# Patient Record
Sex: MALE | Race: WHITE | NOT HISPANIC OR LATINO
[De-identification: names, ages, dates, MRNs, and addresses within clinical notes are randomized per-mention and may not be internally consistent; named-entity substitution may affect disease eponyms.]

---

## 2017-02-10 ENCOUNTER — NON-APPOINTMENT (OUTPATIENT)
Age: 77
End: 2017-02-10

## 2017-02-10 ENCOUNTER — APPOINTMENT (OUTPATIENT)
Dept: CARDIOLOGY | Facility: CLINIC | Age: 77
End: 2017-02-10

## 2017-02-10 VITALS
BODY MASS INDEX: 28.93 KG/M2 | HEART RATE: 56 BPM | DIASTOLIC BLOOD PRESSURE: 69 MMHG | SYSTOLIC BLOOD PRESSURE: 118 MMHG | HEIGHT: 66 IN | WEIGHT: 180 LBS | OXYGEN SATURATION: 96 %

## 2017-02-10 DIAGNOSIS — Z87.898 PERSONAL HISTORY OF OTHER SPECIFIED CONDITIONS: ICD-10-CM

## 2017-02-10 DIAGNOSIS — F15.90 OTHER STIMULANT USE, UNSPECIFIED, UNCOMPLICATED: ICD-10-CM

## 2017-02-10 DIAGNOSIS — Z87.09 PERSONAL HISTORY OF OTHER DISEASES OF THE RESPIRATORY SYSTEM: ICD-10-CM

## 2017-02-10 DIAGNOSIS — N40.0 BENIGN PROSTATIC HYPERPLASIA WITHOUT LOWER URINARY TRACT SYMPMS: ICD-10-CM

## 2017-02-10 DIAGNOSIS — Z87.39 PERSONAL HISTORY OF OTHER DISEASES OF THE MUSCULOSKELETAL SYSTEM AND CONNECTIVE TISSUE: ICD-10-CM

## 2017-02-10 RX ORDER — MEMANTINE HYDROCHLORIDE 10 MG/1
10 TABLET ORAL
Qty: 180 | Refills: 3 | Status: ACTIVE | COMMUNITY

## 2017-03-03 ENCOUNTER — APPOINTMENT (OUTPATIENT)
Dept: CARDIOLOGY | Facility: CLINIC | Age: 77
End: 2017-03-03

## 2017-03-20 ENCOUNTER — APPOINTMENT (OUTPATIENT)
Dept: CARDIOLOGY | Facility: CLINIC | Age: 77
End: 2017-03-20

## 2017-07-10 ENCOUNTER — RESULT REVIEW (OUTPATIENT)
Age: 77
End: 2017-07-10

## 2017-07-10 ENCOUNTER — OUTPATIENT (OUTPATIENT)
Dept: OUTPATIENT SERVICES | Facility: HOSPITAL | Age: 77
LOS: 1 days | Discharge: HOME | End: 2017-07-10
Payer: MEDICARE

## 2017-07-10 ENCOUNTER — TRANSCRIPTION ENCOUNTER (OUTPATIENT)
Age: 77
End: 2017-07-10

## 2017-07-10 DIAGNOSIS — R13.10 DYSPHAGIA, UNSPECIFIED: ICD-10-CM

## 2017-07-10 DIAGNOSIS — Z12.11 ENCOUNTER FOR SCREENING FOR MALIGNANT NEOPLASM OF COLON: ICD-10-CM

## 2017-07-10 PROCEDURE — 88313 SPECIAL STAINS GROUP 2: CPT | Mod: 26

## 2017-07-10 PROCEDURE — 88305 TISSUE EXAM BY PATHOLOGIST: CPT | Mod: 26

## 2017-07-10 PROCEDURE — 88305 TISSUE EXAM BY PATHOLOGIST: CPT

## 2017-07-10 PROCEDURE — 45385 COLONOSCOPY W/LESION REMOVAL: CPT | Mod: PT

## 2017-07-10 PROCEDURE — 88312 SPECIAL STAINS GROUP 1: CPT

## 2017-07-10 PROCEDURE — 43239 EGD BIOPSY SINGLE/MULTIPLE: CPT

## 2017-07-10 PROCEDURE — 88312 SPECIAL STAINS GROUP 1: CPT | Mod: 26

## 2017-07-10 PROCEDURE — 88313 SPECIAL STAINS GROUP 2: CPT

## 2017-07-14 DIAGNOSIS — D12.3 BENIGN NEOPLASM OF TRANSVERSE COLON: ICD-10-CM

## 2017-07-14 DIAGNOSIS — K64.4 RESIDUAL HEMORRHOIDAL SKIN TAGS: ICD-10-CM

## 2017-07-14 DIAGNOSIS — K26.9 DUODENAL ULCER, UNSPECIFIED AS ACUTE OR CHRONIC, WITHOUT HEMORRHAGE OR PERFORATION: ICD-10-CM

## 2017-07-14 DIAGNOSIS — I10 ESSENTIAL (PRIMARY) HYPERTENSION: ICD-10-CM

## 2017-07-14 DIAGNOSIS — Z12.11 ENCOUNTER FOR SCREENING FOR MALIGNANT NEOPLASM OF COLON: ICD-10-CM

## 2017-07-14 DIAGNOSIS — K29.50 UNSPECIFIED CHRONIC GASTRITIS WITHOUT BLEEDING: ICD-10-CM

## 2017-07-14 DIAGNOSIS — B96.81 HELICOBACTER PYLORI [H. PYLORI] AS THE CAUSE OF DISEASES CLASSIFIED ELSEWHERE: ICD-10-CM

## 2017-07-14 DIAGNOSIS — K21.9 GASTRO-ESOPHAGEAL REFLUX DISEASE WITHOUT ESOPHAGITIS: ICD-10-CM

## 2017-07-14 DIAGNOSIS — J44.9 CHRONIC OBSTRUCTIVE PULMONARY DISEASE, UNSPECIFIED: ICD-10-CM

## 2017-07-14 DIAGNOSIS — K64.8 OTHER HEMORRHOIDS: ICD-10-CM

## 2017-07-14 DIAGNOSIS — Z79.82 LONG TERM (CURRENT) USE OF ASPIRIN: ICD-10-CM

## 2017-07-14 DIAGNOSIS — K29.80 DUODENITIS WITHOUT BLEEDING: ICD-10-CM

## 2017-08-02 ENCOUNTER — APPOINTMENT (OUTPATIENT)
Dept: CARDIOLOGY | Facility: CLINIC | Age: 77
End: 2017-08-02

## 2017-09-07 ENCOUNTER — NON-APPOINTMENT (OUTPATIENT)
Age: 77
End: 2017-09-07

## 2017-09-07 ENCOUNTER — APPOINTMENT (OUTPATIENT)
Dept: CARDIOLOGY | Facility: CLINIC | Age: 77
End: 2017-09-07
Payer: MEDICARE

## 2017-09-07 VITALS
WEIGHT: 180 LBS | SYSTOLIC BLOOD PRESSURE: 147 MMHG | BODY MASS INDEX: 28.93 KG/M2 | OXYGEN SATURATION: 93 % | HEART RATE: 66 BPM | DIASTOLIC BLOOD PRESSURE: 76 MMHG | HEIGHT: 66 IN

## 2017-09-07 PROCEDURE — 93000 ELECTROCARDIOGRAM COMPLETE: CPT

## 2017-09-07 PROCEDURE — 99214 OFFICE O/P EST MOD 30 MIN: CPT

## 2018-10-25 ENCOUNTER — APPOINTMENT (OUTPATIENT)
Dept: INTERNAL MEDICINE | Facility: CLINIC | Age: 78
End: 2018-10-25
Payer: MEDICARE

## 2018-10-25 VITALS
WEIGHT: 162 LBS | SYSTOLIC BLOOD PRESSURE: 154 MMHG | TEMPERATURE: 98.7 F | OXYGEN SATURATION: 98 % | HEIGHT: 66 IN | BODY MASS INDEX: 26.03 KG/M2 | DIASTOLIC BLOOD PRESSURE: 68 MMHG | HEART RATE: 74 BPM

## 2018-10-25 DIAGNOSIS — Z87.891 PERSONAL HISTORY OF NICOTINE DEPENDENCE: ICD-10-CM

## 2018-10-25 DIAGNOSIS — R07.89 OTHER CHEST PAIN: ICD-10-CM

## 2018-10-25 DIAGNOSIS — R01.1 CARDIAC MURMUR, UNSPECIFIED: ICD-10-CM

## 2018-10-25 PROCEDURE — 99204 OFFICE O/P NEW MOD 45 MIN: CPT

## 2018-10-25 RX ORDER — AZITHROMYCIN 250 MG/1
250 TABLET, FILM COATED ORAL
Qty: 6 | Refills: 0 | Status: DISCONTINUED | COMMUNITY
Start: 2017-09-07 | End: 2018-10-25

## 2018-10-25 NOTE — PHYSICAL EXAM

## 2018-10-25 NOTE — HISTORY OF PRESENT ILLNESS
[FreeTextEntry1] : establish care [de-identified] : Presents to establish care. Sees cardiologist in-system. Feels well today. No acute complaints other than constipation. Received flu vaccine in [place] this season.

## 2018-10-25 NOTE — ASSESSMENT
[FreeTextEntry1] : \par 78 year old man with dementia brought in by daughter to establish care.\par \par 1. Dementia: cont f/u with neurologist\par \par 2. HCM: flu shot up to date; colonoscopy up to date with GI; check PSA today

## 2018-10-31 LAB
25(OH)D3 SERPL-MCNC: 29.2 NG/ML
ALBUMIN SERPL ELPH-MCNC: 4.7 G/DL
ALP BLD-CCNC: 64 U/L
ALT SERPL-CCNC: 17 U/L
ANION GAP SERPL CALC-SCNC: 16 MMOL/L
AST SERPL-CCNC: 23 U/L
BASOPHILS # BLD AUTO: 0.03 K/UL
BASOPHILS NFR BLD AUTO: 0.5 %
BILIRUB SERPL-MCNC: 0.5 MG/DL
BUN SERPL-MCNC: 14 MG/DL
CALCIUM SERPL-MCNC: 10 MG/DL
CHLORIDE SERPL-SCNC: 104 MMOL/L
CHOLEST SERPL-MCNC: 130 MG/DL
CHOLEST/HDLC SERPL: 3.1 RATIO
CO2 SERPL-SCNC: 22 MMOL/L
CREAT SERPL-MCNC: 1.08 MG/DL
EOSINOPHIL # BLD AUTO: 0.09 K/UL
EOSINOPHIL NFR BLD AUTO: 1.5 %
GLUCOSE SERPL-MCNC: 99 MG/DL
HBA1C MFR BLD HPLC: 5.6 %
HCT VFR BLD CALC: 43.3 %
HCV AB SER QL: NONREACTIVE
HCV S/CO RATIO: 0.14 S/CO
HDLC SERPL-MCNC: 42 MG/DL
HGB BLD-MCNC: 14 G/DL
HIV1+2 AB SPEC QL IA.RAPID: NONREACTIVE
IMM GRANULOCYTES NFR BLD AUTO: 0.2 %
LDLC SERPL CALC-MCNC: 68 MG/DL
LYMPHOCYTES # BLD AUTO: 1.27 K/UL
LYMPHOCYTES NFR BLD AUTO: 20.7 %
MAN DIFF?: NORMAL
MCHC RBC-ENTMCNC: 29 PG
MCHC RBC-ENTMCNC: 32.3 GM/DL
MCV RBC AUTO: 89.8 FL
MONOCYTES # BLD AUTO: 0.55 K/UL
MONOCYTES NFR BLD AUTO: 9 %
NEUTROPHILS # BLD AUTO: 4.18 K/UL
NEUTROPHILS NFR BLD AUTO: 68.1 %
PLATELET # BLD AUTO: 232 K/UL
POTASSIUM SERPL-SCNC: 4 MMOL/L
PROT SERPL-MCNC: 7.3 G/DL
PSA SERPL-MCNC: 1.57 NG/ML
RBC # BLD: 4.82 M/UL
RBC # FLD: 14.4 %
SODIUM SERPL-SCNC: 142 MMOL/L
TRIGL SERPL-MCNC: 102 MG/DL
TSH SERPL-ACNC: 1.11 UIU/ML
WBC # FLD AUTO: 6.13 K/UL

## 2018-11-02 ENCOUNTER — MEDICATION RENEWAL (OUTPATIENT)
Age: 78
End: 2018-11-02

## 2018-11-02 RX ORDER — ALBUTEROL SULFATE 90 UG/1
AEROSOL, METERED RESPIRATORY (INHALATION)
Refills: 0 | Status: DISCONTINUED | COMMUNITY
End: 2018-11-02

## 2018-12-12 ENCOUNTER — APPOINTMENT (OUTPATIENT)
Dept: CARDIOLOGY | Facility: CLINIC | Age: 78
End: 2018-12-12
Payer: MEDICARE

## 2018-12-12 ENCOUNTER — NON-APPOINTMENT (OUTPATIENT)
Age: 78
End: 2018-12-12

## 2018-12-12 VITALS
HEART RATE: 60 BPM | DIASTOLIC BLOOD PRESSURE: 68 MMHG | SYSTOLIC BLOOD PRESSURE: 119 MMHG | WEIGHT: 172 LBS | OXYGEN SATURATION: 95 % | HEIGHT: 66 IN | RESPIRATION RATE: 16 BRPM | BODY MASS INDEX: 27.64 KG/M2

## 2018-12-12 PROCEDURE — 93000 ELECTROCARDIOGRAM COMPLETE: CPT

## 2018-12-12 PROCEDURE — 99214 OFFICE O/P EST MOD 30 MIN: CPT

## 2018-12-12 NOTE — PHYSICAL EXAM
[General Appearance - Well Developed] : well developed [Normal Appearance] : normal appearance [Well Groomed] : well groomed [General Appearance - Well Nourished] : well nourished [No Deformities] : no deformities [General Appearance - In No Acute Distress] : no acute distress [Normal Conjunctiva] : the conjunctiva exhibited no abnormalities [Eyelids - No Xanthelasma] : the eyelids demonstrated no xanthelasmas [Normal Oral Mucosa] : normal oral mucosa [No Oral Pallor] : no oral pallor [No Oral Cyanosis] : no oral cyanosis [Normal Jugular Venous A Waves Present] : normal jugular venous A waves present [Normal Jugular Venous V Waves Present] : normal jugular venous V waves present [No Jugular Venous Ramírez A Waves] : no jugular venous ramírez A waves [Respiration, Rhythm And Depth] : normal respiratory rhythm and effort [Exaggerated Use Of Accessory Muscles For Inspiration] : no accessory muscle use [Auscultation Breath Sounds / Voice Sounds] : lungs were clear to auscultation bilaterally [Heart Rate And Rhythm] : heart rate and rhythm were normal [Heart Sounds] : normal S1 and S2 [Arterial Pulses Normal] : the arterial pulses were normal [Edema] : no peripheral edema present [Systolic grade ___/6] : A grade [unfilled]/6 systolic murmur was heard. [Abdomen Soft] : soft [Abdomen Tenderness] : non-tender [Abdomen Mass (___ Cm)] : no abdominal mass palpated [Abnormal Walk] : normal gait [Gait - Sufficient For Exercise Testing] : the gait was sufficient for exercise testing [Nail Clubbing] : no clubbing of the fingernails [Cyanosis, Localized] : no localized cyanosis [Petechial Hemorrhages (___cm)] : no petechial hemorrhages [Skin Color & Pigmentation] : normal skin color and pigmentation [] : no rash [No Venous Stasis] : no venous stasis [Skin Lesions] : no skin lesions [No Skin Ulcers] : no skin ulcer [No Xanthoma] : no  xanthoma was observed [Oriented To Time, Place, And Person] : oriented to person, place, and time [Affect] : the affect was normal [Mood] : the mood was normal [No Anxiety] : not feeling anxious

## 2018-12-12 NOTE — REASON FOR VISIT
[Follow-Up - Clinic] : a clinic follow-up of [Aortic Stenosis] : aortic stenosis [Hyperlipidemia] : hyperlipidemia [Medication Management] : Medication management

## 2018-12-26 ENCOUNTER — APPOINTMENT (OUTPATIENT)
Dept: CARDIOLOGY | Facility: CLINIC | Age: 78
End: 2018-12-26
Payer: MEDICARE

## 2018-12-26 PROCEDURE — 93306 TTE W/DOPPLER COMPLETE: CPT

## 2018-12-26 NOTE — REVIEW OF SYSTEMS
[Dyspnea on exertion] : dyspnea during exertion [Negative] : Heme/Lymph [Feeling Fatigued] : not feeling fatigued [Shortness Of Breath] : no shortness of breath [Lower Ext Edema] : no extremity edema

## 2018-12-26 NOTE — HISTORY OF PRESENT ILLNESS
[FreeTextEntry1] : Puma is here for f/u\par Continued Mild ACE, christiano up inclinces and stairs\par \par No chest pressure\par No syncope\par No palpitations\par No LE edema\par

## 2019-01-08 ENCOUNTER — LABORATORY RESULT (OUTPATIENT)
Age: 79
End: 2019-01-08

## 2019-01-08 ENCOUNTER — APPOINTMENT (OUTPATIENT)
Dept: CARDIOTHORACIC SURGERY | Facility: CLINIC | Age: 79
End: 2019-01-08
Payer: MEDICARE

## 2019-01-08 ENCOUNTER — NON-APPOINTMENT (OUTPATIENT)
Age: 79
End: 2019-01-08

## 2019-01-08 VITALS
SYSTOLIC BLOOD PRESSURE: 160 MMHG | OXYGEN SATURATION: 96 % | DIASTOLIC BLOOD PRESSURE: 68 MMHG | WEIGHT: 176 LBS | HEIGHT: 66 IN | TEMPERATURE: 98.3 F | HEART RATE: 62 BPM | BODY MASS INDEX: 28.28 KG/M2 | RESPIRATION RATE: 16 BRPM

## 2019-01-08 VITALS — DIASTOLIC BLOOD PRESSURE: 68 MMHG | SYSTOLIC BLOOD PRESSURE: 163 MMHG

## 2019-01-08 DIAGNOSIS — Z80.0 FAMILY HISTORY OF MALIGNANT NEOPLASM OF DIGESTIVE ORGANS: ICD-10-CM

## 2019-01-08 LAB
ALBUMIN SERPL ELPH-MCNC: 4.9 G/DL
ALP BLD-CCNC: 71 U/L
ALT SERPL-CCNC: 21 U/L
ANION GAP SERPL CALC-SCNC: 10 MMOL/L
AST SERPL-CCNC: 22 U/L
BASOPHILS # BLD AUTO: 0.07 K/UL
BASOPHILS NFR BLD AUTO: 1 %
BILIRUB SERPL-MCNC: 0.4 MG/DL
BUN SERPL-MCNC: 16 MG/DL
CALCIUM SERPL-MCNC: 10.1 MG/DL
CHLORIDE SERPL-SCNC: 105 MMOL/L
CO2 SERPL-SCNC: 29 MMOL/L
CREAT SERPL-MCNC: 1.07 MG/DL
EOSINOPHIL # BLD AUTO: 0.3 K/UL
EOSINOPHIL NFR BLD AUTO: 4.6 %
GLUCOSE SERPL-MCNC: 129 MG/DL
HCT VFR BLD CALC: 46.3 %
HGB BLD-MCNC: 15.4 G/DL
LYMPHOCYTES # BLD AUTO: 1.84 K/UL
LYMPHOCYTES NFR BLD AUTO: 27.8 %
MAN DIFF?: NO
MCHC RBC-ENTMCNC: 28.8 PG
MCHC RBC-ENTMCNC: 33.2 GM/DL
MCV RBC AUTO: 86.8 FL
MONOCYTES # BLD AUTO: 0.45 K/UL
MONOCYTES NFR BLD AUTO: 6.8 %
NEUTROPHILS # BLD AUTO: 3.94 K/UL
NEUTROPHILS NFR BLD AUTO: 59.8 %
NT-PROBNP SERPL-MCNC: 262 PG/ML
PLATELET # BLD AUTO: 206 K/UL
POTASSIUM SERPL-SCNC: 4.6 MMOL/L
PROT SERPL-MCNC: 7.5 G/DL
RBC # BLD: 5.33 M/UL
RBC # FLD: 12.3 %
SODIUM SERPL-SCNC: 144 MMOL/L
WBC # FLD AUTO: 6.59 K/UL

## 2019-01-08 PROCEDURE — 93000 ELECTROCARDIOGRAM COMPLETE: CPT

## 2019-01-08 PROCEDURE — 99205 OFFICE O/P NEW HI 60 MIN: CPT

## 2019-01-08 RX ORDER — ASPIRIN 81 MG/1
81 TABLET ORAL DAILY
Refills: 0 | Status: ACTIVE | COMMUNITY

## 2019-01-08 RX ORDER — DONEPEZIL HYDROCHLORIDE 5 MG/1
5 TABLET ORAL
Refills: 0 | Status: ACTIVE | COMMUNITY

## 2019-01-08 NOTE — PHYSICAL EXAM
[General Appearance - Well Developed] : well developed [Well Groomed] : well groomed [General Appearance - Well Nourished] : well nourished [General Appearance - In No Acute Distress] : no acute distress [Eyelids - No Xanthelasma] : the eyelids demonstrated no xanthelasmas [No Oral Pallor] : no oral pallor [No Oral Cyanosis] : no oral cyanosis [Normal Jugular Venous V Waves Present] : normal jugular venous V waves present [No Jugular Venous Ramírez A Waves] : no jugular venous ramírez A waves [Normal Rate] : normal [Rhythm Regular] : regular [II] : a grade 2 [No Pitting Edema] : no pitting edema present [Respiration, Rhythm And Depth] : normal respiratory rhythm and effort [Exaggerated Use Of Accessory Muscles For Inspiration] : no accessory muscle use [Auscultation Breath Sounds / Voice Sounds] : lungs were clear to auscultation bilaterally [Bowel Sounds] : normal bowel sounds [Abdomen Soft] : soft [Abdomen Tenderness] : non-tender [Abnormal Walk] : normal gait [Nail Clubbing] : no clubbing of the fingernails [Cyanosis, Localized] : no localized cyanosis [Petechial Hemorrhages (___cm)] : no petechial hemorrhages [Skin Color & Pigmentation] : normal skin color and pigmentation [] : no rash [No Venous Stasis] : no venous stasis [Skin Lesions] : no skin lesions [No Skin Ulcers] : no skin ulcer [No Xanthoma] : no  xanthoma was observed [Oriented To Time, Place, And Person] : oriented to person, place, and time [Affect] : the affect was normal [Mood] : the mood was normal [No Anxiety] : not feeling anxious

## 2019-01-08 NOTE — DISCUSSION/SUMMARY
[Aortic Stenosis] : aortic stenosis [Stable] : stable [None] : none [Patient] : the patient [Family] : the patient's family [FreeTextEntry1] : Mr Joseph has aortic stenosis with progression on his recent TTE as compared to 2017, which prompted his referral here today.  He reports no change in his functional capacity and is able to do all of his desired activities without limitation--his daughter corroborates that this is in fact the case.  He remains active, climbs stairs, and gardens without reported issue.  Dr Sterling and I have discussed with his daughter our mutual concern regarding the potential for his neurocognitive function to decline following valvular intervention, and we have agreed to a strategy of close follow up without intervention at this time.  He will continue to follow up closely with Dr Coreas--his next appointment is in March. Should there be any changes in his clinical status, I asked his daughter to notify either Dr Coreas or our team, at which time options will be reconsidered (i.e. a BAV would not be unreasonable).

## 2019-01-08 NOTE — REVIEW OF SYSTEMS
[see HPI] : see HPI [Shortness Of Breath] : no shortness of breath [Dyspnea on exertion] : not dyspnea during exertion [Lower Ext Edema] : no extremity edema [Palpitations] : no palpitations [Memory Lapses Or Loss] : memory lapses or loss [Depression] : depression [Negative] : Heme/Lymph

## 2019-01-08 NOTE — HISTORY OF PRESENT ILLNESS
[FreeTextEntry1] : Mr. Ruiz is an 78 year old gentleman, referred by Dr. Coreas for evaluation of aortic stenosis with recent progression on his TTE as compared to his prior exam in 2017.  His PMH includes dementia (on Namenda and Aricept), BPH, and asthma.  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  He just returned from [place] this past October after a year and notes feeling fatigued "with heavy stuff" but not with normal daily activities.  He walks extensively without issue.  He has no angina, dyspnea, or syncopal symptoms.  His daughter notes that he is difficult at times (agitated) due to his baseline cognitive impairment but they are "getting by".  He looks comfortable today in the office.

## 2019-01-10 ENCOUNTER — APPOINTMENT (OUTPATIENT)
Dept: CARDIOLOGY | Facility: CLINIC | Age: 79
End: 2019-01-10

## 2019-02-08 ENCOUNTER — APPOINTMENT (OUTPATIENT)
Dept: INTERNAL MEDICINE | Facility: CLINIC | Age: 79
End: 2019-02-08
Payer: MEDICARE

## 2019-02-08 VITALS
WEIGHT: 176 LBS | HEIGHT: 66 IN | SYSTOLIC BLOOD PRESSURE: 140 MMHG | HEART RATE: 63 BPM | BODY MASS INDEX: 28.28 KG/M2 | DIASTOLIC BLOOD PRESSURE: 80 MMHG | OXYGEN SATURATION: 97 %

## 2019-02-08 PROCEDURE — G0442 ANNUAL ALCOHOL SCREEN 15 MIN: CPT | Mod: 59

## 2019-02-08 PROCEDURE — G0444 DEPRESSION SCREEN ANNUAL: CPT | Mod: 59

## 2019-02-08 PROCEDURE — 99214 OFFICE O/P EST MOD 30 MIN: CPT

## 2019-02-08 NOTE — REVIEW OF SYSTEMS
[Fever] : no fever [Chills] : no chills [Fatigue] : no fatigue [Hot Flashes] : no hot flashes [Night Sweats] : no night sweats [Recent Change In Weight] : ~T no recent weight change [Discharge] : no discharge [Pain] : no pain [Redness] : no redness [Dryness] : no dryness [Vision Problems] : no vision problems [Itching] : no itching [Earache] : no earache [Hearing Loss] : no hearing loss [Nosebleeds] : no nosebleeds [Postnasal Drip] : no postnasal drip [Nasal Discharge] : no nasal discharge [Sore Throat] : no sore throat [Hoarseness] : no hoarseness [Chest Pain] : no chest pain [Palpitations] : no palpitations [Claudication] : no  leg claudication [Lower Ext Edema] : no lower extremity edema [Orthopena] : no orthopnea [Paroysmal Nocturnal Dyspnea] : no paroysmal nocturnal dyspnea [Shortness Of Breath] : no shortness of breath [Wheezing] : no wheezing [Cough] : no cough [Dyspnea on Exertion] : not dyspnea on exertion [Abdominal Pain] : no abdominal pain [Nausea] : no nausea [Constipation] : no constipation [Diarrhea] : no diarrhea [Vomiting] : no vomiting [Heartburn] : no heartburn [Melena] : no melena [Dysuria] : no dysuria [Incontinence] : no incontinence [Hesitancy] : no hesitancy [Nocturia] : nocturia [Hematuria] : no hematuria [Frequency] : frequency [Impotence] : no impotency [Poor Libido] : libido not poor [Joint Pain] : no joint pain [Joint Stiffness] : no joint stiffness [Muscle Pain] : no muscle pain [Muscle Weakness] : no muscle weakness [Back Pain] : no back pain [Joint Swelling] : no joint swelling [Itching] : no itching [Mole Changes] : no mole changes [Nail Changes] : no nail changes [Hair Changes] : no hair changes [Skin Rash] : no skin rash [Headache] : no headache [Dizziness] : no dizziness [Fainting] : no fainting [Confusion] : no confusion [Unsteady Walk] : no ataxia [Memory Loss] : no memory loss [Suicidal] : not suicidal [Insomnia] : no insomnia [Anxiety] : no anxiety [Depression] : no depression [Easy Bleeding] : no easy bleeding [Easy Bruising] : no easy bruising [Swollen Glands] : no swollen glands

## 2019-02-08 NOTE — ASSESSMENT
[FreeTextEntry1] : \par Medical Issue 1: Aortic Stenosis: coordinated follow-up and plan of care with cardiology team; continue to observe and consider surgical intervention if develops SOB, angina or syncope.\par \par Medical Issue 2: Dementia: prescribed Namenda and Donepezil medications; coordinated follow-up with neurologist for further evaluation; patient's family refusing nursing home placement at this time\par \par Medical Issue 3: High cholesterol: prescribed Simvastatin 40mg qhs and check lipid panel fasting for target LDL <70\par \par Medical Issue 4: BPH: not well controlled; pt has worsening frequency; prescribed Proscar and Flomax medications and coordinated follow-up care with Urologist\par \par Depression screen performed today, PHQ2=0\par \par Annual alcohol misuse screen, 15 mins, done; negative

## 2019-02-08 NOTE — HEALTH RISK ASSESSMENT
[] : No [No falls in past year] : Patient reported no falls in the past year [0] : 2) Feeling down, depressed, or hopeless: Not at all (0) [de-identified] : Urologist, Cardiologist [[id number]] : 0

## 2019-02-14 ENCOUNTER — APPOINTMENT (OUTPATIENT)
Dept: PULMONOLOGY | Facility: CLINIC | Age: 79
End: 2019-02-14
Payer: MEDICARE

## 2019-02-14 VITALS
RESPIRATION RATE: 16 BRPM | DIASTOLIC BLOOD PRESSURE: 68 MMHG | HEART RATE: 65 BPM | BODY MASS INDEX: 29.05 KG/M2 | WEIGHT: 180 LBS | OXYGEN SATURATION: 96 % | TEMPERATURE: 97.5 F | SYSTOLIC BLOOD PRESSURE: 155 MMHG

## 2019-02-14 PROCEDURE — 99204 OFFICE O/P NEW MOD 45 MIN: CPT

## 2019-02-14 RX ORDER — UMECLIDINIUM BROMIDE AND VILANTEROL TRIFENATATE 62.5; 25 UG/1; UG/1
62.5-25 POWDER RESPIRATORY (INHALATION)
Refills: 0 | Status: DISCONTINUED | COMMUNITY
End: 2019-02-14

## 2019-02-14 NOTE — HISTORY OF PRESENT ILLNESS
[FreeTextEntry1] : 78 year old male with dementia, severe AS( aortic valve area is 0.8, pressure gradient is 78) Also former smoker, 1  PPD for 50 years.  Respiratory symptoms started  20 years ago.  He was smoking up until 5 years ago.  Becomes dyspneic with walking and uses Ventolin quite frequently.  Denies episodes of bronchitis recently.  Lived in [facility] for two years and was reportedly well there, however dementia has become an issue.  Denied chest pain or lower extremity edema.  \par \par Did not perform PFTs today.  Daughter was concerned that he could have difficulty related to his AS.

## 2019-02-14 NOTE — PHYSICAL EXAM
[General Appearance - Well Developed] : well developed [Normal Appearance] : normal appearance [Well Groomed] : well groomed [General Appearance - Well Nourished] : well nourished [No Deformities] : no deformities [General Appearance - In No Acute Distress] : no acute distress [Normal Conjunctiva] : the conjunctiva exhibited no abnormalities [Eyelids - No Xanthelasma] : the eyelids demonstrated no xanthelasmas [III] : III [Neck Appearance] : the appearance of the neck was normal [Neck Cervical Mass (___cm)] : no neck mass was observed [Jugular Venous Distention Increased] : there was no jugular-venous distention [Thyroid Diffuse Enlargement] : the thyroid was not enlarged [Thyroid Nodule] : there were no palpable thyroid nodules [Heart Rate And Rhythm] : heart rate and rhythm were normal [Heart Sounds] : normal S1 and S2 [Edema] : no peripheral edema present [Systolic grade ___/6] : A grade [unfilled]/6 systolic murmur was heard. [Respiration, Rhythm And Depth] : normal respiratory rhythm and effort [Exaggerated Use Of Accessory Muscles For Inspiration] : no accessory muscle use [Auscultation Breath Sounds / Voice Sounds] : lungs were clear to auscultation bilaterally [Abnormal Walk] : normal gait [Gait - Sufficient For Exercise Testing] : the gait was sufficient for exercise testing [Nail Clubbing] : no clubbing of the fingernails [Cyanosis, Localized] : no localized cyanosis [Petechial Hemorrhages (___cm)] : no petechial hemorrhages [Skin Color & Pigmentation] : normal skin color and pigmentation [Skin Turgor] : normal skin turgor [] : no rash [No Focal Deficits] : no focal deficits [Oriented To Time, Place, And Person] : oriented to person, place, and time [Impaired Insight] : insight and judgment were intact [Affect] : the affect was normal

## 2019-02-14 NOTE — REVIEW OF SYSTEMS
[Ear Disturbance] : ear disturbance [As Noted in HPI] : as noted in HPI [Frequency] : urinary frequency [Arthralgias] : arthralgias [Memory Loss] : ~T memory lapses or loss [Confusion] : confusion [Negative] : Sleep Disorder

## 2019-02-14 NOTE — ASSESSMENT
[FreeTextEntry1] : Mr. Ruiz is an 78 year old male with dementia, aortic stenosis that is at least moderate, former 50 pack year smoker who quit less than 5 years ago.  He carries a diagnosis of "asthma" but it is likely COPD.  He is on ICS/LAMA/LABA and is using a ZENA quite frequently.  Did not perform PFTs today because family was concerned given his AS.  Difficult to obtain history secondary to dementia.\par \par On PE today, VSS.  Oxygen saturation at rest at room air is 96%.  HEENT is notable for acrowded oropharynx.  His lungs are clear without rales or wheezes.  Heart is RRR with soft systolic murmur.  There is no edema.  \par \par Imp- NOt clear if breathing difficulty is related to smoking (COPD) or cardiac condition.  \par \par Plan\par Continue INcruse and ADvair for now.  Advised patient to try not to use Ventolin so frequently.  ADvised to rest, if breathing difficulty does not improve he can use Ventolin then.  \par Will perform complete pulmonary function test if OK with Dr. Coreas, cardiology\par Will discuss ordering CT screening exam for lung cancer with daughter and patient at next visit.

## 2019-03-23 ENCOUNTER — LABORATORY RESULT (OUTPATIENT)
Age: 79
End: 2019-03-23

## 2019-03-27 ENCOUNTER — NON-APPOINTMENT (OUTPATIENT)
Age: 79
End: 2019-03-27

## 2019-03-27 ENCOUNTER — APPOINTMENT (OUTPATIENT)
Dept: CARDIOLOGY | Facility: CLINIC | Age: 79
End: 2019-03-27
Payer: MEDICARE

## 2019-03-27 VITALS
RESPIRATION RATE: 16 BRPM | SYSTOLIC BLOOD PRESSURE: 133 MMHG | BODY MASS INDEX: 29.57 KG/M2 | DIASTOLIC BLOOD PRESSURE: 71 MMHG | HEART RATE: 67 BPM | HEIGHT: 66 IN | WEIGHT: 184 LBS | OXYGEN SATURATION: 95 %

## 2019-03-27 PROCEDURE — 99214 OFFICE O/P EST MOD 30 MIN: CPT

## 2019-03-27 PROCEDURE — 93000 ELECTROCARDIOGRAM COMPLETE: CPT

## 2019-03-31 NOTE — HISTORY OF PRESENT ILLNESS
[FreeTextEntry1] : Puma is here for f/u\par Continued Mild ACE - unchanged\par \par No chest pressure\par No syncope\par No palpitations\par No LE edema\par

## 2019-04-01 ENCOUNTER — RX RENEWAL (OUTPATIENT)
Age: 79
End: 2019-04-01

## 2019-04-01 RX ORDER — FLUTICASONE PROPIONATE AND SALMETEROL 250; 50 UG/1; UG/1
250-50 POWDER RESPIRATORY (INHALATION) TWICE DAILY
Qty: 1 | Refills: 3 | Status: COMPLETED | COMMUNITY
Start: 2018-11-02 | End: 2019-07-30

## 2019-05-02 ENCOUNTER — APPOINTMENT (OUTPATIENT)
Dept: COLORECTAL SURGERY | Facility: CLINIC | Age: 79
End: 2019-05-02
Payer: MEDICARE

## 2019-05-02 VITALS
BODY MASS INDEX: 26.52 KG/M2 | WEIGHT: 175 LBS | HEIGHT: 68 IN | RESPIRATION RATE: 16 BRPM | HEART RATE: 63 BPM | OXYGEN SATURATION: 94 % | SYSTOLIC BLOOD PRESSURE: 151 MMHG | DIASTOLIC BLOOD PRESSURE: 72 MMHG

## 2019-05-02 PROCEDURE — 46600 DIAGNOSTIC ANOSCOPY SPX: CPT

## 2019-05-02 PROCEDURE — 99204 OFFICE O/P NEW MOD 45 MIN: CPT | Mod: 25

## 2019-05-02 RX ORDER — LATANOPROST 50 UG/ML
0.01 SOLUTION OPHTHALMIC
Refills: 0 | Status: ACTIVE | COMMUNITY

## 2019-05-02 NOTE — REASON FOR VISIT
[Spouse] : spouse [Consultation] : a consultation visit [Family Member] : family member [FreeTextEntry1] : Patient intake forms reviewed

## 2019-05-02 NOTE — PHYSICAL EXAM
[Normal Breath Sounds] : Normal breath sounds [Murmur] : murmur was appreciated [No Rash or Lesion] : No rash or lesion [Alert] : alert [Oriented to Person] : oriented to person [Oriented to Place] : oriented to place [Calm] : calm [Oriented to Time] : oriented to time [de-identified] : elderly male [de-identified] : obese abdomen, +BS [de-identified] : NC/AT [de-identified] : Intact [de-identified] : JAYASHREE/+ROM

## 2019-05-02 NOTE — REVIEW OF SYSTEMS
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

## 2019-05-02 NOTE — CONSULT LETTER
[Dear  ___] : Dear  [unfilled], [Consult Letter:] : I had the pleasure of evaluating your patient, [unfilled]. [Please see my note below.] : Please see my note below. [FreeTextEntry2] : Zay Martinez [Sincerely,] : Sincerely, [Consult Closing:] : Thank you very much for allowing me to participate in the care of this patient.  If you have any questions, please do not hesitate to contact me. [FreeTextEntry3] : Brian Irwin MD FACS\par Chief Colon and Rectal Surgery\par [facility]

## 2019-05-02 NOTE — HISTORY OF PRESENT ILLNESS
[FreeTextEntry1] : Patient is a 80 yo [place] male here to discuss a tear in his rectum.  Patient does complain of pain with bowel movements.  He was sent by his GI.  The patient does have dementia so the extent of his description is limited.  He was given creams and stool softner but states no improvement.Pain worse with bowel movements. Denies bleeding.

## 2019-05-03 RX ORDER — HYDROCORTISONE 25 MG/G
2.5 CREAM TOPICAL
Qty: 1 | Refills: 1 | Status: ACTIVE | COMMUNITY
Start: 2019-05-03 | End: 1900-01-01

## 2019-05-03 RX ORDER — HYDROCORTISONE 25 MG/G
2.5 CREAM TOPICAL
Qty: 2 | Refills: 5 | Status: ACTIVE | COMMUNITY
Start: 2019-05-03 | End: 1900-01-01

## 2019-05-16 ENCOUNTER — APPOINTMENT (OUTPATIENT)
Dept: INTERNAL MEDICINE | Facility: CLINIC | Age: 79
End: 2019-05-16
Payer: MEDICARE

## 2019-05-16 VITALS
SYSTOLIC BLOOD PRESSURE: 150 MMHG | BODY MASS INDEX: 27.58 KG/M2 | HEIGHT: 68 IN | HEART RATE: 73 BPM | DIASTOLIC BLOOD PRESSURE: 66 MMHG | OXYGEN SATURATION: 97 % | WEIGHT: 182 LBS

## 2019-05-16 PROCEDURE — 99214 OFFICE O/P EST MOD 30 MIN: CPT

## 2019-05-16 NOTE — ASSESSMENT
[FreeTextEntry1] : \par Medical Issue 1: Aortic Stenosis: coordinated follow-up and plan of care with cardiology team; continue to observe and consider surgical intervention if develops SOB, angina or syncope.\par \par Medical Issue 2: Dementia: prescribed Namenda and Donepezil medications; coordinated follow-up with neurologist for further evaluation; patient's family refusing nursing home placement at this time\par \par Medical Issue 3: High cholesterol: prescribed Simvastatin 40mg qhs and check lipid panel fasting for target LDL <70\par \par Medical Issue 4: BPH: not well controlled; pt has worsening frequency; prescribed Proscar and Flomax medications and coordinated follow-up care with Urologist

## 2019-05-16 NOTE — HEALTH RISK ASSESSMENT
[No falls in past year] : Patient reported no falls in the past year [0] : 2) Feeling down, depressed, or hopeless: Not at all (0) [] : No [de-identified] : Urologist, Cardiologist [[id number]] : 0

## 2019-05-16 NOTE — PHYSICAL EXAM
[No Acute Distress] : no acute distress [Well Nourished] : well nourished [Well-Appearing] : well-appearing [Well Developed] : well developed [Normal Sclera/Conjunctiva] : normal sclera/conjunctiva [PERRL] : pupils equal round and reactive to light [Normal Outer Ear/Nose] : the outer ears and nose were normal in appearance [EOMI] : extraocular movements intact [Supple] : supple [Normal Oropharynx] : the oropharynx was normal [No JVD] : no jugular venous distention [No Lymphadenopathy] : no lymphadenopathy [Thyroid Normal, No Nodules] : the thyroid was normal and there were no nodules present [Clear to Auscultation] : lungs were clear to auscultation bilaterally [No Respiratory Distress] : no respiratory distress  [No Accessory Muscle Use] : no accessory muscle use [Regular Rhythm] : with a regular rhythm [Normal Rate] : normal rate  [Normal S1, S2] : normal S1 and S2 [No Carotid Bruits] : no carotid bruits [No Murmur] : no murmur heard [No Abdominal Bruit] : a ~M bruit was not heard ~T in the abdomen [No Varicosities] : no varicosities [No Extremity Clubbing/Cyanosis] : no extremity clubbing/cyanosis [No Edema] : there was no peripheral edema [Pedal Pulses Present] : the pedal pulses are present [Soft] : abdomen soft [No Palpable Aorta] : no palpable aorta [Non Tender] : non-tender [No HSM] : no HSM [Non-distended] : non-distended [No Masses] : no abdominal mass palpated [Normal Bowel Sounds] : normal bowel sounds [Normal Posterior Cervical Nodes] : no posterior cervical lymphadenopathy [Normal Anterior Cervical Nodes] : no anterior cervical lymphadenopathy [No CVA Tenderness] : no CVA  tenderness [No Spinal Tenderness] : no spinal tenderness [No Joint Swelling] : no joint swelling [Grossly Normal Strength/Tone] : grossly normal strength/tone [No Rash] : no rash [Normal Gait] : normal gait [Coordination Grossly Intact] : coordination grossly intact [No Focal Deficits] : no focal deficits [Deep Tendon Reflexes (DTR)] : deep tendon reflexes were 2+ and symmetric [Normal Affect] : the affect was normal [Normal Insight/Judgement] : insight and judgment were intact

## 2019-05-16 NOTE — REVIEW OF SYSTEMS
[Nocturia] : nocturia [Frequency] : frequency [Fever] : no fever [Fatigue] : no fatigue [Chills] : no chills [Hot Flashes] : no hot flashes [Night Sweats] : no night sweats [Recent Change In Weight] : ~T no recent weight change [Discharge] : no discharge [Pain] : no pain [Redness] : no redness [Dryness] : no dryness [Vision Problems] : no vision problems [Itching] : no itching [Earache] : no earache [Hearing Loss] : no hearing loss [Nosebleeds] : no nosebleeds [Postnasal Drip] : no postnasal drip [Nasal Discharge] : no nasal discharge [Sore Throat] : no sore throat [Hoarseness] : no hoarseness [Chest Pain] : no chest pain [Palpitations] : no palpitations [Claudication] : no  leg claudication [Lower Ext Edema] : no lower extremity edema [Orthopena] : no orthopnea [Paroysmal Nocturnal Dyspnea] : no paroysmal nocturnal dyspnea [Shortness Of Breath] : no shortness of breath [Wheezing] : no wheezing [Cough] : no cough [Dyspnea on Exertion] : not dyspnea on exertion [Abdominal Pain] : no abdominal pain [Constipation] : no constipation [Nausea] : no nausea [Diarrhea] : no diarrhea [Vomiting] : no vomiting [Melena] : no melena [Heartburn] : no heartburn [Dysuria] : no dysuria [Incontinence] : no incontinence [Hesitancy] : no hesitancy [Hematuria] : no hematuria [Impotence] : no impotency [Poor Libido] : libido not poor [Joint Pain] : no joint pain [Joint Stiffness] : no joint stiffness [Muscle Pain] : no muscle pain [Muscle Weakness] : no muscle weakness [Back Pain] : no back pain [Joint Swelling] : no joint swelling [Itching] : no itching [Mole Changes] : no mole changes [Nail Changes] : no nail changes [Hair Changes] : no hair changes [Skin Rash] : no skin rash [Headache] : no headache [Dizziness] : no dizziness [Fainting] : no fainting [Unsteady Walk] : no ataxia [Confusion] : no confusion [Memory Loss] : no memory loss [Insomnia] : no insomnia [Suicidal] : not suicidal [Anxiety] : no anxiety [Depression] : no depression [Easy Bruising] : no easy bruising [Easy Bleeding] : no easy bleeding [Swollen Glands] : no swollen glands

## 2019-05-16 NOTE — HISTORY OF PRESENT ILLNESS
[Spouse] : spouse [Other: _____] : [unfilled] [FreeTextEntry1] : Presents for follow-up of aortic stenosis, dementia, high cholesterol and BPH. [de-identified] : \par Medical Issue 1: Aortic Stenosis: patient saw cardiologist since last visit with me and underwent ECHO showing moderate AS but he currently denies SOB, syncope and chest pain; he says he is agreeable to watch and observe plan as per cardiology\par \par Medical Issue 2: Dementia: patient's wife and daughter say this is getting worse; but patient says he does not notice any worsening; family says he forgets things easily and they do not want him going to [facility] by himself anymore; family is concerned about his safety but they do not want him to go to nursing home; they are with him at home 24/7\par \par Medical Issue 3: High cholesterol: patient admits to difficulty adhering to low-fat diet; he is struggling with this and needs assistance for healthy lifestyle; he is compliant with his statin medication\par \par Medical Issue 4: BPH: pt reports increase in urinary frequency; having to wake up several times per night to urinate; he is compliant with Proscar and Flomax; he denies blood in urine, flank pain, fevers and chills; he also denies dysuria

## 2019-06-28 ENCOUNTER — APPOINTMENT (OUTPATIENT)
Dept: INTERNAL MEDICINE | Facility: CLINIC | Age: 79
End: 2019-06-28
Payer: MEDICARE

## 2019-06-28 VITALS
BODY MASS INDEX: 27.28 KG/M2 | HEART RATE: 68 BPM | WEIGHT: 180 LBS | TEMPERATURE: 98.5 F | SYSTOLIC BLOOD PRESSURE: 134 MMHG | DIASTOLIC BLOOD PRESSURE: 64 MMHG | HEIGHT: 68 IN | OXYGEN SATURATION: 97 %

## 2019-06-28 PROCEDURE — 99215 OFFICE O/P EST HI 40 MIN: CPT

## 2019-06-28 NOTE — PHYSICAL EXAM
[Well Nourished] : well nourished [No Acute Distress] : no acute distress [Normal Sclera/Conjunctiva] : normal sclera/conjunctiva [Well-Appearing] : well-appearing [Well Developed] : well developed [PERRL] : pupils equal round and reactive to light [EOMI] : extraocular movements intact [Normal Outer Ear/Nose] : the outer ears and nose were normal in appearance [Normal Oropharynx] : the oropharynx was normal [No JVD] : no jugular venous distention [Supple] : supple [No Respiratory Distress] : no respiratory distress  [Thyroid Normal, No Nodules] : the thyroid was normal and there were no nodules present [No Lymphadenopathy] : no lymphadenopathy [No Accessory Muscle Use] : no accessory muscle use [Clear to Auscultation] : lungs were clear to auscultation bilaterally [Normal Rate] : normal rate  [Regular Rhythm] : with a regular rhythm [No Carotid Bruits] : no carotid bruits [Normal S1, S2] : normal S1 and S2 [No Murmur] : no murmur heard [No Abdominal Bruit] : a ~M bruit was not heard ~T in the abdomen [No Varicosities] : no varicosities [Pedal Pulses Present] : the pedal pulses are present [No Extremity Clubbing/Cyanosis] : no extremity clubbing/cyanosis [No Palpable Aorta] : no palpable aorta [No Edema] : there was no peripheral edema [Soft] : abdomen soft [Non Tender] : non-tender [No Masses] : no abdominal mass palpated [Non-distended] : non-distended [No HSM] : no HSM [Normal Bowel Sounds] : normal bowel sounds [Normal Anterior Cervical Nodes] : no anterior cervical lymphadenopathy [Normal Posterior Cervical Nodes] : no posterior cervical lymphadenopathy [No Joint Swelling] : no joint swelling [No CVA Tenderness] : no CVA  tenderness [No Spinal Tenderness] : no spinal tenderness [No Rash] : no rash [Grossly Normal Strength/Tone] : grossly normal strength/tone [Normal Gait] : normal gait [Deep Tendon Reflexes (DTR)] : deep tendon reflexes were 2+ and symmetric [Coordination Grossly Intact] : coordination grossly intact [No Focal Deficits] : no focal deficits [Normal Insight/Judgement] : insight and judgment were intact [Normal Affect] : the affect was normal

## 2019-06-28 NOTE — HEALTH RISK ASSESSMENT
[No falls in past year] : Patient reported no falls in the past year [] : No [de-identified] : Urologist, Cardiologist [[id number]] : 0 [0] : 2) Feeling down, depressed, or hopeless: Not at all (0)

## 2019-06-28 NOTE — REVIEW OF SYSTEMS
[Fever] : no fever [Chills] : no chills [Fatigue] : no fatigue [Night Sweats] : no night sweats [Hot Flashes] : no hot flashes [Recent Change In Weight] : ~T no recent weight change [Discharge] : no discharge [Pain] : no pain [Redness] : no redness [Vision Problems] : no vision problems [Dryness] : no dryness [Earache] : no earache [Hearing Loss] : no hearing loss [Itching] : no itching [Postnasal Drip] : no postnasal drip [Nasal Discharge] : no nasal discharge [Nosebleeds] : no nosebleeds [Hoarseness] : no hoarseness [Sore Throat] : no sore throat [Chest Pain] : no chest pain [Palpitations] : no palpitations [Claudication] : no  leg claudication [Paroysmal Nocturnal Dyspnea] : no paroysmal nocturnal dyspnea [Lower Ext Edema] : no lower extremity edema [Orthopena] : no orthopnea [Shortness Of Breath] : no shortness of breath [Wheezing] : no wheezing [Abdominal Pain] : no abdominal pain [Dyspnea on Exertion] : not dyspnea on exertion [Cough] : no cough [Diarrhea] : no diarrhea [Constipation] : no constipation [Nausea] : no nausea [Melena] : no melena [Vomiting] : no vomiting [Heartburn] : no heartburn [Hesitancy] : no hesitancy [Incontinence] : no incontinence [Dysuria] : no dysuria [Frequency] : frequency [Hematuria] : no hematuria [Nocturia] : nocturia [Poor Libido] : libido not poor [Impotence] : no impotency [Joint Pain] : no joint pain [Muscle Pain] : no muscle pain [Joint Stiffness] : no joint stiffness [Joint Swelling] : no joint swelling [Back Pain] : no back pain [Muscle Weakness] : no muscle weakness [Mole Changes] : no mole changes [Nail Changes] : no nail changes [Headache] : no headache [Skin Rash] : no skin rash [Hair Changes] : no hair changes [Dizziness] : no dizziness [Confusion] : no confusion [Fainting] : no fainting [Memory Loss] : no memory loss [Unsteady Walk] : no ataxia [Insomnia] : no insomnia [Suicidal] : not suicidal [Depression] : no depression [Easy Bleeding] : no easy bleeding [Easy Bruising] : no easy bruising [Anxiety] : no anxiety [Swollen Glands] : no swollen glands

## 2019-06-28 NOTE — HISTORY OF PRESENT ILLNESS
[Spouse] : spouse [Other: _____] : [unfilled] [FreeTextEntry1] : Presents for follow-up of aortic stenosis, dementia, high cholesterol and BPH. [de-identified] : \par Medical Issue 1: Aortic Stenosis: patient saw cardiologist since last visit with me and underwent ECHO showing moderate AS but he currently denies SOB, syncope and chest pain; he says he is agreeable to watch and observe plan as per cardiology\par \par Medical Issue 2: Dementia: patient's wife and daughter say this is getting worse; but patient says he does not notice any worsening; family says he forgets things easily and they do not want him going to [facility] by himself anymore; family is concerned about his safety but they do not want him to go to nursing home; they are with him at home 24/7\par \par Medical Issue 3: High cholesterol: patient admits to difficulty adhering to low-fat diet; he is struggling with this and needs assistance for healthy lifestyle; he is compliant with his statin medication\par \par Medical Issue 4: BPH: pt reports increase in urinary frequency; having to wake up several times per night to urinate; he is compliant with Proscar and Flomax; he denies blood in urine, flank pain, fevers and chills; he also denies dysuria

## 2019-07-11 ENCOUNTER — NON-APPOINTMENT (OUTPATIENT)
Age: 79
End: 2019-07-11

## 2019-07-11 ENCOUNTER — APPOINTMENT (OUTPATIENT)
Dept: CARDIOLOGY | Facility: CLINIC | Age: 79
End: 2019-07-11
Payer: MEDICARE

## 2019-07-11 VITALS — HEART RATE: 60 BPM | DIASTOLIC BLOOD PRESSURE: 57 MMHG | OXYGEN SATURATION: 95 % | SYSTOLIC BLOOD PRESSURE: 125 MMHG

## 2019-07-11 PROCEDURE — 93000 ELECTROCARDIOGRAM COMPLETE: CPT

## 2019-07-11 PROCEDURE — 99214 OFFICE O/P EST MOD 30 MIN: CPT

## 2019-07-11 NOTE — REASON FOR VISIT
[Follow-Up - Clinic] : a clinic follow-up of [Aortic Stenosis] : aortic stenosis [Medication Management] : Medication management [Hyperlipidemia] : hyperlipidemia

## 2019-07-11 NOTE — PHYSICAL EXAM
[Normal Appearance] : normal appearance [General Appearance - Well Developed] : well developed [Well Groomed] : well groomed [General Appearance - Well Nourished] : well nourished [No Deformities] : no deformities [General Appearance - In No Acute Distress] : no acute distress [Normal Conjunctiva] : the conjunctiva exhibited no abnormalities [Eyelids - No Xanthelasma] : the eyelids demonstrated no xanthelasmas [Normal Oral Mucosa] : normal oral mucosa [No Oral Pallor] : no oral pallor [No Oral Cyanosis] : no oral cyanosis [Normal Jugular Venous A Waves Present] : normal jugular venous A waves present [Normal Jugular Venous V Waves Present] : normal jugular venous V waves present [No Jugular Venous Ramírez A Waves] : no jugular venous ramírez A waves [Respiration, Rhythm And Depth] : normal respiratory rhythm and effort [Exaggerated Use Of Accessory Muscles For Inspiration] : no accessory muscle use [Auscultation Breath Sounds / Voice Sounds] : lungs were clear to auscultation bilaterally [Heart Rate And Rhythm] : heart rate and rhythm were normal [Heart Sounds] : normal S1 and S2 [Arterial Pulses Normal] : the arterial pulses were normal [Edema] : no peripheral edema present [Systolic grade ___/6] : A grade [unfilled]/6 systolic murmur was heard. [Abdomen Soft] : soft [Abdomen Tenderness] : non-tender [Abdomen Mass (___ Cm)] : no abdominal mass palpated [Abnormal Walk] : normal gait [Nail Clubbing] : no clubbing of the fingernails [Gait - Sufficient For Exercise Testing] : the gait was sufficient for exercise testing [Petechial Hemorrhages (___cm)] : no petechial hemorrhages [Cyanosis, Localized] : no localized cyanosis [] : no rash [Skin Color & Pigmentation] : normal skin color and pigmentation [No Venous Stasis] : no venous stasis [Skin Lesions] : no skin lesions [No Skin Ulcers] : no skin ulcer [No Xanthoma] : no  xanthoma was observed [Oriented To Time, Place, And Person] : oriented to person, place, and time [Affect] : the affect was normal [Mood] : the mood was normal [No Anxiety] : not feeling anxious

## 2019-07-14 LAB
ALBUMIN SERPL ELPH-MCNC: 4.2 G/DL
ALP BLD-CCNC: 52 U/L
ALT SERPL-CCNC: 21 U/L
ANION GAP SERPL CALC-SCNC: 14 MMOL/L
AST SERPL-CCNC: 21 U/L
BASOPHILS # BLD AUTO: 0.07 K/UL
BASOPHILS NFR BLD AUTO: 1.2 %
BILIRUB SERPL-MCNC: 0.4 MG/DL
BUN SERPL-MCNC: 16 MG/DL
CALCIUM SERPL-MCNC: 9.5 MG/DL
CHLORIDE SERPL-SCNC: 107 MMOL/L
CHOLEST SERPL-MCNC: 122 MG/DL
CHOLEST/HDLC SERPL: 2.9 RATIO
CO2 SERPL-SCNC: 23 MMOL/L
CREAT SERPL-MCNC: 0.95 MG/DL
EOSINOPHIL # BLD AUTO: 0.28 K/UL
EOSINOPHIL NFR BLD AUTO: 4.8 %
ESTIMATED AVERAGE GLUCOSE: 120 MG/DL
GLUCOSE SERPL-MCNC: 98 MG/DL
HBA1C MFR BLD HPLC: 5.8 %
HCT VFR BLD CALC: 42.1 %
HDLC SERPL-MCNC: 42 MG/DL
HGB BLD-MCNC: 13.9 G/DL
IMM GRANULOCYTES NFR BLD AUTO: 0.7 %
LDLC SERPL CALC-MCNC: 57 MG/DL
LYMPHOCYTES # BLD AUTO: 1.65 K/UL
LYMPHOCYTES NFR BLD AUTO: 28.3 %
MAN DIFF?: NORMAL
MCHC RBC-ENTMCNC: 28.6 PG
MCHC RBC-ENTMCNC: 33 GM/DL
MCV RBC AUTO: 86.6 FL
MONOCYTES # BLD AUTO: 0.48 K/UL
MONOCYTES NFR BLD AUTO: 8.2 %
NEUTROPHILS # BLD AUTO: 3.31 K/UL
NEUTROPHILS NFR BLD AUTO: 56.8 %
PLATELET # BLD AUTO: 220 K/UL
POTASSIUM SERPL-SCNC: 4.3 MMOL/L
PROT SERPL-MCNC: 6.3 G/DL
PSA SERPL-MCNC: 1.8 NG/ML
RBC # BLD: 4.86 M/UL
RBC # FLD: 13.3 %
SODIUM SERPL-SCNC: 144 MMOL/L
TRIGL SERPL-MCNC: 117 MG/DL
TSH SERPL-ACNC: 0.88 UIU/ML
WBC # FLD AUTO: 5.83 K/UL

## 2019-07-23 NOTE — DISCUSSION/SUMMARY
[FreeTextEntry1] : No change in meds\par Re-referred back to struct heart team for consideration of TAVR

## 2019-07-23 NOTE — HISTORY OF PRESENT ILLNESS
[FreeTextEntry1] : Puma is here for f/u\par Continued Mild ACE - slightly worse than prior\par \par No chest pressure\par No syncope\par No palpitations\par No LE edema\par \par Daughter notices him slowing down and wonders if something could be done.

## 2019-07-23 NOTE — REVIEW OF SYSTEMS
[Dyspnea on exertion] : dyspnea during exertion [Negative] : Heme/Lymph [Feeling Fatigued] : not feeling fatigued [Lower Ext Edema] : no extremity edema [Shortness Of Breath] : no shortness of breath

## 2019-10-03 ENCOUNTER — APPOINTMENT (OUTPATIENT)
Dept: CV DIAGNOSITCS | Facility: HOSPITAL | Age: 79
End: 2019-10-03

## 2019-10-08 ENCOUNTER — APPOINTMENT (OUTPATIENT)
Dept: CARDIOTHORACIC SURGERY | Facility: CLINIC | Age: 79
End: 2019-10-08
Payer: MEDICARE

## 2019-10-08 ENCOUNTER — NON-APPOINTMENT (OUTPATIENT)
Age: 79
End: 2019-10-08

## 2019-10-08 ENCOUNTER — OUTPATIENT (OUTPATIENT)
Dept: OUTPATIENT SERVICES | Facility: HOSPITAL | Age: 79
LOS: 1 days | End: 2019-10-08
Payer: MEDICARE

## 2019-10-08 VITALS
TEMPERATURE: 98 F | DIASTOLIC BLOOD PRESSURE: 66 MMHG | OXYGEN SATURATION: 96 % | RESPIRATION RATE: 12 BRPM | HEIGHT: 68 IN | HEART RATE: 60 BPM | WEIGHT: 180 LBS | BODY MASS INDEX: 27.28 KG/M2 | SYSTOLIC BLOOD PRESSURE: 133 MMHG

## 2019-10-08 DIAGNOSIS — I35.0 NONRHEUMATIC AORTIC (VALVE) STENOSIS: ICD-10-CM

## 2019-10-08 LAB
ALBUMIN SERPL ELPH-MCNC: 4.7 G/DL
ALP BLD-CCNC: 52 U/L
ALT SERPL-CCNC: 22 U/L
ANION GAP SERPL CALC-SCNC: 11 MMOL/L
AST SERPL-CCNC: 25 U/L
BASOPHILS # BLD AUTO: 0.04 K/UL
BASOPHILS NFR BLD AUTO: 0.7 %
BILIRUB SERPL-MCNC: 0.3 MG/DL
BUN SERPL-MCNC: 10 MG/DL
CALCIUM SERPL-MCNC: 9.8 MG/DL
CHLORIDE SERPL-SCNC: 104 MMOL/L
CO2 SERPL-SCNC: 27 MMOL/L
CREAT SERPL-MCNC: 0.98 MG/DL
EOSINOPHIL # BLD AUTO: 0.2 K/UL
EOSINOPHIL NFR BLD AUTO: 3.6 %
GLUCOSE SERPL-MCNC: 110 MG/DL
HCT VFR BLD CALC: 41.5 %
HGB BLD-MCNC: 13.7 G/DL
IMM GRANULOCYTES NFR BLD AUTO: 0.4 %
LYMPHOCYTES # BLD AUTO: 1.49 K/UL
LYMPHOCYTES NFR BLD AUTO: 27.1 %
MAN DIFF?: NORMAL
MCHC RBC-ENTMCNC: 28.4 PG
MCHC RBC-ENTMCNC: 33 GM/DL
MCV RBC AUTO: 85.9 FL
MONOCYTES # BLD AUTO: 0.51 K/UL
MONOCYTES NFR BLD AUTO: 9.3 %
NEUTROPHILS # BLD AUTO: 3.23 K/UL
NEUTROPHILS NFR BLD AUTO: 58.9 %
NT-PROBNP SERPL-MCNC: 627 PG/ML
PLATELET # BLD AUTO: 204 K/UL
POTASSIUM SERPL-SCNC: 4.4 MMOL/L
PROT SERPL-MCNC: 6.7 G/DL
RBC # BLD: 4.83 M/UL
RBC # FLD: 13.5 %
SODIUM SERPL-SCNC: 142 MMOL/L
WBC # FLD AUTO: 5.49 K/UL

## 2019-10-08 PROCEDURE — 99214 OFFICE O/P EST MOD 30 MIN: CPT

## 2019-10-08 PROCEDURE — 93306 TTE W/DOPPLER COMPLETE: CPT | Mod: 26

## 2019-10-08 PROCEDURE — 94010 BREATHING CAPACITY TEST: CPT

## 2019-10-08 PROCEDURE — 93000 ELECTROCARDIOGRAM COMPLETE: CPT

## 2019-10-08 PROCEDURE — 93306 TTE W/DOPPLER COMPLETE: CPT

## 2019-10-08 PROCEDURE — 99215 OFFICE O/P EST HI 40 MIN: CPT

## 2019-10-08 RX ORDER — CITALOPRAM HYDROBROMIDE 10 MG/1
10 TABLET, FILM COATED ORAL
Qty: 30 | Refills: 0 | Status: COMPLETED | COMMUNITY
Start: 2019-07-15 | End: 2019-10-08

## 2019-10-08 RX ORDER — DONEPEZIL HYDROCHLORIDE 10 MG/1
10 TABLET ORAL
Qty: 30 | Refills: 0 | Status: DISCONTINUED | COMMUNITY
Start: 2019-07-05

## 2019-10-08 NOTE — ASSESSMENT
[FreeTextEntry1] : 78 yo with mild dementia although completes all his own ADL's including going to the store with progressive aortic valve stenosis.  Daughter concerned that his dementia would preclude TAVR.  He was evaluated here in January and has been progresseively more symptomatic  since that time.  I am in favor of proceeding with the procedure as his best option now.

## 2019-10-08 NOTE — REASON FOR VISIT
[Initial Evaluation] : an initial evaluation [Aortic Stenosis] : aortic stenosis [Consultation] : a consultation regarding

## 2019-10-08 NOTE — HISTORY OF PRESENT ILLNESS
[FreeTextEntry1] : Mr. Ruiz returns today for follow up evaluation of aortic stenosis. We saw him in valve clinic in January, his symptoms at that time were limited to mild fatigue without impact to his quality of life. He has some degree of baseline dementia (on two medications), and on his prior visit he was not very limited with respect to function, and his family opted to continue medical management and close monitoring at that time (given concerns for the risk of cognitive decline post TAVR). \par \par His wife has observed a functional decline and more fatigue with exertion.  When asked about angina, he reports "sometimes feeling very tight" in the chest, though his family attributes this possibly to his asthma.  He has not syncopized.  His appetite is normal.

## 2019-10-08 NOTE — PHYSICAL EXAM
[Normal Rate] : normal [II] : a grade 2 [Rhythm Regular] : regular [No Pitting Edema] : no pitting edema present

## 2019-10-08 NOTE — CONSULT LETTER
[Dear  ___] : Dear ~SAMANTHA, [Courtesy Letter:] : I had the pleasure of seeing your patient, [unfilled], in my office today. [Please see my note below.] : Please see my note below. [Consult Closing:] : Thank you very much for allowing me to participate in the care of this patient.  If you have any questions, please do not hesitate to contact me. [Sincerely,] : Sincerely, [FreeTextEntry2] : Analy Coreas MD \par [address] [place] [address] \[place] [address] [FreeTextEntry3] : Micky Sterling MD\par \par Cardiovascular & Thoracic Surgery\par Professor\par [facility]\par \par

## 2019-10-08 NOTE — REVIEW OF SYSTEMS
[Feeling Fatigued] : feeling fatigued [Dyspnea on exertion] : dyspnea during exertion [Chest  Pressure] : chest pressure [Cough] : cough [Change in Appetite] : change in appetite [Urinary Frequency] : urinary frequency [Joint Pain] : joint pain [Memory Lapses Or Loss] : memory lapses or loss [Negative] : Heme/Lymph [Fever] : no fever [Chills] : no chills

## 2019-10-16 ENCOUNTER — FORM ENCOUNTER (OUTPATIENT)
Age: 79
End: 2019-10-16

## 2019-10-17 ENCOUNTER — OUTPATIENT (OUTPATIENT)
Dept: OUTPATIENT SERVICES | Facility: HOSPITAL | Age: 79
LOS: 1 days | End: 2019-10-17
Payer: MEDICARE

## 2019-10-17 ENCOUNTER — APPOINTMENT (OUTPATIENT)
Dept: CARDIOLOGY | Facility: CLINIC | Age: 79
End: 2019-10-17

## 2019-10-17 DIAGNOSIS — R07.9 CHEST PAIN, UNSPECIFIED: ICD-10-CM

## 2019-10-17 PROCEDURE — 75572 CT HRT W/3D IMAGE: CPT | Mod: 26

## 2019-10-21 ENCOUNTER — MEDICATION RENEWAL (OUTPATIENT)
Age: 79
End: 2019-10-21

## 2019-10-25 ENCOUNTER — TRANSCRIPTION ENCOUNTER (OUTPATIENT)
Age: 79
End: 2019-10-25

## 2019-10-25 ENCOUNTER — INPATIENT (INPATIENT)
Facility: HOSPITAL | Age: 79
LOS: 0 days | Discharge: HOME | DRG: 247 | End: 2019-10-25
Attending: INTERNAL MEDICINE | Admitting: INTERNAL MEDICINE
Payer: MEDICARE

## 2019-10-25 VITALS
TEMPERATURE: 99 F | RESPIRATION RATE: 18 BRPM | HEART RATE: 72 BPM | OXYGEN SATURATION: 96 % | DIASTOLIC BLOOD PRESSURE: 64 MMHG | SYSTOLIC BLOOD PRESSURE: 101 MMHG

## 2019-10-25 VITALS
DIASTOLIC BLOOD PRESSURE: 57 MMHG | HEART RATE: 66 BPM | OXYGEN SATURATION: 97 % | SYSTOLIC BLOOD PRESSURE: 117 MMHG | RESPIRATION RATE: 14 BRPM | HEIGHT: 67 IN | TEMPERATURE: 98 F | WEIGHT: 179.9 LBS

## 2019-10-25 DIAGNOSIS — I35.0 NONRHEUMATIC AORTIC (VALVE) STENOSIS: ICD-10-CM

## 2019-10-25 DIAGNOSIS — Z90.89 ACQUIRED ABSENCE OF OTHER ORGANS: Chronic | ICD-10-CM

## 2019-10-25 LAB
ALBUMIN SERPL ELPH-MCNC: 4.3 G/DL — SIGNIFICANT CHANGE UP (ref 3.3–5)
ALP SERPL-CCNC: 48 U/L — SIGNIFICANT CHANGE UP (ref 40–120)
ALT FLD-CCNC: 27 U/L — SIGNIFICANT CHANGE UP (ref 10–45)
ANION GAP SERPL CALC-SCNC: 18 MMOL/L — HIGH (ref 5–17)
AST SERPL-CCNC: 25 U/L — SIGNIFICANT CHANGE UP (ref 10–40)
BILIRUB SERPL-MCNC: 0.3 MG/DL — SIGNIFICANT CHANGE UP (ref 0.2–1.2)
BUN SERPL-MCNC: 13 MG/DL — SIGNIFICANT CHANGE UP (ref 7–23)
CALCIUM SERPL-MCNC: 9 MG/DL — SIGNIFICANT CHANGE UP (ref 8.4–10.5)
CHLORIDE SERPL-SCNC: 102 MMOL/L — SIGNIFICANT CHANGE UP (ref 96–108)
CO2 SERPL-SCNC: 25 MMOL/L — SIGNIFICANT CHANGE UP (ref 22–31)
CREAT SERPL-MCNC: 0.86 MG/DL — SIGNIFICANT CHANGE UP (ref 0.5–1.3)
GLUCOSE SERPL-MCNC: 112 MG/DL — HIGH (ref 70–99)
HCT VFR BLD CALC: 41.1 % — SIGNIFICANT CHANGE UP (ref 39–50)
HGB BLD-MCNC: 13.5 G/DL — SIGNIFICANT CHANGE UP (ref 13–17)
MCHC RBC-ENTMCNC: 28.1 PG — SIGNIFICANT CHANGE UP (ref 27–34)
MCHC RBC-ENTMCNC: 32.8 GM/DL — SIGNIFICANT CHANGE UP (ref 32–36)
MCV RBC AUTO: 85.4 FL — SIGNIFICANT CHANGE UP (ref 80–100)
NRBC # BLD: 0 /100 WBCS — SIGNIFICANT CHANGE UP (ref 0–0)
PLATELET # BLD AUTO: 204 K/UL — SIGNIFICANT CHANGE UP (ref 150–400)
POTASSIUM SERPL-MCNC: 4.3 MMOL/L — SIGNIFICANT CHANGE UP (ref 3.5–5.3)
POTASSIUM SERPL-SCNC: 4.3 MMOL/L — SIGNIFICANT CHANGE UP (ref 3.5–5.3)
PROT SERPL-MCNC: 6.8 G/DL — SIGNIFICANT CHANGE UP (ref 6–8.3)
RBC # BLD: 4.81 M/UL — SIGNIFICANT CHANGE UP (ref 4.2–5.8)
RBC # FLD: 13.5 % — SIGNIFICANT CHANGE UP (ref 10.3–14.5)
SODIUM SERPL-SCNC: 145 MMOL/L — SIGNIFICANT CHANGE UP (ref 135–145)
WBC # BLD: 5.48 K/UL — SIGNIFICANT CHANGE UP (ref 3.8–10.5)
WBC # FLD AUTO: 5.48 K/UL — SIGNIFICANT CHANGE UP (ref 3.8–10.5)

## 2019-10-25 PROCEDURE — 92933 PRQ TRLML C ATHRC ST ANGIOP1: CPT | Mod: LD,GC

## 2019-10-25 PROCEDURE — 99152 MOD SED SAME PHYS/QHP 5/>YRS: CPT | Mod: GC

## 2019-10-25 PROCEDURE — 93456 R HRT CORONARY ARTERY ANGIO: CPT | Mod: 26,59,GC

## 2019-10-25 PROCEDURE — 93010 ELECTROCARDIOGRAM REPORT: CPT

## 2019-10-25 PROCEDURE — 99223 1ST HOSP IP/OBS HIGH 75: CPT

## 2019-10-25 PROCEDURE — 93005 ELECTROCARDIOGRAM TRACING: CPT

## 2019-10-25 PROCEDURE — 75572 CT HRT W/3D IMAGE: CPT

## 2019-10-25 RX ORDER — DONEPEZIL HYDROCHLORIDE 10 MG/1
10 TABLET, FILM COATED ORAL AT BEDTIME
Refills: 0 | Status: DISCONTINUED | OUTPATIENT
Start: 2019-10-25 | End: 2019-10-25

## 2019-10-25 RX ORDER — FINASTERIDE 5 MG/1
5 TABLET, FILM COATED ORAL DAILY
Refills: 0 | Status: DISCONTINUED | OUTPATIENT
Start: 2019-10-25 | End: 2019-10-25

## 2019-10-25 RX ORDER — LATANOPROST 0.05 MG/ML
1 SOLUTION/ DROPS OPHTHALMIC; TOPICAL AT BEDTIME
Refills: 0 | Status: DISCONTINUED | OUTPATIENT
Start: 2019-10-25 | End: 2019-10-25

## 2019-10-25 RX ORDER — CLOPIDOGREL BISULFATE 75 MG/1
1 TABLET, FILM COATED ORAL
Qty: 90 | Refills: 3
Start: 2019-10-25 | End: 2020-10-18

## 2019-10-25 RX ORDER — CLOPIDOGREL BISULFATE 75 MG/1
75 TABLET, FILM COATED ORAL DAILY
Refills: 0 | Status: DISCONTINUED | OUTPATIENT
Start: 2019-10-26 | End: 2019-10-25

## 2019-10-25 RX ORDER — ASPIRIN/CALCIUM CARB/MAGNESIUM 324 MG
81 TABLET ORAL DAILY
Refills: 0 | Status: DISCONTINUED | OUTPATIENT
Start: 2019-10-25 | End: 2019-10-25

## 2019-10-25 RX ORDER — SODIUM CHLORIDE 9 MG/ML
3 INJECTION INTRAMUSCULAR; INTRAVENOUS; SUBCUTANEOUS EVERY 8 HOURS
Refills: 0 | Status: DISCONTINUED | OUTPATIENT
Start: 2019-10-25 | End: 2019-10-25

## 2019-10-25 RX ORDER — SIMVASTATIN 20 MG/1
40 TABLET, FILM COATED ORAL AT BEDTIME
Refills: 0 | Status: DISCONTINUED | OUTPATIENT
Start: 2019-10-25 | End: 2019-10-25

## 2019-10-25 RX ORDER — BUDESONIDE AND FORMOTEROL FUMARATE DIHYDRATE 160; 4.5 UG/1; UG/1
2 AEROSOL RESPIRATORY (INHALATION)
Refills: 0 | Status: DISCONTINUED | OUTPATIENT
Start: 2019-10-25 | End: 2019-10-25

## 2019-10-25 RX ORDER — OMEGA-3 ACID ETHYL ESTERS 1 G
4 CAPSULE ORAL DAILY
Refills: 0 | Status: DISCONTINUED | OUTPATIENT
Start: 2019-10-25 | End: 2019-10-25

## 2019-10-25 RX ORDER — MONTELUKAST 4 MG/1
10 TABLET, CHEWABLE ORAL DAILY
Refills: 0 | Status: DISCONTINUED | OUTPATIENT
Start: 2019-10-25 | End: 2019-10-25

## 2019-10-25 RX ORDER — TAMSULOSIN HYDROCHLORIDE 0.4 MG/1
0.4 CAPSULE ORAL AT BEDTIME
Refills: 0 | Status: DISCONTINUED | OUTPATIENT
Start: 2019-10-25 | End: 2019-10-25

## 2019-10-25 RX ORDER — ALBUTEROL 90 UG/1
2 AEROSOL, METERED ORAL EVERY 6 HOURS
Refills: 0 | Status: DISCONTINUED | OUTPATIENT
Start: 2019-10-25 | End: 2019-10-25

## 2019-10-25 RX ORDER — MEMANTINE HYDROCHLORIDE 10 MG/1
10 TABLET ORAL
Refills: 0 | Status: DISCONTINUED | OUTPATIENT
Start: 2019-10-25 | End: 2019-10-25

## 2019-10-25 RX ORDER — OMEGA-3 ACID ETHYL ESTERS 1 G
2 CAPSULE ORAL
Refills: 0 | Status: DISCONTINUED | OUTPATIENT
Start: 2019-10-25 | End: 2019-10-25

## 2019-10-25 RX ORDER — INFLUENZA VIRUS VACCINE 15; 15; 15; 15 UG/.5ML; UG/.5ML; UG/.5ML; UG/.5ML
0.5 SUSPENSION INTRAMUSCULAR ONCE
Refills: 0 | Status: COMPLETED | OUTPATIENT
Start: 2019-10-25 | End: 2019-10-25

## 2019-10-25 RX ADMIN — SODIUM CHLORIDE 3 MILLILITER(S): 9 INJECTION INTRAMUSCULAR; INTRAVENOUS; SUBCUTANEOUS at 13:54

## 2019-10-25 RX ADMIN — INFLUENZA VIRUS VACCINE 0.5 MILLILITER(S): 15; 15; 15; 15 SUSPENSION INTRAMUSCULAR at 17:31

## 2019-10-25 NOTE — CHART NOTE - NSCHARTNOTEFT_GEN_A_CORE
Removal of Femoral Sheath    Pulses in the (right/left) lower extremity are (palpable/audible by doppler/absent). The patient was placed in the supine position. The insertion site was identified and the sutures were removed per protocol.  The __6__ Nepali arterial femoral  and 7 Fr venous sheath was then removed. Direct pressure was applied for  ______ minutes.     Monitoring of the (right) groin and both lower extremities including neuro-vascular checks and vital signs every 15 minutes x 4, then every 30 minutes x 2, then every 1 hour x 2 and the every 4 hours was ordered.    Complications: No hematoma, no bleed    Comments:  Importance of DAPT therapy stressed with daughter (pts caretaker). Groin care and s/s reviewed. Understanding verified by teach back.    Marky Berg, SIRISHA   [phone]

## 2019-10-25 NOTE — DISCHARGE NOTE PROVIDER - NSDCCPTREATMENT_GEN_ALL_CORE_FT
PRINCIPAL PROCEDURE  Procedure: PCI, with stent insertion  Findings and Treatment: mLAD 80% CSI/atherectomy/LELAND x 2

## 2019-10-25 NOTE — DISCHARGE NOTE NURSING/CASE MANAGEMENT/SOCIAL WORK - NSDPLANGINTNAME_GEN_ALL_CORE
TEDDY  ( used for flu vaccine administration, daughter Georgia to be pt  for discharge instructions.)

## 2019-10-25 NOTE — H&P CARDIOLOGY - PMH
Aortic stenosis    Asthma    BPH (benign prostatic hyperplasia)    Dementia    ACE (dyspnea on exertion)    HLD (hyperlipidemia)

## 2019-10-25 NOTE — H&P CARDIOLOGY - HISTORY OF PRESENT ILLNESS
80 y/o Khmer speaking male(daughter at bedside to interpret) with PMHx of AS, Asthma, BPH, Dementia, HLD, ACE presents today for right and left heart cath in evaluation of  progressive aortic valve stenosis. Patient denies any chest pain/ SOB, palpitation dizziness/ syncope. Patient was evaluated by Dr. Sterling for possible TAVR evaluation and referred for cath. Patient had TTE this months which reveals severe AS.  CONCLUSIONS:  1. Calcified trileaflet aortic valve. Peak transaortic  valve gradient equals 70 mm Hg, mean transaortic valve  gradient equals 39 mm Hg, estimated aortic valve area  equals 0.8 sqcm (by continuity equation), aortic valve  velocity time integral equals 109 cm, the DVI= 0.18,  consistent with severe aortic stenosis. Mild aortic  regurgitation.  2. Normal left ventricular systolic function. No segmental  wall motion abnormalities.  The LVEF= 55-60%.  3. Diastology suggests increased left atrial pressure.  ------------------------------------------------------------------------  PROCEDURE DESCRIPTION: Transthoracic echocardiogram with  2-D, M-Mode and complete spectral and color flow Doppler.  ------------------------------------------------------------------------  ECHOCARDIOGRAPHIC EXAMINATION:  AORTIC ROOT:  Aortic Root (Leaflet): 3.3 cm  Aortic Root Index: 0.9  LEFT ATRIUM:  AP Dimensions: 3.9 cm  LA Volume Index: 37.00 cc/sqm  LA Volume: 71.9 cc  LEFT VENTRICLE:  LVIDd: 5.5 cm  LVIDs: 3.6 cm  IVS: 1.30  ILWT: 1.1 cm  RWT: 0.41  LV Mass: 286.0 gm  LV Mass Index: 148 gm/sqm  EF (Visual Estimate): 55-60%  HR and BP:  HR: 60 bpm  BP: 162/67  BSA: 1.93  ------------------------------------------------------------------------  HEMODYNAMICS:  IVRT: 81 ms  ------------------------------------------------------------------------  COLOR FLOW and SPECIAL DOPPLER:  AORTIC VALVE:  AV Peak Velocity: 4.1 M/sec  AV Peak Gradient: 69 mm Hg  AV Mean Gradient: 39 mm Hg  Valve Area: 0.8 sqcm  LVOT:  LVOT Velocity: .7 M/sec  LVOT Diameter: 2.3 cm  LVOT Peak Gradient Rest: 2 mm Hg  LVOT Mean Gradient Rest: 2 mm Hg  ------------------------------------------------------------------------  DIASTOLIC FUNCTION:  DT:356 ms  E/A: 1.12  e' Septal: 5.0 cm/s  E/e' Septal: 18.0  e' Lateral: 4.0 cm/s  E/e' Lateral: 22.5  MV E wave: 0.9 m/s  MV A wave: 0.8 m/s  Septal a': 7.0 cm/s  Lateral a': 9.0 cm/s 80 y/o English speaking male(daughter at bedside to interpret) with PMHx of AS, Asthma, BPH, Dementia, HLD, ACE presents today for right and left heart cath in evaluation of  progressive aortic valve stenosis. Patient denies any chest pain/ SOB, palpitation dizziness/ syncope. Patient was evaluated by Dr. Sterling for possible TAVR evaluation and referred for cath. Patient had TTE this months which reveals severe AS. Denies any implanted cardiac device  CONCLUSIONS:  1. Calcified trileaflet aortic valve. Peak transaortic  valve gradient equals 70 mm Hg, mean transaortic valve  gradient equals 39 mm Hg, estimated aortic valve area  equals 0.8 sqcm (by continuity equation), aortic valve  velocity time integral equals 109 cm, the DVI= 0.18,  consistent with severe aortic stenosis. Mild aortic  regurgitation.  2. Normal left ventricular systolic function. No segmental  wall motion abnormalities.  The LVEF= 55-60%.  3. Diastology suggests increased left atrial pressure.  ------------------------------------------------------------------------  PROCEDURE DESCRIPTION: Transthoracic echocardiogram with  2-D, M-Mode and complete spectral and color flow Doppler.  ------------------------------------------------------------------------  ECHOCARDIOGRAPHIC EXAMINATION:  AORTIC ROOT:  Aortic Root (Leaflet): 3.3 cm  Aortic Root Index: 0.9  LEFT ATRIUM:  AP Dimensions: 3.9 cm  LA Volume Index: 37.00 cc/sqm  LA Volume: 71.9 cc  LEFT VENTRICLE:  LVIDd: 5.5 cm  LVIDs: 3.6 cm  IVS: 1.30  ILWT: 1.1 cm  RWT: 0.41  LV Mass: 286.0 gm  LV Mass Index: 148 gm/sqm  EF (Visual Estimate): 55-60%  HR and BP:  HR: 60 bpm  BP: 162/67  BSA: 1.93  ------------------------------------------------------------------------  HEMODYNAMICS:  IVRT: 81 ms  ------------------------------------------------------------------------  COLOR FLOW and SPECIAL DOPPLER:  AORTIC VALVE:  AV Peak Velocity: 4.1 M/sec  AV Peak Gradient: 69 mm Hg  AV Mean Gradient: 39 mm Hg  Valve Area: 0.8 sqcm  LVOT:  LVOT Velocity: .7 M/sec  LVOT Diameter: 2.3 cm  LVOT Peak Gradient Rest: 2 mm Hg  LVOT Mean Gradient Rest: 2 mm Hg  ------------------------------------------------------------------------  DIASTOLIC FUNCTION:  DT:356 ms  E/A: 1.12  e' Septal: 5.0 cm/s  E/e' Septal: 18.0  e' Lateral: 4.0 cm/s  E/e' Lateral: 22.5  MV E wave: 0.9 m/s  MV A wave: 0.8 m/s  Septal a': 7.0 cm/s  Lateral a': 9.0 cm/s

## 2019-10-25 NOTE — DISCHARGE NOTE NURSING/CASE MANAGEMENT/SOCIAL WORK - PATIENT PORTAL LINK FT
You can access the FollowMyHealth Patient Portal offered by [facility] by registering at the following website: [email or web address]. By joining [employer]’s FollowMyHealth portal, you will also be able to view your health information using other applications (apps) compatible with our system.

## 2019-10-25 NOTE — PATIENT PROFILE ADULT - LAST BOWEL MOVEMENT DATE
Problem: At Risk for Falls  Goal: # Patient does not fall  Outcome: Outcome Met, Continue evaluating goal progress toward completion  Followed by therapy.  Up with one walker cindy gait belt from chair to bed.Shuffling slow gait.       24-Oct-2019

## 2019-10-25 NOTE — DISCHARGE NOTE PROVIDER - CARE PROVIDERS DIRECT ADDRESSES
[email or web address]

## 2019-10-25 NOTE — DISCHARGE NOTE PROVIDER - NSDCCPCAREPLAN_GEN_ALL_CORE_FT
PRINCIPAL DISCHARGE DIAGNOSIS  Diagnosis: Contraindication to percutaneous coronary intervention (PCI)  Assessment and Plan of Treatment: Continue your medications. Do not stop Aspirin or Plavix unless instructed by your cardiologist.  No heavy lifting, strenuous activity, bending, straining or unnecessary stair climbing for 2 weeks. No sex for 1 week.  No driving for 2 days. You may shower 24 hours following procedure but avoid baths and swimming for 1 week. Check groin site for bleeding and/or swelling daily following procedure. Call your doctor/cardiologist immediately for bleeding or swelling or if you have increased/persistent pain or drainage at the site. Follow up with your cardiologist in 1- 2 weeks. You may call [facility] at [phone] or [phone] after office hours and weekends with any questions or concerns following your procedure.      SECONDARY DISCHARGE DIAGNOSES  Diagnosis: HLD (hyperlipidemia)  Assessment and Plan of Treatment: Goal is to keep LDL<70. Continue with your cholesterol medications as prescribed. Eat a heart healthy diet that is low in saturated fats and salt, and includes whole grains, fruits, vegetables and lean protein; exercise regularly (consult with your physician or cardiologist first); maintain a heart healthy weight; if you smoke - quit (A resource to help you stop smoking is the [facility] – phone number [phone].). Continue to follow with your primary physician or cardiologist.

## 2019-10-25 NOTE — DISCHARGE NOTE PROVIDER - NSDCFUSCHEDAPPT_GEN_ALL_CORE_FT
MARLON KRUEGER ; 11/15/2019 ; NPP Internal Med [address] [facility]  MARLON KRUEGER ; 11/21/2019 ; NPP Cardio [address]

## 2019-10-25 NOTE — DISCHARGE NOTE PROVIDER - HOSPITAL COURSE
78 y/o Malay speaking male(daughter at bedside to interpret) with PMHx of AS, Asthma, BPH, Dementia, HLD, ACE presents today for right and left heart cath in evaluation of  progressive aortic valve stenosis. Patient denies any chest pain/ SOB, palpitation dizziness/ syncope. Patient was evaluated by Dr. Sterling for possible TAVR evaluation and referred for cath. Patient had TTE this months which reveals severe AS. Denies any implanted cardiac device    CONCLUSIONS:    1. Calcified trileaflet aortic valve. Peak transaortic    valve gradient equals 70 mm Hg, mean transaortic valve    gradient equals 39 mm Hg, estimated aortic valve area    equals 0.8 sqcm (by continuity equation), aortic valve    velocity time integral equals 109 cm, the DVI= 0.18,    consistent with severe aortic stenosis. Mild aortic    regurgitation.    2. Normal left ventricular systolic function. No segmental    wall motion abnormalities.  The LVEF= 55-60%.    3. Diastology suggests increased left atrial pressure.    ------------------------------------------------------------------------    PROCEDURE DESCRIPTION: Transthoracic echocardiogram with    2-D, M-Mode and complete spectral and color flow Doppler.    ------------------------------------------------------------------------    ECHOCARDIOGRAPHIC EXAMINATION:    AORTIC ROOT:    Aortic Root (Leaflet): 3.3 cm    Aortic Root Index: 0.9    LEFT ATRIUM:    AP Dimensions: 3.9 cm    LA Volume Index: 37.00 cc/sqm    LA Volume: 71.9 cc    LEFT VENTRICLE:    LVIDd: 5.5 cm    LVIDs: 3.6 cm    IVS: 1.30    ILWT: 1.1 cm    RWT: 0.41    LV Mass: 286.0 gm    LV Mass Index: 148 gm/sqm    EF (Visual Estimate): 55-60%    HR and BP:    HR: 60 bpm    BP: 162/67    BSA: 1.93    ------------------------------------------------------------------------    HEMODYNAMICS:    IVRT: 81 ms    ------------------------------------------------------------------------    COLOR FLOW and SPECIAL DOPPLER:    AORTIC VALVE:    AV Peak Velocity: 4.1 M/sec    AV Peak Gradient: 69 mm Hg    AV Mean Gradient: 39 mm Hg    Valve Area: 0.8 sqcm    LVOT:    LVOT Velocity: .7 M/sec    LVOT Diameter: 2.3 cm    LVOT Peak Gradient Rest: 2 mm Hg    LVOT Mean Gradient Rest: 2 mm Hg    ------------------------------------------------------------------------    DIASTOLIC FUNCTION:    DT:356 ms    E/A: 1.12    e' Septal: 5.0 cm/s    E/e' Septal: 18.0    e' Lateral: 4.0 cm/s    E/e' Lateral: 22.5    MV E wave: 0.9 m/s    MV A wave: 0.8 m/s    Septal a': 7.0 cm/s    Lateral a': 9.0 cm/s (25 Oct 2019 07:28)        Pt underwent left heart cath 10/25/19 that revealed mLAD 80% with  CSI/atherectomy and LELAND x 2, RCA 30% via right femoral artery access. Sheath (A/V) removed without hematoma or bleed. Pt tolerated well. Post bedrest pt ambulate without difficulty and groin remained stable. Pt is stable to discharge home. Medication and acitivyt and groin education done with pts daughter Georgia. Pt to follow up with Dr Coreas as outpatient.

## 2019-10-25 NOTE — DISCHARGE NOTE PROVIDER - CARE PROVIDER_API CALL
Analy Coreas (MD)  Cardiovascular Disease; Interventional Cardiology  [address] [place] [address]  Phone: [phone]  Fax: [phone]  Follow Up Time: 2 weeks

## 2019-10-25 NOTE — PATIENT PROFILE ADULT - PATIENT/CAREGIVER ACCEPTED INTERPRETER SERVICES
no/Family will translate at bedside, daughter Georgia.  If pt is not able to make needs known,  phone is available.

## 2019-10-25 NOTE — CHART NOTE - NSCHARTNOTEFT_GEN_A_CORE
Patient underwent a PCI procedure and is being admitted as they are at increased risk for major adverse cardiac and vascular events if discharged due to the following high risk characteristics:      bifurcations lesion, long lesion greater than or equal to 28 mm    Marky Berg, NP  x [phone]

## 2019-10-30 ENCOUNTER — MEDICATION RENEWAL (OUTPATIENT)
Age: 79
End: 2019-10-30

## 2019-10-30 NOTE — DISCUSSION/SUMMARY
[Home] : patient was discharged to home [FreeTextEntry1] : attempted to reach multiple times - has pending apt 11/15 - if does not show up will mail out post hospital f/u letter. pcp notified.

## 2019-11-01 ENCOUNTER — OUTPATIENT (OUTPATIENT)
Dept: OUTPATIENT SERVICES | Facility: HOSPITAL | Age: 79
LOS: 1 days | End: 2019-11-01
Payer: MEDICARE

## 2019-11-01 DIAGNOSIS — Z90.89 ACQUIRED ABSENCE OF OTHER ORGANS: Chronic | ICD-10-CM

## 2019-11-01 PROCEDURE — G9001: CPT

## 2019-11-02 PROBLEM — I35.0 NONRHEUMATIC AORTIC (VALVE) STENOSIS: Chronic | Status: ACTIVE | Noted: 2019-10-25

## 2019-11-02 PROBLEM — R06.09 OTHER FORMS OF DYSPNEA: Chronic | Status: ACTIVE | Noted: 2019-10-25

## 2019-11-02 PROBLEM — E78.5 HYPERLIPIDEMIA, UNSPECIFIED: Chronic | Status: ACTIVE | Noted: 2019-10-25

## 2019-11-02 PROBLEM — N40.0 BENIGN PROSTATIC HYPERPLASIA WITHOUT LOWER URINARY TRACT SYMPTOMS: Chronic | Status: ACTIVE | Noted: 2019-10-25

## 2019-11-08 DIAGNOSIS — Z71.89 OTHER SPECIFIED COUNSELING: ICD-10-CM

## 2019-11-12 PROCEDURE — 90686 IIV4 VACC NO PRSV 0.5 ML IM: CPT

## 2019-11-12 PROCEDURE — 93005 ELECTROCARDIOGRAM TRACING: CPT

## 2019-11-12 PROCEDURE — C1769: CPT

## 2019-11-12 PROCEDURE — 99153 MOD SED SAME PHYS/QHP EA: CPT

## 2019-11-12 PROCEDURE — C1725: CPT

## 2019-11-12 PROCEDURE — C1724: CPT

## 2019-11-12 PROCEDURE — 99152 MOD SED SAME PHYS/QHP 5/>YRS: CPT

## 2019-11-12 PROCEDURE — C1887: CPT

## 2019-11-12 PROCEDURE — 93456 R HRT CORONARY ARTERY ANGIO: CPT | Mod: 59

## 2019-11-12 PROCEDURE — 85027 COMPLETE CBC AUTOMATED: CPT

## 2019-11-12 PROCEDURE — C1894: CPT

## 2019-11-12 PROCEDURE — C1874: CPT

## 2019-11-12 PROCEDURE — 80053 COMPREHEN METABOLIC PANEL: CPT

## 2019-11-12 PROCEDURE — C9602: CPT | Mod: LD

## 2019-11-14 ENCOUNTER — APPOINTMENT (OUTPATIENT)
Dept: CARDIOLOGY | Facility: CLINIC | Age: 79
End: 2019-11-14
Payer: MEDICARE

## 2019-11-14 ENCOUNTER — APPOINTMENT (OUTPATIENT)
Dept: ULTRASOUND IMAGING | Facility: HOSPITAL | Age: 79
End: 2019-11-14

## 2019-11-14 ENCOUNTER — OUTPATIENT (OUTPATIENT)
Dept: OUTPATIENT SERVICES | Facility: HOSPITAL | Age: 79
LOS: 1 days | End: 2019-11-14
Payer: MEDICARE

## 2019-11-14 ENCOUNTER — NON-APPOINTMENT (OUTPATIENT)
Age: 79
End: 2019-11-14

## 2019-11-14 VITALS
SYSTOLIC BLOOD PRESSURE: 132 MMHG | TEMPERATURE: 98 F | RESPIRATION RATE: 15 BRPM | WEIGHT: 184.97 LBS | DIASTOLIC BLOOD PRESSURE: 72 MMHG | HEIGHT: 67 IN | HEART RATE: 77 BPM | OXYGEN SATURATION: 95 %

## 2019-11-14 VITALS — DIASTOLIC BLOOD PRESSURE: 71 MMHG | SYSTOLIC BLOOD PRESSURE: 150 MMHG | OXYGEN SATURATION: 95 % | HEART RATE: 67 BPM

## 2019-11-14 DIAGNOSIS — I35.0 NONRHEUMATIC AORTIC (VALVE) STENOSIS: ICD-10-CM

## 2019-11-14 DIAGNOSIS — J45.909 UNSPECIFIED ASTHMA, UNCOMPLICATED: ICD-10-CM

## 2019-11-14 DIAGNOSIS — Z29.9 ENCOUNTER FOR PROPHYLACTIC MEASURES, UNSPECIFIED: ICD-10-CM

## 2019-11-14 DIAGNOSIS — Z90.89 ACQUIRED ABSENCE OF OTHER ORGANS: Chronic | ICD-10-CM

## 2019-11-14 DIAGNOSIS — Z01.818 ENCOUNTER FOR OTHER PREPROCEDURAL EXAMINATION: ICD-10-CM

## 2019-11-14 DIAGNOSIS — Z98.890 OTHER SPECIFIED POSTPROCEDURAL STATES: Chronic | ICD-10-CM

## 2019-11-14 DIAGNOSIS — I25.10 ATHEROSCLEROTIC HEART DISEASE OF NATIVE CORONARY ARTERY WITHOUT ANGINA PECTORIS: ICD-10-CM

## 2019-11-14 LAB
ANION GAP SERPL CALC-SCNC: 13 MMOL/L — SIGNIFICANT CHANGE UP (ref 5–17)
BLD GP AB SCN SERPL QL: NEGATIVE — SIGNIFICANT CHANGE UP
BUN SERPL-MCNC: 12 MG/DL — SIGNIFICANT CHANGE UP (ref 7–23)
CALCIUM SERPL-MCNC: 9.8 MG/DL — SIGNIFICANT CHANGE UP (ref 8.4–10.5)
CHLORIDE SERPL-SCNC: 101 MMOL/L — SIGNIFICANT CHANGE UP (ref 96–108)
CO2 SERPL-SCNC: 25 MMOL/L — SIGNIFICANT CHANGE UP (ref 22–31)
CREAT SERPL-MCNC: 0.89 MG/DL — SIGNIFICANT CHANGE UP (ref 0.5–1.3)
GLUCOSE SERPL-MCNC: 103 MG/DL — HIGH (ref 70–99)
HBA1C BLD-MCNC: 5.8 % — HIGH (ref 4–5.6)
HCT VFR BLD CALC: 42.7 % — SIGNIFICANT CHANGE UP (ref 39–50)
HGB BLD-MCNC: 14 G/DL — SIGNIFICANT CHANGE UP (ref 13–17)
MCHC RBC-ENTMCNC: 28.2 PG — SIGNIFICANT CHANGE UP (ref 27–34)
MCHC RBC-ENTMCNC: 32.8 GM/DL — SIGNIFICANT CHANGE UP (ref 32–36)
MCV RBC AUTO: 86.1 FL — SIGNIFICANT CHANGE UP (ref 80–100)
PLATELET # BLD AUTO: 233 K/UL — SIGNIFICANT CHANGE UP (ref 150–400)
POTASSIUM SERPL-MCNC: 4.1 MMOL/L — SIGNIFICANT CHANGE UP (ref 3.5–5.3)
POTASSIUM SERPL-SCNC: 4.1 MMOL/L — SIGNIFICANT CHANGE UP (ref 3.5–5.3)
RBC # BLD: 4.96 M/UL — SIGNIFICANT CHANGE UP (ref 4.2–5.8)
RBC # FLD: 13.2 % — SIGNIFICANT CHANGE UP (ref 10.3–14.5)
RH IG SCN BLD-IMP: POSITIVE — SIGNIFICANT CHANGE UP
SODIUM SERPL-SCNC: 139 MMOL/L — SIGNIFICANT CHANGE UP (ref 135–145)
WBC # BLD: 6.3 K/UL — SIGNIFICANT CHANGE UP (ref 3.8–10.5)
WBC # FLD AUTO: 6.3 K/UL — SIGNIFICANT CHANGE UP (ref 3.8–10.5)

## 2019-11-14 PROCEDURE — 93880 EXTRACRANIAL BILAT STUDY: CPT | Mod: 26

## 2019-11-14 PROCEDURE — 86901 BLOOD TYPING SEROLOGIC RH(D): CPT

## 2019-11-14 PROCEDURE — 86900 BLOOD TYPING SEROLOGIC ABO: CPT

## 2019-11-14 PROCEDURE — 86850 RBC ANTIBODY SCREEN: CPT

## 2019-11-14 PROCEDURE — 83036 HEMOGLOBIN GLYCOSYLATED A1C: CPT

## 2019-11-14 PROCEDURE — 85027 COMPLETE CBC AUTOMATED: CPT

## 2019-11-14 PROCEDURE — 93000 ELECTROCARDIOGRAM COMPLETE: CPT

## 2019-11-14 PROCEDURE — 71046 X-RAY EXAM CHEST 2 VIEWS: CPT | Mod: 26

## 2019-11-14 PROCEDURE — 80048 BASIC METABOLIC PNL TOTAL CA: CPT

## 2019-11-14 PROCEDURE — 99214 OFFICE O/P EST MOD 30 MIN: CPT

## 2019-11-14 PROCEDURE — G0463: CPT

## 2019-11-14 PROCEDURE — 87640 STAPH A DNA AMP PROBE: CPT

## 2019-11-14 PROCEDURE — 87641 MR-STAPH DNA AMP PROBE: CPT

## 2019-11-14 PROCEDURE — 93880 EXTRACRANIAL BILAT STUDY: CPT

## 2019-11-14 PROCEDURE — 71046 X-RAY EXAM CHEST 2 VIEWS: CPT

## 2019-11-14 RX ORDER — SODIUM CHLORIDE 9 MG/ML
3 INJECTION INTRAMUSCULAR; INTRAVENOUS; SUBCUTANEOUS EVERY 8 HOURS
Refills: 0 | Status: DISCONTINUED | OUTPATIENT
Start: 2019-11-21 | End: 2019-11-21

## 2019-11-14 RX ORDER — LIDOCAINE HCL 20 MG/ML
0.2 VIAL (ML) INJECTION ONCE
Refills: 0 | Status: DISCONTINUED | OUTPATIENT
Start: 2019-11-21 | End: 2019-11-21

## 2019-11-14 NOTE — H&P PST ADULT - NSICDXPASTSURGICALHX_GEN_ALL_CORE_FT
PAST SURGICAL HISTORY:  H/O coronary angiogram 10/25/19, s/p PCI    History of tonsillectomy childhood PAST SURGICAL HISTORY:  H/O coronary angiogram 10/25/19, s/p PCI to LAD    History of tonsillectomy childhood

## 2019-11-14 NOTE — PHYSICAL EXAM
[General Appearance - Well Developed] : well developed [Normal Appearance] : normal appearance [Well Groomed] : well groomed [General Appearance - Well Nourished] : well nourished [No Deformities] : no deformities [General Appearance - In No Acute Distress] : no acute distress [Normal Conjunctiva] : the conjunctiva exhibited no abnormalities [Eyelids - No Xanthelasma] : the eyelids demonstrated no xanthelasmas [Normal Oral Mucosa] : normal oral mucosa [No Oral Pallor] : no oral pallor [No Oral Cyanosis] : no oral cyanosis [Normal Jugular Venous A Waves Present] : normal jugular venous A waves present [Normal Jugular Venous V Waves Present] : normal jugular venous V waves present [No Jugular Venous Ramírez A Waves] : no jugular venous ramírez A waves [Respiration, Rhythm And Depth] : normal respiratory rhythm and effort [Exaggerated Use Of Accessory Muscles For Inspiration] : no accessory muscle use [Auscultation Breath Sounds / Voice Sounds] : lungs were clear to auscultation bilaterally [Heart Rate And Rhythm] : heart rate and rhythm were normal [Heart Sounds] : normal S1 and S2 [Arterial Pulses Normal] : the arterial pulses were normal [Edema] : no peripheral edema present [Systolic grade ___/6] : A grade [unfilled]/6 systolic murmur was heard. [Abdomen Soft] : soft [Abdomen Tenderness] : non-tender [Abdomen Mass (___ Cm)] : no abdominal mass palpated [Abnormal Walk] : normal gait [Gait - Sufficient For Exercise Testing] : the gait was sufficient for exercise testing [Nail Clubbing] : no clubbing of the fingernails [Cyanosis, Localized] : no localized cyanosis [Petechial Hemorrhages (___cm)] : no petechial hemorrhages [Skin Color & Pigmentation] : normal skin color and pigmentation [] : no rash [No Venous Stasis] : no venous stasis [Skin Lesions] : no skin lesions [No Skin Ulcers] : no skin ulcer [Oriented To Time, Place, And Person] : oriented to person, place, and time [No Xanthoma] : no  xanthoma was observed [Affect] : the affect was normal [Mood] : the mood was normal [No Anxiety] : not feeling anxious

## 2019-11-14 NOTE — H&P PST ADULT - NSICDXPASTMEDICALHX_GEN_ALL_CORE_FT
PAST MEDICAL HISTORY:  Aortic stenosis     Asthma     BPH (benign prostatic hyperplasia)     CAD (coronary artery disease) with stent    Dementia     ACE (dyspnea on exertion)     HLD (hyperlipidemia) PAST MEDICAL HISTORY:  Anxiety     Aortic stenosis     Asthma controlled on inhalers    BPH (benign prostatic hyperplasia)     CAD (coronary artery disease) s/p stent 10/2019    Dementia mild- on Memantine    ACE (dyspnea on exertion)     HLD (hyperlipidemia)     OH (ocular hypertension) on eye drops    Overactive bladder

## 2019-11-14 NOTE — H&P PST ADULT - ASSESSMENT
CAPRINI SCORE [CLOT updated 18]    AGE RELATED RISK FACTORS                                                       MOBILITY RELATED FACTORS  [ ] Age 41-60 years                                            (1 Point)                    [ ] Bed rest                                                        (1 Point)  [ ] Age: 61-74 years                                           (2 Points)                  [ ] Plaster cast                                                   (2 Points)  [x] Age= 75 years                                              (3 Points)                    [ ] Bed bound for more than 72 hours                 (2 Points)    DISEASE RELATED RISK FACTORS                                               GENDER SPECIFIC FACTORS  [ ] Edema in the lower extremities                       (1 Point)              [ ] Pregnancy                                                     (1 Point)  [ ] Varicose veins                                               (1 Point)                     [ ] Post-partum < 6 weeks                                   (1 Point)             [x] BMI > 25 Kg/m2                                            (1 Point)                     [ ] Hormonal therapy  or oral contraception          (1 Point)                 [ ] Sepsis (in the previous month)                        (1 Point)               [ ] History of pregnancy complications                 (1 point)  [ ] Pneumonia or serious lung disease                                               [ ] Unexplained or recurrent                     (1 Point)           (in the previous month)                               (1 Point)  [ ] Abnormal pulmonary function test                     (1 Point)                 SURGERY RELATED RISK FACTORS  [ ] Acute myocardial infarction                              (1 Point)               [ ]  Section                                             (1 Point)  [ ] Congestive heart failure (in the previous month)  (1 Point)      [ ] Minor surgery                                                  (1 Point)   [ ] Inflammatory bowel disease                             (1 Point)               [ ] Arthroscopic surgery                                        (2 Points)  [ ] Central venous access                                      (2 Points)                [x] General surgery lasting more than 45 minutes (2 points)  [ ] Present or previous malignancy                     (2 Points)                [ ] Elective arthroplasty                                         (5 points)    [ ] Stroke (in the previous month)                          (5 Points)                                                                                                                                                           HEMATOLOGY RELATED FACTORS                                                 TRAUMA RELATED RISK FACTORS  [ ] Prior episodes of VTE                                     (3 Points)                [ ] Fracture of the hip, pelvis, or leg                       (5 Points)  [ ] Positive family history for VTE                         (3 Points)             [ ] Acute spinal cord injury (in the previous month)  (5 Points)  [ ] Prothrombin 52152 A                                     (3 Points)               [ ] Paralysis  (less than 1 month)                             (5 Points)  [ ] Factor V Leiden                                             (3 Points)                  [ ] Multiple Trauma within 1 month                        (5 Points)  [ ] Lupus anticoagulants                                     (3 Points)                                                           [ ] Anticardiolipin antibodies                               (3 Points)                                                       [ ] High homocysteine in the blood                      (3 Points)                                             [ ] Other congenital or acquired thrombophilia      (3 Points)                                                [ ] Heparin induced thrombocytopenia                  (3 Points)                                     Total Score [    6     ]

## 2019-11-14 NOTE — H&P PST ADULT - NSICDXPROBLEM_GEN_ALL_CORE_FT
no
PROBLEM DIAGNOSES  Problem: Aortic stenosis  Assessment and Plan: Scheduled TAVR on 11/21/19  Pre-op insructions given to pt and daughter, including chlorhexidine soap  Carotid dopplers and CXR ordered at [facility]  Lab work sent at [facility]    Problem: CAD (coronary artery disease)  Assessment and Plan: Pt instructed to continue aspirin and plavix pre-op per CTSx (Tigre).     Problem: Asthma  Assessment and Plan: Pt to continue inhalers    Problem: Need for prophylactic measure  Assessment and Plan: The Caprini score indicates that this patient is at high risk for a VTE event (score 6 or greater). Surgical patients in this group will benefit from both pharmacologic prophylaxis and intermittent compression devices.  The surgical team will determine the balance between VTE risk and bleeding risk, and other clinical considerations

## 2019-11-15 ENCOUNTER — APPOINTMENT (OUTPATIENT)
Dept: INTERNAL MEDICINE | Facility: CLINIC | Age: 79
End: 2019-11-15
Payer: MEDICARE

## 2019-11-15 VITALS
DIASTOLIC BLOOD PRESSURE: 70 MMHG | HEART RATE: 74 BPM | BODY MASS INDEX: 28.34 KG/M2 | HEIGHT: 68 IN | WEIGHT: 187 LBS | OXYGEN SATURATION: 97 % | SYSTOLIC BLOOD PRESSURE: 123 MMHG | TEMPERATURE: 99 F

## 2019-11-15 PROBLEM — H40.059 OCULAR HYPERTENSION, UNSPECIFIED EYE: Chronic | Status: ACTIVE | Noted: 2019-11-14

## 2019-11-15 PROBLEM — F03.90 UNSPECIFIED DEMENTIA WITHOUT BEHAVIORAL DISTURBANCE: Chronic | Status: ACTIVE | Noted: 2019-10-25

## 2019-11-15 PROBLEM — F41.9 ANXIETY DISORDER, UNSPECIFIED: Chronic | Status: ACTIVE | Noted: 2019-11-14

## 2019-11-15 PROBLEM — N32.81 OVERACTIVE BLADDER: Chronic | Status: ACTIVE | Noted: 2019-11-14

## 2019-11-15 PROBLEM — I25.10 ATHEROSCLEROTIC HEART DISEASE OF NATIVE CORONARY ARTERY WITHOUT ANGINA PECTORIS: Chronic | Status: ACTIVE | Noted: 2019-11-14

## 2019-11-15 PROBLEM — J45.909 UNSPECIFIED ASTHMA, UNCOMPLICATED: Chronic | Status: ACTIVE | Noted: 2019-10-25

## 2019-11-15 LAB
MRSA PCR RESULT.: SIGNIFICANT CHANGE UP
S AUREUS DNA NOSE QL NAA+PROBE: SIGNIFICANT CHANGE UP

## 2019-11-15 PROCEDURE — 99215 OFFICE O/P EST HI 40 MIN: CPT

## 2019-11-15 NOTE — REVIEW OF SYSTEMS
[Nocturia] : nocturia [Frequency] : frequency [Fever] : no fever [Chills] : no chills [Fatigue] : no fatigue [Hot Flashes] : no hot flashes [Night Sweats] : no night sweats [Recent Change In Weight] : ~T no recent weight change [Discharge] : no discharge [Pain] : no pain [Redness] : no redness [Dryness] : no dryness [Vision Problems] : no vision problems [Itching] : no itching [Earache] : no earache [Hearing Loss] : no hearing loss [Nosebleeds] : no nosebleeds [Postnasal Drip] : no postnasal drip [Nasal Discharge] : no nasal discharge [Sore Throat] : no sore throat [Hoarseness] : no hoarseness [Chest Pain] : no chest pain [Palpitations] : no palpitations [Claudication] : no  leg claudication [Lower Ext Edema] : no lower extremity edema [Orthopena] : no orthopnea [Shortness Of Breath] : no shortness of breath [Cough] : no cough [Wheezing] : no wheezing [Nausea] : no nausea [Dyspnea on Exertion] : not dyspnea on exertion [Abdominal Pain] : no abdominal pain [Constipation] : no constipation [Diarrhea] : no diarrhea [Vomiting] : no vomiting [Heartburn] : no heartburn [Melena] : no melena [Dysuria] : no dysuria [Incontinence] : no incontinence [Hesitancy] : no hesitancy [Impotence] : no impotency [Hematuria] : no hematuria [Poor Libido] : libido not poor [Joint Pain] : no joint pain [Muscle Pain] : no muscle pain [Joint Stiffness] : no joint stiffness [Muscle Weakness] : no muscle weakness [Back Pain] : no back pain [Itching] : no itching [Joint Swelling] : no joint swelling [Mole Changes] : no mole changes [Nail Changes] : no nail changes [Hair Changes] : no hair changes [Headache] : no headache [Skin Rash] : no skin rash [Confusion] : no confusion [Fainting] : no fainting [Dizziness] : no dizziness [Unsteady Walk] : no ataxia [Suicidal] : not suicidal [Memory Loss] : no memory loss [Insomnia] : no insomnia [Anxiety] : no anxiety [Easy Bruising] : no easy bruising [Depression] : no depression [Easy Bleeding] : no easy bleeding [Swollen Glands] : no swollen glands

## 2019-11-15 NOTE — PHYSICAL EXAM
[No Acute Distress] : no acute distress [Well Nourished] : well nourished [Normal Sclera/Conjunctiva] : normal sclera/conjunctiva [Well-Appearing] : well-appearing [Well Developed] : well developed [Normal Outer Ear/Nose] : the outer ears and nose were normal in appearance [PERRL] : pupils equal round and reactive to light [EOMI] : extraocular movements intact [Supple] : supple [No JVD] : no jugular venous distention [Normal Oropharynx] : the oropharynx was normal [Thyroid Normal, No Nodules] : the thyroid was normal and there were no nodules present [No Lymphadenopathy] : no lymphadenopathy [No Respiratory Distress] : no respiratory distress  [Clear to Auscultation] : lungs were clear to auscultation bilaterally [No Accessory Muscle Use] : no accessory muscle use [Normal Rate] : normal rate  [Regular Rhythm] : with a regular rhythm [Normal S1, S2] : normal S1 and S2 [No Murmur] : no murmur heard [Pedal Pulses Present] : the pedal pulses are present [No Carotid Bruits] : no carotid bruits [No Varicosities] : no varicosities [No Abdominal Bruit] : a ~M bruit was not heard ~T in the abdomen [No Extremity Clubbing/Cyanosis] : no extremity clubbing/cyanosis [No Edema] : there was no peripheral edema [No Palpable Aorta] : no palpable aorta [Soft] : abdomen soft [Non-distended] : non-distended [Non Tender] : non-tender [No Masses] : no abdominal mass palpated [Normal Bowel Sounds] : normal bowel sounds [Normal Posterior Cervical Nodes] : no posterior cervical lymphadenopathy [No HSM] : no HSM [Normal Anterior Cervical Nodes] : no anterior cervical lymphadenopathy [No Spinal Tenderness] : no spinal tenderness [No CVA Tenderness] : no CVA  tenderness [No Joint Swelling] : no joint swelling [Grossly Normal Strength/Tone] : grossly normal strength/tone [No Rash] : no rash [Coordination Grossly Intact] : coordination grossly intact [Normal Gait] : normal gait [No Focal Deficits] : no focal deficits [Deep Tendon Reflexes (DTR)] : deep tendon reflexes were 2+ and symmetric [Normal Insight/Judgement] : insight and judgment were intact [Normal Affect] : the affect was normal

## 2019-11-15 NOTE — ASSESSMENT
[FreeTextEntry1] : \par Medical Issue 1: Aortic Stenosis: coordinated follow-up and plan of care with cardiology team; planning to undergo TAVR\par \par Medical Issue 2: Dementia: prescribed Namenda and Donepezil medications; coordinated follow-up with neurologist for further evaluation; patient's family refusing nursing home placement at this time\par \par Medical Issue 3: High cholesterol: prescribed Simvastatin 40mg qhs and check lipid panel fasting for target LDL <70\par \par Medical Issue 4: BPH: not well controlled; pt has worsening frequency; prescribed Proscar and Flomax medications and coordinated follow-up care with Urologist

## 2019-11-15 NOTE — HEALTH RISK ASSESSMENT
[No falls in past year] : Patient reported no falls in the past year [0] : 2) Feeling down, depressed, or hopeless: Not at all (0) [de-identified] : Urologist, Cardiologist [] : No [[id number]] : 0

## 2019-11-15 NOTE — HISTORY OF PRESENT ILLNESS
[Spouse] : spouse [Other: _____] : [unfilled] [FreeTextEntry1] : Presents for follow-up of aortic stenosis, dementia, high cholesterol and BPH. [de-identified] : \par Medical Issue 1: Aortic Stenosis: patient saw cardiologist since last visit with me and underwent ECHO showing moderate AS but he currently denies SOB, syncope and chest pain; he says he is agreeable to pursuing TAVR\par \par Medical Issue 2: Dementia: patient's wife and daughter say this is getting worse; but patient says he does not notice any worsening; family says he forgets things easily and they do not want him going to [facility] by himself anymore; family is concerned about his safety but they do not want him to go to nursing home; they are with him at home 24/7\par \par Medical Issue 3: High cholesterol: patient admits to difficulty adhering to low-fat diet; he is struggling with this and needs assistance for healthy lifestyle; he is compliant with his statin medication\par \par Medical Issue 4: BPH: pt reports increase in urinary frequency; having to wake up several times per night to urinate; he is compliant with Proscar and Flomax; he denies blood in urine, flank pain, fevers and chills; he also denies dysuria

## 2019-11-18 NOTE — HISTORY OF PRESENT ILLNESS
[FreeTextEntry1] : [facility] is here for f/u\par s/p recent LAD CSI/PCI\par RTO prior to TAVR next week\par No chest pressure\par No syncope\par No palpitations\par No LE edema\par

## 2019-11-18 NOTE — DISCUSSION/SUMMARY
[FreeTextEntry1] : No change in meds \par Encouraged DAPT compliance\par Ok to proceed with TAVR next week\par

## 2019-11-18 NOTE — REVIEW OF SYSTEMS
[Dyspnea on exertion] : dyspnea during exertion [Negative] : Heme/Lymph [Shortness Of Breath] : no shortness of breath [Feeling Fatigued] : not feeling fatigued [Lower Ext Edema] : no extremity edema

## 2019-11-21 ENCOUNTER — INPATIENT (INPATIENT)
Facility: HOSPITAL | Age: 79
LOS: 1 days | Discharge: HOME | DRG: 267 | End: 2019-11-23
Attending: THORACIC SURGERY (CARDIOTHORACIC VASCULAR SURGERY) | Admitting: THORACIC SURGERY (CARDIOTHORACIC VASCULAR SURGERY)
Payer: MEDICARE

## 2019-11-21 ENCOUNTER — APPOINTMENT (OUTPATIENT)
Dept: CARDIOTHORACIC SURGERY | Facility: HOSPITAL | Age: 79
End: 2019-11-21

## 2019-11-21 VITALS
TEMPERATURE: 98 F | HEART RATE: 68 BPM | WEIGHT: 188.27 LBS | SYSTOLIC BLOOD PRESSURE: 145 MMHG | RESPIRATION RATE: 18 BRPM | HEIGHT: 67 IN | DIASTOLIC BLOOD PRESSURE: 71 MMHG | OXYGEN SATURATION: 97 %

## 2019-11-21 DIAGNOSIS — Z98.890 OTHER SPECIFIED POSTPROCEDURAL STATES: Chronic | ICD-10-CM

## 2019-11-21 DIAGNOSIS — I35.0 NONRHEUMATIC AORTIC (VALVE) STENOSIS: ICD-10-CM

## 2019-11-21 DIAGNOSIS — Z90.89 ACQUIRED ABSENCE OF OTHER ORGANS: Chronic | ICD-10-CM

## 2019-11-21 LAB
ALBUMIN SERPL ELPH-MCNC: 4 G/DL — SIGNIFICANT CHANGE UP (ref 3.3–5)
ALP SERPL-CCNC: 44 U/L — SIGNIFICANT CHANGE UP (ref 40–120)
ALT FLD-CCNC: 15 U/L — SIGNIFICANT CHANGE UP (ref 10–45)
ANION GAP SERPL CALC-SCNC: 13 MMOL/L — SIGNIFICANT CHANGE UP (ref 5–17)
APTT BLD: 31 SEC — SIGNIFICANT CHANGE UP (ref 27.5–36.3)
AST SERPL-CCNC: 20 U/L — SIGNIFICANT CHANGE UP (ref 10–40)
BASOPHILS # BLD AUTO: 0.03 K/UL — SIGNIFICANT CHANGE UP (ref 0–0.2)
BASOPHILS NFR BLD AUTO: 0.4 % — SIGNIFICANT CHANGE UP (ref 0–2)
BILIRUB SERPL-MCNC: 0.3 MG/DL — SIGNIFICANT CHANGE UP (ref 0.2–1.2)
BUN SERPL-MCNC: 10 MG/DL — SIGNIFICANT CHANGE UP (ref 7–23)
CALCIUM SERPL-MCNC: 9.3 MG/DL — SIGNIFICANT CHANGE UP (ref 8.4–10.5)
CHLORIDE SERPL-SCNC: 105 MMOL/L — SIGNIFICANT CHANGE UP (ref 96–108)
CK MB BLD-MCNC: 7.4 % — HIGH (ref 0–3.5)
CK MB CFR SERPL CALC: 10.8 NG/ML — HIGH (ref 0–6.7)
CK SERPL-CCNC: 145 U/L — SIGNIFICANT CHANGE UP (ref 30–200)
CO2 SERPL-SCNC: 24 MMOL/L — SIGNIFICANT CHANGE UP (ref 22–31)
CREAT SERPL-MCNC: 0.82 MG/DL — SIGNIFICANT CHANGE UP (ref 0.5–1.3)
EOSINOPHIL # BLD AUTO: 0.17 K/UL — SIGNIFICANT CHANGE UP (ref 0–0.5)
EOSINOPHIL NFR BLD AUTO: 2.3 % — SIGNIFICANT CHANGE UP (ref 0–6)
FIBRINOGEN PPP-MCNC: 456 MG/DL — SIGNIFICANT CHANGE UP (ref 350–510)
GLUCOSE SERPL-MCNC: 93 MG/DL — SIGNIFICANT CHANGE UP (ref 70–99)
HCT VFR BLD CALC: 38.6 % — LOW (ref 39–50)
HGB BLD-MCNC: 12.8 G/DL — LOW (ref 13–17)
IMM GRANULOCYTES NFR BLD AUTO: 0.7 % — SIGNIFICANT CHANGE UP (ref 0–1.5)
INR BLD: 1.26 RATIO — HIGH (ref 0.88–1.16)
LYMPHOCYTES # BLD AUTO: 1.05 K/UL — SIGNIFICANT CHANGE UP (ref 1–3.3)
LYMPHOCYTES # BLD AUTO: 14.2 % — SIGNIFICANT CHANGE UP (ref 13–44)
MAGNESIUM SERPL-MCNC: 2.1 MG/DL — SIGNIFICANT CHANGE UP (ref 1.6–2.6)
MCHC RBC-ENTMCNC: 27.9 PG — SIGNIFICANT CHANGE UP (ref 27–34)
MCHC RBC-ENTMCNC: 33.2 GM/DL — SIGNIFICANT CHANGE UP (ref 32–36)
MCV RBC AUTO: 84.3 FL — SIGNIFICANT CHANGE UP (ref 80–100)
MONOCYTES # BLD AUTO: 0.67 K/UL — SIGNIFICANT CHANGE UP (ref 0–0.9)
MONOCYTES NFR BLD AUTO: 9.1 % — SIGNIFICANT CHANGE UP (ref 2–14)
NEUTROPHILS # BLD AUTO: 5.43 K/UL — SIGNIFICANT CHANGE UP (ref 1.8–7.4)
NEUTROPHILS NFR BLD AUTO: 73.3 % — SIGNIFICANT CHANGE UP (ref 43–77)
NRBC # BLD: 0 /100 WBCS — SIGNIFICANT CHANGE UP (ref 0–0)
PHOSPHATE SERPL-MCNC: 3.2 MG/DL — SIGNIFICANT CHANGE UP (ref 2.5–4.5)
PLATELET # BLD AUTO: 172 K/UL — SIGNIFICANT CHANGE UP (ref 150–400)
POTASSIUM SERPL-MCNC: 4.4 MMOL/L — SIGNIFICANT CHANGE UP (ref 3.5–5.3)
POTASSIUM SERPL-SCNC: 4.4 MMOL/L — SIGNIFICANT CHANGE UP (ref 3.5–5.3)
PROT SERPL-MCNC: 6.3 G/DL — SIGNIFICANT CHANGE UP (ref 6–8.3)
PROTHROM AB SERPL-ACNC: 14.5 SEC — HIGH (ref 10–12.9)
RBC # BLD: 4.58 M/UL — SIGNIFICANT CHANGE UP (ref 4.2–5.8)
RBC # FLD: 13.3 % — SIGNIFICANT CHANGE UP (ref 10.3–14.5)
RH IG SCN BLD-IMP: POSITIVE — SIGNIFICANT CHANGE UP
SODIUM SERPL-SCNC: 142 MMOL/L — SIGNIFICANT CHANGE UP (ref 135–145)
TROPONIN T, HIGH SENSITIVITY RESULT: 89 NG/L — HIGH (ref 0–51)
WBC # BLD: 7.4 K/UL — SIGNIFICANT CHANGE UP (ref 3.8–10.5)
WBC # FLD AUTO: 7.4 K/UL — SIGNIFICANT CHANGE UP (ref 3.8–10.5)

## 2019-11-21 PROCEDURE — 33361 REPLACE AORTIC VALVE PERQ: CPT | Mod: 62,Q0

## 2019-11-21 PROCEDURE — 33361 REPLACE AORTIC VALVE PERQ: CPT | Mod: Q0,62

## 2019-11-21 PROCEDURE — 93355 ECHO TRANSESOPHAGEAL (TEE): CPT

## 2019-11-21 PROCEDURE — 93010 ELECTROCARDIOGRAM REPORT: CPT | Mod: 76

## 2019-11-21 RX ORDER — FINASTERIDE 5 MG/1
5 TABLET, FILM COATED ORAL DAILY
Refills: 0 | Status: DISCONTINUED | OUTPATIENT
Start: 2019-11-21 | End: 2019-11-23

## 2019-11-21 RX ORDER — DONEPEZIL HYDROCHLORIDE 10 MG/1
10 TABLET, FILM COATED ORAL AT BEDTIME
Refills: 0 | Status: DISCONTINUED | OUTPATIENT
Start: 2019-11-21 | End: 2019-11-23

## 2019-11-21 RX ORDER — SIMVASTATIN 20 MG/1
40 TABLET, FILM COATED ORAL AT BEDTIME
Refills: 0 | Status: DISCONTINUED | OUTPATIENT
Start: 2019-11-21 | End: 2019-11-23

## 2019-11-21 RX ORDER — SODIUM CHLORIDE 9 MG/ML
1000 INJECTION INTRAMUSCULAR; INTRAVENOUS; SUBCUTANEOUS
Refills: 0 | Status: DISCONTINUED | OUTPATIENT
Start: 2019-11-21 | End: 2019-11-23

## 2019-11-21 RX ORDER — MONTELUKAST 4 MG/1
10 TABLET, CHEWABLE ORAL DAILY
Refills: 0 | Status: DISCONTINUED | OUTPATIENT
Start: 2019-11-21 | End: 2019-11-23

## 2019-11-21 RX ORDER — TAMSULOSIN HYDROCHLORIDE 0.4 MG/1
1 CAPSULE ORAL
Qty: 0 | Refills: 0 | DISCHARGE

## 2019-11-21 RX ORDER — CHLORHEXIDINE GLUCONATE 213 G/1000ML
1 SOLUTION TOPICAL ONCE
Refills: 0 | Status: COMPLETED | OUTPATIENT
Start: 2019-11-21 | End: 2019-11-21

## 2019-11-21 RX ORDER — CEFUROXIME AXETIL 250 MG
1500 TABLET ORAL ONCE
Refills: 0 | Status: COMPLETED | OUTPATIENT
Start: 2019-11-21 | End: 2019-11-21

## 2019-11-21 RX ORDER — CITALOPRAM 10 MG/1
20 TABLET, FILM COATED ORAL DAILY
Refills: 0 | Status: DISCONTINUED | OUTPATIENT
Start: 2019-11-21 | End: 2019-11-23

## 2019-11-21 RX ORDER — TAMSULOSIN HYDROCHLORIDE 0.4 MG/1
2 CAPSULE ORAL
Qty: 0 | Refills: 0 | DISCHARGE

## 2019-11-21 RX ORDER — PANTOPRAZOLE SODIUM 20 MG/1
40 TABLET, DELAYED RELEASE ORAL
Refills: 0 | Status: DISCONTINUED | OUTPATIENT
Start: 2019-11-21 | End: 2019-11-23

## 2019-11-21 RX ORDER — LATANOPROST 0.05 MG/ML
1 SOLUTION/ DROPS OPHTHALMIC; TOPICAL AT BEDTIME
Refills: 0 | Status: DISCONTINUED | OUTPATIENT
Start: 2019-11-21 | End: 2019-11-23

## 2019-11-21 RX ORDER — TAMSULOSIN HYDROCHLORIDE 0.4 MG/1
0.4 CAPSULE ORAL AT BEDTIME
Refills: 0 | Status: DISCONTINUED | OUTPATIENT
Start: 2019-11-21 | End: 2019-11-23

## 2019-11-21 RX ORDER — CLOPIDOGREL BISULFATE 75 MG/1
75 TABLET, FILM COATED ORAL DAILY
Refills: 0 | Status: DISCONTINUED | OUTPATIENT
Start: 2019-11-21 | End: 2019-11-23

## 2019-11-21 RX ORDER — ACETAMINOPHEN 500 MG
650 TABLET ORAL EVERY 6 HOURS
Refills: 0 | Status: DISCONTINUED | OUTPATIENT
Start: 2019-11-21 | End: 2019-11-23

## 2019-11-21 RX ORDER — MEMANTINE HYDROCHLORIDE 10 MG/1
10 TABLET ORAL
Refills: 0 | Status: DISCONTINUED | OUTPATIENT
Start: 2019-11-21 | End: 2019-11-23

## 2019-11-21 RX ORDER — ASPIRIN/CALCIUM CARB/MAGNESIUM 324 MG
81 TABLET ORAL DAILY
Refills: 0 | Status: DISCONTINUED | OUTPATIENT
Start: 2019-11-21 | End: 2019-11-23

## 2019-11-21 RX ORDER — CEFUROXIME AXETIL 250 MG
1500 TABLET ORAL EVERY 8 HOURS
Refills: 0 | Status: COMPLETED | OUTPATIENT
Start: 2019-11-21 | End: 2019-11-22

## 2019-11-21 RX ADMIN — CHLORHEXIDINE GLUCONATE 1 APPLICATION(S): 213 SOLUTION TOPICAL at 11:29

## 2019-11-21 RX ADMIN — Medication 81 MILLIGRAM(S): at 22:01

## 2019-11-21 RX ADMIN — SODIUM CHLORIDE 3 MILLILITER(S): 9 INJECTION INTRAMUSCULAR; INTRAVENOUS; SUBCUTANEOUS at 11:28

## 2019-11-21 RX ADMIN — FINASTERIDE 5 MILLIGRAM(S): 5 TABLET, FILM COATED ORAL at 22:01

## 2019-11-21 RX ADMIN — LATANOPROST 1 DROP(S): 0.05 SOLUTION/ DROPS OPHTHALMIC; TOPICAL at 22:02

## 2019-11-21 RX ADMIN — SIMVASTATIN 40 MILLIGRAM(S): 20 TABLET, FILM COATED ORAL at 22:01

## 2019-11-21 RX ADMIN — CLOPIDOGREL BISULFATE 75 MILLIGRAM(S): 75 TABLET, FILM COATED ORAL at 22:01

## 2019-11-21 RX ADMIN — TAMSULOSIN HYDROCHLORIDE 0.4 MILLIGRAM(S): 0.4 CAPSULE ORAL at 22:01

## 2019-11-21 NOTE — CHART NOTE - NSCHARTNOTEFT_GEN_A_CORE
Patient is s/p TAVR via Right groin ( Manta closure device in place) and Left groin ( per- close in place)   pt tolerated procedure well as per verbal report received by Dr Coreas and Dr Dc  vitals remained stable while in CRS ( see paper flow sheet), calm and cooperative ( PMH of Dementia, family at bedside)   bl groin benign w/o bleeding or hematoma   pt is to stay BR as per protocol   RRA band being removed by primary nurse Roxie   patient to be transferred to [facility] after recovery is fully done, hand off telephone report given to Richa ( SIRISHA)

## 2019-11-21 NOTE — BRIEF OPERATIVE NOTE - NSICDXBRIEFPROCEDURE_GEN_ALL_CORE_FT
PROCEDURES:  TAVR, percutaneous 21-Nov-2019 17:08:50 #29 core valve via right femoral artery [place]

## 2019-11-22 DIAGNOSIS — F03.90 UNSPECIFIED DEMENTIA WITHOUT BEHAVIORAL DISTURBANCE: ICD-10-CM

## 2019-11-22 DIAGNOSIS — Z95.3 PRESENCE OF XENOGENIC HEART VALVE: ICD-10-CM

## 2019-11-22 LAB
ANION GAP SERPL CALC-SCNC: 16 MMOL/L — SIGNIFICANT CHANGE UP (ref 5–17)
BUN SERPL-MCNC: 14 MG/DL — SIGNIFICANT CHANGE UP (ref 7–23)
CALCIUM SERPL-MCNC: 9.3 MG/DL — SIGNIFICANT CHANGE UP (ref 8.4–10.5)
CHLORIDE SERPL-SCNC: 105 MMOL/L — SIGNIFICANT CHANGE UP (ref 96–108)
CO2 SERPL-SCNC: 22 MMOL/L — SIGNIFICANT CHANGE UP (ref 22–31)
CREAT SERPL-MCNC: 0.95 MG/DL — SIGNIFICANT CHANGE UP (ref 0.5–1.3)
GLUCOSE BLDC GLUCOMTR-MCNC: 101 MG/DL — HIGH (ref 70–99)
GLUCOSE SERPL-MCNC: 94 MG/DL — SIGNIFICANT CHANGE UP (ref 70–99)
HCT VFR BLD CALC: 38.2 % — LOW (ref 39–50)
HGB BLD-MCNC: 13 G/DL — SIGNIFICANT CHANGE UP (ref 13–17)
MCHC RBC-ENTMCNC: 28.8 PG — SIGNIFICANT CHANGE UP (ref 27–34)
MCHC RBC-ENTMCNC: 34 GM/DL — SIGNIFICANT CHANGE UP (ref 32–36)
MCV RBC AUTO: 84.7 FL — SIGNIFICANT CHANGE UP (ref 80–100)
NRBC # BLD: 0 /100 WBCS — SIGNIFICANT CHANGE UP (ref 0–0)
PLATELET # BLD AUTO: 161 K/UL — SIGNIFICANT CHANGE UP (ref 150–400)
POTASSIUM SERPL-MCNC: 4.2 MMOL/L — SIGNIFICANT CHANGE UP (ref 3.5–5.3)
POTASSIUM SERPL-SCNC: 4.2 MMOL/L — SIGNIFICANT CHANGE UP (ref 3.5–5.3)
RBC # BLD: 4.51 M/UL — SIGNIFICANT CHANGE UP (ref 4.2–5.8)
RBC # FLD: 13.6 % — SIGNIFICANT CHANGE UP (ref 10.3–14.5)
SODIUM SERPL-SCNC: 143 MMOL/L — SIGNIFICANT CHANGE UP (ref 135–145)
WBC # BLD: 10.76 K/UL — HIGH (ref 3.8–10.5)
WBC # FLD AUTO: 10.76 K/UL — HIGH (ref 3.8–10.5)

## 2019-11-22 PROCEDURE — 99232 SBSQ HOSP IP/OBS MODERATE 35: CPT

## 2019-11-22 PROCEDURE — 93306 TTE W/DOPPLER COMPLETE: CPT | Mod: 26

## 2019-11-22 PROCEDURE — 71045 X-RAY EXAM CHEST 1 VIEW: CPT | Mod: 26

## 2019-11-22 PROCEDURE — 93010 ELECTROCARDIOGRAM REPORT: CPT

## 2019-11-22 RX ORDER — SODIUM CHLORIDE 9 MG/ML
3 INJECTION INTRAMUSCULAR; INTRAVENOUS; SUBCUTANEOUS EVERY 8 HOURS
Refills: 0 | Status: DISCONTINUED | OUTPATIENT
Start: 2019-11-22 | End: 2019-11-23

## 2019-11-22 RX ORDER — ALBUTEROL 90 UG/1
2 AEROSOL, METERED ORAL EVERY 4 HOURS
Refills: 0 | Status: DISCONTINUED | OUTPATIENT
Start: 2019-11-22 | End: 2019-11-23

## 2019-11-22 RX ORDER — BUDESONIDE AND FORMOTEROL FUMARATE DIHYDRATE 160; 4.5 UG/1; UG/1
2 AEROSOL RESPIRATORY (INHALATION)
Refills: 0 | Status: DISCONTINUED | OUTPATIENT
Start: 2019-11-22 | End: 2019-11-23

## 2019-11-22 RX ORDER — INSULIN LISPRO 100/ML
VIAL (ML) SUBCUTANEOUS
Refills: 0 | Status: DISCONTINUED | OUTPATIENT
Start: 2019-11-22 | End: 2019-11-23

## 2019-11-22 RX ADMIN — MONTELUKAST 10 MILLIGRAM(S): 4 TABLET, CHEWABLE ORAL at 11:31

## 2019-11-22 RX ADMIN — SODIUM CHLORIDE 3 MILLILITER(S): 9 INJECTION INTRAMUSCULAR; INTRAVENOUS; SUBCUTANEOUS at 23:03

## 2019-11-22 RX ADMIN — CITALOPRAM 20 MILLIGRAM(S): 10 TABLET, FILM COATED ORAL at 11:31

## 2019-11-22 RX ADMIN — ALBUTEROL 2 PUFF(S): 90 AEROSOL, METERED ORAL at 14:21

## 2019-11-22 RX ADMIN — TAMSULOSIN HYDROCHLORIDE 0.4 MILLIGRAM(S): 0.4 CAPSULE ORAL at 22:42

## 2019-11-22 RX ADMIN — Medication 650 MILLIGRAM(S): at 08:14

## 2019-11-22 RX ADMIN — DONEPEZIL HYDROCHLORIDE 10 MILLIGRAM(S): 10 TABLET, FILM COATED ORAL at 22:42

## 2019-11-22 RX ADMIN — MEMANTINE HYDROCHLORIDE 10 MILLIGRAM(S): 10 TABLET ORAL at 17:00

## 2019-11-22 RX ADMIN — FINASTERIDE 5 MILLIGRAM(S): 5 TABLET, FILM COATED ORAL at 11:32

## 2019-11-22 RX ADMIN — PANTOPRAZOLE SODIUM 40 MILLIGRAM(S): 20 TABLET, DELAYED RELEASE ORAL at 08:14

## 2019-11-22 RX ADMIN — Medication 1 TABLET(S): at 11:31

## 2019-11-22 RX ADMIN — MEMANTINE HYDROCHLORIDE 10 MILLIGRAM(S): 10 TABLET ORAL at 05:22

## 2019-11-22 RX ADMIN — CLOPIDOGREL BISULFATE 75 MILLIGRAM(S): 75 TABLET, FILM COATED ORAL at 11:31

## 2019-11-22 RX ADMIN — Medication 81 MILLIGRAM(S): at 11:31

## 2019-11-22 RX ADMIN — Medication 650 MILLIGRAM(S): at 08:30

## 2019-11-22 RX ADMIN — BUDESONIDE AND FORMOTEROL FUMARATE DIHYDRATE 2 PUFF(S): 160; 4.5 AEROSOL RESPIRATORY (INHALATION) at 22:42

## 2019-11-22 RX ADMIN — LATANOPROST 1 DROP(S): 0.05 SOLUTION/ DROPS OPHTHALMIC; TOPICAL at 22:43

## 2019-11-22 RX ADMIN — Medication 100 MILLIGRAM(S): at 08:20

## 2019-11-22 RX ADMIN — DONEPEZIL HYDROCHLORIDE 10 MILLIGRAM(S): 10 TABLET, FILM COATED ORAL at 00:27

## 2019-11-22 RX ADMIN — SIMVASTATIN 40 MILLIGRAM(S): 20 TABLET, FILM COATED ORAL at 22:42

## 2019-11-22 RX ADMIN — Medication 100 MILLIGRAM(S): at 00:27

## 2019-11-22 NOTE — PROGRESS NOTE ADULT - PROBLEM SELECTOR PLAN 3
(+) confusion --> 1:1; A&O x 2 only --> constant observation for safety.  Continue donepezil 10 mg PO mg daily  Continue with memantine 10 mg PO two times a day

## 2019-11-22 NOTE — PROGRESS NOTE ADULT - SUBJECTIVE AND OBJECTIVE BOX
*****Structural Heart Team*****    Subjective:    Patient resting comfortably in bed, offering no complaints. He currently denies any SOB or CP, and denies pain to his groins. He has yet to ambulate.      PAST MEDICAL & SURGICAL HISTORY:  Anxiety  Overactive bladder  OH (ocular hypertension): on eye drops  CAD (coronary artery disease): s/p stent 10/2019  HLD (hyperlipidemia)  Asthma: controlled on inhalers  ACE (dyspnea on exertion)  Dementia: mild- on Memantine  BPH (benign prostatic hyperplasia)  Aortic stenosis  H/O coronary angiogram: 10/25/19, s/p PCI to LAD  History of tonsillectomy: childhood  chronic diastolic Heart failure      T(C): 37.1 (11-22-19 @ 11:00), Max: 37.3 (11-22-19 @ 07:00)  HR: 76 (11-22-19 @ 11:00) (57 - 88)  BP: 139/63 (11-22-19 @ 11:00) (123/57 - 187/82)  RR: 32 (11-22-19 @ 11:00) (16 - 43)  SpO2: 96% (11-22-19 @ 11:00) (92% - 100%)  Wt(kg): --  11-21 @ 07:01  -  11-22 @ 07:00  --------------------------------------------------------  IN: 250 mL / OUT: 1300 mL / NET: -1050 mL    11-22 @ 07:01  -  11-22 @ 11:53  --------------------------------------------------------  IN: 300 mL / OUT: 325 mL / NET: -25 mL      MEDICATIONS  (STANDING):  aspirin enteric coated 81 milliGRAM(s) Oral daily  budesonide 160 MICROgram(s)/formoterol 4.5 MICROgram(s) Inhaler 2 Puff(s) Inhalation two times a day  citalopram 20 milliGRAM(s) Oral daily  clopidogrel Tablet 75 milliGRAM(s) Oral daily  donepezil 10 milliGRAM(s) Oral at bedtime  finasteride 5 milliGRAM(s) Oral daily  latanoprost 0.005% Ophthalmic Solution 1 Drop(s) Both EYES at bedtime  memantine 10 milliGRAM(s) Oral two times a day  montelukast 10 milliGRAM(s) Oral daily  multivitamin 1 Tablet(s) Oral daily  pantoprazole    Tablet 40 milliGRAM(s) Oral before breakfast  simvastatin 40 milliGRAM(s) Oral at bedtime  sodium chloride 0.9%. 1000 milliLiter(s) (10 mL/Hr) IV Continuous <Continuous>  tamsulosin 0.4 milliGRAM(s) Oral at bedtime    MEDICATIONS  (PRN):  acetaminophen   Tablet .. 650 milliGRAM(s) Oral every 6 hours PRN Mild Pain (1 - 3)  ALBUTerol    90 MICROgram(s) HFA Inhaler 2 Puff(s) Inhalation every 4 hours PRN Shortness of Breath      Review of Symptoms:  Constitutional: Awake, Alert, Follows commands  Respiratory: Denies SOB, Denies ACE  Cardiac: Denies CP, Denies Palpitations  Gastrointestinal: Denies Pain, Denies N/V, tolerating po intake  Vascular: Negative  Extremities: No Edema, No joint pain or swelling  Neurological: Negative  Endocrine: No heat or cold intolerance, No excessive thirst  Heme/Onc: Negative    Exam:  General: A/O x3, NAD, [id number]  HEENT: Supple, No JVD, Trachea midline, no masses  Pulmonary: CTAB, = Chest Excursion, no accessory muscle use  Cor: S1S2, RRR, No murmur/Rub  ECG: SR  Groin/Wound: Soft B/L , NT, No hematoma  Gastrointestinal: Soft, NT/ND, + Bowel Sounds  Neuro: = motor and sensory B/L, No focal deficits  Vascular: 1+ Pulses B/L, No Bruits, + PMS x4  Extremities: No joint pain or edema, PMS x4  Skin: Warm/Dry/Normal color, Normal turgor, no rashes                          13.0   10.76 )-----------( 161      ( 22 Nov 2019 04:42 )             38.2   11-22    143  |  105  |  14  ----------------------------<  94  4.2   |  22  |  0.95    Ca    9.3      22 Nov 2019 04:42  Phos  3.2     11-21  Mg     2.1     11-21    TPro  6.3  /  Alb  4.0  /  TBili  0.3  /  DBili  x   /  AST  20  /  ALT  15  /  AlkPhos  44  11-21  PT/INR - ( 21 Nov 2019 20:02 )   PT: 14.5 sec;   INR: 1.26 ratio         PTT - ( 21 Nov 2019 20:02 )  PTT:31.0 sec    Imaging Reviewed:    Post Op TTE: Pending    Assesment/Plan:  80 y/o male, S/P TAVR for Severe Symptomatic Aortic Stenosis, Chronic Diastolic Heart FAilure, Asthma  1.) S/P TAVR: ASA 81 mg po daily, Plavix 75 mg po daily, Continue to Monitor Groins. Ambulate as tolerated. Follow up Post op TTE  2.) Asthma: Restarted patient on his Bronchodilators. Meds were  changed for therapeutic interchange here at the hospital. Please instruct patient to return to his usual meds on discharge (Advair HFA and Ventolin HFA)  3.) Diastolic Heart Failure: no indication for diuresis, daily weight  4.) Transfer patient to [address] [place] [address]  5.) Discharge Plan: Patient is to follow up with Dr. Sterling  in 1 week post discharge. HE should then follow up with the [facility]  in 30 days, with a repeat Transthoracic Echo to be done at that visit.

## 2019-11-22 NOTE — PROGRESS NOTE ADULT - ASSESSMENT
78 y/o Romanian speaking male, accompanied by daughter to translate, with PMHx: of AS, Asthma (controlled on inhalers), BPH, Dementia, HLD, c/o progressively worsening ACE, S/P Echo confirmed severe aortic stenosis. S/P cath completed 10/25/19 with stent placed to LAD. Patient currently denies any chest pain/ SOB, palpitations, or dizziness/ syncope. Patient was evaluated by Dr. Sterling. Presents to [facility] for a scheduled TAVR on 11/21/2019.  On 11/21/19 s/p TAVR #29 core valve via right femoral artery  Post op Course:   11/22 Post op TTE revealed: Mitral annular calcification, otherwise normal mitral valve. Minimal mitral regurgitation. Transcatheter aortic valve replacement. The valve is well seated.  Peak transaortic valve gradient equals 7 mm Hg, mean transaortic valve gradient equals 4 mm Hg, which is probably normal in the presence of a transcatheter aortic valve replacement. No aortic valvular or paravalvular regurgitation seen. Transferred to [facility] Telemetry floor.  (+) confusion à 1:1; A&O x 2 only --> constant observation for safety.

## 2019-11-22 NOTE — PROGRESS NOTE ADULT - PROBLEM SELECTOR PLAN 1
Continue with ASA 81 mg PO Daily.   Continue on Plavix 75 mg PO Daily   Daily EKG   Continue with Statin   Increase activity as tolerated.   Encourage Chest PT / Pulmonary toileting and Incentive spirometry every 1 hour x 10 while awake.   Continue with PUD prophylaxis.   Daily Shower start in AM   D/C plan home once medically   Plan of care discussed with attending Continue with ASA 81 mg PO Daily.   Continue on Plavix 75 mg PO Daily   Daily EKG   Continue with Statin   Increase activity as tolerated.   Encourage Chest PT / Pulmonary toileting and Incentive spirometry every 1 hour x 10 while awake.   Continue with PUD prophylaxis.   Daily Shower start in AM   D/C plan home once medically cleared   Plan of care discussed with attending

## 2019-11-22 NOTE — PROGRESS NOTE ADULT - SUBJECTIVE AND OBJECTIVE BOX
VITAL SIGNS    Subjective: "I'm ok." Denies CP, palpitation, SOB, AEC, HA, dizziness, N/V/D, fever or chills.  No acute event noted overnight.     Telemetry:  NSR 80's     Vital Signs Last 24 Hrs  T(C): 37.4 (19 @ 18:26), Max: 38.1 (19 @ 15:00)  T(F): 99.3 (19 @ 18:26), Max: 100.5 (19 @ 15:00)  HR: 75 (19 @ 18:26) (59 - 88)  BP: 149/73 (19 @ 18:26) (115/58 - 187/82)  RR: 20 (19 @ 18:26) (17 - 46)  SpO2: 95% (19 @ 18:26) (92% - 96%)           @ 07:01  -   @ 07:00  --------------------------------------------------------  IN: 250 mL / OUT: 1300 mL / NET: -1050 mL     @ 07:01  -   @ 19:41  --------------------------------------------------------  IN: 420 mL / OUT: 625 mL / NET: -205 mL    Daily     Daily Weight in k.7 (2019 00:00)      PHYSICAL EXAM    Neurology: alert and oriented x 2, nonfocal, no gross deficits    CV: (+) S1 and S2, No murmurs, rubs, gallops or clicks     Lungs: CTA B/L     Abdomen: soft, nontender, nondistended, positive bowel sounds, (+) Flatus; (+) BM     :  Voiding               Extremities:  B/L LE (+) edema; negative calf tenderness; (+) 2 DP palpable; B/L groin site C/D/I;  soft         acetaminophen Tablet .. 650 milliGRAM(s) Oral every 6 hours PRN  ALBUTerol 90 MICROgram(s) HFA Inhaler 2 Puff(s) Inhalation every 4 hours PRN  aspirin enteric coated 81 milliGRAM(s) Oral daily  budesonide 160 MICROgram(s)/formoterol 4.5 MICROgram(s) Inhaler 2 Puff(s) Inhalation two times a day  citalopram 20 milliGRAM(s) Oral daily  clopidogrel Tablet 75 milliGRAM(s) Oral daily  donepezil 10 milliGRAM(s) Oral at bedtime  finasteride 5 milliGRAM(s) Oral daily  insulin lispro (HumaLOG) corrective regimen sliding scale   SubCutaneous Before meals and at bedtime  latanoprost 0.005% Ophthalmic Solution 1 Drop(s) Both EYES at bedtime  memantine 10 milliGRAM(s) Oral two times a day  montelukast 10 milliGRAM(s) Oral daily  multivitamin 1 Tablet(s) Oral daily  pantoprazole Tablet 40 milliGRAM(s) Oral before breakfast  simvastatin 40 milliGRAM(s) Oral at bedtime  tamsulosin 0.4 milliGRAM(s) Oral at bedtime    Physical Therapy Rec:   Home  [  ]   Home w/ PT  [ X ]  Rehab  [  ]    Discussed with Cardiothoracic Team at AM rounds.

## 2019-11-22 NOTE — PROGRESS NOTE ADULT - PROBLEM SELECTOR PLAN 2
Continue with Albuterol 90 HAF inhaler 2 puffs every 6 hours as needed   Continue with budesonide 160  mcg / formoterol 4.5 mcg  Inhaler 2 Puff(s) Inhalation two times a day

## 2019-11-23 ENCOUNTER — TRANSCRIPTION ENCOUNTER (OUTPATIENT)
Age: 79
End: 2019-11-23

## 2019-11-23 VITALS
RESPIRATION RATE: 18 BRPM | HEART RATE: 68 BPM | TEMPERATURE: 99 F | DIASTOLIC BLOOD PRESSURE: 69 MMHG | OXYGEN SATURATION: 95 % | SYSTOLIC BLOOD PRESSURE: 125 MMHG

## 2019-11-23 LAB
ANION GAP SERPL CALC-SCNC: 14 MMOL/L — SIGNIFICANT CHANGE UP (ref 5–17)
BUN SERPL-MCNC: 15 MG/DL — SIGNIFICANT CHANGE UP (ref 7–23)
CALCIUM SERPL-MCNC: 9.2 MG/DL — SIGNIFICANT CHANGE UP (ref 8.4–10.5)
CHLORIDE SERPL-SCNC: 105 MMOL/L — SIGNIFICANT CHANGE UP (ref 96–108)
CO2 SERPL-SCNC: 24 MMOL/L — SIGNIFICANT CHANGE UP (ref 22–31)
CREAT SERPL-MCNC: 0.86 MG/DL — SIGNIFICANT CHANGE UP (ref 0.5–1.3)
GLUCOSE BLDC GLUCOMTR-MCNC: 111 MG/DL — HIGH (ref 70–99)
GLUCOSE BLDC GLUCOMTR-MCNC: 122 MG/DL — HIGH (ref 70–99)
GLUCOSE SERPL-MCNC: 159 MG/DL — HIGH (ref 70–99)
HCT VFR BLD CALC: 36.7 % — LOW (ref 39–50)
HGB BLD-MCNC: 12.6 G/DL — LOW (ref 13–17)
MCHC RBC-ENTMCNC: 28.8 PG — SIGNIFICANT CHANGE UP (ref 27–34)
MCHC RBC-ENTMCNC: 34.3 GM/DL — SIGNIFICANT CHANGE UP (ref 32–36)
MCV RBC AUTO: 84 FL — SIGNIFICANT CHANGE UP (ref 80–100)
NRBC # BLD: 0 /100 WBCS — SIGNIFICANT CHANGE UP (ref 0–0)
PLATELET # BLD AUTO: 147 K/UL — LOW (ref 150–400)
POTASSIUM SERPL-MCNC: 3.7 MMOL/L — SIGNIFICANT CHANGE UP (ref 3.5–5.3)
POTASSIUM SERPL-SCNC: 3.7 MMOL/L — SIGNIFICANT CHANGE UP (ref 3.5–5.3)
RBC # BLD: 4.37 M/UL — SIGNIFICANT CHANGE UP (ref 4.2–5.8)
RBC # FLD: 13.2 % — SIGNIFICANT CHANGE UP (ref 10.3–14.5)
SODIUM SERPL-SCNC: 143 MMOL/L — SIGNIFICANT CHANGE UP (ref 135–145)
WBC # BLD: 8.54 K/UL — SIGNIFICANT CHANGE UP (ref 3.8–10.5)
WBC # FLD AUTO: 8.54 K/UL — SIGNIFICANT CHANGE UP (ref 3.8–10.5)

## 2019-11-23 PROCEDURE — P9047: CPT

## 2019-11-23 PROCEDURE — 71045 X-RAY EXAM CHEST 1 VIEW: CPT

## 2019-11-23 PROCEDURE — 93010 ELECTROCARDIOGRAM REPORT: CPT

## 2019-11-23 PROCEDURE — 99239 HOSP IP/OBS DSCHRG MGMT >30: CPT

## 2019-11-23 PROCEDURE — P9016: CPT

## 2019-11-23 PROCEDURE — 93355 ECHO TRANSESOPHAGEAL (TEE): CPT

## 2019-11-23 PROCEDURE — 85610 PROTHROMBIN TIME: CPT

## 2019-11-23 PROCEDURE — 83735 ASSAY OF MAGNESIUM: CPT

## 2019-11-23 PROCEDURE — 85384 FIBRINOGEN ACTIVITY: CPT

## 2019-11-23 PROCEDURE — C1889: CPT

## 2019-11-23 PROCEDURE — C1769: CPT

## 2019-11-23 PROCEDURE — 85027 COMPLETE CBC AUTOMATED: CPT

## 2019-11-23 PROCEDURE — 84100 ASSAY OF PHOSPHORUS: CPT

## 2019-11-23 PROCEDURE — C1894: CPT

## 2019-11-23 PROCEDURE — 86900 BLOOD TYPING SEROLOGIC ABO: CPT

## 2019-11-23 PROCEDURE — 80053 COMPREHEN METABOLIC PANEL: CPT

## 2019-11-23 PROCEDURE — C1726: CPT

## 2019-11-23 PROCEDURE — 93306 TTE W/DOPPLER COMPLETE: CPT

## 2019-11-23 PROCEDURE — 85730 THROMBOPLASTIN TIME PARTIAL: CPT

## 2019-11-23 PROCEDURE — 84484 ASSAY OF TROPONIN QUANT: CPT

## 2019-11-23 PROCEDURE — C1887: CPT

## 2019-11-23 PROCEDURE — 80048 BASIC METABOLIC PNL TOTAL CA: CPT

## 2019-11-23 PROCEDURE — C1884: CPT

## 2019-11-23 PROCEDURE — 93005 ELECTROCARDIOGRAM TRACING: CPT

## 2019-11-23 PROCEDURE — 76000 FLUOROSCOPY <1 HR PHYS/QHP: CPT

## 2019-11-23 PROCEDURE — 82962 GLUCOSE BLOOD TEST: CPT

## 2019-11-23 PROCEDURE — 86901 BLOOD TYPING SEROLOGIC RH(D): CPT

## 2019-11-23 PROCEDURE — 82553 CREATINE MB FRACTION: CPT

## 2019-11-23 PROCEDURE — 82550 ASSAY OF CK (CPK): CPT

## 2019-11-23 PROCEDURE — 86923 COMPATIBILITY TEST ELECTRIC: CPT

## 2019-11-23 PROCEDURE — 71045 X-RAY EXAM CHEST 1 VIEW: CPT | Mod: 26

## 2019-11-23 PROCEDURE — C1760: CPT

## 2019-11-23 PROCEDURE — L8699: CPT

## 2019-11-23 PROCEDURE — 94640 AIRWAY INHALATION TREATMENT: CPT

## 2019-11-23 RX ORDER — ACETAMINOPHEN 500 MG
2 TABLET ORAL
Qty: 0 | Refills: 0 | DISCHARGE
Start: 2019-11-23

## 2019-11-23 RX ORDER — OMEGA-3 ACID ETHYL ESTERS 1 G
0 CAPSULE ORAL
Qty: 0 | Refills: 0 | DISCHARGE

## 2019-11-23 RX ADMIN — Medication 1 TABLET(S): at 13:40

## 2019-11-23 RX ADMIN — CITALOPRAM 20 MILLIGRAM(S): 10 TABLET, FILM COATED ORAL at 13:42

## 2019-11-23 RX ADMIN — Medication 81 MILLIGRAM(S): at 13:41

## 2019-11-23 RX ADMIN — SODIUM CHLORIDE 3 MILLILITER(S): 9 INJECTION INTRAMUSCULAR; INTRAVENOUS; SUBCUTANEOUS at 06:30

## 2019-11-23 RX ADMIN — FINASTERIDE 5 MILLIGRAM(S): 5 TABLET, FILM COATED ORAL at 13:40

## 2019-11-23 RX ADMIN — CLOPIDOGREL BISULFATE 75 MILLIGRAM(S): 75 TABLET, FILM COATED ORAL at 13:40

## 2019-11-23 RX ADMIN — BUDESONIDE AND FORMOTEROL FUMARATE DIHYDRATE 2 PUFF(S): 160; 4.5 AEROSOL RESPIRATORY (INHALATION) at 06:44

## 2019-11-23 RX ADMIN — PANTOPRAZOLE SODIUM 40 MILLIGRAM(S): 20 TABLET, DELAYED RELEASE ORAL at 06:43

## 2019-11-23 RX ADMIN — MEMANTINE HYDROCHLORIDE 10 MILLIGRAM(S): 10 TABLET ORAL at 06:43

## 2019-11-23 NOTE — DISCHARGE NOTE NURSING/CASE MANAGEMENT/SOCIAL WORK - NSDCPNINST_GEN_ALL_CORE
pt refused  services, daughter sheree used for translation, pt and family understands post op plan of care teach back rec'v

## 2019-11-23 NOTE — DISCHARGE NOTE PROVIDER - HOSPITAL COURSE
78 y/o Amharic speaking male, accompanied by daughter to translate, with PMHx: of AS, Asthma (controlled on inhalers), BPH, Dementia, HLD, c/o progressively worsening ACE, S/P Echo confirmed severe aortic stenosis. S/P cath completed 10/25/19 with stent placed to LAD. Patient currently denies any chest pain/ SOB, palpitations, or dizziness/ syncope. Patient was evaluated by Dr. Sterling. Presents to [facility] for a scheduled TAVR on 11/21/2019.    On 11/21/19 s/p TAVR #29 core valve via right femoral artery    Post op Course:     11/22 Post op TTE revealed: Mitral annular calcification, otherwise normal mitral valve. Minimal mitral regurgitation. Transcatheter aortic valve replacement. The valve is well seated.  Peak transaortic valve gradient equals 7 mm Hg, mean transaortic valve gradient equals 4 mm Hg, which is probably normal in the presence of a transcatheter aortic valve replacement. No aortic valvular or paravalvular regurgitation seen. Transferred to 2 [facility] Telemetry floor.  (+) confusion à 1:1; A&O x 2 only --> constant observation for safety.     11/23 VSS  seen and examined in company of Dr Sterling stable and eager for discharge on home

## 2019-11-23 NOTE — DISCHARGE NOTE NURSING/CASE MANAGEMENT/SOCIAL WORK - PATIENT PORTAL LINK FT
You can access the FollowMyHealth Patient Portal offered by [facility] by registering at the following website: [email or web address]. By joining [employer]’s FollowMyHealth portal, you will also be able to view your health information using other applications (apps) compatible with our system.

## 2019-11-23 NOTE — DISCHARGE NOTE PROVIDER - CARE PROVIDER_API CALL
Micky Sterling)  Thoracic and Cardiac Surgery  [address] [place] [address]  Phone: [phone]  Fax: [phone]  Follow Up Time:

## 2019-11-23 NOTE — DISCHARGE NOTE PROVIDER - NSDCFUSCHEDAPPT_GEN_ALL_CORE_FT
MARLON KRUEGER ; 11/26/2019 ; NPP CT Surg [address]  MARLON KRUEGER ; 11/26/2019 ; NPP CT Surg [address] MARLON Jacome ; 02/21/2020 ; NPP Internal Med [address]

## 2019-11-23 NOTE — DISCHARGE NOTE PROVIDER - NSDCPNSUBOBJ_GEN_ALL_CORE
axox3     NSR70    S1S2 rrr    Lungs CTA ab soft +, BS+ BM    Voids     left groin eccymotic nontender    right groin  ecchymotic nontender    b/lle warm well erfused + DP     125/69 68 18 95 36.7                            12.6     8.54  )-----------( 147      ( 23 Nov 2019 10:39 )               36.7     11-23        143  |  105  |  15    ----------------------------<  159<H>    3.7   |  24  |  0.86        Ca    9.2      23 Nov 2019 10:39    Phos  3.2     11-21    Mg     2.1     11-21        TPro  6.3  /  Alb  4.0  /  TBili  0.3  /  DBili  x   /  AST  20  /  ALT  15  /  AlkPhos  44  11-21    Greater then 40 minutes spent on discharge

## 2019-11-26 ENCOUNTER — NON-APPOINTMENT (OUTPATIENT)
Age: 79
End: 2019-11-26

## 2019-11-26 ENCOUNTER — APPOINTMENT (OUTPATIENT)
Dept: CARDIOTHORACIC SURGERY | Facility: CLINIC | Age: 79
End: 2019-11-26
Payer: MEDICARE

## 2019-11-26 VITALS
HEIGHT: 68 IN | DIASTOLIC BLOOD PRESSURE: 76 MMHG | BODY MASS INDEX: 28.64 KG/M2 | RESPIRATION RATE: 12 BRPM | HEART RATE: 74 BPM | TEMPERATURE: 98.8 F | OXYGEN SATURATION: 97 % | WEIGHT: 189 LBS | SYSTOLIC BLOOD PRESSURE: 130 MMHG

## 2019-11-26 PROCEDURE — 99214 OFFICE O/P EST MOD 30 MIN: CPT

## 2019-11-27 NOTE — DISCUSSION/SUMMARY
[Home] : patient was discharged to home [FreeTextEntry1] : attempted to reach pt multiple times - will mail out post hospital f/u letter, pcp notified

## 2019-12-05 ENCOUNTER — APPOINTMENT (OUTPATIENT)
Dept: PULMONOLOGY | Facility: CLINIC | Age: 79
End: 2019-12-05
Payer: MEDICARE

## 2019-12-05 VITALS
OXYGEN SATURATION: 96 % | HEART RATE: 67 BPM | HEIGHT: 64 IN | TEMPERATURE: 98.7 F | BODY MASS INDEX: 30.73 KG/M2 | WEIGHT: 180 LBS

## 2019-12-05 PROCEDURE — 94727 GAS DIL/WSHOT DETER LNG VOL: CPT

## 2019-12-05 PROCEDURE — 94060 EVALUATION OF WHEEZING: CPT

## 2019-12-05 PROCEDURE — 94729 DIFFUSING CAPACITY: CPT

## 2019-12-05 PROCEDURE — 99203 OFFICE O/P NEW LOW 30 MIN: CPT | Mod: 25

## 2019-12-09 NOTE — PHYSICAL EXAM
[Normal Appearance] : normal appearance [General Appearance - Well Developed] : well developed [Well Groomed] : well groomed [No Deformities] : no deformities [General Appearance - Well Nourished] : well nourished [General Appearance - In No Acute Distress] : no acute distress [Normal Conjunctiva] : the conjunctiva exhibited no abnormalities [Eyelids - No Xanthelasma] : the eyelids demonstrated no xanthelasmas [Normal Oropharynx] : normal oropharynx [Neck Appearance] : the appearance of the neck was normal [Neck Cervical Mass (___cm)] : no neck mass was observed [Thyroid Nodule] : there were no palpable thyroid nodules [Thyroid Diffuse Enlargement] : the thyroid was not enlarged [Jugular Venous Distention Increased] : there was no jugular-venous distention [Heart Sounds] : normal S1 and S2 [Heart Rate And Rhythm] : heart rate and rhythm were normal [Murmurs] : no murmurs present [Respiration, Rhythm And Depth] : normal respiratory rhythm and effort [Exaggerated Use Of Accessory Muscles For Inspiration] : no accessory muscle use [Abdomen Soft] : soft [Auscultation Breath Sounds / Voice Sounds] : lungs were clear to auscultation bilaterally [Abdomen Tenderness] : non-tender [Cyanosis, Localized] : no localized cyanosis [Abdomen Mass (___ Cm)] : no abdominal mass palpated [Nail Clubbing] : no clubbing of the fingernails [Petechial Hemorrhages (___cm)] : no petechial hemorrhages [Skin Color & Pigmentation] : normal skin color and pigmentation [] : no rash [Skin Turgor] : normal skin turgor [No Focal Deficits] : no focal deficits [Oriented To Time, Place, And Person] : oriented to person, place, and time [Impaired Insight] : insight and judgment were intact [Affect] : the affect was normal

## 2019-12-09 NOTE — HISTORY OF PRESENT ILLNESS
[FreeTextEntry1] : 80 yo male with hx of "asthma", presents for evaluation. The patient has never been admitted for respiratory problems. He uses advair BID with daily incruse with PRN albuterol MDI. Presently he denies fever, cough or chest pain. He is 2 weeks post TAVR.

## 2019-12-09 NOTE — DISCUSSION/SUMMARY
[FreeTextEntry1] : 78 yo male with OAD by history. PFT are normal today. Treatment adjustment discussed with the patient's daughter who was present. He is to stop LAMA and use ICS/LABA , montelukast with albuterol MDI PRN . Further treatment adjustment will depend on symptomatic needs. He is to follow up with his PMD as before.

## 2019-12-19 ENCOUNTER — APPOINTMENT (OUTPATIENT)
Dept: CARDIOLOGY | Facility: CLINIC | Age: 79
End: 2019-12-19
Payer: MEDICARE

## 2019-12-19 ENCOUNTER — NON-APPOINTMENT (OUTPATIENT)
Age: 79
End: 2019-12-19

## 2019-12-19 ENCOUNTER — APPOINTMENT (OUTPATIENT)
Dept: CARDIOTHORACIC SURGERY | Facility: CLINIC | Age: 79
End: 2019-12-19
Payer: MEDICARE

## 2019-12-19 ENCOUNTER — OUTPATIENT (OUTPATIENT)
Dept: OUTPATIENT SERVICES | Facility: HOSPITAL | Age: 79
LOS: 1 days | End: 2019-12-19
Payer: MEDICARE

## 2019-12-19 ENCOUNTER — APPOINTMENT (OUTPATIENT)
Dept: CV DIAGNOSITCS | Facility: HOSPITAL | Age: 79
End: 2019-12-19

## 2019-12-19 VITALS — OXYGEN SATURATION: 93 % | HEART RATE: 63 BPM | DIASTOLIC BLOOD PRESSURE: 77 MMHG | SYSTOLIC BLOOD PRESSURE: 149 MMHG

## 2019-12-19 VITALS
BODY MASS INDEX: 29.88 KG/M2 | WEIGHT: 175 LBS | DIASTOLIC BLOOD PRESSURE: 65 MMHG | SYSTOLIC BLOOD PRESSURE: 131 MMHG | OXYGEN SATURATION: 99 % | RESPIRATION RATE: 20 BRPM | HEART RATE: 59 BPM | HEIGHT: 64 IN

## 2019-12-19 DIAGNOSIS — Z90.89 ACQUIRED ABSENCE OF OTHER ORGANS: Chronic | ICD-10-CM

## 2019-12-19 DIAGNOSIS — Z98.890 OTHER SPECIFIED POSTPROCEDURAL STATES: Chronic | ICD-10-CM

## 2019-12-19 DIAGNOSIS — I35.0 NONRHEUMATIC AORTIC (VALVE) STENOSIS: ICD-10-CM

## 2019-12-19 PROCEDURE — 99213 OFFICE O/P EST LOW 20 MIN: CPT

## 2019-12-19 PROCEDURE — 99214 OFFICE O/P EST MOD 30 MIN: CPT

## 2019-12-19 PROCEDURE — 93306 TTE W/DOPPLER COMPLETE: CPT

## 2019-12-19 PROCEDURE — 93000 ELECTROCARDIOGRAM COMPLETE: CPT

## 2019-12-19 PROCEDURE — 93306 TTE W/DOPPLER COMPLETE: CPT | Mod: 26

## 2019-12-19 NOTE — ASSESSMENT
[FreeTextEntry1] : Mr. Ruiz is continuing to recover from his TAVR. I encouraged him and the family to get him to go out and increase his activities. I also recommended they see a geriatrician with regards to his worsening memory issues, but they said they will see his neurologist. He will continue to follow up with Dr. Coreas. He will see us again in one year.

## 2019-12-19 NOTE — PHYSICAL EXAM
[Sclera] : the sclera and conjunctiva were normal [PERRL With Normal Accommodation] : pupils were equal in size, round, and reactive to light [Neck Appearance] : the appearance of the neck was normal [Jugular Venous Distention Increased] : there was no jugular-venous distention [Respiration, Rhythm And Depth] : normal respiratory rhythm and effort [Exaggerated Use Of Accessory Muscles For Inspiration] : no accessory muscle use [Auscultation Breath Sounds / Voice Sounds] : lungs were clear to auscultation bilaterally [Apical Impulse] : the apical impulse was normal [Heart Rate And Rhythm] : heart rate was normal and rhythm regular [Heart Sounds] : normal S1 and S2 [Murmurs] : no murmurs [Heart Sounds Gallop] : no gallops [Arterial Pulses Carotid] : carotid pulses were normal with no bruits [Abdominal Aorta] : the abdominal aorta was normal [Arterial Pulses Femoral] : femoral pulses were normal without bruits [Full Pulse] : the pedal pulses are present [Edema] : there was no peripheral edema [Bowel Sounds] : normal bowel sounds [Abdomen Soft] : soft [Abdomen Tenderness] : non-tender [Abnormal Walk] : normal gait [Nail Clubbing] : no clubbing  or cyanosis of the fingernails [Musculoskeletal - Swelling] : no joint swelling seen [Skin Color & Pigmentation] : normal skin color and pigmentation [Skin Turgor] : normal skin turgor [Sensation] : the sensory exam was normal to light touch and pinprick [] : no rash [Oriented To Time, Place, And Person] : oriented to person, place, and time [Motor Exam] : the motor exam was normal [FreeTextEntry1] : Worsening short term memory

## 2019-12-19 NOTE — REVIEW OF SYSTEMS
[Feeling Tired] : feeling tired [Lower Ext Edema] : no extremity edema [As Noted in HPI] : as noted in HPI [Negative] : Integumentary

## 2019-12-19 NOTE — HISTORY OF PRESENT ILLNESS
[Dyslipidemia] : Dyslipidemia [Hypertension] : Hypertension [Prior Heart Failure] : Prior Heart Failure [FreeTextEntry1] : MARLON KRUEGER 79 year year old M, presenting today for his  30-day follow up after his TAVR procedure on 11/21/2019 .his  past medical history includes:Dementia, BPH, Asthma and HLD. His post operative course was unremarkable, and he was sent home on POD #2.\par \par Today he presents stating he feels well, but he doesn't do much as per his daughter. His breathing has been better, and has some CP with coughing. He denies any pedal edema, but he does get get occasional headaches, which is not new. His family states that his dementia has been getting worse, where he is more forgetful, and he has to repeat things more. He usually A/Ox3, but his short term memory is getting worse. He also having occasional mood swings. \par \par 5 meter Gait:  7.5/ 7.7/ 6.8  Seconds\par Current NYHA Class: 1\par

## 2019-12-19 NOTE — PHYSICAL EXAM
[General Appearance - Well Developed] : well developed [Normal Appearance] : normal appearance [General Appearance - Well Nourished] : well nourished [Well Groomed] : well groomed [Normal Conjunctiva] : the conjunctiva exhibited no abnormalities [No Deformities] : no deformities [General Appearance - In No Acute Distress] : no acute distress [Eyelids - No Xanthelasma] : the eyelids demonstrated no xanthelasmas [Normal Oral Mucosa] : normal oral mucosa [No Oral Cyanosis] : no oral cyanosis [No Oral Pallor] : no oral pallor [Normal Jugular Venous V Waves Present] : normal jugular venous V waves present [Normal Jugular Venous A Waves Present] : normal jugular venous A waves present [No Jugular Venous Ramírez A Waves] : no jugular venous ramírez A waves [Respiration, Rhythm And Depth] : normal respiratory rhythm and effort [Auscultation Breath Sounds / Voice Sounds] : lungs were clear to auscultation bilaterally [Exaggerated Use Of Accessory Muscles For Inspiration] : no accessory muscle use [Arterial Pulses Normal] : the arterial pulses were normal [Edema] : no peripheral edema present [Heart Rate And Rhythm] : heart rate and rhythm were normal [Heart Sounds] : normal S1 and S2 [Abdomen Soft] : soft [Systolic grade ___/6] : A grade [unfilled]/6 systolic murmur was heard. [Abdomen Mass (___ Cm)] : no abdominal mass palpated [Abdomen Tenderness] : non-tender [Abnormal Walk] : normal gait [Gait - Sufficient For Exercise Testing] : the gait was sufficient for exercise testing [Petechial Hemorrhages (___cm)] : no petechial hemorrhages [Cyanosis, Localized] : no localized cyanosis [Nail Clubbing] : no clubbing of the fingernails [] : no ischemic changes [Skin Color & Pigmentation] : normal skin color and pigmentation [No Venous Stasis] : no venous stasis [Skin Lesions] : no skin lesions [No Skin Ulcers] : no skin ulcer [Oriented To Time, Place, And Person] : oriented to person, place, and time [No Xanthoma] : no  xanthoma was observed [Mood] : the mood was normal [No Anxiety] : not feeling anxious [Affect] : the affect was normal

## 2020-01-03 NOTE — REVIEW OF SYSTEMS
[Negative] : Heme/Lymph [Feeling Fatigued] : not feeling fatigued [Shortness Of Breath] : no shortness of breath [Dyspnea on exertion] : not dyspnea during exertion [Lower Ext Edema] : no extremity edema

## 2020-01-03 NOTE — HISTORY OF PRESENT ILLNESS
[FreeTextEntry1] : Marlys is here for f/u\par \par s/p TAVR\par Doing well\par No chest pressure\par No syncope\par No palpitations\par No LE edema\par

## 2020-01-15 ENCOUNTER — APPOINTMENT (OUTPATIENT)
Dept: CV DIAGNOSITCS | Facility: HOSPITAL | Age: 80
End: 2020-01-15

## 2020-01-17 ENCOUNTER — APPOINTMENT (OUTPATIENT)
Dept: INTERNAL MEDICINE | Facility: CLINIC | Age: 80
End: 2020-01-17
Payer: MEDICARE

## 2020-01-17 VITALS
OXYGEN SATURATION: 96 % | SYSTOLIC BLOOD PRESSURE: 148 MMHG | DIASTOLIC BLOOD PRESSURE: 74 MMHG | HEART RATE: 73 BPM | WEIGHT: 176 LBS | TEMPERATURE: 98.6 F | HEIGHT: 64 IN | BODY MASS INDEX: 30.05 KG/M2

## 2020-01-17 PROCEDURE — 99215 OFFICE O/P EST HI 40 MIN: CPT

## 2020-01-17 PROCEDURE — G0444 DEPRESSION SCREEN ANNUAL: CPT | Mod: 59

## 2020-01-19 NOTE — REVIEW OF SYSTEMS
[Chills] : no chills [Fever] : no fever [Fatigue] : no fatigue [Hot Flashes] : no hot flashes [Recent Change In Weight] : ~T no recent weight change [Night Sweats] : no night sweats [Discharge] : no discharge [Pain] : no pain [Dryness] : no dryness [Redness] : no redness [Itching] : no itching [Vision Problems] : no vision problems [Hearing Loss] : no hearing loss [Earache] : no earache [Nosebleeds] : no nosebleeds [Postnasal Drip] : no postnasal drip [Nasal Discharge] : no nasal discharge [Hoarseness] : no hoarseness [Sore Throat] : no sore throat [Chest Pain] : no chest pain [Palpitations] : no palpitations [Claudication] : no  leg claudication [Lower Ext Edema] : no lower extremity edema [Orthopena] : no orthopnea [Shortness Of Breath] : no shortness of breath [Cough] : no cough [Wheezing] : no wheezing [Dyspnea on Exertion] : not dyspnea on exertion [Nausea] : no nausea [Constipation] : no constipation [Abdominal Pain] : no abdominal pain [Vomiting] : no vomiting [Diarrhea] : no diarrhea [Heartburn] : no heartburn [Melena] : no melena [Dysuria] : no dysuria [Hesitancy] : no hesitancy [Incontinence] : no incontinence [Hematuria] : no hematuria [Nocturia] : nocturia [Frequency] : frequency [Poor Libido] : libido not poor [Impotence] : no impotency [Joint Pain] : no joint pain [Joint Stiffness] : no joint stiffness [Muscle Pain] : no muscle pain [Muscle Weakness] : no muscle weakness [Back Pain] : no back pain [Joint Swelling] : no joint swelling [Nail Changes] : no nail changes [Mole Changes] : no mole changes [Skin Rash] : no skin rash [Hair Changes] : no hair changes [Dizziness] : no dizziness [Headache] : no headache [Confusion] : no confusion [Fainting] : no fainting [Memory Loss] : no memory loss [Suicidal] : not suicidal [Unsteady Walk] : no ataxia [Insomnia] : no insomnia [Anxiety] : no anxiety [Easy Bleeding] : no easy bleeding [Depression] : no depression [Easy Bruising] : no easy bruising [Swollen Glands] : no swollen glands

## 2020-01-19 NOTE — HEALTH RISK ASSESSMENT
[] : No [No falls in past year] : Patient reported no falls in the past year [de-identified] : Urologist, Cardiologist [0] : 2) Feeling down, depressed, or hopeless: Not at all (0) [[id number]] : 0

## 2020-01-19 NOTE — PHYSICAL EXAM
[No Acute Distress] : no acute distress [Well Nourished] : well nourished [Well Developed] : well developed [Well-Appearing] : well-appearing [PERRL] : pupils equal round and reactive to light [Normal Sclera/Conjunctiva] : normal sclera/conjunctiva [EOMI] : extraocular movements intact [Normal Outer Ear/Nose] : the outer ears and nose were normal in appearance [Normal Oropharynx] : the oropharynx was normal [Supple] : supple [No JVD] : no jugular venous distention [No Lymphadenopathy] : no lymphadenopathy [No Respiratory Distress] : no respiratory distress  [Thyroid Normal, No Nodules] : the thyroid was normal and there were no nodules present [No Accessory Muscle Use] : no accessory muscle use [Clear to Auscultation] : lungs were clear to auscultation bilaterally [Normal Rate] : normal rate  [Regular Rhythm] : with a regular rhythm [Normal S1, S2] : normal S1 and S2 [No Murmur] : no murmur heard [No Carotid Bruits] : no carotid bruits [No Abdominal Bruit] : a ~M bruit was not heard ~T in the abdomen [No Varicosities] : no varicosities [Pedal Pulses Present] : the pedal pulses are present [No Edema] : there was no peripheral edema [No Extremity Clubbing/Cyanosis] : no extremity clubbing/cyanosis [No Palpable Aorta] : no palpable aorta [Non Tender] : non-tender [Soft] : abdomen soft [Non-distended] : non-distended [No Masses] : no abdominal mass palpated [Normal Bowel Sounds] : normal bowel sounds [Normal Posterior Cervical Nodes] : no posterior cervical lymphadenopathy [No HSM] : no HSM [No CVA Tenderness] : no CVA  tenderness [Normal Anterior Cervical Nodes] : no anterior cervical lymphadenopathy [No Joint Swelling] : no joint swelling [No Spinal Tenderness] : no spinal tenderness [Grossly Normal Strength/Tone] : grossly normal strength/tone [No Rash] : no rash [Normal Gait] : normal gait [Coordination Grossly Intact] : coordination grossly intact [Deep Tendon Reflexes (DTR)] : deep tendon reflexes were 2+ and symmetric [Normal Affect] : the affect was normal [No Focal Deficits] : no focal deficits [Normal Insight/Judgement] : insight and judgment were intact

## 2020-01-19 NOTE — HISTORY OF PRESENT ILLNESS
[Spouse] : spouse [Other: _____] : [unfilled] [FreeTextEntry1] : Presents for follow-up of aortic stenosis, dementia, high cholesterol and BPH. [de-identified] : \par Medical Issue 1: Aortic Stenosis: patient saw cardiologist since last visit with me and underwent ECHO showing moderate AS but he currently denies SOB, syncope and chest pain; he says he is agreeable to pursuing TAVR\par \par Medical Issue 2: Dementia: patient's wife and daughter say this is getting worse; but patient says he does not notice any worsening; family says he forgets things easily and they do not want him going to [facility] by himself anymore; family is concerned about his safety but they do not want him to go to nursing home; they are with him at home 24/7\par \par Medical Issue 3: High cholesterol: patient admits to difficulty adhering to low-fat diet; he is struggling with this and needs assistance for healthy lifestyle; he is compliant with his statin medication\par \par Medical Issue 4: BPH: pt reports increase in urinary frequency; having to wake up several times per night to urinate; he is compliant with Proscar and Flomax; he denies blood in urine, flank pain, fevers and chills; he also denies dysuria

## 2020-01-27 LAB
ANION GAP SERPL CALC-SCNC: 14 MMOL/L
BASOPHILS # BLD AUTO: 0.05 K/UL
BASOPHILS NFR BLD AUTO: 0.7 %
BUN SERPL-MCNC: 11 MG/DL
CALCIUM SERPL-MCNC: 9.8 MG/DL
CHLORIDE SERPL-SCNC: 104 MMOL/L
CO2 SERPL-SCNC: 25 MMOL/L
CREAT SERPL-MCNC: 0.91 MG/DL
EOSINOPHIL # BLD AUTO: 0.27 K/UL
EOSINOPHIL NFR BLD AUTO: 3.7 %
GLUCOSE SERPL-MCNC: 98 MG/DL
HCT VFR BLD CALC: 43.3 %
HGB BLD-MCNC: 14.1 G/DL
IMM GRANULOCYTES NFR BLD AUTO: 0.5 %
LYMPHOCYTES # BLD AUTO: 1.8 K/UL
LYMPHOCYTES NFR BLD AUTO: 24.4 %
MAN DIFF?: NORMAL
MCHC RBC-ENTMCNC: 27.8 PG
MCHC RBC-ENTMCNC: 32.6 GM/DL
MCV RBC AUTO: 85.4 FL
MONOCYTES # BLD AUTO: 0.74 K/UL
MONOCYTES NFR BLD AUTO: 10 %
NEUTROPHILS # BLD AUTO: 4.47 K/UL
NEUTROPHILS NFR BLD AUTO: 60.7 %
PLATELET # BLD AUTO: 168 K/UL
POTASSIUM SERPL-SCNC: 4.5 MMOL/L
RBC # BLD: 5.07 M/UL
RBC # FLD: 13.3 %
SODIUM SERPL-SCNC: 143 MMOL/L
WBC # FLD AUTO: 7.37 K/UL

## 2020-02-28 ENCOUNTER — APPOINTMENT (OUTPATIENT)
Dept: INTERNAL MEDICINE | Facility: CLINIC | Age: 80
End: 2020-02-28
Payer: MEDICARE

## 2020-02-28 VITALS
SYSTOLIC BLOOD PRESSURE: 129 MMHG | BODY MASS INDEX: 31.76 KG/M2 | HEIGHT: 64 IN | TEMPERATURE: 98.2 F | DIASTOLIC BLOOD PRESSURE: 76 MMHG | WEIGHT: 186 LBS | HEART RATE: 72 BPM

## 2020-02-28 PROCEDURE — G0442 ANNUAL ALCOHOL SCREEN 15 MIN: CPT | Mod: 59

## 2020-02-28 PROCEDURE — 99215 OFFICE O/P EST HI 40 MIN: CPT

## 2020-02-28 RX ORDER — ALBUTEROL SULFATE 90 UG/1
108 (90 BASE) AEROSOL, METERED RESPIRATORY (INHALATION) EVERY 4 HOURS
Qty: 1 | Refills: 3 | Status: ACTIVE | COMMUNITY
Start: 2018-11-02 | End: 1900-01-01

## 2020-02-28 NOTE — PHYSICAL EXAM
[No Acute Distress] : no acute distress [Well Nourished] : well nourished [Well-Appearing] : well-appearing [Well Developed] : well developed [Normal Sclera/Conjunctiva] : normal sclera/conjunctiva [PERRL] : pupils equal round and reactive to light [EOMI] : extraocular movements intact [Normal Outer Ear/Nose] : the outer ears and nose were normal in appearance [Normal Oropharynx] : the oropharynx was normal [No JVD] : no jugular venous distention [Supple] : supple [No Lymphadenopathy] : no lymphadenopathy [No Respiratory Distress] : no respiratory distress  [Clear to Auscultation] : lungs were clear to auscultation bilaterally [Thyroid Normal, No Nodules] : the thyroid was normal and there were no nodules present [Normal Rate] : normal rate  [Regular Rhythm] : with a regular rhythm [No Accessory Muscle Use] : no accessory muscle use [Normal S1, S2] : normal S1 and S2 [No Murmur] : no murmur heard [No Carotid Bruits] : no carotid bruits [Pedal Pulses Present] : the pedal pulses are present [No Varicosities] : no varicosities [No Abdominal Bruit] : a ~M bruit was not heard ~T in the abdomen [No Palpable Aorta] : no palpable aorta [No Edema] : there was no peripheral edema [No Extremity Clubbing/Cyanosis] : no extremity clubbing/cyanosis [Soft] : abdomen soft [Non-distended] : non-distended [Non Tender] : non-tender [No HSM] : no HSM [Normal Bowel Sounds] : normal bowel sounds [No Masses] : no abdominal mass palpated [No CVA Tenderness] : no CVA  tenderness [Normal Posterior Cervical Nodes] : no posterior cervical lymphadenopathy [Normal Anterior Cervical Nodes] : no anterior cervical lymphadenopathy [No Spinal Tenderness] : no spinal tenderness [Grossly Normal Strength/Tone] : grossly normal strength/tone [No Joint Swelling] : no joint swelling [Coordination Grossly Intact] : coordination grossly intact [No Rash] : no rash [Normal Gait] : normal gait [Deep Tendon Reflexes (DTR)] : deep tendon reflexes were 2+ and symmetric [No Focal Deficits] : no focal deficits [Normal Insight/Judgement] : insight and judgment were intact [Normal Affect] : the affect was normal

## 2020-02-28 NOTE — HEALTH RISK ASSESSMENT
[] : No [No falls in past year] : Patient reported no falls in the past year [0] : 1) Little interest or pleasure doing things: Not at all (0) [[id number]] : 0 [de-identified] : Urologist, Cardiologist

## 2020-02-28 NOTE — ASSESSMENT
[FreeTextEntry1] : \par Medical Issue 1: Aortic Stenosis: coordinated follow-up and plan of care with cardiology team; planning to undergo TAVR\par \par Medical Issue 2: Dementia: prescribed Namenda and Donepezil medications; coordinated follow-up with neurologist for further evaluation; patient's family refusing nursing home placement at this time\par \par Medical Issue 3: High cholesterol: prescribed Simvastatin 40mg qhs and check lipid panel fasting for target LDL <70\par \par Medical Issue 4: BPH: not well controlled; pt has worsening frequency; prescribed Proscar and Flomax medications and coordinated follow-up care with Urologist\par \par Annual alcohol misuse screen, 15 mins, done; negative

## 2020-02-28 NOTE — HISTORY OF PRESENT ILLNESS
[Spouse] : spouse [Other: _____] : [unfilled] [FreeTextEntry1] : Presents for follow-up of aortic stenosis, dementia, high cholesterol and BPH. [de-identified] : \par Medical Issue 1: Aortic Stenosis: patient saw cardiologist since last visit with me and underwent ECHO showing moderate AS but he currently denies SOB, syncope and chest pain; he says he is agreeable to pursuing TAVR\par \par Medical Issue 2: Dementia: patient's wife and daughter say this is getting worse; but patient says he does not notice any worsening; family says he forgets things easily and they do not want him going to [facility] by himself anymore; family is concerned about his safety but they do not want him to go to nursing home; they are with him at home 24/7\par \par Medical Issue 3: High cholesterol: patient admits to difficulty adhering to low-fat diet; he is struggling with this and needs assistance for healthy lifestyle; he is compliant with his statin medication\par \par Medical Issue 4: BPH: pt reports increase in urinary frequency; having to wake up several times per night to urinate; he is compliant with Proscar and Flomax; he denies blood in urine, flank pain, fevers and chills; he also denies dysuria

## 2020-02-28 NOTE — REVIEW OF SYSTEMS
[Fever] : no fever [Fatigue] : no fatigue [Night Sweats] : no night sweats [Hot Flashes] : no hot flashes [Chills] : no chills [Discharge] : no discharge [Recent Change In Weight] : ~T no recent weight change [Pain] : no pain [Redness] : no redness [Dryness] : no dryness [Vision Problems] : no vision problems [Itching] : no itching [Earache] : no earache [Nosebleeds] : no nosebleeds [Hearing Loss] : no hearing loss [Sore Throat] : no sore throat [Postnasal Drip] : no postnasal drip [Nasal Discharge] : no nasal discharge [Hoarseness] : no hoarseness [Chest Pain] : no chest pain [Palpitations] : no palpitations [Lower Ext Edema] : no lower extremity edema [Orthopena] : no orthopnea [Claudication] : no  leg claudication [Wheezing] : no wheezing [Shortness Of Breath] : no shortness of breath [Cough] : no cough [Dyspnea on Exertion] : not dyspnea on exertion [Abdominal Pain] : no abdominal pain [Constipation] : no constipation [Nausea] : no nausea [Diarrhea] : no diarrhea [Vomiting] : no vomiting [Heartburn] : no heartburn [Melena] : no melena [Dysuria] : no dysuria [Incontinence] : no incontinence [Hesitancy] : no hesitancy [Nocturia] : nocturia [Frequency] : frequency [Hematuria] : no hematuria [Poor Libido] : libido not poor [Impotence] : no impotency [Joint Pain] : no joint pain [Muscle Pain] : no muscle pain [Joint Stiffness] : no joint stiffness [Muscle Weakness] : no muscle weakness [Itching] : no itching [Mole Changes] : no mole changes [Back Pain] : no back pain [Joint Swelling] : no joint swelling [Nail Changes] : no nail changes [Hair Changes] : no hair changes [Headache] : no headache [Skin Rash] : no skin rash [Fainting] : no fainting [Dizziness] : no dizziness [Memory Loss] : no memory loss [Confusion] : no confusion [Unsteady Walk] : no ataxia [Suicidal] : not suicidal [Insomnia] : no insomnia [Depression] : no depression [Anxiety] : no anxiety [Easy Bruising] : no easy bruising [Swollen Glands] : no swollen glands [Easy Bleeding] : no easy bleeding

## 2020-03-19 ENCOUNTER — APPOINTMENT (OUTPATIENT)
Dept: PULMONOLOGY | Facility: CLINIC | Age: 80
End: 2020-03-19
Payer: MEDICARE

## 2020-03-19 VITALS
OXYGEN SATURATION: 96 % | WEIGHT: 186 LBS | DIASTOLIC BLOOD PRESSURE: 88 MMHG | BODY MASS INDEX: 31.93 KG/M2 | TEMPERATURE: 98.2 F | HEART RATE: 75 BPM | SYSTOLIC BLOOD PRESSURE: 158 MMHG

## 2020-03-19 PROCEDURE — 99214 OFFICE O/P EST MOD 30 MIN: CPT

## 2020-03-19 RX ORDER — UMECLIDINIUM 62.5 UG/1
62.5 AEROSOL, POWDER ORAL
Refills: 0 | Status: DISCONTINUED | COMMUNITY
End: 2020-03-19

## 2020-03-22 NOTE — HISTORY OF PRESENT ILLNESS
[TextBox_4] : 78 yo male with hx of OAD presents for follow up. The patient complains of PRN productive cough with wheezing. He denies fever, chest pain or hemoptysis. He is on daily montelukast, BID advair 250/50 with PRN albuterol MDI.

## 2020-03-22 NOTE — DISCUSSION/SUMMARY
[FreeTextEntry1] : 78 yo male with OAD at baseline. He is to decrease advair to 250/50 BID with continued use of montelukast daily and PRN albuterol MDI. Treatment adjustment will depend on symptomatic needs. He is to follow up with his PMD as before. His wife and daughter were present.

## 2020-04-03 ENCOUNTER — APPOINTMENT (OUTPATIENT)
Dept: INTERNAL MEDICINE | Facility: CLINIC | Age: 80
End: 2020-04-03

## 2020-04-28 RX ORDER — OMEGA-3-ACID ETHYL ESTERS CAPSULES 1 G/1
1 CAPSULE, LIQUID FILLED ORAL
Qty: 180 | Refills: 3 | Status: ACTIVE | COMMUNITY
Start: 1900-01-01 | End: 1900-01-01

## 2020-05-05 ENCOUNTER — RX RENEWAL (OUTPATIENT)
Age: 80
End: 2020-05-05

## 2020-05-05 RX ORDER — FLUTICASONE PROPIONATE AND SALMETEROL 50; 250 UG/1; UG/1
250-50 POWDER RESPIRATORY (INHALATION)
Qty: 180 | Refills: 3 | Status: ACTIVE | COMMUNITY
Start: 2020-05-05 | End: 1900-01-01

## 2020-05-08 ENCOUNTER — APPOINTMENT (OUTPATIENT)
Dept: INTERNAL MEDICINE | Facility: CLINIC | Age: 80
End: 2020-05-08

## 2020-06-05 ENCOUNTER — APPOINTMENT (OUTPATIENT)
Dept: INTERNAL MEDICINE | Facility: CLINIC | Age: 80
End: 2020-06-05
Payer: MEDICARE

## 2020-06-05 PROCEDURE — 99213 OFFICE O/P EST LOW 20 MIN: CPT | Mod: 95

## 2020-06-05 NOTE — HISTORY OF PRESENT ILLNESS
[Home] : at home, [unfilled] , at the time of the visit. [Medical Office: ([facility])___] : at the medical office located in  [Spouse] : spouse [Other:____] : [unfilled] [Verbal consent obtained from patient] : the patient, [unfilled] [FreeTextEntry8] : Patient feeling more angry, stressed in setting of COVID pandemic. Living at home with family. Denies fevers, chills, SOB and cough. Taking all meds as prescribed.

## 2020-06-05 NOTE — ASSESSMENT
[FreeTextEntry1] : \par Dementia: worsened by stress from COVID pandemic; advised to remain home under family supervision as much as possible

## 2020-06-11 ENCOUNTER — APPOINTMENT (OUTPATIENT)
Dept: CARDIOLOGY | Facility: CLINIC | Age: 80
End: 2020-06-11
Payer: MEDICARE

## 2020-06-11 VITALS
DIASTOLIC BLOOD PRESSURE: 84 MMHG | HEIGHT: 64 IN | HEART RATE: 70 BPM | OXYGEN SATURATION: 96 % | SYSTOLIC BLOOD PRESSURE: 138 MMHG

## 2020-06-11 PROCEDURE — 99213 OFFICE O/P EST LOW 20 MIN: CPT

## 2020-06-11 PROCEDURE — 93000 ELECTROCARDIOGRAM COMPLETE: CPT

## 2020-06-15 NOTE — REVIEW OF SYSTEMS
[Negative] : Heme/Lymph [Feeling Fatigued] : not feeling fatigued [Dyspnea on exertion] : not dyspnea during exertion [Shortness Of Breath] : no shortness of breath [Lower Ext Edema] : no extremity edema

## 2020-06-15 NOTE — HISTORY OF PRESENT ILLNESS
[FreeTextEntry1] : Puma is here for f/u\par \par s/p TAVR\par No chest pressure\par No syncope\par No palpitations\par No LE edema\par Daughter notes an increase in agitation and short temper

## 2020-08-03 ENCOUNTER — APPOINTMENT (OUTPATIENT)
Dept: INTERNAL MEDICINE | Facility: CLINIC | Age: 80
End: 2020-08-03
Payer: MEDICARE

## 2020-08-03 VITALS
HEART RATE: 93 BPM | OXYGEN SATURATION: 95 % | DIASTOLIC BLOOD PRESSURE: 70 MMHG | HEIGHT: 64 IN | TEMPERATURE: 97.1 F | SYSTOLIC BLOOD PRESSURE: 137 MMHG | BODY MASS INDEX: 31.76 KG/M2 | WEIGHT: 186 LBS

## 2020-08-03 PROCEDURE — G0447 BEHAVIOR COUNSEL OBESITY 15M: CPT | Mod: 59

## 2020-08-03 PROCEDURE — G0439: CPT

## 2020-08-03 PROCEDURE — 99214 OFFICE O/P EST MOD 30 MIN: CPT | Mod: 25

## 2020-08-03 NOTE — PHYSICAL EXAM
[No Acute Distress] : no acute distress [Well Nourished] : well nourished [Well Developed] : well developed [Normal Sclera/Conjunctiva] : normal sclera/conjunctiva [Well-Appearing] : well-appearing [PERRL] : pupils equal round and reactive to light [EOMI] : extraocular movements intact [Normal Oropharynx] : the oropharynx was normal [Normal Outer Ear/Nose] : the outer ears and nose were normal in appearance [No JVD] : no jugular venous distention [Supple] : supple [No Lymphadenopathy] : no lymphadenopathy [Thyroid Normal, No Nodules] : the thyroid was normal and there were no nodules present [No Respiratory Distress] : no respiratory distress  [Clear to Auscultation] : lungs were clear to auscultation bilaterally [Normal Rate] : normal rate  [No Accessory Muscle Use] : no accessory muscle use [Normal S1, S2] : normal S1 and S2 [Regular Rhythm] : with a regular rhythm [No Murmur] : no murmur heard [No Carotid Bruits] : no carotid bruits [No Abdominal Bruit] : a ~M bruit was not heard ~T in the abdomen [No Varicosities] : no varicosities [Pedal Pulses Present] : the pedal pulses are present [No Edema] : there was no peripheral edema [No Extremity Clubbing/Cyanosis] : no extremity clubbing/cyanosis [No Palpable Aorta] : no palpable aorta [Non Tender] : non-tender [Soft] : abdomen soft [Non-distended] : non-distended [No Masses] : no abdominal mass palpated [No HSM] : no HSM [Normal Bowel Sounds] : normal bowel sounds [Normal Anterior Cervical Nodes] : no anterior cervical lymphadenopathy [Normal Posterior Cervical Nodes] : no posterior cervical lymphadenopathy [No CVA Tenderness] : no CVA  tenderness [No Spinal Tenderness] : no spinal tenderness [No Joint Swelling] : no joint swelling [Grossly Normal Strength/Tone] : grossly normal strength/tone [Normal Gait] : normal gait [No Rash] : no rash [Deep Tendon Reflexes (DTR)] : deep tendon reflexes were 2+ and symmetric [Coordination Grossly Intact] : coordination grossly intact [No Focal Deficits] : no focal deficits [Normal Affect] : the affect was normal [Normal Insight/Judgement] : insight and judgment were intact

## 2020-08-03 NOTE — ASSESSMENT
[FreeTextEntry1] : \par Preventive visit: pt is up to date with vaccines; he is up to date with colonoscopy screening and prostate cancer screening; he does not smoke cigarettes and does not misuse alcohol; he feels safe at home and has smoke/CO detectors in the house; he wears seatbelts when in vehicles\par \par Medical Issue 1: Aortic Stenosis: coordinated follow-up and plan of care with cardiology team; planning to undergo TAVR\par \par Medical Issue 2: Dementia: prescribed Namenda and Donepezil medications; coordinated follow-up with neurologist for further evaluation; patient's family refusing nursing home placement at this time\par \par Medical Issue 3: High cholesterol: prescribed Simvastatin 40mg qhs and check lipid panel fasting for target LDL <70\par \par Medical Issue 4: BPH: not well controlled; pt has worsening frequency; prescribed Proscar and Flomax medications and coordinated follow-up care with Urologist\par \par Obesity counselling: 15 mins, assessed BMI at 30 and above now in obese range; advised weight loss, exercise routine and diet; pt agreed to start exercise program for target weight loss 20 lbs; assisted patient with resources including nutrition referral as needed and arranged for follow-up to monitor weight loss progress over next several months

## 2020-08-03 NOTE — HISTORY OF PRESENT ILLNESS
[Spouse] : spouse [FreeTextEntry1] : Presents for preventive visit as well as follow-up of aortic stenosis, dementia, high cholesterol and BPH. [de-identified] : \par Preventive visit: pt is up to date with vaccines; he is up to date with colonoscopy screening and prostate cancer screening; he does not smoke cigarettes and does not misuse alcohol; he feels safe at home and has smoke/CO detectors in the house; he wears seatbelts when in vehicles\par \par Medical Issue 1: Aortic Stenosis: patient saw cardiologist since last visit with me and underwent ECHO showing moderate AS but he currently denies SOB, syncope and chest pain; he says he is agreeable to pursuing TAVR\par \par Medical Issue 2: Dementia: patient's wife and daughter say this is getting worse; but patient says he does not notice any worsening; family says he forgets things easily and they do not want him going to [facility] by himself anymore; family is concerned about his safety but they do not want him to go to nursing home; they are with him at home 24/7\par \par Medical Issue 3: High cholesterol: patient admits to difficulty adhering to low-fat diet; he is struggling with this and needs assistance for healthy lifestyle; he is compliant with his statin medication\par \par Medical Issue 4: BPH: pt reports increase in urinary frequency; having to wake up several times per night to urinate; he is compliant with Proscar and Flomax; he denies blood in urine, flank pain, fevers and chills; he also denies dysuria [Other: _____] : [unfilled]

## 2020-08-03 NOTE — REVIEW OF SYSTEMS
[Fever] : no fever [Chills] : no chills [Hot Flashes] : no hot flashes [Fatigue] : no fatigue [Discharge] : no discharge [Recent Change In Weight] : ~T no recent weight change [Night Sweats] : no night sweats [Pain] : no pain [Redness] : no redness [Dryness] : no dryness [Earache] : no earache [Itching] : no itching [Vision Problems] : no vision problems [Nosebleeds] : no nosebleeds [Hearing Loss] : no hearing loss [Nasal Discharge] : no nasal discharge [Postnasal Drip] : no postnasal drip [Sore Throat] : no sore throat [Hoarseness] : no hoarseness [Lower Ext Edema] : no lower extremity edema [Chest Pain] : no chest pain [Palpitations] : no palpitations [Claudication] : no  leg claudication [Orthopena] : no orthopnea [Cough] : no cough [Shortness Of Breath] : no shortness of breath [Dyspnea on Exertion] : not dyspnea on exertion [Wheezing] : no wheezing [Nausea] : no nausea [Abdominal Pain] : no abdominal pain [Constipation] : no constipation [Diarrhea] : no diarrhea [Vomiting] : no vomiting [Heartburn] : no heartburn [Melena] : no melena [Dysuria] : no dysuria [Incontinence] : no incontinence [Hesitancy] : no hesitancy [Nocturia] : nocturia [Frequency] : frequency [Hematuria] : no hematuria [Impotence] : no impotency [Joint Pain] : no joint pain [Poor Libido] : libido not poor [Joint Stiffness] : no joint stiffness [Muscle Pain] : no muscle pain [Muscle Weakness] : no muscle weakness [Joint Swelling] : no joint swelling [Back Pain] : no back pain [Mole Changes] : no mole changes [Nail Changes] : no nail changes [Hair Changes] : no hair changes [Headache] : no headache [Skin Rash] : no skin rash [Fainting] : no fainting [Confusion] : no confusion [Dizziness] : no dizziness [Unsteady Walk] : no ataxia [Suicidal] : not suicidal [Memory Loss] : no memory loss [Insomnia] : no insomnia [Depression] : no depression [Anxiety] : no anxiety [Easy Bruising] : no easy bruising [Easy Bleeding] : no easy bleeding [Swollen Glands] : no swollen glands

## 2020-08-03 NOTE — HEALTH RISK ASSESSMENT
[] : No [Good] : ~his/her~  mood as  good [No] : No [No falls in past year] : Patient reported no falls in the past year [1 or 2 (0 pts)] : 1 or 2 (0 points) [Never (0 pts)] : Never (0 points) [0] : 1) Little interest or pleasure doing things: Not at all (0) [de-identified] : Urologist, Cardiologist [Change in mental status noted] : No change in mental status noted [Audit-CScore] : 0 [[id number]] : 0 [Behavior] : denies difficulty with behavior [Language] : denies difficulty with language [Learning/Retaining New Information] : denies difficulty learning/retaining new information [Handling Complex Tasks] : denies difficulty handling complex tasks [Reasoning] : denies difficulty with reasoning [Spatial Ability and Orientation] : denies difficulty with spatial ability and orientation [Retired] : retired [With Family] : lives with family [None] : None [High Risk Behavior] : no high risk behavior [] :  [Feels Safe at Home] : Feels safe at home [Some assistance needed] : using telephone [Full assistance needed] : managing medications [Reports changes in vision] : Reports no changes in vision [Reports changes in hearing] : Reports no changes in hearing [Reports normal functional visual acuity (ie: able to read med bottle)] : Reports normal functional visual acuity [Carbon Monoxide Detector] : carbon monoxide detector [Smoke Detector] : smoke detector [Reports changes in dental health] : Reports no changes in dental health [Guns at Home] : no guns at home [Seat Belt] :  uses seat belt [Safety elements used in home] : safety elements used in home [Sunscreen] : uses sunscreen [TB Exposure] : is not being exposed to tuberculosis [Travel to Developing Areas] : does not  travel to developing areas [Caregiver Concerns] : does not have caregiver concerns [Reviewed no changes] : Reviewed no changes [AdvancecareDate] : 08/20

## 2020-08-07 LAB
25(OH)D3 SERPL-MCNC: 33.7 NG/ML
ALBUMIN SERPL ELPH-MCNC: 4.7 G/DL
ALP BLD-CCNC: 56 U/L
ALT SERPL-CCNC: 15 U/L
ANION GAP SERPL CALC-SCNC: 12 MMOL/L
AST SERPL-CCNC: 21 U/L
BASOPHILS # BLD AUTO: 0.05 K/UL
BASOPHILS NFR BLD AUTO: 0.8 %
BILIRUB SERPL-MCNC: 0.2 MG/DL
BUN SERPL-MCNC: 11 MG/DL
CALCIUM SERPL-MCNC: 9.8 MG/DL
CHLORIDE SERPL-SCNC: 103 MMOL/L
CHOLEST SERPL-MCNC: 142 MG/DL
CHOLEST/HDLC SERPL: 3.4 RATIO
CO2 SERPL-SCNC: 25 MMOL/L
CREAT SERPL-MCNC: 0.95 MG/DL
EOSINOPHIL # BLD AUTO: 0.21 K/UL
EOSINOPHIL NFR BLD AUTO: 3.2 %
ESTIMATED AVERAGE GLUCOSE: 123 MG/DL
GLUCOSE SERPL-MCNC: 114 MG/DL
HBA1C MFR BLD HPLC: 5.9 %
HCT VFR BLD CALC: 44.3 %
HDLC SERPL-MCNC: 41 MG/DL
HGB BLD-MCNC: 14.4 G/DL
IMM GRANULOCYTES NFR BLD AUTO: 0.3 %
LDLC SERPL CALC-MCNC: 56 MG/DL
LYMPHOCYTES # BLD AUTO: 1.39 K/UL
LYMPHOCYTES NFR BLD AUTO: 21.5 %
MAN DIFF?: NORMAL
MCHC RBC-ENTMCNC: 28.3 PG
MCHC RBC-ENTMCNC: 32.5 GM/DL
MCV RBC AUTO: 87.2 FL
MONOCYTES # BLD AUTO: 0.67 K/UL
MONOCYTES NFR BLD AUTO: 10.4 %
NEUTROPHILS # BLD AUTO: 4.13 K/UL
NEUTROPHILS NFR BLD AUTO: 63.8 %
PLATELET # BLD AUTO: 200 K/UL
POTASSIUM SERPL-SCNC: 4.4 MMOL/L
PROT SERPL-MCNC: 6.5 G/DL
PSA SERPL-MCNC: 1.09 NG/ML
RBC # BLD: 5.08 M/UL
RBC # FLD: 13.3 %
SARS-COV-2 IGG SERPL IA-ACNC: <0.1 INDEX
SARS-COV-2 IGG SERPL QL IA: NEGATIVE
SODIUM SERPL-SCNC: 141 MMOL/L
TRIGL SERPL-MCNC: 220 MG/DL
TSH SERPL-ACNC: 1 UIU/ML
WBC # FLD AUTO: 6.47 K/UL

## 2020-08-13 LAB — SARS-COV-2 N GENE NPH QL NAA+PROBE: NOT DETECTED

## 2020-09-25 ENCOUNTER — APPOINTMENT (OUTPATIENT)
Dept: INTERNAL MEDICINE | Facility: CLINIC | Age: 80
End: 2020-09-25
Payer: MEDICARE

## 2020-09-25 VITALS
BODY MASS INDEX: 32.06 KG/M2 | DIASTOLIC BLOOD PRESSURE: 71 MMHG | TEMPERATURE: 96.9 F | SYSTOLIC BLOOD PRESSURE: 117 MMHG | WEIGHT: 186.75 LBS | OXYGEN SATURATION: 95 % | HEART RATE: 75 BPM

## 2020-09-25 PROCEDURE — 99214 OFFICE O/P EST MOD 30 MIN: CPT | Mod: 25

## 2020-09-25 PROCEDURE — G0447 BEHAVIOR COUNSEL OBESITY 15M: CPT | Mod: 59

## 2020-09-28 NOTE — REVIEW OF SYSTEMS
[Fever] : no fever [Chills] : no chills [Fatigue] : no fatigue [Hot Flashes] : no hot flashes [Night Sweats] : no night sweats [Recent Change In Weight] : ~T no recent weight change [Discharge] : no discharge [Pain] : no pain [Redness] : no redness [Dryness] : no dryness [Vision Problems] : no vision problems [Itching] : no itching [Earache] : no earache [Hearing Loss] : no hearing loss [Nosebleeds] : no nosebleeds [Postnasal Drip] : no postnasal drip [Nasal Discharge] : no nasal discharge [Sore Throat] : no sore throat [Hoarseness] : no hoarseness [Chest Pain] : no chest pain [Palpitations] : no palpitations [Claudication] : no  leg claudication [Lower Ext Edema] : no lower extremity edema [Orthopena] : no orthopnea [Shortness Of Breath] : no shortness of breath [Wheezing] : no wheezing [Cough] : no cough [Dyspnea on Exertion] : not dyspnea on exertion [Abdominal Pain] : no abdominal pain [Nausea] : no nausea [Constipation] : no constipation [Diarrhea] : no diarrhea [Vomiting] : no vomiting [Heartburn] : no heartburn [Melena] : no melena [Dysuria] : no dysuria [Incontinence] : no incontinence [Hesitancy] : no hesitancy [Nocturia] : nocturia [Hematuria] : no hematuria [Frequency] : frequency [Impotence] : no impotency [Poor Libido] : libido not poor [Joint Pain] : no joint pain [Joint Stiffness] : no joint stiffness [Muscle Pain] : no muscle pain [Muscle Weakness] : no muscle weakness [Back Pain] : no back pain [Joint Swelling] : no joint swelling [Mole Changes] : no mole changes [Nail Changes] : no nail changes [Hair Changes] : no hair changes [Skin Rash] : no skin rash [Headache] : no headache [Dizziness] : no dizziness [Fainting] : no fainting [Confusion] : no confusion [Unsteady Walk] : no ataxia [Memory Loss] : no memory loss [Suicidal] : not suicidal [Insomnia] : no insomnia [Anxiety] : no anxiety [Depression] : no depression [Easy Bleeding] : no easy bleeding [Easy Bruising] : no easy bruising [Swollen Glands] : no swollen glands

## 2020-09-28 NOTE — HISTORY OF PRESENT ILLNESS
[Spouse] : spouse [Other: _____] : [unfilled] [FreeTextEntry1] : Presents for follow-up of aortic stenosis, dementia, high cholesterol and BPH. [de-identified] : \par Medical Issue 1: Aortic Stenosis: patient saw cardiologist since last visit with me and underwent ECHO showing moderate AS but he currently denies SOB, syncope and chest pain; he says he is agreeable to pursuing TAVR\par \par Medical Issue 2: Dementia: patient's wife and daughter say this is getting worse; but patient says he does not notice any worsening; family says he forgets things easily and they do not want him going to [facility] by himself anymore; family is concerned about his safety but they do not want him to go to nursing home; they are with him at home 24/7\par \par Medical Issue 3: High cholesterol: patient admits to difficulty adhering to low-fat diet; he is struggling with this and needs assistance for healthy lifestyle; he is compliant with his statin medication\par \par Medical Issue 4: BPH: pt reports increase in urinary frequency; having to wake up several times per night to urinate; he is compliant with Proscar and Flomax; he denies blood in urine, flank pain, fevers and chills; he also denies dysuria

## 2020-09-28 NOTE — ASSESSMENT
[FreeTextEntry1] : \par Medical Issue 1: Aortic Stenosis: coordinated follow-up and plan of care with cardiology team; planning to undergo TAVR\par \par Medical Issue 2: Dementia: prescribed Namenda and Donepezil medications; coordinated follow-up with neurologist for further evaluation; patient's family refusing nursing home placement at this time\par \par Medical Issue 3: High cholesterol: prescribed Simvastatin 40mg qhs and check lipid panel fasting for target LDL <70\par \par Medical Issue 4: BPH: not well controlled; pt has worsening frequency; prescribed Proscar and Flomax medications and coordinated follow-up care with Urologist\par \par Obesity counselling: 15 mins, assessed BMI at 30 and above now in obese range; advised weight loss, exercise routine and diet; pt agreed to start exercise program for target weight loss 20 lbs; assisted patient with resources including nutrition referral as needed and arranged for follow-up to monitor weight loss progress over next several months

## 2020-09-28 NOTE — HEALTH RISK ASSESSMENT
[] : No [No falls in past year] : Patient reported no falls in the past year [0] : 2) Feeling down, depressed, or hopeless: Not at all (0) [de-identified] : Urologist, Cardiologist [[id number]] : 0

## 2020-11-20 ENCOUNTER — APPOINTMENT (OUTPATIENT)
Dept: INTERNAL MEDICINE | Facility: CLINIC | Age: 80
End: 2020-11-20
Payer: MEDICARE

## 2020-11-20 VITALS
HEIGHT: 64 IN | OXYGEN SATURATION: 96 % | SYSTOLIC BLOOD PRESSURE: 110 MMHG | BODY MASS INDEX: 31.76 KG/M2 | HEART RATE: 78 BPM | DIASTOLIC BLOOD PRESSURE: 73 MMHG | WEIGHT: 186 LBS | TEMPERATURE: 94 F

## 2020-11-20 DIAGNOSIS — I35.0 NONRHEUMATIC AORTIC (VALVE) STENOSIS: ICD-10-CM

## 2020-11-20 PROCEDURE — 99213 OFFICE O/P EST LOW 20 MIN: CPT

## 2020-11-20 NOTE — ASSESSMENT
[FreeTextEntry1] : Pt as outlined, stable.\par Forms filled out for longterm care.\par f/u w Cardiol and Neuro.\par

## 2020-11-20 NOTE — HISTORY OF PRESENT ILLNESS
[FreeTextEntry1] : f/u [de-identified] : 79 yo man brought by dgtr for med renewals and forms to fill out for longterm care.\par Pt had TAVR last yr, will see Dr. FEDERICO Coreas next mo\par Sees Neuro Dr. James, stable at this time.\par Vax and labs uptodate

## 2020-12-09 ENCOUNTER — APPOINTMENT (OUTPATIENT)
Dept: CARDIOLOGY | Facility: CLINIC | Age: 80
End: 2020-12-09

## 2021-01-07 ENCOUNTER — NON-APPOINTMENT (OUTPATIENT)
Age: 81
End: 2021-01-07

## 2021-01-07 ENCOUNTER — APPOINTMENT (OUTPATIENT)
Dept: CARDIOLOGY | Facility: CLINIC | Age: 81
End: 2021-01-07
Payer: MEDICARE

## 2021-01-07 VITALS
SYSTOLIC BLOOD PRESSURE: 145 MMHG | HEART RATE: 79 BPM | DIASTOLIC BLOOD PRESSURE: 72 MMHG | HEIGHT: 64 IN | OXYGEN SATURATION: 99 %

## 2021-01-07 PROCEDURE — 99213 OFFICE O/P EST LOW 20 MIN: CPT

## 2021-01-07 PROCEDURE — 93000 ELECTROCARDIOGRAM COMPLETE: CPT

## 2021-01-07 NOTE — PHYSICAL EXAM
[General Appearance - Well Developed] : well developed [Normal Appearance] : normal appearance [Well Groomed] : well groomed [General Appearance - Well Nourished] : well nourished [No Deformities] : no deformities [General Appearance - In No Acute Distress] : no acute distress [Normal Conjunctiva] : the conjunctiva exhibited no abnormalities [Eyelids - No Xanthelasma] : the eyelids demonstrated no xanthelasmas [Normal Oral Mucosa] : normal oral mucosa [No Oral Pallor] : no oral pallor [No Oral Cyanosis] : no oral cyanosis [Normal Jugular Venous A Waves Present] : normal jugular venous A waves present [Normal Jugular Venous V Waves Present] : normal jugular venous V waves present [No Jugular Venous Ramírez A Waves] : no jugular venous ramírez A waves [Respiration, Rhythm And Depth] : normal respiratory rhythm and effort [Exaggerated Use Of Accessory Muscles For Inspiration] : no accessory muscle use [Auscultation Breath Sounds / Voice Sounds] : lungs were clear to auscultation bilaterally [Heart Rate And Rhythm] : heart rate and rhythm were normal [Heart Sounds] : normal S1 and S2 [Arterial Pulses Normal] : the arterial pulses were normal [Edema] : no peripheral edema present [Systolic grade ___/6] : A grade [unfilled]/6 systolic murmur was heard. [Abdomen Soft] : soft [Abdomen Tenderness] : non-tender [Abdomen Mass (___ Cm)] : no abdominal mass palpated [Abnormal Walk] : normal gait [Gait - Sufficient For Exercise Testing] : the gait was sufficient for exercise testing [Nail Clubbing] : no clubbing of the fingernails [Cyanosis, Localized] : no localized cyanosis [Petechial Hemorrhages (___cm)] : no petechial hemorrhages [Skin Color & Pigmentation] : normal skin color and pigmentation [] : no rash [No Venous Stasis] : no venous stasis [Skin Lesions] : no skin lesions [No Skin Ulcers] : no skin ulcer [No Xanthoma] : no  xanthoma was observed [Affect] : the affect was normal [Mood] : the mood was normal [No Anxiety] : not feeling anxious

## 2021-01-12 NOTE — REVIEW OF SYSTEMS
[Negative] : Heme/Lymph [Confusion] : confusion was observed [Memory Lapses Or Loss] : memory lapses or loss [Feeling Fatigued] : not feeling fatigued [Shortness Of Breath] : no shortness of breath [Dyspnea on exertion] : not dyspnea during exertion [Lower Ext Edema] : no extremity edema

## 2021-01-12 NOTE — DISCUSSION/SUMMARY
[FreeTextEntry1] : No change in meds \par Encouraged  med compliance \par F/u with pcp - consider Neuro intervention for agitation/memory lapse

## 2021-01-29 ENCOUNTER — APPOINTMENT (OUTPATIENT)
Dept: INTERNAL MEDICINE | Facility: CLINIC | Age: 81
End: 2021-01-29
Payer: MEDICARE

## 2021-01-29 VITALS
DIASTOLIC BLOOD PRESSURE: 67 MMHG | TEMPERATURE: 97.7 F | BODY MASS INDEX: 32.27 KG/M2 | HEART RATE: 75 BPM | SYSTOLIC BLOOD PRESSURE: 120 MMHG | WEIGHT: 188 LBS | OXYGEN SATURATION: 94 %

## 2021-01-29 PROCEDURE — G0444 DEPRESSION SCREEN ANNUAL: CPT | Mod: 59

## 2021-01-29 PROCEDURE — 99215 OFFICE O/P EST HI 40 MIN: CPT

## 2021-01-29 PROCEDURE — G0442 ANNUAL ALCOHOL SCREEN 15 MIN: CPT | Mod: 59

## 2021-01-29 NOTE — HISTORY OF PRESENT ILLNESS
[Spouse] : spouse [Other: _____] : [unfilled] [FreeTextEntry1] : Presents for follow-up of aortic stenosis, dementia, high cholesterol and BPH. [de-identified] : \par Medical Issue 1: Aortic Stenosis: patient saw cardiologist since last visit with me and underwent ECHO showing moderate AS but he currently denies SOB, syncope and chest pain; he says he is agreeable to pursuing TAVR\par \par Medical Issue 2: Dementia: patient's wife and daughter say this is getting worse; but patient says he does not notice any worsening; family says he forgets things easily and they do not want him going to [facility] by himself anymore; family is concerned about his safety but they do not want him to go to nursing home; they are with him at home 24/7\par \par Medical Issue 3: High cholesterol: patient admits to difficulty adhering to low-fat diet; he is struggling with this and needs assistance for healthy lifestyle; he is compliant with his statin medication\par \par Medical Issue 4: BPH: pt reports increase in urinary frequency; having to wake up several times per night to urinate; he is compliant with Proscar and Flomax; he denies blood in urine, flank pain, fevers and chills; he also denies dysuria

## 2021-01-29 NOTE — ASSESSMENT
[FreeTextEntry1] : \par Medical Issue 1: Aortic Stenosis: coordinated follow-up and plan of care with cardiology team; planning to undergo TAVR\par \par Medical Issue 2: Dementia: prescribed Namenda and Donepezil medications; coordinated follow-up with neurologist for further evaluation; patient's family refusing nursing home placement at this time\par \par Medical Issue 3: High cholesterol: prescribed Simvastatin 40mg qhs and check lipid panel fasting for target LDL <70\par \par Medical Issue 4: BPH: not well controlled; pt has worsening frequency; prescribed Proscar and Flomax medications and coordinated follow-up care with Urologist\par \par Obesity counselling: 15 mins, assessed BMI at 30 and above now in obese range; advised weight loss, exercise routine and diet; pt agreed to start exercise program for target weight loss 20 lbs; assisted patient with resources including nutrition referral as needed and arranged for follow-up to monitor weight loss progress over next several months\par \par Depression screen performed today, PHQ2=0, negative.\par \par Annual alcohol misuse screen, 15 mins, done; negative.\par

## 2021-01-29 NOTE — HEALTH RISK ASSESSMENT
[No falls in past year] : Patient reported no falls in the past year [0] : 2) Feeling down, depressed, or hopeless: Not at all (0) [] : No [de-identified] : Urologist, Cardiologist [[id number]] : 0

## 2021-01-29 NOTE — REVIEW OF SYSTEMS
[Nocturia] : nocturia [Frequency] : frequency [Fever] : no fever [Chills] : no chills [Fatigue] : no fatigue [Hot Flashes] : no hot flashes [Night Sweats] : no night sweats [Recent Change In Weight] : ~T no recent weight change [Discharge] : no discharge [Pain] : no pain [Redness] : no redness [Dryness] : no dryness [Vision Problems] : no vision problems [Itching] : no itching [Earache] : no earache [Hearing Loss] : no hearing loss [Nosebleeds] : no nosebleeds [Postnasal Drip] : no postnasal drip [Nasal Discharge] : no nasal discharge [Sore Throat] : no sore throat [Hoarseness] : no hoarseness [Chest Pain] : no chest pain [Palpitations] : no palpitations [Claudication] : no  leg claudication [Lower Ext Edema] : no lower extremity edema [Orthopena] : no orthopnea [Shortness Of Breath] : no shortness of breath [Wheezing] : no wheezing [Cough] : no cough [Dyspnea on Exertion] : not dyspnea on exertion [Abdominal Pain] : no abdominal pain [Nausea] : no nausea [Constipation] : no constipation [Diarrhea] : no diarrhea [Vomiting] : no vomiting [Heartburn] : no heartburn [Melena] : no melena [Dysuria] : no dysuria [Incontinence] : no incontinence [Hesitancy] : no hesitancy [Hematuria] : no hematuria [Impotence] : no impotency [Poor Libido] : libido not poor [Joint Pain] : no joint pain [Joint Stiffness] : no joint stiffness [Muscle Pain] : no muscle pain [Muscle Weakness] : no muscle weakness [Back Pain] : no back pain [Joint Swelling] : no joint swelling [Itching] : no itching [Mole Changes] : no mole changes [Nail Changes] : no nail changes [Hair Changes] : no hair changes [Skin Rash] : no skin rash [Headache] : no headache [Dizziness] : no dizziness [Fainting] : no fainting [Confusion] : no confusion [Unsteady Walk] : no ataxia [Memory Loss] : no memory loss [Suicidal] : not suicidal [Insomnia] : no insomnia [Anxiety] : no anxiety [Depression] : no depression [Easy Bleeding] : no easy bleeding [Easy Bruising] : no easy bruising [Swollen Glands] : no swollen glands

## 2021-02-05 LAB
25(OH)D3 SERPL-MCNC: 30.8 NG/ML
ALBUMIN SERPL ELPH-MCNC: 4.4 G/DL
ALP BLD-CCNC: 62 U/L
ALT SERPL-CCNC: 16 U/L
ANION GAP SERPL CALC-SCNC: 15 MMOL/L
AST SERPL-CCNC: 20 U/L
BASOPHILS # BLD AUTO: 0.07 K/UL
BASOPHILS NFR BLD AUTO: 1.3 %
BILIRUB SERPL-MCNC: 0.3 MG/DL
BUN SERPL-MCNC: 13 MG/DL
CALCIUM SERPL-MCNC: 9.7 MG/DL
CHLORIDE SERPL-SCNC: 104 MMOL/L
CHOLEST SERPL-MCNC: 143 MG/DL
CO2 SERPL-SCNC: 23 MMOL/L
CREAT SERPL-MCNC: 0.97 MG/DL
EOSINOPHIL # BLD AUTO: 0.41 K/UL
EOSINOPHIL NFR BLD AUTO: 7.3 %
ESTIMATED AVERAGE GLUCOSE: 128 MG/DL
GLUCOSE SERPL-MCNC: 166 MG/DL
HBA1C MFR BLD HPLC: 6.1 %
HCT VFR BLD CALC: 44.5 %
HDLC SERPL-MCNC: 36 MG/DL
HGB BLD-MCNC: 14.2 G/DL
IMM GRANULOCYTES NFR BLD AUTO: 0.4 %
LDLC SERPL CALC-MCNC: 53 MG/DL
LYMPHOCYTES # BLD AUTO: 1.54 K/UL
LYMPHOCYTES NFR BLD AUTO: 27.6 %
MAN DIFF?: NORMAL
MCHC RBC-ENTMCNC: 28.3 PG
MCHC RBC-ENTMCNC: 31.9 GM/DL
MCV RBC AUTO: 88.6 FL
MONOCYTES # BLD AUTO: 0.51 K/UL
MONOCYTES NFR BLD AUTO: 9.1 %
NEUTROPHILS # BLD AUTO: 3.03 K/UL
NEUTROPHILS NFR BLD AUTO: 54.3 %
NONHDLC SERPL-MCNC: 107 MG/DL
PLATELET # BLD AUTO: 205 K/UL
POTASSIUM SERPL-SCNC: 4.4 MMOL/L
PROT SERPL-MCNC: 6.4 G/DL
RBC # BLD: 5.02 M/UL
RBC # FLD: 13.9 %
SODIUM SERPL-SCNC: 142 MMOL/L
TRIGL SERPL-MCNC: 270 MG/DL
TSH SERPL-ACNC: 0.88 UIU/ML
WBC # FLD AUTO: 5.58 K/UL

## 2021-03-22 ENCOUNTER — RX RENEWAL (OUTPATIENT)
Age: 81
End: 2021-03-22

## 2021-04-08 ENCOUNTER — APPOINTMENT (OUTPATIENT)
Dept: INTERNAL MEDICINE | Facility: CLINIC | Age: 81
End: 2021-04-08
Payer: MEDICARE

## 2021-04-08 VITALS
WEIGHT: 182 LBS | HEART RATE: 76 BPM | BODY MASS INDEX: 31.07 KG/M2 | HEIGHT: 64 IN | DIASTOLIC BLOOD PRESSURE: 67 MMHG | TEMPERATURE: 97.3 F | SYSTOLIC BLOOD PRESSURE: 153 MMHG | OXYGEN SATURATION: 96 %

## 2021-04-08 PROCEDURE — 99215 OFFICE O/P EST HI 40 MIN: CPT

## 2021-04-08 PROCEDURE — G0447 BEHAVIOR COUNSEL OBESITY 15M: CPT | Mod: 59

## 2021-04-08 NOTE — HISTORY OF PRESENT ILLNESS
[Spouse] : spouse [Other: _____] : [unfilled] [FreeTextEntry1] : Presents for follow-up of aortic stenosis, dementia, high cholesterol and BPH. [de-identified] : \par Medical Issue 1: Aortic Stenosis: patient saw cardiologist since last visit with me and underwent ECHO showing moderate AS but he currently denies SOB, syncope and chest pain; he says he is agreeable to pursuing TAVR\par \par Medical Issue 2: Dementia: patient's wife and daughter say this is getting worse; but patient says he does not notice any worsening; family says he forgets things easily and they do not want him going to [facility] by himself anymore; family is concerned about his safety but they do not want him to go to nursing home; they are with him at home 24/7\par \par Medical Issue 3: High cholesterol: patient admits to difficulty adhering to low-fat diet; he is struggling with this and needs assistance for healthy lifestyle; he is compliant with his statin medication\par \par Medical Issue 4: BPH: pt reports increase in urinary frequency; having to wake up several times per night to urinate; he is compliant with Proscar and Flomax; he denies blood in urine, flank pain, fevers and chills; he also denies dysuria

## 2021-04-08 NOTE — HEALTH RISK ASSESSMENT
[No falls in past year] : Patient reported no falls in the past year [0] : 2) Feeling down, depressed, or hopeless: Not at all (0) [] : No [de-identified] : Urologist, Cardiologist [[id number]] : 0

## 2021-04-08 NOTE — ASSESSMENT
[FreeTextEntry1] : \par Medical Issue 1: Aortic Stenosis: coordinated follow-up and plan of care with cardiology team; planning to undergo TAVR\par \par Medical Issue 2: Dementia: prescribed Namenda and Donepezil medications; coordinated follow-up with neurologist for further evaluation; patient's family refusing nursing home placement at this time\par \par Medical Issue 3: High cholesterol: prescribed Simvastatin 40mg qhs and check lipid panel fasting for target LDL <70\par \par Medical Issue 4: BPH: not well controlled; pt has worsening frequency; prescribed Proscar and Flomax medications and coordinated follow-up care with Urologist\par \par Obesity counselling: 15 mins, assessed BMI at 30 and above now in obese range; advised weight loss, exercise routine and diet; pt agreed to start exercise program for target weight loss 20 lbs; assisted patient with resources including nutrition referral as needed and arranged for follow-up to monitor weight loss progress over next several months\par \par \par

## 2021-05-20 ENCOUNTER — APPOINTMENT (OUTPATIENT)
Dept: INTERNAL MEDICINE | Facility: CLINIC | Age: 81
End: 2021-05-20
Payer: MEDICARE

## 2021-05-20 VITALS
TEMPERATURE: 97.1 F | HEART RATE: 75 BPM | OXYGEN SATURATION: 95 % | WEIGHT: 180 LBS | HEIGHT: 64 IN | DIASTOLIC BLOOD PRESSURE: 79 MMHG | BODY MASS INDEX: 30.73 KG/M2 | SYSTOLIC BLOOD PRESSURE: 162 MMHG

## 2021-05-20 PROCEDURE — 99215 OFFICE O/P EST HI 40 MIN: CPT

## 2021-05-20 PROCEDURE — G0447 BEHAVIOR COUNSEL OBESITY 15M: CPT | Mod: 59

## 2021-05-20 NOTE — HEALTH RISK ASSESSMENT
[No falls in past year] : Patient reported no falls in the past year [0] : 2) Feeling down, depressed, or hopeless: Not at all (0) [] : No [de-identified] : Urologist, Cardiologist [[id number]] : 0

## 2021-05-20 NOTE — HISTORY OF PRESENT ILLNESS
[Spouse] : spouse [Other: _____] : [unfilled] [FreeTextEntry1] : Presents for follow-up of aortic stenosis, dementia, high cholesterol and BPH. [de-identified] : \par Medical Issue 1: Aortic Stenosis: patient saw cardiologist since last visit with me and underwent ECHO showing moderate AS but he currently denies SOB, syncope and chest pain; he says he is agreeable to pursuing TAVR\par \par Medical Issue 2: Dementia: patient's wife and daughter say this is getting worse; but patient says he does not notice any worsening; family says he forgets things easily and they do not want him going to [facility] by himself anymore; family is concerned about his safety but they do not want him to go to nursing home; they are with him at home 24/7\par \par Medical Issue 3: High cholesterol: patient admits to difficulty adhering to low-fat diet; he is struggling with this and needs assistance for healthy lifestyle; he is compliant with his statin medication\par \par Medical Issue 4: BPH: pt reports increase in urinary frequency; having to wake up several times per night to urinate; he is compliant with Proscar and Flomax; he denies blood in urine, flank pain, fevers and chills; he also denies dysuria

## 2021-05-27 ENCOUNTER — NON-APPOINTMENT (OUTPATIENT)
Age: 81
End: 2021-05-27

## 2021-07-08 ENCOUNTER — APPOINTMENT (OUTPATIENT)
Dept: CARDIOLOGY | Facility: CLINIC | Age: 81
End: 2021-07-08
Payer: MEDICARE

## 2021-07-08 VITALS
DIASTOLIC BLOOD PRESSURE: 70 MMHG | HEIGHT: 64 IN | BODY MASS INDEX: 30.73 KG/M2 | HEART RATE: 71 BPM | OXYGEN SATURATION: 95 % | WEIGHT: 180 LBS | SYSTOLIC BLOOD PRESSURE: 120 MMHG

## 2021-07-08 PROCEDURE — 99213 OFFICE O/P EST LOW 20 MIN: CPT

## 2021-07-08 PROCEDURE — 93000 ELECTROCARDIOGRAM COMPLETE: CPT

## 2021-07-21 ENCOUNTER — NON-APPOINTMENT (OUTPATIENT)
Age: 81
End: 2021-07-21

## 2021-07-21 NOTE — HISTORY OF PRESENT ILLNESS
[FreeTextEntry1] : Puma is here for f/u\par \par he is s/p TAVR\par His mental sharpness continues to decline\par \par No chest pressure\par No syncope\par No palpitations\par No LE edema

## 2021-07-21 NOTE — DISCUSSION/SUMMARY
[FreeTextEntry1] : CAD- No angina\par s/p TAVR - No heart failure or edema. No dyspnea\par HTN - well controlled\par \par No change in meds \par Encouraged  med compliance

## 2021-07-21 NOTE — REVIEW OF SYSTEMS
[Feeling Fatigued] : feeling fatigued [Dyspnea on exertion] : dyspnea during exertion [Memory Lapses Or Loss] : memory lapses or loss [Negative] : Heme/Lymph

## 2021-07-21 NOTE — CARDIOLOGY SUMMARY
[de-identified] : 7/8/21\par Sinus  Rhythm  - occasional PAC   \par # PACs = 1.\par -Intraventricular conduction defect -consider left ventricular hypertrophy. \par  -Nonspecific ST depression  -Nondiagnostic. \par \par ABNORMAL

## 2021-07-23 ENCOUNTER — APPOINTMENT (OUTPATIENT)
Dept: INTERNAL MEDICINE | Facility: CLINIC | Age: 81
End: 2021-07-23
Payer: MEDICARE

## 2021-07-23 VITALS
WEIGHT: 182 LBS | HEART RATE: 69 BPM | OXYGEN SATURATION: 94 % | DIASTOLIC BLOOD PRESSURE: 70 MMHG | TEMPERATURE: 97.2 F | HEIGHT: 64 IN | SYSTOLIC BLOOD PRESSURE: 100 MMHG | BODY MASS INDEX: 31.07 KG/M2

## 2021-07-23 PROCEDURE — 99215 OFFICE O/P EST HI 40 MIN: CPT

## 2021-07-23 NOTE — HEALTH RISK ASSESSMENT
[No falls in past year] : Patient reported no falls in the past year [0] : 2) Feeling down, depressed, or hopeless: Not at all (0) [] : No [de-identified] : Urologist, Cardiologist [[id number]] : 0

## 2021-07-23 NOTE — HISTORY OF PRESENT ILLNESS
[Spouse] : spouse [Other: _____] : [unfilled] [FreeTextEntry1] : Presents for follow-up of aortic stenosis, dementia, high cholesterol and BPH. [de-identified] : \par Medical Issue 1: Aortic Stenosis: patient saw cardiologist since last visit with me and underwent ECHO showing moderate AS but he currently denies SOB, syncope and chest pain; he says he is agreeable to pursuing TAVR\par \par Medical Issue 2: Dementia: patient's wife and daughter say this is getting worse; but patient says he does not notice any worsening; family says he forgets things easily and they do not want him going to [facility] by himself anymore; family is concerned about his safety but they do not want him to go to nursing home; they are with him at home 24/7\par \par Medical Issue 3: High cholesterol: patient admits to difficulty adhering to low-fat diet; he is struggling with this and needs assistance for healthy lifestyle; he is compliant with his statin medication\par \par Medical Issue 4: BPH: pt reports increase in urinary frequency; having to wake up several times per night to urinate; he is compliant with Proscar and Flomax; he denies blood in urine, flank pain, fevers and chills; he also denies dysuria

## 2021-07-26 LAB
25(OH)D3 SERPL-MCNC: 29.9 NG/ML
ALBUMIN SERPL ELPH-MCNC: 4.6 G/DL
ALP BLD-CCNC: 65 U/L
ALT SERPL-CCNC: 17 U/L
ANION GAP SERPL CALC-SCNC: 14 MMOL/L
AST SERPL-CCNC: 21 U/L
BASOPHILS # BLD AUTO: 0.08 K/UL
BASOPHILS NFR BLD AUTO: 1.2 %
BILIRUB SERPL-MCNC: 0.2 MG/DL
BUN SERPL-MCNC: 13 MG/DL
CALCIUM SERPL-MCNC: 9.7 MG/DL
CHLORIDE SERPL-SCNC: 105 MMOL/L
CHOLEST SERPL-MCNC: 123 MG/DL
CO2 SERPL-SCNC: 22 MMOL/L
CREAT SERPL-MCNC: 0.93 MG/DL
EOSINOPHIL # BLD AUTO: 0.42 K/UL
EOSINOPHIL NFR BLD AUTO: 6.3 %
ESTIMATED AVERAGE GLUCOSE: 126 MG/DL
GLUCOSE SERPL-MCNC: 106 MG/DL
HBA1C MFR BLD HPLC: 6 %
HCT VFR BLD CALC: 46.3 %
HDLC SERPL-MCNC: 40 MG/DL
HGB BLD-MCNC: 15.1 G/DL
IMM GRANULOCYTES NFR BLD AUTO: 0.3 %
LDLC SERPL CALC-MCNC: 48 MG/DL
LYMPHOCYTES # BLD AUTO: 1.7 K/UL
LYMPHOCYTES NFR BLD AUTO: 25.6 %
MAN DIFF?: NORMAL
MCHC RBC-ENTMCNC: 29.2 PG
MCHC RBC-ENTMCNC: 32.6 GM/DL
MCV RBC AUTO: 89.4 FL
MONOCYTES # BLD AUTO: 0.67 K/UL
MONOCYTES NFR BLD AUTO: 10.1 %
NEUTROPHILS # BLD AUTO: 3.76 K/UL
NEUTROPHILS NFR BLD AUTO: 56.5 %
NONHDLC SERPL-MCNC: 84 MG/DL
PLATELET # BLD AUTO: 215 K/UL
POTASSIUM SERPL-SCNC: 4.6 MMOL/L
PROT SERPL-MCNC: 6.6 G/DL
PSA SERPL-MCNC: 0.98 NG/ML
RBC # BLD: 5.18 M/UL
RBC # FLD: 13.4 %
SODIUM SERPL-SCNC: 141 MMOL/L
TRIGL SERPL-MCNC: 181 MG/DL
TSH SERPL-ACNC: 0.84 UIU/ML
WBC # FLD AUTO: 6.65 K/UL

## 2021-08-04 ENCOUNTER — RX RENEWAL (OUTPATIENT)
Age: 81
End: 2021-08-04

## 2021-09-15 ENCOUNTER — RX RENEWAL (OUTPATIENT)
Age: 81
End: 2021-09-15

## 2021-09-15 RX ORDER — FINASTERIDE 5 MG/1
5 TABLET, FILM COATED ORAL DAILY
Qty: 90 | Refills: 3 | Status: ACTIVE | COMMUNITY
Start: 2018-11-02 | End: 1900-01-01

## 2021-09-16 ENCOUNTER — APPOINTMENT (OUTPATIENT)
Dept: INTERNAL MEDICINE | Facility: CLINIC | Age: 81
End: 2021-09-16
Payer: MEDICARE

## 2021-09-16 VITALS
TEMPERATURE: 97.9 F | WEIGHT: 180 LBS | SYSTOLIC BLOOD PRESSURE: 145 MMHG | HEART RATE: 68 BPM | OXYGEN SATURATION: 93 % | BODY MASS INDEX: 30.73 KG/M2 | DIASTOLIC BLOOD PRESSURE: 79 MMHG | HEIGHT: 64 IN

## 2021-09-16 DIAGNOSIS — Z23 ENCOUNTER FOR IMMUNIZATION: ICD-10-CM

## 2021-09-16 PROCEDURE — 99215 OFFICE O/P EST HI 40 MIN: CPT | Mod: 25

## 2021-09-16 PROCEDURE — G0008: CPT

## 2021-09-16 PROCEDURE — G0447 BEHAVIOR COUNSEL OBESITY 15M: CPT | Mod: 59

## 2021-09-16 PROCEDURE — 90662 IIV NO PRSV INCREASED AG IM: CPT

## 2021-09-16 RX ORDER — MIRABEGRON 25 MG/1
25 TABLET, FILM COATED, EXTENDED RELEASE ORAL
Qty: 90 | Refills: 3 | Status: ACTIVE | COMMUNITY
Start: 2019-07-12 | End: 1900-01-01

## 2021-09-16 NOTE — HISTORY OF PRESENT ILLNESS
[Spouse] : spouse [Other: _____] : [unfilled] [FreeTextEntry1] : Presents for follow-up of aortic stenosis, dementia, high cholesterol and BPH. [de-identified] : \par Medical Issue 1: Aortic Stenosis: patient saw cardiologist since last visit with me and underwent ECHO showing moderate AS but he currently denies SOB, syncope and chest pain; he says he is agreeable to pursuing TAVR\par \par Medical Issue 2: Dementia: patient's wife and daughter say this is getting worse; but patient says he does not notice any worsening; family says he forgets things easily and they do not want him going to [facility] by himself anymore; family is concerned about his safety but they do not want him to go to nursing home; they are with him at home 24/7\par \par Medical Issue 3: High cholesterol: patient admits to difficulty adhering to low-fat diet; he is struggling with this and needs assistance for healthy lifestyle; he is compliant with his statin medication\par \par Medical Issue 4: BPH: pt reports increase in urinary frequency; having to wake up several times per night to urinate; he is compliant with Proscar and Flomax; he denies blood in urine, flank pain, fevers and chills; he also denies dysuria

## 2021-09-16 NOTE — HEALTH RISK ASSESSMENT
[] : No [No falls in past year] : Patient reported no falls in the past year [0] : 2) Feeling down, depressed, or hopeless: Not at all (0) [de-identified] : Urologist, Cardiologist [[id number]] : 0

## 2021-09-16 NOTE — ASSESSMENT
[FreeTextEntry1] : \par Medical Issue 1: Aortic Stenosis: coordinated follow-up and plan of care with cardiology team; planning to undergo TAVR; add Amlodipine 5\par \par Medical Issue 2: Dementia: prescribed Namenda and Donepezil medications; coordinated follow-up with neurologist for further evaluation; patient's family refusing nursing home placement at this time\par \par Medical Issue 3: High cholesterol: prescribed Simvastatin 40mg qhs and check lipid panel fasting for target LDL <70\par \par Medical Issue 4: BPH: not well controlled; pt has worsening frequency; prescribed Proscar and Flomax medications and coordinated follow-up care with Urologist\par \par Obesity counselling: 15 mins, assessed BMI at 30 and above now in obese range; advised weight loss, exercise routine and diet; pt agreed to start exercise program for target weight loss 20 lbs; assisted patient with resources including nutrition referral as needed and arranged for follow-up to monitor weight loss progress over next several months\par \par \par

## 2021-10-20 ENCOUNTER — RX RENEWAL (OUTPATIENT)
Age: 81
End: 2021-10-20

## 2021-10-20 RX ORDER — TAMSULOSIN HYDROCHLORIDE 0.4 MG/1
0.4 CAPSULE ORAL
Qty: 90 | Refills: 3 | Status: ACTIVE | COMMUNITY
Start: 2021-10-20 | End: 1900-01-01

## 2021-11-19 ENCOUNTER — APPOINTMENT (OUTPATIENT)
Dept: INTERNAL MEDICINE | Facility: CLINIC | Age: 81
End: 2021-11-19
Payer: MEDICARE

## 2021-11-19 VITALS
DIASTOLIC BLOOD PRESSURE: 69 MMHG | HEART RATE: 65 BPM | OXYGEN SATURATION: 95 % | WEIGHT: 183 LBS | BODY MASS INDEX: 31.24 KG/M2 | HEIGHT: 64 IN | SYSTOLIC BLOOD PRESSURE: 113 MMHG | TEMPERATURE: 97.6 F

## 2021-11-19 PROCEDURE — G0447 BEHAVIOR COUNSEL OBESITY 15M: CPT | Mod: 59

## 2021-11-19 PROCEDURE — 99215 OFFICE O/P EST HI 40 MIN: CPT

## 2021-11-19 NOTE — HISTORY OF PRESENT ILLNESS
[Spouse] : spouse [Other: _____] : [unfilled] [FreeTextEntry1] : Presents for follow-up of aortic stenosis, dementia, high cholesterol and BPH. [de-identified] : \par Medical Issue 1: Aortic Stenosis: patient saw cardiologist since last visit with me and underwent ECHO showing moderate AS but he currently denies SOB, syncope and chest pain; he says he is agreeable to pursuing TAVR\par \par Medical Issue 2: Dementia: patient's wife and daughter say this is getting worse; but patient says he does not notice any worsening; family says he forgets things easily and they do not want him going to [facility] by himself anymore; family is concerned about his safety but they do not want him to go to nursing home; they are with him at home 24/7\par \par Medical Issue 3: High cholesterol: patient admits to difficulty adhering to low-fat diet; he is struggling with this and needs assistance for healthy lifestyle; he is compliant with his statin medication\par \par Medical Issue 4: BPH: pt reports increase in urinary frequency; having to wake up several times per night to urinate; he is compliant with Proscar and Flomax; he denies blood in urine, flank pain, fevers and chills; he also denies dysuria

## 2021-11-19 NOTE — REVIEW OF SYSTEMS
[Nocturia] : nocturia [Frequency] : frequency [Fever] : no fever [Chills] : no chills [Fatigue] : no fatigue [Hot Flashes] : no hot flashes [Night Sweats] : no night sweats [Recent Change In Weight] : ~T no recent weight change [Discharge] : no discharge [Pain] : no pain [Redness] : no redness [Dryness] : no dryness [Vision Problems] : no vision problems [Itching] : no itching [Earache] : no earache [Hearing Loss] : no hearing loss [Nosebleeds] : no nosebleeds [Postnasal Drip] : no postnasal drip [Nasal Discharge] : no nasal discharge [Sore Throat] : no sore throat [Hoarseness] : no hoarseness [Chest Pain] : no chest pain [Palpitations] : no palpitations [Claudication] : no  leg claudication [Lower Ext Edema] : no lower extremity edema [Orthopena] : no orthopnea [Shortness Of Breath] : no shortness of breath [Wheezing] : no wheezing [Cough] : no cough [Dyspnea on Exertion] : not dyspnea on exertion [Abdominal Pain] : no abdominal pain [Nausea] : no nausea [Constipation] : no constipation [Diarrhea] : no diarrhea [Vomiting] : no vomiting [Heartburn] : no heartburn [Melena] : no melena [Dysuria] : no dysuria [Incontinence] : no incontinence [Hesitancy] : no hesitancy [Hematuria] : no hematuria [Impotence] : no impotency [Poor Libido] : libido not poor [Joint Pain] : no joint pain [Joint Stiffness] : no joint stiffness [Muscle Pain] : no muscle pain [Back Pain] : no back pain [Muscle Weakness] : no muscle weakness [Joint Swelling] : no joint swelling [Itching] : no itching [Mole Changes] : no mole changes [Nail Changes] : no nail changes [Hair Changes] : no hair changes [Skin Rash] : no skin rash [Headache] : no headache [Dizziness] : no dizziness [Fainting] : no fainting [Unsteady Walk] : no ataxia [Confusion] : no confusion [Memory Loss] : no memory loss [Suicidal] : not suicidal [Insomnia] : no insomnia [Anxiety] : no anxiety [Depression] : no depression [Easy Bleeding] : no easy bleeding [Easy Bruising] : no easy bruising [Swollen Glands] : no swollen glands

## 2021-11-19 NOTE — HEALTH RISK ASSESSMENT
[No falls in past year] : Patient reported no falls in the past year [0] : 2) Feeling down, depressed, or hopeless: Not at all (0) [] : No [de-identified] : Urologist, Cardiologist [[id number]] : 0

## 2021-11-23 LAB
25(OH)D3 SERPL-MCNC: 32.8 NG/ML
ALBUMIN SERPL ELPH-MCNC: 4.6 G/DL
ALP BLD-CCNC: 63 U/L
ALT SERPL-CCNC: 21 U/L
ANION GAP SERPL CALC-SCNC: 11 MMOL/L
AST SERPL-CCNC: 19 U/L
BASOPHILS # BLD AUTO: 0.08 K/UL
BASOPHILS NFR BLD AUTO: 1.1 %
BILIRUB SERPL-MCNC: 0.3 MG/DL
BUN SERPL-MCNC: 14 MG/DL
CALCIUM SERPL-MCNC: 10 MG/DL
CHLORIDE SERPL-SCNC: 104 MMOL/L
CHOLEST SERPL-MCNC: 135 MG/DL
CO2 SERPL-SCNC: 26 MMOL/L
CREAT SERPL-MCNC: 0.96 MG/DL
EOSINOPHIL # BLD AUTO: 0.33 K/UL
EOSINOPHIL NFR BLD AUTO: 4.7 %
ESTIMATED AVERAGE GLUCOSE: 120 MG/DL
GLUCOSE SERPL-MCNC: 124 MG/DL
HBA1C MFR BLD HPLC: 5.8 %
HCT VFR BLD CALC: 44.9 %
HDLC SERPL-MCNC: 41 MG/DL
HGB BLD-MCNC: 14.3 G/DL
IMM GRANULOCYTES NFR BLD AUTO: 0.3 %
LDLC SERPL CALC-MCNC: 57 MG/DL
LYMPHOCYTES # BLD AUTO: 1.71 K/UL
LYMPHOCYTES NFR BLD AUTO: 24.2 %
MAN DIFF?: NORMAL
MCHC RBC-ENTMCNC: 28.3 PG
MCHC RBC-ENTMCNC: 31.8 GM/DL
MCV RBC AUTO: 88.9 FL
MONOCYTES # BLD AUTO: 0.68 K/UL
MONOCYTES NFR BLD AUTO: 9.6 %
NEUTROPHILS # BLD AUTO: 4.24 K/UL
NEUTROPHILS NFR BLD AUTO: 60.1 %
NONHDLC SERPL-MCNC: 94 MG/DL
PLATELET # BLD AUTO: 212 K/UL
POTASSIUM SERPL-SCNC: 4.5 MMOL/L
PROT SERPL-MCNC: 6.7 G/DL
PSA SERPL-MCNC: 0.9 NG/ML
RBC # BLD: 5.05 M/UL
RBC # FLD: 13.8 %
SODIUM SERPL-SCNC: 141 MMOL/L
TRIGL SERPL-MCNC: 186 MG/DL
TSH SERPL-ACNC: 0.79 UIU/ML
WBC # FLD AUTO: 7.06 K/UL

## 2021-12-09 ENCOUNTER — APPOINTMENT (OUTPATIENT)
Dept: CARDIOLOGY | Facility: CLINIC | Age: 81
End: 2021-12-09
Payer: MEDICARE

## 2021-12-09 VITALS
DIASTOLIC BLOOD PRESSURE: 69 MMHG | HEIGHT: 64 IN | HEART RATE: 68 BPM | SYSTOLIC BLOOD PRESSURE: 133 MMHG | OXYGEN SATURATION: 95 %

## 2021-12-09 PROCEDURE — 93000 ELECTROCARDIOGRAM COMPLETE: CPT

## 2021-12-09 PROCEDURE — 99214 OFFICE O/P EST MOD 30 MIN: CPT

## 2021-12-12 ENCOUNTER — NON-APPOINTMENT (OUTPATIENT)
Age: 81
End: 2021-12-12

## 2021-12-12 NOTE — REVIEW OF SYSTEMS
[Feeling Fatigued] : feeling fatigued [Confusion] : confusion was observed [Memory Lapses Or Loss] : memory lapses or loss [Negative] : Heme/Lymph

## 2021-12-12 NOTE — DISCUSSION/SUMMARY
[FreeTextEntry1] : CAD- No angina\par s/p TAVR - No heart failure or significant edema. No dyspnea\par HTN - well controlled\par \par No change in meds \par Encouraged  med compliance

## 2021-12-12 NOTE — CARDIOLOGY SUMMARY
[de-identified] : 12/9/21\par Sinus  Rhythm \par -Intraventricular conduction defect -consider left ventricular hypertrophy. \par \par ABNORMAL

## 2021-12-12 NOTE — HISTORY OF PRESENT ILLNESS
[FreeTextEntry1] : Puma is here for f/u\par \par he is s/p TAVR\par \par No chest pressure\par No syncope\par No palpitations\par Minor LE edema\par \par Overall he appears clinically unchanged

## 2022-01-20 ENCOUNTER — APPOINTMENT (OUTPATIENT)
Dept: INTERNAL MEDICINE | Facility: CLINIC | Age: 82
End: 2022-01-20
Payer: MEDICARE

## 2022-01-20 VITALS
OXYGEN SATURATION: 95 % | WEIGHT: 183 LBS | HEART RATE: 72 BPM | SYSTOLIC BLOOD PRESSURE: 115 MMHG | HEIGHT: 64 IN | BODY MASS INDEX: 31.24 KG/M2 | TEMPERATURE: 98.4 F | DIASTOLIC BLOOD PRESSURE: 73 MMHG

## 2022-01-20 PROCEDURE — 99215 OFFICE O/P EST HI 40 MIN: CPT

## 2022-01-20 PROCEDURE — G0447 BEHAVIOR COUNSEL OBESITY 15M: CPT | Mod: 59

## 2022-01-20 NOTE — HEALTH RISK ASSESSMENT
[No falls in past year] : Patient reported no falls in the past year [0] : 2) Feeling down, depressed, or hopeless: Not at all (0) [de-identified] : Urologist, Cardiologist [[id number]] : 0

## 2022-01-20 NOTE — HISTORY OF PRESENT ILLNESS
Vital signs: Stable ; B/P: 142/77, Temp: 98.7, HR: 92, RR: 16  Pain Control: Tylenol, Ibuprofen and Oxycodone PRN.   Diet: Tolerating regular diet.   Output: Voiding adequately and having loose stool; stool softeners held.   Activity: Up in room and halls independently. Resting comfortably between cares.   Updates: Abdomen tender in right mid part of abdomen. Miralax and senna given with Oxycodone per patient request.    Plan: Monitor and assess VS/pain. Continue IV antibiotics and plan for US tomorrow.      [Spouse] : spouse [Other: _____] : [unfilled] [FreeTextEntry1] : Presents for follow-up of aortic stenosis, dementia, high cholesterol and BPH. [de-identified] : \par Medical Issue 1: Aortic Stenosis: patient saw cardiologist since last visit with me and underwent ECHO showing moderate AS but he currently denies SOB, syncope and chest pain; he says he is agreeable to pursuing TAVR\par \par Medical Issue 2: Dementia: patient's wife and daughter say this is getting worse; but patient says he does not notice any worsening; family says he forgets things easily and they do not want him going to [facility] by himself anymore; family is concerned about his safety but they do not want him to go to nursing home; they are with him at home 24/7\par \par Medical Issue 3: High cholesterol: patient admits to difficulty adhering to low-fat diet; he is struggling with this and needs assistance for healthy lifestyle; he is compliant with his statin medication\par \par Medical Issue 4: BPH: pt reports increase in urinary frequency; having to wake up several times per night to urinate; he is compliant with Proscar and Flomax; he denies blood in urine, flank pain, fevers and chills; he also denies dysuria

## 2022-03-16 NOTE — DISCHARGE NOTE NURSING/CASE MANAGEMENT/SOCIAL WORK - HAS THE PATIENT RECEIVED THE INFLUENZA VACCINE THIS SEASON?
Pt keeps yo yoing her weight  No weight loss meds tried  Refer to bariatric surgeon  Pt lost 70lbs and then gained it back   yes...

## 2022-03-17 ENCOUNTER — NON-APPOINTMENT (OUTPATIENT)
Age: 82
End: 2022-03-17

## 2022-03-17 ENCOUNTER — APPOINTMENT (OUTPATIENT)
Dept: CARDIOLOGY | Facility: CLINIC | Age: 82
End: 2022-03-17
Payer: MEDICARE

## 2022-03-17 VITALS
DIASTOLIC BLOOD PRESSURE: 78 MMHG | WEIGHT: 180 LBS | HEIGHT: 64 IN | SYSTOLIC BLOOD PRESSURE: 149 MMHG | HEART RATE: 72 BPM | OXYGEN SATURATION: 96 % | BODY MASS INDEX: 30.73 KG/M2

## 2022-03-17 PROCEDURE — 93000 ELECTROCARDIOGRAM COMPLETE: CPT

## 2022-03-17 PROCEDURE — 99214 OFFICE O/P EST MOD 30 MIN: CPT

## 2022-03-19 NOTE — DISCUSSION/SUMMARY
[FreeTextEntry1] : CAD- No angina\par s/p TAVR - No heart failure or significant edema. No dyspnea\par HTN - Adequate control\par \par No change in meds \par Encouraged  med compliance

## 2022-03-19 NOTE — PHYSICAL EXAM
[Well Developed] : well developed [Well Nourished] : well nourished [Normal Conjunctiva] : normal conjunctiva [No Acute Distress] : no acute distress [Normal Venous Pressure] : normal venous pressure [No Carotid Bruit] : no carotid bruit [Normal S1, S2] : normal S1, S2 [No Murmur] : no murmur [No Rub] : no rub [No Gallop] : no gallop [Clear Lung Fields] : clear lung fields [Good Air Entry] : good air entry [Soft] : abdomen soft [No Respiratory Distress] : no respiratory distress  [Non Tender] : non-tender [No Masses/organomegaly] : no masses/organomegaly [Normal Bowel Sounds] : normal bowel sounds [Normal Gait] : normal gait [No Edema] : no edema [No Cyanosis] : no cyanosis [No Clubbing] : no clubbing [No Varicosities] : no varicosities [No Rash] : no rash [No Skin Lesions] : no skin lesions [Moves all extremities] : moves all extremities [Normal Speech] : normal speech [No Focal Deficits] : no focal deficits [Alert and Oriented] : alert and oriented [Normal memory] : normal memory

## 2022-03-22 ENCOUNTER — APPOINTMENT (OUTPATIENT)
Dept: INTERNAL MEDICINE | Facility: CLINIC | Age: 82
End: 2022-03-22
Payer: MEDICARE

## 2022-03-22 VITALS
DIASTOLIC BLOOD PRESSURE: 72 MMHG | HEIGHT: 64 IN | BODY MASS INDEX: 30.73 KG/M2 | SYSTOLIC BLOOD PRESSURE: 128 MMHG | WEIGHT: 180 LBS

## 2022-03-22 PROCEDURE — G0444 DEPRESSION SCREEN ANNUAL: CPT | Mod: 59

## 2022-03-22 PROCEDURE — G0442 ANNUAL ALCOHOL SCREEN 15 MIN: CPT | Mod: 59

## 2022-03-22 PROCEDURE — G0447 BEHAVIOR COUNSEL OBESITY 15M: CPT | Mod: 59

## 2022-03-22 PROCEDURE — 99215 OFFICE O/P EST HI 40 MIN: CPT

## 2022-03-22 NOTE — HISTORY OF PRESENT ILLNESS
[Spouse] : spouse [Other: _____] : [unfilled] [FreeTextEntry1] : Presents for follow-up of aortic stenosis, dementia, high cholesterol and BPH. [de-identified] : \par Medical Issue 1: Aortic Stenosis: patient saw cardiologist since last visit with me and underwent ECHO showing moderate AS but he currently denies SOB, syncope and chest pain; he says he is agreeable to pursuing TAVR\par \par Medical Issue 2: Dementia: patient's wife and daughter say this is getting worse; but patient says he does not notice any worsening; family says he forgets things easily and they do not want him going to [facility] by himself anymore; family is concerned about his safety but they do not want him to go to nursing home; they are with him at home 24/7\par \par Medical Issue 3: High cholesterol: patient admits to difficulty adhering to low-fat diet; he is struggling with this and needs assistance for healthy lifestyle; he is compliant with his statin medication\par \par Medical Issue 4: BPH: pt reports increase in urinary frequency; having to wake up several times per night to urinate; he is compliant with Proscar and Flomax; he denies blood in urine, flank pain, fevers and chills; he also denies dysuria

## 2022-03-22 NOTE — HEALTH RISK ASSESSMENT
[No falls in past year] : Patient reported no falls in the past year [0] : 2) Feeling down, depressed, or hopeless: Not at all (0) [de-identified] : Urologist, Cardiologist [[id number]] : 0

## 2022-03-22 NOTE — ASSESSMENT
[FreeTextEntry1] : \par Medical Issue 1: Aortic Stenosis: coordinated follow-up and plan of care with cardiology team; planning to undergo TAVR; add Amlodipine 5\par \par Medical Issue 2: Dementia: prescribed Namenda and Donepezil medications; coordinated follow-up with neurologist for further evaluation; patient's family refusing nursing home placement at this time\par \par Medical Issue 3: High cholesterol: prescribed Simvastatin 40mg qhs and check lipid panel fasting for target LDL <70\par \par Medical Issue 4: BPH: not well controlled; pt has worsening frequency; prescribed Proscar and Flomax medications and coordinated follow-up care with Urologist\par \par Obesity counselling: 15 mins, assessed BMI at 30 and above now in obese range; advised weight loss, exercise routine and diet; pt agreed to start exercise program for target weight loss 20 lbs; assisted patient with resources including nutrition referral as needed and arranged for follow-up to monitor weight loss progress over next several months\par \par Depression screen performed today, PHQ2=0, negative.\par \par Annual alcohol misuse screen, 15 mins, done; negative.\par \par \par \par

## 2022-03-23 LAB
25(OH)D3 SERPL-MCNC: 32.3 NG/ML
ALBUMIN SERPL ELPH-MCNC: 4.7 G/DL
ALP BLD-CCNC: 67 U/L
ALT SERPL-CCNC: 23 U/L
ANION GAP SERPL CALC-SCNC: 13 MMOL/L
AST SERPL-CCNC: 26 U/L
BASOPHILS # BLD AUTO: 0.07 K/UL
BASOPHILS NFR BLD AUTO: 1 %
BILIRUB SERPL-MCNC: 0.2 MG/DL
BUN SERPL-MCNC: 15 MG/DL
CALCIUM SERPL-MCNC: 9.6 MG/DL
CHLORIDE SERPL-SCNC: 104 MMOL/L
CHOLEST SERPL-MCNC: 127 MG/DL
CO2 SERPL-SCNC: 27 MMOL/L
CREAT SERPL-MCNC: 0.94 MG/DL
EGFR: 80 ML/MIN/1.73M2
EOSINOPHIL # BLD AUTO: 0.31 K/UL
EOSINOPHIL NFR BLD AUTO: 4.4 %
ESTIMATED AVERAGE GLUCOSE: 123 MG/DL
GLUCOSE SERPL-MCNC: 113 MG/DL
HBA1C MFR BLD HPLC: 5.9 %
HCT VFR BLD CALC: 42.6 %
HDLC SERPL-MCNC: 41 MG/DL
HGB BLD-MCNC: 13.9 G/DL
IMM GRANULOCYTES NFR BLD AUTO: 0.4 %
LDLC SERPL CALC-MCNC: 51 MG/DL
LYMPHOCYTES # BLD AUTO: 1.71 K/UL
LYMPHOCYTES NFR BLD AUTO: 24.3 %
MAN DIFF?: NORMAL
MCHC RBC-ENTMCNC: 28.2 PG
MCHC RBC-ENTMCNC: 32.6 GM/DL
MCV RBC AUTO: 86.4 FL
MONOCYTES # BLD AUTO: 0.8 K/UL
MONOCYTES NFR BLD AUTO: 11.4 %
NEUTROPHILS # BLD AUTO: 4.11 K/UL
NEUTROPHILS NFR BLD AUTO: 58.5 %
NONHDLC SERPL-MCNC: 86 MG/DL
PLATELET # BLD AUTO: 218 K/UL
POTASSIUM SERPL-SCNC: 4.5 MMOL/L
PROT SERPL-MCNC: 6.6 G/DL
PSA SERPL-MCNC: 1.35 NG/ML
RBC # BLD: 4.93 M/UL
RBC # FLD: 13.5 %
SODIUM SERPL-SCNC: 143 MMOL/L
TRIGL SERPL-MCNC: 174 MG/DL
TSH SERPL-ACNC: 0.98 UIU/ML
WBC # FLD AUTO: 7.03 K/UL

## 2022-04-14 ENCOUNTER — RX RENEWAL (OUTPATIENT)
Age: 82
End: 2022-04-14

## 2022-05-20 ENCOUNTER — APPOINTMENT (OUTPATIENT)
Dept: INTERNAL MEDICINE | Facility: CLINIC | Age: 82
End: 2022-05-20
Payer: MEDICARE

## 2022-05-20 VITALS
DIASTOLIC BLOOD PRESSURE: 68 MMHG | HEIGHT: 64 IN | SYSTOLIC BLOOD PRESSURE: 135 MMHG | BODY MASS INDEX: 30.05 KG/M2 | WEIGHT: 176 LBS

## 2022-05-20 VITALS
DIASTOLIC BLOOD PRESSURE: 68 MMHG | BODY MASS INDEX: 30.05 KG/M2 | WEIGHT: 176 LBS | TEMPERATURE: 97.6 F | OXYGEN SATURATION: 93 % | SYSTOLIC BLOOD PRESSURE: 135 MMHG | HEART RATE: 70 BPM | HEIGHT: 64 IN

## 2022-05-20 PROCEDURE — 99215 OFFICE O/P EST HI 40 MIN: CPT | Mod: 25

## 2022-05-20 PROCEDURE — G0447 BEHAVIOR COUNSEL OBESITY 15M: CPT | Mod: 59

## 2022-05-20 RX ORDER — CITALOPRAM HYDROBROMIDE 20 MG/1
20 TABLET, FILM COATED ORAL DAILY
Qty: 90 | Refills: 3 | Status: DISCONTINUED | COMMUNITY
Start: 2019-09-20 | End: 2022-05-20

## 2022-05-20 RX ORDER — CITALOPRAM HYDROBROMIDE 10 MG/1
10 TABLET, FILM COATED ORAL DAILY
Qty: 90 | Refills: 3 | Status: DISCONTINUED | COMMUNITY
Start: 2021-09-16 | End: 2022-05-20

## 2022-05-20 NOTE — HISTORY OF PRESENT ILLNESS
[Spouse] : spouse [Other: _____] : [unfilled] [FreeTextEntry1] : Presents for follow-up of aortic stenosis, dementia, high cholesterol and BPH. [de-identified] : \par Medical Issue 1: Aortic Stenosis: patient saw cardiologist since last visit with me and underwent ECHO showing moderate AS but he currently denies SOB, syncope and chest pain; he says he is agreeable to pursuing TAVR\par \par Medical Issue 2: Dementia: patient's wife and daughter say this is getting worse; but patient says he does not notice any worsening; family says he forgets things easily and they do not want him going to [facility] by himself anymore; family is concerned about his safety but they do not want him to go to nursing home; they are with him at home 24/7\par \par Medical Issue 3: High cholesterol: patient admits to difficulty adhering to low-fat diet; he is struggling with this and needs assistance for healthy lifestyle; he is compliant with his statin medication\par \par Medical Issue 4: BPH: pt reports increase in urinary frequency; having to wake up several times per night to urinate; he is compliant with Proscar and Flomax; he denies blood in urine, flank pain, fevers and chills; he also denies dysuria

## 2022-05-20 NOTE — ASSESSMENT
[FreeTextEntry1] : \par Medical Issue 1: Aortic Stenosis: coordinated follow-up and plan of care with cardiology team; planning to undergo TAVR; add Amlodipine 5\par \par Medical Issue 2: Dementia: prescribed Namenda and Donepezil medications; coordinated follow-up with neurologist for further evaluation; patient's family refusing nursing home placement at this time\par \par Medical Issue 3: High cholesterol: prescribed Simvastatin 40mg qhs and check lipid panel fasting for target LDL <70\par \par Medical Issue 4: BPH: not well controlled; pt has worsening frequency; prescribed Proscar and Flomax medications and coordinated follow-up care with Urologist\par \par Obesity counselling: 15 mins, assessed BMI at 30 and above now in obese range; advised weight loss, exercise routine and diet; pt agreed to start exercise program for target weight loss 20 lbs; assisted patient with resources including nutrition referral as needed and arranged for follow-up to monitor weight loss progress over next several months\par

## 2022-05-20 NOTE — REVIEW OF SYSTEMS
[Fever] : no fever [Chills] : no chills [Fatigue] : no fatigue [Hot Flashes] : no hot flashes [Night Sweats] : no night sweats [Recent Change In Weight] : ~T no recent weight change [Discharge] : no discharge [Pain] : no pain [Redness] : no redness [Dryness] : no dryness [Vision Problems] : no vision problems [Itching] : no itching [Earache] : no earache [Hearing Loss] : no hearing loss [Nosebleeds] : no nosebleeds [Postnasal Drip] : no postnasal drip [Nasal Discharge] : no nasal discharge [Sore Throat] : no sore throat [Hoarseness] : no hoarseness [Chest Pain] : no chest pain [Palpitations] : no palpitations [Claudication] : no  leg claudication [Lower Ext Edema] : no lower extremity edema [Orthopena] : no orthopnea [Shortness Of Breath] : no shortness of breath [Wheezing] : no wheezing [Cough] : no cough [Dyspnea on Exertion] : not dyspnea on exertion [Abdominal Pain] : no abdominal pain [Nausea] : no nausea [Constipation] : no constipation [Diarrhea] : no diarrhea [Vomiting] : no vomiting [Heartburn] : no heartburn [Melena] : no melena [Dysuria] : no dysuria [Incontinence] : no incontinence [Hesitancy] : no hesitancy [Nocturia] : nocturia [Hematuria] : no hematuria [Frequency] : frequency [Impotence] : no impotency [Poor Libido] : libido not poor [Joint Pain] : no joint pain [Joint Stiffness] : no joint stiffness [Muscle Pain] : no muscle pain [Muscle Weakness] : no muscle weakness [Back Pain] : no back pain [Joint Swelling] : no joint swelling [Itching] : no itching [Mole Changes] : no mole changes [Nail Changes] : no nail changes [Hair Changes] : no hair changes [Skin Rash] : no skin rash [Headache] : no headache [Dizziness] : no dizziness [Fainting] : no fainting [Confusion] : no confusion [Unsteady Walk] : no ataxia [Memory Loss] : no memory loss [Suicidal] : not suicidal [Insomnia] : no insomnia [Anxiety] : no anxiety [Depression] : no depression [Easy Bleeding] : no easy bleeding [Easy Bruising] : no easy bruising [Swollen Glands] : no swollen glands

## 2022-05-20 NOTE — HEALTH RISK ASSESSMENT
[No falls in past year] : Patient reported no falls in the past year [0] : 2) Feeling down, depressed, or hopeless: Not at all (0) [de-identified] : Urologist, Cardiologist [[id number]] : 0

## 2022-06-13 ENCOUNTER — RX RENEWAL (OUTPATIENT)
Age: 82
End: 2022-06-13

## 2022-06-21 NOTE — CARDIOLOGY SUMMARY
[de-identified] : 3/17/22\par Sinus  Rhythm \par -Nonspecific QRS widening. \par  -Poor R-wave progression -nonspecific -consider old anterior infarct. \par  -  Nonspecific T-abnormality. \par \par ABNORMAL \par 
[[id number]]

## 2022-06-22 ENCOUNTER — APPOINTMENT (OUTPATIENT)
Dept: ORTHOPEDIC SURGERY | Facility: CLINIC | Age: 82
End: 2022-06-22

## 2022-06-23 ENCOUNTER — OUTPATIENT (OUTPATIENT)
Dept: OUTPATIENT SERVICES | Facility: HOSPITAL | Age: 82
LOS: 1 days | End: 2022-06-23
Payer: MEDICARE

## 2022-06-23 ENCOUNTER — APPOINTMENT (OUTPATIENT)
Dept: CV DIAGNOSITCS | Facility: HOSPITAL | Age: 82
End: 2022-06-23

## 2022-06-23 DIAGNOSIS — Z90.89 ACQUIRED ABSENCE OF OTHER ORGANS: Chronic | ICD-10-CM

## 2022-06-23 DIAGNOSIS — E78.00 PURE HYPERCHOLESTEROLEMIA, UNSPECIFIED: ICD-10-CM

## 2022-06-23 DIAGNOSIS — I25.10 ATHEROSCLEROTIC HEART DISEASE OF NATIVE CORONARY ARTERY WITHOUT ANGINA PECTORIS: ICD-10-CM

## 2022-06-23 DIAGNOSIS — Z98.890 OTHER SPECIFIED POSTPROCEDURAL STATES: Chronic | ICD-10-CM

## 2022-06-23 PROCEDURE — 93306 TTE W/DOPPLER COMPLETE: CPT | Mod: 26

## 2022-06-23 PROCEDURE — 93306 TTE W/DOPPLER COMPLETE: CPT

## 2022-07-03 NOTE — DISCHARGE NOTE PROVIDER - NSDCMRMEDTOKEN_GEN_ALL_CORE_FT
5 Advair  mcg-21 mcg/inh inhalation aerosol: 2 puff(s) inhaled 2 times a day  cetirizine 5 mg oral tablet: 1 tab(s) orally once a day  citalopram 20 mg oral tablet: 1 tab(s) orally once a day  clopidogrel 75 mg oral tablet: 1 tab(s) orally once a day  donepezil 10 mg oral tablet: 1 tab(s) orally once a day (at bedtime)  Ecotrin Adult Low Strength 81 mg oral delayed release tablet: 1 tab(s) orally once a day  finasteride 5 mg oral tablet: 1 tab(s) orally once a day  Incruse Ellipta 62.5 mcg/inh inhalation powder: inhaled once a day  Melatonin 5 mg oral tablet: 1 tab(s) orally once a day (at bedtime)  memantine 10 mg oral tablet: 1 tab(s) orally 2 times a day  Multiple Vitamins oral tablet: 1 tab(s) orally once a day  Myrbetriq 25 mg oral tablet, extended release: 1 tab(s) orally once a day (at bedtime)  Omega-3 oral capsule: orally once a day  simvastatin 40 mg oral tablet: 1 tab(s) orally once a day (at bedtime)  Singulair 10 mg oral tablet: 1 tab(s) orally once a day  tamsulosin 0.4 mg oral capsule: 1 cap(s) orally once a day  Ventolin HFA 90 mcg/inh inhalation aerosol: 2 puff(s) inhaled 4 times a day, As Needed  Xalatan 0.005% ophthalmic solution: 1 drop(s) to each affected eye once a day (in the evening) acetaminophen 325 mg oral tablet: 2 tab(s) orally every 6 hours, As needed, Mild Pain (1 - 3)  Advair  mcg-21 mcg/inh inhalation aerosol: 2 puff(s) inhaled 2 times a day  cetirizine 5 mg oral tablet: 1 tab(s) orally once a day  citalopram 20 mg oral tablet: 1 tab(s) orally once a day  clopidogrel 75 mg oral tablet: 1 tab(s) orally once a day  donepezil 10 mg oral tablet: 1 tab(s) orally once a day (at bedtime)  Ecotrin Adult Low Strength 81 mg oral delayed release tablet: 1 tab(s) orally once a day  finasteride 5 mg oral tablet: 1 tab(s) orally once a day  Incruse Ellipta 62.5 mcg/inh inhalation powder: inhaled once a day  Melatonin 5 mg oral tablet: 1 tab(s) orally once a day (at bedtime)  memantine 10 mg oral tablet: 1 tab(s) orally 2 times a day  Multiple Vitamins oral tablet: 1 tab(s) orally once a day  Myrbetriq 25 mg oral tablet, extended release: 1 tab(s) orally once a day (at bedtime)  simvastatin 40 mg oral tablet: 1 tab(s) orally once a day (at bedtime)  Singulair 10 mg oral tablet: 1 tab(s) orally once a day  tamsulosin 0.4 mg oral capsule: 1 cap(s) orally once a day  Ventolin HFA 90 mcg/inh inhalation aerosol: 2 puff(s) inhaled 4 times a day, As Needed  Xalatan 0.005% ophthalmic solution: 1 drop(s) to each affected eye once a day (in the evening)

## 2022-07-14 ENCOUNTER — NON-APPOINTMENT (OUTPATIENT)
Age: 82
End: 2022-07-14

## 2022-07-14 ENCOUNTER — APPOINTMENT (OUTPATIENT)
Dept: CARDIOLOGY | Facility: CLINIC | Age: 82
End: 2022-07-14

## 2022-07-14 VITALS
HEIGHT: 64 IN | BODY MASS INDEX: 29.02 KG/M2 | SYSTOLIC BLOOD PRESSURE: 129 MMHG | WEIGHT: 170 LBS | OXYGEN SATURATION: 97 % | DIASTOLIC BLOOD PRESSURE: 66 MMHG | HEART RATE: 73 BPM

## 2022-07-14 PROCEDURE — 99214 OFFICE O/P EST MOD 30 MIN: CPT

## 2022-07-14 PROCEDURE — 93000 ELECTROCARDIOGRAM COMPLETE: CPT

## 2022-07-17 NOTE — PHYSICAL EXAM
[Well Nourished] : well nourished [Well Developed] : well developed [No Acute Distress] : no acute distress [Normal Conjunctiva] : normal conjunctiva [Normal Venous Pressure] : normal venous pressure [No Carotid Bruit] : no carotid bruit [Normal S1, S2] : normal S1, S2 [No Murmur] : no murmur [No Rub] : no rub [No Gallop] : no gallop [Clear Lung Fields] : clear lung fields [Good Air Entry] : good air entry [No Respiratory Distress] : no respiratory distress  [Soft] : abdomen soft [No Masses/organomegaly] : no masses/organomegaly [Non Tender] : non-tender [Normal Bowel Sounds] : normal bowel sounds [Normal Gait] : normal gait [No Edema] : no edema [No Cyanosis] : no cyanosis [No Clubbing] : no clubbing [No Varicosities] : no varicosities [No Rash] : no rash [No Skin Lesions] : no skin lesions [No Focal Deficits] : no focal deficits [Moves all extremities] : moves all extremities [Normal Speech] : normal speech [Alert and Oriented] : alert and oriented [Normal memory] : normal memory

## 2022-07-17 NOTE — CARDIOLOGY SUMMARY
[de-identified] : 7/14/22\par Sinus  Rhythm \par -Intraventricular conduction defect -consider left ventricular hypertrophy. \par \par ABNORMAL \par

## 2022-07-17 NOTE — HISTORY OF PRESENT ILLNESS
[FreeTextEntry1] : Puma is here for f/u\par \par \par No chest pressure\par No syncope\par No palpitations\par Minor LE edema\par His memory and short temper issues continue to worsen

## 2022-07-17 NOTE — DISCUSSION/SUMMARY
[EKG obtained to assist in diagnosis and management of assessed problem(s)] : EKG obtained to assist in diagnosis and management of assessed problem(s) [FreeTextEntry1] : CAD- No angina\par s/p TAVR - No heart failure or significant edema. No dyspnea\par HTN - Adequate control\par \par No change in meds

## 2022-07-20 ENCOUNTER — APPOINTMENT (OUTPATIENT)
Dept: INTERNAL MEDICINE | Facility: CLINIC | Age: 82
End: 2022-07-20

## 2022-07-20 VITALS
WEIGHT: 182 LBS | TEMPERATURE: 97.7 F | OXYGEN SATURATION: 95 % | BODY MASS INDEX: 31.07 KG/M2 | SYSTOLIC BLOOD PRESSURE: 101 MMHG | HEART RATE: 78 BPM | DIASTOLIC BLOOD PRESSURE: 60 MMHG | HEIGHT: 64 IN

## 2022-07-20 PROCEDURE — 99215 OFFICE O/P EST HI 40 MIN: CPT

## 2022-07-20 PROCEDURE — G0447 BEHAVIOR COUNSEL OBESITY 15M: CPT | Mod: 59

## 2022-07-20 NOTE — HISTORY OF PRESENT ILLNESS
[Spouse] : spouse [Other: _____] : [unfilled] [FreeTextEntry1] : Presents for follow-up of aortic stenosis, dementia, high cholesterol and BPH. [de-identified] : \par Medical Issue 1: Aortic Stenosis: patient saw cardiologist since last visit with me and underwent ECHO showing moderate AS but he currently denies SOB, syncope and chest pain; he says he is agreeable to pursuing TAVR\par \par Medical Issue 2: Dementia: patient's wife and daughter say this is getting worse; but patient says he does not notice any worsening; family says he forgets things easily and they do not want him going to [facility] by himself anymore; family is concerned about his safety but they do not want him to go to nursing home; they are with him at home 24/7\par \par Medical Issue 3: High cholesterol: patient admits to difficulty adhering to low-fat diet; he is struggling with this and needs assistance for healthy lifestyle; he is compliant with his statin medication\par \par Medical Issue 4: BPH: pt reports increase in urinary frequency; having to wake up several times per night to urinate; he is compliant with Proscar and Flomax; he denies blood in urine, flank pain, fevers and chills; he also denies dysuria

## 2022-07-20 NOTE — HEALTH RISK ASSESSMENT
[No falls in past year] : Patient reported no falls in the past year [0] : 2) Feeling down, depressed, or hopeless: Not at all (0) [de-identified] : Urologist, Cardiologist [[id number]] : 0

## 2022-07-27 LAB
ALBUMIN SERPL ELPH-MCNC: 4.6 G/DL
ALP BLD-CCNC: 69 U/L
ALT SERPL-CCNC: 14 U/L
ANION GAP SERPL CALC-SCNC: 14 MMOL/L
AST SERPL-CCNC: 21 U/L
BASOPHILS # BLD AUTO: 0.06 K/UL
BASOPHILS NFR BLD AUTO: 0.9 %
BILIRUB SERPL-MCNC: 0.2 MG/DL
BUN SERPL-MCNC: 12 MG/DL
CALCIUM SERPL-MCNC: 9.5 MG/DL
CHLORIDE SERPL-SCNC: 107 MMOL/L
CHOLEST SERPL-MCNC: 132 MG/DL
CO2 SERPL-SCNC: 23 MMOL/L
COVID-19 NUCLEOCAPSID  GAM ANTIBODY INTERPRETATION: NEGATIVE
COVID-19 SPIKE DOMAIN ANTIBODY INTERPRETATION: POSITIVE
CREAT SERPL-MCNC: 0.95 MG/DL
EGFR: 79 ML/MIN/1.73M2
EOSINOPHIL # BLD AUTO: 0.38 K/UL
EOSINOPHIL NFR BLD AUTO: 5.7 %
ESTIMATED AVERAGE GLUCOSE: 128 MG/DL
GLUCOSE SERPL-MCNC: 98 MG/DL
HBA1C MFR BLD HPLC: 6.1 %
HCT VFR BLD CALC: 42.9 %
HDLC SERPL-MCNC: 41 MG/DL
HGB BLD-MCNC: 14.4 G/DL
IMM GRANULOCYTES NFR BLD AUTO: 0.4 %
LDLC SERPL CALC-MCNC: 49 MG/DL
LYMPHOCYTES # BLD AUTO: 1.6 K/UL
LYMPHOCYTES NFR BLD AUTO: 23.8 %
MAN DIFF?: NORMAL
MCHC RBC-ENTMCNC: 29 PG
MCHC RBC-ENTMCNC: 33.6 GM/DL
MCV RBC AUTO: 86.5 FL
MONOCYTES # BLD AUTO: 0.75 K/UL
MONOCYTES NFR BLD AUTO: 11.2 %
NEUTROPHILS # BLD AUTO: 3.89 K/UL
NEUTROPHILS NFR BLD AUTO: 58 %
NONHDLC SERPL-MCNC: 91 MG/DL
PLATELET # BLD AUTO: 212 K/UL
POTASSIUM SERPL-SCNC: 4.5 MMOL/L
PROT SERPL-MCNC: 6.7 G/DL
RBC # BLD: 4.96 M/UL
RBC # FLD: 13.8 %
SARS-COV-2 AB SERPL IA-ACNC: >250 U/ML
SARS-COV-2 AB SERPL QL IA: 0.08 INDEX
SODIUM SERPL-SCNC: 144 MMOL/L
TRIGL SERPL-MCNC: 209 MG/DL
TSH SERPL-ACNC: 0.87 UIU/ML
WBC # FLD AUTO: 6.71 K/UL

## 2022-07-29 ENCOUNTER — RX RENEWAL (OUTPATIENT)
Age: 82
End: 2022-07-29

## 2022-08-16 NOTE — HISTORY OF PRESENT ILLNESS
[Spouse] : spouse [Other: _____] : [unfilled] [FreeTextEntry1] : Presents for follow-up of aortic stenosis, dementia, high cholesterol and BPH. [de-identified] : \par Medical Issue 1: Aortic Stenosis: patient saw cardiologist since last visit with me and underwent ECHO showing moderate AS but he currently denies SOB, syncope and chest pain; he says he is agreeable to watch and observe plan as per cardiology\par \par Medical Issue 2: Dementia: patient's wife and daughter say this is getting worse; but patient says he does not notice any worsening; family says he forgets things easily and they do not want him going to [facility] by himself anymore; family is concerned about his safety but they do not want him to go to nursing home; they are with him at home 24/7\par \par Medical Issue 3: High cholesterol: patient admits to difficulty adhering to low-fat diet; he is struggling with this and needs assistance for healthy lifestyle; he is compliant with his statin medication\par \par Medical Issue 4: BPH: pt reports increase in urinary frequency; having to wake up several times per night to urinate; he is compliant with Proscar and Flomax; he denies blood in urine, flank pain, fevers and chills; he also denies dysuria Stable

## 2022-08-31 ENCOUNTER — RX RENEWAL (OUTPATIENT)
Age: 82
End: 2022-08-31

## 2022-08-31 RX ORDER — CLOPIDOGREL BISULFATE 75 MG/1
75 TABLET, FILM COATED ORAL DAILY
Qty: 1 | Refills: 1 | Status: ACTIVE | COMMUNITY
Start: 2020-10-26 | End: 1900-01-01

## 2022-09-14 ENCOUNTER — APPOINTMENT (OUTPATIENT)
Dept: INTERNAL MEDICINE | Facility: CLINIC | Age: 82
End: 2022-09-14

## 2022-09-14 VITALS
SYSTOLIC BLOOD PRESSURE: 109 MMHG | OXYGEN SATURATION: 96 % | TEMPERATURE: 97.8 F | WEIGHT: 173 LBS | HEART RATE: 81 BPM | DIASTOLIC BLOOD PRESSURE: 63 MMHG | BODY MASS INDEX: 29.53 KG/M2 | HEIGHT: 64 IN

## 2022-09-14 PROCEDURE — G0008: CPT

## 2022-09-14 PROCEDURE — G0447 BEHAVIOR COUNSEL OBESITY 15M: CPT | Mod: 59

## 2022-09-14 PROCEDURE — 99215 OFFICE O/P EST HI 40 MIN: CPT | Mod: 25

## 2022-09-14 PROCEDURE — 90662 IIV NO PRSV INCREASED AG IM: CPT

## 2022-09-14 NOTE — HISTORY OF PRESENT ILLNESS
[Spouse] : spouse [Other: _____] : [unfilled] [FreeTextEntry1] : Presents for follow-up of aortic stenosis, dementia, high cholesterol and BPH. [de-identified] : \par Medical Issue 1: Aortic Stenosis: patient saw cardiologist since last visit with me and underwent ECHO showing moderate AS but he currently denies SOB, syncope and chest pain; he says he is agreeable to pursuing TAVR\par \par Medical Issue 2: Dementia: patient's wife and daughter say this is getting worse; but patient says he does not notice any worsening; family says he forgets things easily and they do not want him going to [facility] by himself anymore; family is concerned about his safety but they do not want him to go to nursing home; they are with him at home 24/7\par \par Medical Issue 3: High cholesterol: patient admits to difficulty adhering to low-fat diet; he is struggling with this and needs assistance for healthy lifestyle; he is compliant with his statin medication\par \par Medical Issue 4: BPH: pt reports increase in urinary frequency; having to wake up several times per night to urinate; he is compliant with Proscar and Flomax; he denies blood in urine, flank pain, fevers and chills; he also denies dysuria

## 2022-09-14 NOTE — HEALTH RISK ASSESSMENT
[No falls in past year] : Patient reported no falls in the past year [0] : 2) Feeling down, depressed, or hopeless: Not at all (0) [de-identified] : Urologist, Cardiologist [[id number]] : 0

## 2022-10-27 DIAGNOSIS — U07.1 COVID-19: ICD-10-CM

## 2022-10-27 RX ORDER — NIRMATRELVIR AND RITONAVIR 300-100 MG
20 X 150 MG & KIT ORAL
Qty: 1 | Refills: 0 | Status: ACTIVE | COMMUNITY
Start: 2022-10-27 | End: 1900-01-01

## 2022-10-28 ENCOUNTER — NON-APPOINTMENT (OUTPATIENT)
Age: 82
End: 2022-10-28

## 2022-11-07 ENCOUNTER — RX RENEWAL (OUTPATIENT)
Age: 82
End: 2022-11-07

## 2022-11-18 ENCOUNTER — LABORATORY RESULT (OUTPATIENT)
Age: 82
End: 2022-11-18

## 2022-11-18 ENCOUNTER — RX RENEWAL (OUTPATIENT)
Age: 82
End: 2022-11-18

## 2022-11-18 RX ORDER — MONTELUKAST 10 MG/1
10 TABLET, FILM COATED ORAL DAILY
Qty: 90 | Refills: 3 | Status: ACTIVE | COMMUNITY
Start: 2021-09-15 | End: 1900-01-01

## 2022-11-23 ENCOUNTER — APPOINTMENT (OUTPATIENT)
Dept: INTERNAL MEDICINE | Facility: CLINIC | Age: 82
End: 2022-11-23

## 2022-11-28 NOTE — BRIEF OPERATIVE NOTE - ESTIMATED BLOOD LOSS
15 [Negative] : Heme/Lymph [Feeling Fatigued] : not feeling fatigued [FreeTextEntry8] : As noted above

## 2022-12-07 ENCOUNTER — APPOINTMENT (OUTPATIENT)
Dept: INTERNAL MEDICINE | Facility: CLINIC | Age: 82
End: 2022-12-07

## 2022-12-07 VITALS
BODY MASS INDEX: 26.63 KG/M2 | SYSTOLIC BLOOD PRESSURE: 121 MMHG | HEIGHT: 64 IN | HEART RATE: 93 BPM | OXYGEN SATURATION: 95 % | WEIGHT: 156 LBS | DIASTOLIC BLOOD PRESSURE: 71 MMHG | TEMPERATURE: 97.8 F

## 2022-12-07 DIAGNOSIS — Z00.00 ENCOUNTER FOR GENERAL ADULT MEDICAL EXAMINATION W/OUT ABNORMAL FINDINGS: ICD-10-CM

## 2022-12-07 PROCEDURE — 99215 OFFICE O/P EST HI 40 MIN: CPT | Mod: 25

## 2022-12-07 PROCEDURE — G0439: CPT

## 2022-12-07 NOTE — HEALTH RISK ASSESSMENT
[Good] : ~his/her~  mood as  good [No] : No [1 or 2 (0 pts)] : 1 or 2 (0 points) [Never (0 pts)] : Never (0 points) [No falls in past year] : Patient reported no falls in the past year [0] : 2) Feeling down, depressed, or hopeless: Not at all (0) [PHQ-2 Negative - No further assessment needed] : PHQ-2 Negative - No further assessment needed [de-identified] : Urologist, Cardiologist [Audit-CScore] : 0 [[id number]] : 0 [Change in mental status noted] : No change in mental status noted [Language] : denies difficulty with language [Behavior] : denies difficulty with behavior [Learning/Retaining New Information] : denies difficulty learning/retaining new information [Handling Complex Tasks] : denies difficulty handling complex tasks [Reasoning] : denies difficulty with reasoning [Spatial Ability and Orientation] : denies difficulty with spatial ability and orientation [None] : None [With Family] : lives with family [Retired] : retired [] :  [High Risk Behavior] : no high risk behavior [Feels Safe at Home] : Feels safe at home [Some assistance needed] : using telephone [Full assistance needed] : managing finances [Reports changes in hearing] : Reports no changes in hearing [Reports changes in vision] : Reports no changes in vision [Reports normal functional visual acuity (ie: able to read med bottle)] : Reports normal functional visual acuity [Reports changes in dental health] : Reports no changes in dental health [Smoke Detector] : smoke detector [Carbon Monoxide Detector] : carbon monoxide detector [Guns at Home] : no guns at home [Safety elements used in home] : safety elements used in home [Seat Belt] :  uses seat belt [Sunscreen] : uses sunscreen [Travel to Developing Areas] : does not  travel to developing areas [TB Exposure] : is not being exposed to tuberculosis [Caregiver Concerns] : does not have caregiver concerns

## 2022-12-07 NOTE — HISTORY OF PRESENT ILLNESS
[FreeTextEntry1] : Presents for preventive visit as well as follow-up of aortic stenosis, dementia, high cholesterol and BPH. [de-identified] : \par Preventive visit: pt is up to date with vaccines; he is up to date with colonoscopy screening and prostate cancer screening; he does not smoke cigarettes and does not misuse alcohol; he feels safe at home and has smoke/CO detectors in the house; he wears seatbelts when in vehicles\par \par Medical Issue 1: Aortic Stenosis: patient saw cardiologist since last visit with me and underwent ECHO showing moderate AS but he currently denies SOB, syncope and chest pain; he says he is agreeable to pursuing TAVR\par \par Medical Issue 2: Dementia: patient's wife and daughter say this is getting worse; but patient says he does not notice any worsening; family says he forgets things easily and they do not want him going to [facility] by himself anymore; family is concerned about his safety but they do not want him to go to nursing home; they are with him at home 24/7\par \par Medical Issue 3: High cholesterol: patient admits to difficulty adhering to low-fat diet; he is struggling with this and needs assistance for healthy lifestyle; he is compliant with his statin medication\par \par Medical Issue 4: BPH: pt reports increase in urinary frequency; having to wake up several times per night to urinate; he is compliant with Proscar and Flomax; he denies blood in urine, flank pain, fevers and chills; he also denies dysuria [Spouse] : spouse [Other: _____] : [unfilled]

## 2022-12-07 NOTE — ASSESSMENT
[FreeTextEntry1] : \par Preventive visit: pt is up to date with vaccines; he is up to date with colonoscopy screening and prostate cancer screening; he does not smoke cigarettes and does not misuse alcohol; he feels safe at home and has smoke/CO detectors in the house; he wears seatbelts when in vehicles\par \par Medical Issue 1: Aortic Stenosis: coordinated follow-up and plan of care with cardiology team; planning to undergo TAVR\par \par Medical Issue 2: Dementia: prescribed Namenda and Donepezil medications; coordinated follow-up with neurologist for further evaluation; patient's family refusing nursing home placement at this time\par \par Medical Issue 3: High cholesterol: prescribed Simvastatin 40mg qhs and check lipid panel fasting for target LDL <70\par \par Medical Issue 4: BPH: not well controlled; pt has worsening frequency; prescribed Proscar and Flomax medications and coordinated follow-up care with Urologist

## 2022-12-13 ENCOUNTER — APPOINTMENT (OUTPATIENT)
Dept: CARDIOLOGY | Facility: CLINIC | Age: 82
End: 2022-12-13

## 2022-12-13 VITALS
HEART RATE: 82 BPM | HEIGHT: 64 IN | OXYGEN SATURATION: 97 % | SYSTOLIC BLOOD PRESSURE: 123 MMHG | DIASTOLIC BLOOD PRESSURE: 78 MMHG

## 2022-12-13 PROCEDURE — 99214 OFFICE O/P EST MOD 30 MIN: CPT

## 2022-12-13 PROCEDURE — 93000 ELECTROCARDIOGRAM COMPLETE: CPT

## 2022-12-15 ENCOUNTER — NON-APPOINTMENT (OUTPATIENT)
Age: 82
End: 2022-12-15

## 2022-12-15 NOTE — DISCUSSION/SUMMARY
[FreeTextEntry1] : CAD- No angina\par s/p TAVR - No heart failure or significant edema. No dyspnea\par HTN - Adequate control\par \par No change in meds   [EKG obtained to assist in diagnosis and management of assessed problem(s)] : EKG obtained to assist in diagnosis and management of assessed problem(s)

## 2022-12-15 NOTE — CARDIOLOGY SUMMARY
[de-identified] : 12/13/22\par Sinus  Rhythm \par -Nonspecific QRS widening. \par  -Poor R-wave progression -nonspecific -consider old anterior infarct. \par  -Nonspecific ST depression  -Nondiagnostic. \par \par ABNORMAL

## 2022-12-15 NOTE — HISTORY OF PRESENT ILLNESS
[FreeTextEntry1] : Puma is here for f/u\par \par \par No chest pressure\par No syncope\par No palpitations\par Minor LE edema

## 2023-01-03 ENCOUNTER — RX RENEWAL (OUTPATIENT)
Age: 83
End: 2023-01-03

## 2023-01-04 ENCOUNTER — APPOINTMENT (OUTPATIENT)
Dept: INTERNAL MEDICINE | Facility: CLINIC | Age: 83
End: 2023-01-04
Payer: MEDICARE

## 2023-01-04 VITALS
HEART RATE: 81 BPM | HEIGHT: 64 IN | DIASTOLIC BLOOD PRESSURE: 64 MMHG | TEMPERATURE: 97 F | BODY MASS INDEX: 26.8 KG/M2 | OXYGEN SATURATION: 98 % | SYSTOLIC BLOOD PRESSURE: 115 MMHG | WEIGHT: 157 LBS

## 2023-01-04 DIAGNOSIS — J44.9 CHRONIC OBSTRUCTIVE PULMONARY DISEASE, UNSPECIFIED: Chronic | ICD-10-CM

## 2023-01-04 DIAGNOSIS — Z13.31 ENCOUNTER FOR SCREENING FOR DEPRESSION: ICD-10-CM

## 2023-01-04 DIAGNOSIS — E66.9 OBESITY, UNSPECIFIED: ICD-10-CM

## 2023-01-04 DIAGNOSIS — Z13.39 ENCOUNTER FOR SCREENING EXAM FOR OTHER MENTAL HEALTH AND BEHAVIORAL DISORDERS: ICD-10-CM

## 2023-01-04 PROCEDURE — G0444 DEPRESSION SCREEN ANNUAL: CPT | Mod: 59

## 2023-01-04 PROCEDURE — G0442 ANNUAL ALCOHOL SCREEN 15 MIN: CPT | Mod: 59

## 2023-01-04 PROCEDURE — 99215 OFFICE O/P EST HI 40 MIN: CPT

## 2023-01-04 NOTE — HEALTH RISK ASSESSMENT
[No] : No [1 or 2 (0 pts)] : 1 or 2 (0 points) [Never (0 pts)] : Never (0 points) [No falls in past year] : Patient reported no falls in the past year [0] : 2) Feeling down, depressed, or hopeless: Not at all (0) [PHQ-2 Negative - No further assessment needed] : PHQ-2 Negative - No further assessment needed [de-identified] : Urologist, Cardiologist [Audit-CScore] : 0 [[id number]] : 0

## 2023-01-04 NOTE — HISTORY OF PRESENT ILLNESS
[Spouse] : spouse [Other: _____] : [unfilled] [FreeTextEntry1] : Presents for follow-up of aortic stenosis, dementia, high cholesterol and BPH. [de-identified] : \par Medical Issue 1: Aortic Stenosis: patient saw cardiologist since last visit with me and underwent ECHO showing moderate AS but he currently denies SOB, syncope and chest pain; he says he is agreeable to pursuing TAVR\par \par Medical Issue 2: Dementia: patient's wife and daughter say this is getting worse; but patient says he does not notice any worsening; family says he forgets things easily and they do not want him going to [facility] by himself anymore; family is concerned about his safety but they do not want him to go to nursing home; they are with him at home 24/7\par \par Medical Issue 3: High cholesterol: patient admits to difficulty adhering to low-fat diet; he is struggling with this and needs assistance for healthy lifestyle; he is compliant with his statin medication\par \par Medical Issue 4: BPH: pt reports increase in urinary frequency; having to wake up several times per night to urinate; he is compliant with Proscar and Flomax; he denies blood in urine, flank pain, fevers and chills; he also denies dysuria

## 2023-01-04 NOTE — ASSESSMENT
[FreeTextEntry1] : \par Medical Issue 1: Aortic Stenosis: coordinated follow-up and plan of care with cardiology team; planning to undergo TAVR; add Amlodipine 5\par \par Medical Issue 2: Dementia: prescribed Namenda and Donepezil medications; coordinated follow-up with neurologist for further evaluation; patient's family refusing nursing home placement at this time\par \par Medical Issue 3: High cholesterol: prescribed Simvastatin 40mg qhs and check lipid panel fasting for target LDL <70\par \par Medical Issue 4: BPH: not well controlled; pt has worsening frequency; prescribed Proscar and Flomax medications and coordinated follow-up care with Urologist\par \par Depression screen performed today, PHQ2=0, negative.\par \par Annual alcohol misuse screen, 15 mins, done; negative.\par \par

## 2023-01-05 LAB
ALBUMIN SERPL ELPH-MCNC: 4.6 G/DL
ALP BLD-CCNC: 76 U/L
ALT SERPL-CCNC: 17 U/L
ANION GAP SERPL CALC-SCNC: 13 MMOL/L
AST SERPL-CCNC: 21 U/L
BASOPHILS # BLD AUTO: 0.08 K/UL
BASOPHILS NFR BLD AUTO: 1.2 %
BILIRUB SERPL-MCNC: 0.3 MG/DL
BUN SERPL-MCNC: 13 MG/DL
CALCIUM SERPL-MCNC: 10 MG/DL
CHLORIDE SERPL-SCNC: 103 MMOL/L
CHOLEST SERPL-MCNC: 138 MG/DL
CO2 SERPL-SCNC: 24 MMOL/L
CREAT SERPL-MCNC: 0.99 MG/DL
EGFR: 75 ML/MIN/1.73M2
EOSINOPHIL # BLD AUTO: 0.41 K/UL
EOSINOPHIL NFR BLD AUTO: 6.1 %
ESTIMATED AVERAGE GLUCOSE: 120 MG/DL
GLUCOSE SERPL-MCNC: 123 MG/DL
HBA1C MFR BLD HPLC: 5.8 %
HCT VFR BLD CALC: 48.1 %
HDLC SERPL-MCNC: 46 MG/DL
HGB BLD-MCNC: 15.3 G/DL
IMM GRANULOCYTES NFR BLD AUTO: 0.4 %
LDLC SERPL CALC-MCNC: 59 MG/DL
LYMPHOCYTES # BLD AUTO: 1.69 K/UL
LYMPHOCYTES NFR BLD AUTO: 25.3 %
MAN DIFF?: NORMAL
MCHC RBC-ENTMCNC: 28.2 PG
MCHC RBC-ENTMCNC: 31.8 GM/DL
MCV RBC AUTO: 88.6 FL
MONOCYTES # BLD AUTO: 0.6 K/UL
MONOCYTES NFR BLD AUTO: 9 %
NEUTROPHILS # BLD AUTO: 3.86 K/UL
NEUTROPHILS NFR BLD AUTO: 58 %
NONHDLC SERPL-MCNC: 91 MG/DL
PLATELET # BLD AUTO: 227 K/UL
POTASSIUM SERPL-SCNC: 4.3 MMOL/L
PROT SERPL-MCNC: 7 G/DL
RBC # BLD: 5.43 M/UL
RBC # FLD: 14.4 %
SODIUM SERPL-SCNC: 140 MMOL/L
TRIGL SERPL-MCNC: 161 MG/DL
TSH SERPL-ACNC: 0.84 UIU/ML
WBC # FLD AUTO: 6.67 K/UL

## 2023-02-21 ENCOUNTER — RX RENEWAL (OUTPATIENT)
Age: 83
End: 2023-02-21

## 2023-02-27 ENCOUNTER — RX RENEWAL (OUTPATIENT)
Age: 83
End: 2023-02-27

## 2023-03-02 ENCOUNTER — APPOINTMENT (OUTPATIENT)
Dept: COLORECTAL SURGERY | Facility: CLINIC | Age: 83
End: 2023-03-02
Payer: MEDICARE

## 2023-03-02 VITALS
RESPIRATION RATE: 15 BRPM | WEIGHT: 150 LBS | OXYGEN SATURATION: 94 % | DIASTOLIC BLOOD PRESSURE: 71 MMHG | HEIGHT: 67 IN | SYSTOLIC BLOOD PRESSURE: 130 MMHG | BODY MASS INDEX: 23.54 KG/M2 | TEMPERATURE: 97.5 F | HEART RATE: 85 BPM

## 2023-03-02 DIAGNOSIS — K62.89 OTHER SPECIFIED DISEASES OF ANUS AND RECTUM: ICD-10-CM

## 2023-03-02 DIAGNOSIS — K60.2 ANAL FISSURE, UNSPECIFIED: ICD-10-CM

## 2023-03-02 PROCEDURE — 46600 DIAGNOSTIC ANOSCOPY SPX: CPT

## 2023-03-02 PROCEDURE — 99203 OFFICE O/P NEW LOW 30 MIN: CPT | Mod: 25

## 2023-03-02 RX ORDER — HYDROCORTISONE 25 MG/G
2.5 CREAM TOPICAL
Qty: 1 | Refills: 5 | Status: ACTIVE | COMMUNITY
Start: 2023-03-02 | End: 1900-01-01

## 2023-03-02 RX ORDER — MEMANTINE HYDROCHLORIDE 5 MG-10 MG
KIT ORAL
Refills: 0 | Status: ACTIVE | COMMUNITY

## 2023-03-02 RX ORDER — FAMOTIDINE 40 MG/1
TABLET, FILM COATED ORAL
Refills: 0 | Status: ACTIVE | COMMUNITY

## 2023-03-02 RX ORDER — UMECLIDINIUM 62.5 UG/1
AEROSOL, POWDER ORAL
Refills: 0 | Status: ACTIVE | COMMUNITY

## 2023-03-02 RX ORDER — ALBUTEROL 90 MCG
AEROSOL (GRAM) INHALATION
Refills: 0 | Status: ACTIVE | COMMUNITY

## 2023-03-02 RX ORDER — LATANOPROST/PF 0.005 %
DROPS OPHTHALMIC (EYE)
Refills: 0 | Status: ACTIVE | COMMUNITY

## 2023-03-02 NOTE — REVIEW OF SYSTEMS
[Recent Weight Loss (___ Lbs)] : recent [unfilled] ~Ulb weight loss [Wheezing] : wheezing [As Noted in HPI] : as noted in HPI [Confused] : confusion [Depression] : depression [Negative] : Heme/Lymph [FreeTextEntry5] : elevated cholesterol, HTN, valve placement [FreeTextEntry6] : hx asthma, emphysema [FreeTextEntry8] : urinary frequency [FreeTextEntry9] : shoulder pain [de-identified] : hx dementia

## 2023-03-02 NOTE — CONSULT LETTER
[Dear  ___] : Dear  [unfilled], [Consult Letter:] : I had the pleasure of evaluating your patient, [unfilled]. [Please see my note below.] : Please see my note below. [Consult Closing:] : Thank you very much for allowing me to participate in the care of this patient.  If you have any questions, please do not hesitate to contact me. [Sincerely,] : Sincerely, [FreeTextEntry2] : Steve Coreas [FreeTextEntry3] : Brian Irwin MD FACS\par Chief Colon and Rectal Surgery\par [facility]

## 2023-03-02 NOTE — PHYSICAL EXAM
[Wheezing] : wheezing was heard [Normal Heart Sounds] : normal heart sounds [Normal Rate and Rhythm] : normal rate and rhythm [No Rash or Lesion] : No rash or lesion [Alert] : alert [Oriented to Person] : oriented to person [Calm] : calm [Oriented to Place] : disoriented to place [Oriented to Time] : disoriented to time [de-identified] : soft, NT/ND, +BS [de-identified] : elderly male [de-identified] : NC/AT [de-identified] : JAYASHREE/+ROM [de-identified] : intact

## 2023-03-02 NOTE — HISTORY OF PRESENT ILLNESS
[FreeTextEntry1] : 82 year old male presents with rectal pain. Patient has a history of dementia and is primarily [place] speaking. Patient's daughter presents with appointment with patient. Patient has been complaining of rectal pain for about a month and a half but the patient is a poor historian. Patient's daughter states that the patient is constantly in the bathroom but does not produce any urine or stool. Patient is always straining. Patient recently saw a GI last month who did an exam which was normal. Patient's last colonoscopy was in October of 2022 and was reportedly normal.

## 2023-03-02 NOTE — REASON FOR VISIT
[Consultation] : a consultation visit [Family Member] : family member [FreeTextEntry1] : patient intake forms reviewed

## 2023-03-02 NOTE — ASSESSMENT
[FreeTextEntry1] : Small anal fissure. Fissure unlikely cause of patient's discomfort. Persistent feeling of need to defecate likely related to chronic dementia\par -Patient to continue fiber supplementation daily\par -Hydrocortisone cream as needed\par -We'll continue supportive care\par -Follow gastroenterology as needed\par -All questions answered

## 2023-03-07 ENCOUNTER — APPOINTMENT (OUTPATIENT)
Dept: INTERNAL MEDICINE | Facility: CLINIC | Age: 83
End: 2023-03-07
Payer: MEDICARE

## 2023-03-07 VITALS
TEMPERATURE: 97.6 F | HEART RATE: 79 BPM | OXYGEN SATURATION: 96 % | WEIGHT: 157 LBS | SYSTOLIC BLOOD PRESSURE: 106 MMHG | DIASTOLIC BLOOD PRESSURE: 64 MMHG | BODY MASS INDEX: 24.59 KG/M2

## 2023-03-07 PROCEDURE — 99214 OFFICE O/P EST MOD 30 MIN: CPT

## 2023-03-09 NOTE — ASSESSMENT
[FreeTextEntry1] : \par Medical Issue 1: Aortic Stenosis: coordinated follow-up and plan of care with cardiology team; planning to undergo TAVR; add Amlodipine 5\par \par Medical Issue 2: Dementia: prescribed Namenda and Donepezil medications; coordinated follow-up with neurologist for further evaluation; patient's family refusing nursing home placement at this time\par \par Medical Issue 3: High cholesterol: prescribed Simvastatin 40mg qhs and check lipid panel fasting for target LDL <70\par \par Medical Issue 4: BPH: not well controlled; pt has worsening frequency; prescribed Proscar and Flomax medications and coordinated follow-up care with Urologist\par \par

## 2023-03-09 NOTE — HISTORY OF PRESENT ILLNESS
[Spouse] : spouse [Other: _____] : [unfilled] [FreeTextEntry1] : Presents for follow-up of aortic stenosis, dementia, high cholesterol and BPH. [de-identified] : \par Medical Issue 1: Aortic Stenosis: patient saw cardiologist since last visit with me and underwent ECHO showing moderate AS but he currently denies SOB, syncope and chest pain; he says he is agreeable to pursuing TAVR\par \par Medical Issue 2: Dementia: patient's wife and daughter say this is getting worse; but patient says he does not notice any worsening; family says he forgets things easily and they do not want him going to [facility] by himself anymore; family is concerned about his safety but they do not want him to go to nursing home; they are with him at home 24/7\par \par Medical Issue 3: High cholesterol: patient admits to difficulty adhering to low-fat diet; he is struggling with this and needs assistance for healthy lifestyle; he is compliant with his statin medication\par \par Medical Issue 4: BPH: pt reports increase in urinary frequency; having to wake up several times per night to urinate; he is compliant with Proscar and Flomax; he denies blood in urine, flank pain, fevers and chills; he also denies dysuria

## 2023-03-09 NOTE — HEALTH RISK ASSESSMENT
[No] : No [1 or 2 (0 pts)] : 1 or 2 (0 points) [Never (0 pts)] : Never (0 points) [No falls in past year] : Patient reported no falls in the past year [0] : 2) Feeling down, depressed, or hopeless: Not at all (0) [PHQ-2 Negative - No further assessment needed] : PHQ-2 Negative - No further assessment needed [de-identified] : Urologist, Cardiologist [Audit-CScore] : 0 [[id number]] : 0

## 2023-03-13 LAB
ALBUMIN SERPL ELPH-MCNC: 4.6 G/DL
ALP BLD-CCNC: 68 U/L
ALT SERPL-CCNC: 21 U/L
ANION GAP SERPL CALC-SCNC: 12 MMOL/L
AST SERPL-CCNC: 22 U/L
BASOPHILS # BLD AUTO: 0.07 K/UL
BASOPHILS NFR BLD AUTO: 1 %
BILIRUB SERPL-MCNC: 0.3 MG/DL
BUN SERPL-MCNC: 14 MG/DL
CALCIUM SERPL-MCNC: 9.6 MG/DL
CHLORIDE SERPL-SCNC: 104 MMOL/L
CHOLEST SERPL-MCNC: 116 MG/DL
CO2 SERPL-SCNC: 25 MMOL/L
CREAT SERPL-MCNC: 0.89 MG/DL
EGFR: 84 ML/MIN/1.73M2
EOSINOPHIL # BLD AUTO: 0.31 K/UL
EOSINOPHIL NFR BLD AUTO: 4.5 %
ESTIMATED AVERAGE GLUCOSE: 120 MG/DL
GLUCOSE SERPL-MCNC: 112 MG/DL
HBA1C MFR BLD HPLC: 5.8 %
HCT VFR BLD CALC: 45.3 %
HDLC SERPL-MCNC: 44 MG/DL
HGB BLD-MCNC: 15.2 G/DL
IMM GRANULOCYTES NFR BLD AUTO: 0.1 %
LDLC SERPL CALC-MCNC: 45 MG/DL
LYMPHOCYTES # BLD AUTO: 1.47 K/UL
LYMPHOCYTES NFR BLD AUTO: 21.5 %
MAN DIFF?: NORMAL
MCHC RBC-ENTMCNC: 28.7 PG
MCHC RBC-ENTMCNC: 33.6 GM/DL
MCV RBC AUTO: 85.5 FL
MONOCYTES # BLD AUTO: 0.64 K/UL
MONOCYTES NFR BLD AUTO: 9.3 %
NEUTROPHILS # BLD AUTO: 4.35 K/UL
NEUTROPHILS NFR BLD AUTO: 63.6 %
NONHDLC SERPL-MCNC: 73 MG/DL
PLATELET # BLD AUTO: 195 K/UL
POTASSIUM SERPL-SCNC: 4.4 MMOL/L
PROT SERPL-MCNC: 7 G/DL
RBC # BLD: 5.3 M/UL
RBC # FLD: 13.8 %
SODIUM SERPL-SCNC: 141 MMOL/L
TRIGL SERPL-MCNC: 137 MG/DL
TSH SERPL-ACNC: 0.79 UIU/ML
WBC # FLD AUTO: 6.85 K/UL

## 2023-04-01 NOTE — DISCHARGE NOTE PROVIDER - NSDCCPCAREPLAN_GEN_ALL_CORE_FT
PRINCIPAL DISCHARGE DIAGNOSIS  Diagnosis: S/P TAVR (transcatheter aortic valve replacement)  Assessment and Plan of Treatment: Followup with Dr Fiore one week call for appointment [phone]   Tstructural heart team in one month have ECHO prior to visit   1. Daily Shower  2. Weight yourself daily and notify any weight gain greater than 2-3 pounds in 24 hours.  3. Regular diet - low fat, low cholesterol, no added salt.  4. Cleanse Midsternal incision and leg incision daily while showering with warm water and mild soap, pat dry and maintain open to air.   5. Follow Cardiac Surgery Do's and Don'ts discharge instructions.   6. No driving until cleared by MD.   7. No heavy lifting nothing greater than 5 pounds until cleared by MD.   8. Call / Notify MD any fever greater than 101.0  9. Increase Activity as tolerated.ith .

## 2023-04-07 ENCOUNTER — APPOINTMENT (OUTPATIENT)
Dept: INTERNAL MEDICINE | Facility: CLINIC | Age: 83
End: 2023-04-07
Payer: MEDICARE

## 2023-04-07 VITALS
BODY MASS INDEX: 24.17 KG/M2 | HEIGHT: 67 IN | WEIGHT: 154 LBS | HEART RATE: 97 BPM | SYSTOLIC BLOOD PRESSURE: 118 MMHG | OXYGEN SATURATION: 97 % | DIASTOLIC BLOOD PRESSURE: 69 MMHG | TEMPERATURE: 97.2 F

## 2023-04-07 VITALS — DIASTOLIC BLOOD PRESSURE: 69 MMHG | SYSTOLIC BLOOD PRESSURE: 118 MMHG

## 2023-04-07 DIAGNOSIS — M79.10 MYALGIA, UNSPECIFIED SITE: ICD-10-CM

## 2023-04-07 PROCEDURE — 99213 OFFICE O/P EST LOW 20 MIN: CPT

## 2023-04-07 RX ORDER — DICLOFENAC SODIUM 1% 10 MG/G
1 GEL TOPICAL DAILY
Qty: 1 | Refills: 2 | Status: ACTIVE | COMMUNITY
Start: 2023-04-07 | End: 1900-01-01

## 2023-04-07 NOTE — HISTORY OF PRESENT ILLNESS
[Spouse] : spouse [Other: _____] : [unfilled] [FreeTextEntry1] : Presents for follow-up of aortic stenosis, dementia, high cholesterol and BPH. [de-identified] : \par Medical Issue 1: Aortic Stenosis: patient saw cardiologist since last visit with me and underwent ECHO showing moderate AS but he currently denies SOB, syncope and chest pain; he says he is agreeable to pursuing TAVR\par \par Medical Issue 2: Dementia: patient's wife and daughter say this is getting worse; but patient says he does not notice any worsening; family says he forgets things easily and they do not want him going to [facility] by himself anymore; family is concerned about his safety but they do not want him to go to nursing home; they are with him at home 24/7\par \par Medical Issue 3: High cholesterol: patient admits to difficulty adhering to low-fat diet; he is struggling with this and needs assistance for healthy lifestyle; he is compliant with his statin medication\par \par Medical Issue 4: BPH: pt reports increase in urinary frequency; having to wake up several times per night to urinate; he is compliant with Proscar and Flomax; he denies blood in urine, flank pain, fevers and chills; he also denies dysuria

## 2023-04-07 NOTE — HEALTH RISK ASSESSMENT
[No] : No [1 or 2 (0 pts)] : 1 or 2 (0 points) [Never (0 pts)] : Never (0 points) [No falls in past year] : Patient reported no falls in the past year [0] : 2) Feeling down, depressed, or hopeless: Not at all (0) [PHQ-2 Negative - No further assessment needed] : PHQ-2 Negative - No further assessment needed [de-identified] : Urologist, Cardiologist [Audit-CScore] : 0 [[id number]] : 0

## 2023-04-10 RX ORDER — QUETIAPINE FUMARATE 50 MG/1
50 TABLET ORAL
Qty: 135 | Refills: 0 | Status: ACTIVE | COMMUNITY
Start: 2022-05-20 | End: 1900-01-01

## 2023-04-10 RX ORDER — QUETIAPINE FUMARATE 25 MG/1
25 TABLET ORAL
Qty: 30 | Refills: 2 | Status: ACTIVE | COMMUNITY
Start: 2023-04-10 | End: 1900-01-01

## 2023-04-12 ENCOUNTER — RX RENEWAL (OUTPATIENT)
Age: 83
End: 2023-04-12

## 2023-04-12 RX ORDER — BENZONATATE 100 MG/1
100 CAPSULE ORAL 3 TIMES DAILY
Qty: 30 | Refills: 3 | Status: ACTIVE | COMMUNITY
Start: 2022-05-20 | End: 1900-01-01

## 2023-05-03 ENCOUNTER — APPOINTMENT (OUTPATIENT)
Dept: ULTRASOUND IMAGING | Facility: CLINIC | Age: 83
End: 2023-05-03
Payer: MEDICARE

## 2023-05-03 ENCOUNTER — RX RENEWAL (OUTPATIENT)
Age: 83
End: 2023-05-03

## 2023-05-03 PROCEDURE — 76700 US EXAM ABDOM COMPLETE: CPT

## 2023-05-03 RX ORDER — SIMVASTATIN 40 MG/1
40 TABLET, FILM COATED ORAL
Qty: 90 | Refills: 3 | Status: ACTIVE | COMMUNITY
Start: 2021-03-22 | End: 1900-01-01

## 2023-05-10 ENCOUNTER — NON-APPOINTMENT (OUTPATIENT)
Age: 83
End: 2023-05-10

## 2023-05-10 ENCOUNTER — APPOINTMENT (OUTPATIENT)
Dept: CARDIOLOGY | Facility: CLINIC | Age: 83
End: 2023-05-10
Payer: MEDICARE

## 2023-05-10 VITALS
OXYGEN SATURATION: 97 % | WEIGHT: 154 LBS | HEART RATE: 91 BPM | DIASTOLIC BLOOD PRESSURE: 78 MMHG | BODY MASS INDEX: 24.17 KG/M2 | HEIGHT: 67 IN | SYSTOLIC BLOOD PRESSURE: 120 MMHG

## 2023-05-10 PROCEDURE — 93000 ELECTROCARDIOGRAM COMPLETE: CPT

## 2023-05-10 PROCEDURE — 99214 OFFICE O/P EST MOD 30 MIN: CPT

## 2023-05-14 NOTE — HISTORY OF PRESENT ILLNESS
[FreeTextEntry1] : Puma is here for f/u\par \par Dealing with memory issues, agitation\par No chest pressure\par No syncope\par No palpitations\par Minor LE edema

## 2023-05-14 NOTE — REVIEW OF SYSTEMS
[Feeling Fatigued] : feeling fatigued [Confusion] : confusion was observed [Memory Lapses Or Loss] : memory lapses or loss [Depression] : depression [Anxiety] : anxiety [Negative] : Heme/Lymph

## 2023-05-14 NOTE — CARDIOLOGY SUMMARY
[de-identified] : 5/10/23\par Sinus  Rhythm \par -Intraventricular conduction defect -consider left ventricular hypertrophy. \par  -  Nonspecific T-abnormality. \par \par ABNORMAL \par

## 2023-05-14 NOTE — REASON FOR VISIT
[Structural Heart and Valve Disease] : structural heart and valve disease [Arrhythmia/ECG Abnorrmalities] : arrhythmia/ECG abnormalities [Hyperlipidemia] : hyperlipidemia [Hypertension] : hypertension [Coronary Artery Disease] : coronary artery disease

## 2023-05-18 ENCOUNTER — APPOINTMENT (OUTPATIENT)
Dept: INTERNAL MEDICINE | Facility: CLINIC | Age: 83
End: 2023-05-18
Payer: MEDICARE

## 2023-05-18 VITALS
WEIGHT: 156 LBS | SYSTOLIC BLOOD PRESSURE: 108 MMHG | HEIGHT: 67 IN | OXYGEN SATURATION: 95 % | HEART RATE: 98 BPM | TEMPERATURE: 97.5 F | BODY MASS INDEX: 24.48 KG/M2 | DIASTOLIC BLOOD PRESSURE: 59 MMHG

## 2023-05-18 DIAGNOSIS — I35.0 NONRHEUMATIC AORTIC (VALVE) STENOSIS: ICD-10-CM

## 2023-05-18 DIAGNOSIS — N40.0 BENIGN PROSTATIC HYPERPLASIA WITHOUT LOWER URINARY TRACT SYMPMS: ICD-10-CM

## 2023-05-18 DIAGNOSIS — F03.90 UNSPECIFIED DEMENTIA W/OUT BEHAVIORAL DISTURBANCE: ICD-10-CM

## 2023-05-18 PROCEDURE — 99214 OFFICE O/P EST MOD 30 MIN: CPT

## 2023-05-18 NOTE — HISTORY OF PRESENT ILLNESS
[Spouse] : spouse [Other: _____] : [unfilled] [FreeTextEntry1] : Presents for follow-up of aortic stenosis, dementia, high cholesterol and BPH. [de-identified] : \par Medical Issue 1: Aortic Stenosis: patient saw cardiologist since last visit with me and underwent ECHO showing moderate AS but he currently denies SOB, syncope and chest pain; he says he is agreeable to pursuing TAVR\par \par Medical Issue 2: Dementia: patient's wife and daughter say this is getting worse; but patient says he does not notice any worsening; family says he forgets things easily and they do not want him going to [facility] by himself anymore; family is concerned about his safety but they do not want him to go to nursing home; they are with him at home 24/7\par \par Medical Issue 3: High cholesterol: patient admits to difficulty adhering to low-fat diet; he is struggling with this and needs assistance for healthy lifestyle; he is compliant with his statin medication\par \par Medical Issue 4: BPH: pt reports increase in urinary frequency; having to wake up several times per night to urinate; he is compliant with Proscar and Flomax; he denies blood in urine, flank pain, fevers and chills; he also denies dysuria

## 2023-05-18 NOTE — HEALTH RISK ASSESSMENT
[No] : No [1 or 2 (0 pts)] : 1 or 2 (0 points) [Never (0 pts)] : Never (0 points) [No falls in past year] : Patient reported no falls in the past year [0] : 2) Feeling down, depressed, or hopeless: Not at all (0) [PHQ-2 Negative - No further assessment needed] : PHQ-2 Negative - No further assessment needed [de-identified] : Urologist, Cardiologist [Audit-CScore] : 0 [[id number]] : 0

## 2023-05-19 LAB
ALBUMIN SERPL ELPH-MCNC: 4.7 G/DL
ALP BLD-CCNC: 72 U/L
ALT SERPL-CCNC: 21 U/L
ANION GAP SERPL CALC-SCNC: 12 MMOL/L
AST SERPL-CCNC: 24 U/L
BILIRUB SERPL-MCNC: 0.3 MG/DL
BUN SERPL-MCNC: 14 MG/DL
CALCIUM SERPL-MCNC: 10.3 MG/DL
CHLORIDE SERPL-SCNC: 104 MMOL/L
CHOLEST SERPL-MCNC: 132 MG/DL
CO2 SERPL-SCNC: 26 MMOL/L
CREAT SERPL-MCNC: 0.86 MG/DL
EGFR: 85 ML/MIN/1.73M2
ESTIMATED AVERAGE GLUCOSE: 123 MG/DL
GLUCOSE SERPL-MCNC: 139 MG/DL
HBA1C MFR BLD HPLC: 5.9 %
HDLC SERPL-MCNC: 51 MG/DL
LDLC SERPL CALC-MCNC: 60 MG/DL
NONHDLC SERPL-MCNC: 81 MG/DL
POTASSIUM SERPL-SCNC: 4.4 MMOL/L
PROT SERPL-MCNC: 6.9 G/DL
SODIUM SERPL-SCNC: 142 MMOL/L
TRIGL SERPL-MCNC: 104 MG/DL
TSH SERPL-ACNC: 1 UIU/ML

## 2023-06-14 ENCOUNTER — RX RENEWAL (OUTPATIENT)
Age: 83
End: 2023-06-14

## 2023-06-16 ENCOUNTER — RX RENEWAL (OUTPATIENT)
Age: 83
End: 2023-06-16

## 2023-06-16 RX ORDER — LEVOCETIRIZINE DIHYDROCHLORIDE 5 MG/1
5 TABLET ORAL DAILY
Qty: 90 | Refills: 1 | Status: ACTIVE | COMMUNITY
Start: 2019-05-16 | End: 1900-01-01

## 2023-07-18 ENCOUNTER — APPOINTMENT (OUTPATIENT)
Dept: CARDIOLOGY | Facility: CLINIC | Age: 83
End: 2023-07-18
Payer: MEDICARE

## 2023-07-18 VITALS
OXYGEN SATURATION: 96 % | SYSTOLIC BLOOD PRESSURE: 126 MMHG | WEIGHT: 151 LBS | BODY MASS INDEX: 23.7 KG/M2 | HEART RATE: 89 BPM | HEIGHT: 67 IN | DIASTOLIC BLOOD PRESSURE: 71 MMHG

## 2023-07-18 DIAGNOSIS — R06.09 OTHER FORMS OF DYSPNEA: ICD-10-CM

## 2023-07-18 PROCEDURE — 99214 OFFICE O/P EST MOD 30 MIN: CPT

## 2023-07-18 PROCEDURE — 93000 ELECTROCARDIOGRAM COMPLETE: CPT

## 2023-08-03 PROBLEM — R06.09 DYSPNEA ON EXERTION: Status: ACTIVE | Noted: 2017-02-11

## 2023-08-03 NOTE — HISTORY OF PRESENT ILLNESS
[FreeTextEntry1] : Puma is here for f/u  Dealing with memory issues, agitation No chest pressure No syncope No palpitations Minor LE edema  Voices no complaints

## 2023-08-03 NOTE — CARDIOLOGY SUMMARY
[de-identified] : 7/18/23 Sinus  Rhythm  -Intraventricular conduction defect -consider left ventricular hypertrophy.   -Nonspecific ST depression   +   Nonspecific T-abnormality  -Nondiagnostic.   ABNORMAL

## 2023-09-05 ENCOUNTER — RX RENEWAL (OUTPATIENT)
Age: 83
End: 2023-09-05

## 2023-11-21 ENCOUNTER — RX RENEWAL (OUTPATIENT)
Age: 83
End: 2023-11-21

## 2023-11-22 RX ORDER — AMLODIPINE BESYLATE 5 MG/1
5 TABLET ORAL
Qty: 90 | Refills: 0 | Status: ACTIVE | COMMUNITY
Start: 2021-09-16 | End: 1900-01-01

## 2024-01-25 ENCOUNTER — APPOINTMENT (OUTPATIENT)
Dept: CARDIOLOGY | Facility: CLINIC | Age: 84
End: 2024-01-25
Payer: MEDICARE

## 2024-01-25 ENCOUNTER — NON-APPOINTMENT (OUTPATIENT)
Age: 84
End: 2024-01-25

## 2024-01-25 VITALS
HEART RATE: 93 BPM | TEMPERATURE: 97.6 F | OXYGEN SATURATION: 96 % | DIASTOLIC BLOOD PRESSURE: 74 MMHG | SYSTOLIC BLOOD PRESSURE: 113 MMHG

## 2024-01-25 DIAGNOSIS — E78.00 PURE HYPERCHOLESTEROLEMIA, UNSPECIFIED: ICD-10-CM

## 2024-01-25 DIAGNOSIS — Z95.3 PRESENCE OF XENOGENIC HEART VALVE: ICD-10-CM

## 2024-01-25 DIAGNOSIS — I25.10 ATHEROSCLEROTIC HEART DISEASE OF NATIVE CORONARY ARTERY W/OUT ANGINA PECTORIS: ICD-10-CM

## 2024-01-25 PROCEDURE — 99214 OFFICE O/P EST MOD 30 MIN: CPT

## 2024-01-25 PROCEDURE — 93000 ELECTROCARDIOGRAM COMPLETE: CPT

## 2024-01-28 PROBLEM — Z95.3 S/P TAVR (TRANSCATHETER AORTIC VALVE REPLACEMENT), BIOPROSTHETIC: Status: ACTIVE | Noted: 2019-11-25

## 2024-01-28 PROBLEM — E78.00 HIGH CHOLESTEROL: Status: ACTIVE | Noted: 2017-02-10

## 2024-01-28 PROBLEM — I25.10 ARTERIOSCLEROTIC CORONARY ARTERY DISEASE: Status: ACTIVE | Noted: 2019-11-18

## 2024-01-28 NOTE — CARDIOLOGY SUMMARY
[de-identified] : 1/25/24 Sinus Rhythm  -Intraventricular conduction defect -consider left ventricular hypertrophy. -Nonspecific ST depression  +  Nonspecific T-abnormality -Nondiagnostic.  ABNORMAL

## 2024-01-28 NOTE — REVIEW OF SYSTEMS
[Confusion] : confusion was observed [Memory Lapses Or Loss] : memory lapses or loss [Negative] : Heme/Lymph [FreeTextEntry2] : limited due to his underlying confusion

## 2024-01-28 NOTE — HISTORY OF PRESENT ILLNESS
[FreeTextEntry1] : Puma is here for f/u  Dealing with memory issues, agitation - getting worse than previously No chest pressure No syncope No palpitations Minor LE edema

## 2024-01-28 NOTE — REASON FOR VISIT
[Structural Heart and Valve Disease] : structural heart and valve disease [Hyperlipidemia] : hyperlipidemia [Coronary Artery Disease] : coronary artery disease [Hypertension] : hypertension

## 2024-03-18 RX ORDER — CLOPIDOGREL BISULFATE 75 MG/1
75 TABLET, FILM COATED ORAL DAILY
Qty: 90 | Refills: 2 | Status: ACTIVE | COMMUNITY
Start: 1900-01-01 | End: 1900-01-01

## 2024-03-28 ENCOUNTER — INPATIENT (INPATIENT)
Facility: HOSPITAL | Age: 84
LOS: 12 days | Discharge: HOME | DRG: 871 | End: 2024-04-10
Attending: INTERNAL MEDICINE | Admitting: FAMILY MEDICINE
Payer: MEDICARE

## 2024-03-28 VITALS
HEIGHT: 68 IN | OXYGEN SATURATION: 94 % | SYSTOLIC BLOOD PRESSURE: 129 MMHG | HEART RATE: 96 BPM | WEIGHT: 184.97 LBS | DIASTOLIC BLOOD PRESSURE: 73 MMHG | TEMPERATURE: 98 F | RESPIRATION RATE: 19 BRPM

## 2024-03-28 DIAGNOSIS — Z87.09 PERSONAL HISTORY OF OTHER DISEASES OF THE RESPIRATORY SYSTEM: ICD-10-CM

## 2024-03-28 DIAGNOSIS — Z90.89 ACQUIRED ABSENCE OF OTHER ORGANS: Chronic | ICD-10-CM

## 2024-03-28 DIAGNOSIS — J10.1 INFLUENZA DUE TO OTHER IDENTIFIED INFLUENZA VIRUS WITH OTHER RESPIRATORY MANIFESTATIONS: ICD-10-CM

## 2024-03-28 DIAGNOSIS — I25.10 ATHEROSCLEROTIC HEART DISEASE OF NATIVE CORONARY ARTERY WITHOUT ANGINA PECTORIS: ICD-10-CM

## 2024-03-28 DIAGNOSIS — J11.1 INFLUENZA DUE TO UNIDENTIFIED INFLUENZA VIRUS WITH OTHER RESPIRATORY MANIFESTATIONS: ICD-10-CM

## 2024-03-28 DIAGNOSIS — I35.0 NONRHEUMATIC AORTIC (VALVE) STENOSIS: ICD-10-CM

## 2024-03-28 DIAGNOSIS — R63.0 ANOREXIA: ICD-10-CM

## 2024-03-28 DIAGNOSIS — E78.5 HYPERLIPIDEMIA, UNSPECIFIED: ICD-10-CM

## 2024-03-28 DIAGNOSIS — F03.90 UNSPECIFIED DEMENTIA, UNSPECIFIED SEVERITY, WITHOUT BEHAVIORAL DISTURBANCE, PSYCHOTIC DISTURBANCE, MOOD DISTURBANCE, AND ANXIETY: ICD-10-CM

## 2024-03-28 DIAGNOSIS — R93.89 ABNORMAL FINDINGS ON DIAGNOSTIC IMAGING OF OTHER SPECIFIED BODY STRUCTURES: ICD-10-CM

## 2024-03-28 DIAGNOSIS — A04.8 OTHER SPECIFIED BACTERIAL INTESTINAL INFECTIONS: ICD-10-CM

## 2024-03-28 DIAGNOSIS — Z98.890 OTHER SPECIFIED POSTPROCEDURAL STATES: Chronic | ICD-10-CM

## 2024-03-28 DIAGNOSIS — Z29.9 ENCOUNTER FOR PROPHYLACTIC MEASURES, UNSPECIFIED: ICD-10-CM
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
## 2024-03-28 DIAGNOSIS — N40.0 BENIGN PROSTATIC HYPERPLASIA WITHOUT LOWER URINARY TRACT SYMPTOMS: ICD-10-CM

## 2024-03-28 LAB
ALBUMIN SERPL ELPH-MCNC: 3.9 G/DL — SIGNIFICANT CHANGE UP (ref 3.3–5)
ALP SERPL-CCNC: 77 U/L — SIGNIFICANT CHANGE UP (ref 40–120)
ALT FLD-CCNC: 38 U/L — SIGNIFICANT CHANGE UP (ref 12–78)
ANION GAP SERPL CALC-SCNC: 8 MMOL/L — SIGNIFICANT CHANGE UP (ref 5–17)
APTT BLD: 29 SEC — SIGNIFICANT CHANGE UP (ref 24.5–35.6)
AST SERPL-CCNC: 51 U/L — HIGH (ref 15–37)
BASE EXCESS BLDA CALC-SCNC: -2.7 MMOL/L — LOW (ref -2–3)
BASOPHILS # BLD AUTO: 0.05 K/UL — SIGNIFICANT CHANGE UP (ref 0–0.2)
BASOPHILS NFR BLD AUTO: 0.5 % — SIGNIFICANT CHANGE UP (ref 0–2)
BILIRUB SERPL-MCNC: 0.6 MG/DL — SIGNIFICANT CHANGE UP (ref 0.2–1.2)
BLOOD GAS COMMENTS ARTERIAL: SIGNIFICANT CHANGE UP
BUN SERPL-MCNC: 19 MG/DL — SIGNIFICANT CHANGE UP (ref 7–23)
CALCIUM SERPL-MCNC: 9 MG/DL — SIGNIFICANT CHANGE UP (ref 8.5–10.1)
CHLORIDE SERPL-SCNC: 109 MMOL/L — HIGH (ref 96–108)
CK MB BLD-MCNC: 0.6 % — SIGNIFICANT CHANGE UP (ref 0–3.5)
CK MB CFR SERPL CALC: 17.4 NG/ML — HIGH (ref 0–3.6)
CK SERPL-CCNC: 3109 U/L — HIGH (ref 26–308)
CO2 SERPL-SCNC: 27 MMOL/L — SIGNIFICANT CHANGE UP (ref 22–31)
CREAT SERPL-MCNC: 0.98 MG/DL — SIGNIFICANT CHANGE UP (ref 0.5–1.3)
EGFR: 75 ML/MIN/1.73M2 — SIGNIFICANT CHANGE UP
EOSINOPHIL # BLD AUTO: 0.01 K/UL — SIGNIFICANT CHANGE UP (ref 0–0.5)
EOSINOPHIL NFR BLD AUTO: 0.1 % — SIGNIFICANT CHANGE UP (ref 0–6)
FLUAV AG NPH QL: DETECTED
FLUBV AG NPH QL: SIGNIFICANT CHANGE UP
GAS PNL BLDA: SIGNIFICANT CHANGE UP
GLUCOSE SERPL-MCNC: 116 MG/DL — HIGH (ref 70–99)
HCO3 BLDA-SCNC: 25 MMOL/L — SIGNIFICANT CHANGE UP (ref 21–28)
HCT VFR BLD CALC: 43.9 % — SIGNIFICANT CHANGE UP (ref 39–50)
HGB BLD-MCNC: 14.9 G/DL — SIGNIFICANT CHANGE UP (ref 13–17)
HOROWITZ INDEX BLDA+IHG-RTO: 100 — SIGNIFICANT CHANGE UP
IMM GRANULOCYTES NFR BLD AUTO: 0.5 % — SIGNIFICANT CHANGE UP (ref 0–0.9)
INR BLD: 1.24 RATIO — HIGH (ref 0.85–1.18)
LACTATE SERPL-SCNC: 1.4 MMOL/L — SIGNIFICANT CHANGE UP (ref 0.7–2)
LYMPHOCYTES # BLD AUTO: 0.66 K/UL — LOW (ref 1–3.3)
LYMPHOCYTES # BLD AUTO: 6.2 % — LOW (ref 13–44)
MCHC RBC-ENTMCNC: 28.7 PG — SIGNIFICANT CHANGE UP (ref 27–34)
MCHC RBC-ENTMCNC: 33.9 GM/DL — SIGNIFICANT CHANGE UP (ref 32–36)
MCV RBC AUTO: 84.4 FL — SIGNIFICANT CHANGE UP (ref 80–100)
MONOCYTES # BLD AUTO: 1.15 K/UL — HIGH (ref 0–0.9)
MONOCYTES NFR BLD AUTO: 10.9 % — SIGNIFICANT CHANGE UP (ref 2–14)
NEUTROPHILS # BLD AUTO: 8.66 K/UL — HIGH (ref 1.8–7.4)
NEUTROPHILS NFR BLD AUTO: 81.8 % — HIGH (ref 43–77)
NRBC # BLD: 0 /100 WBCS — SIGNIFICANT CHANGE UP (ref 0–0)
PCO2 BLDA: 61 MMHG — HIGH (ref 35–48)
PH BLDA: 7.22 — LOW (ref 7.35–7.45)
PLATELET # BLD AUTO: 172 K/UL — SIGNIFICANT CHANGE UP (ref 150–400)
PO2 BLDA: 67 MMHG — LOW (ref 83–108)
POTASSIUM SERPL-MCNC: 4.1 MMOL/L — SIGNIFICANT CHANGE UP (ref 3.5–5.3)
POTASSIUM SERPL-SCNC: 4.1 MMOL/L — SIGNIFICANT CHANGE UP (ref 3.5–5.3)
PROT SERPL-MCNC: 7.3 G/DL — SIGNIFICANT CHANGE UP (ref 6–8.3)
PROTHROM AB SERPL-ACNC: 14.4 SEC — HIGH (ref 9.5–13)
RBC # BLD: 5.2 M/UL — SIGNIFICANT CHANGE UP (ref 4.2–5.8)
RBC # FLD: 14.1 % — SIGNIFICANT CHANGE UP (ref 10.3–14.5)
RSV RNA NPH QL NAA+NON-PROBE: SIGNIFICANT CHANGE UP
SAO2 % BLDA: 92.3 % — LOW (ref 94–98)
SARS-COV-2 RNA SPEC QL NAA+PROBE: SIGNIFICANT CHANGE UP
SODIUM SERPL-SCNC: 144 MMOL/L — SIGNIFICANT CHANGE UP (ref 135–145)
TROPONIN I, HIGH SENSITIVITY RESULT: 194.6 NG/L — HIGH
WBC # BLD: 10.58 K/UL — HIGH (ref 3.8–10.5)
WBC # FLD AUTO: 10.58 K/UL — HIGH (ref 3.8–10.5)

## 2024-03-28 PROCEDURE — 71250 CT THORAX DX C-: CPT | Mod: 26,MC

## 2024-03-28 PROCEDURE — 72125 CT NECK SPINE W/O DYE: CPT | Mod: 26,MC

## 2024-03-28 PROCEDURE — 71045 X-RAY EXAM CHEST 1 VIEW: CPT | Mod: 26

## 2024-03-28 PROCEDURE — 99223 1ST HOSP IP/OBS HIGH 75: CPT | Mod: GC

## 2024-03-28 PROCEDURE — 93010 ELECTROCARDIOGRAM REPORT: CPT

## 2024-03-28 PROCEDURE — 99285 EMERGENCY DEPT VISIT HI MDM: CPT

## 2024-03-28 PROCEDURE — 70450 CT HEAD/BRAIN W/O DYE: CPT | Mod: 26,MC

## 2024-03-28 RX ORDER — FLUTICASONE FUROATE, UMECLIDINIUM BROMIDE AND VILANTEROL TRIFENATATE 200; 62.5; 25 UG/1; UG/1; UG/1
1 POWDER RESPIRATORY (INHALATION)
Refills: 0 | DISCHARGE

## 2024-03-28 RX ORDER — ENOXAPARIN SODIUM 100 MG/ML
40 INJECTION SUBCUTANEOUS EVERY 24 HOURS
Refills: 0 | Status: DISCONTINUED | OUTPATIENT
Start: 2024-03-28 | End: 2024-03-29

## 2024-03-28 RX ORDER — TIOTROPIUM BROMIDE 18 UG/1
2 CAPSULE ORAL; RESPIRATORY (INHALATION) DAILY
Refills: 0 | Status: DISCONTINUED | OUTPATIENT
Start: 2024-03-28 | End: 2024-04-05

## 2024-03-28 RX ORDER — MONTELUKAST 4 MG/1
10 TABLET, CHEWABLE ORAL DAILY
Refills: 0 | Status: DISCONTINUED | OUTPATIENT
Start: 2024-03-28 | End: 2024-04-10

## 2024-03-28 RX ORDER — BUDESONIDE AND FORMOTEROL FUMARATE DIHYDRATE 160; 4.5 UG/1; UG/1
2 AEROSOL RESPIRATORY (INHALATION)
Refills: 0 | Status: DISCONTINUED | OUTPATIENT
Start: 2024-03-28 | End: 2024-04-05

## 2024-03-28 RX ORDER — AZITHROMYCIN 500 MG/1
500 TABLET, FILM COATED ORAL EVERY 24 HOURS
Refills: 0 | Status: DISCONTINUED | OUTPATIENT
Start: 2024-03-29 | End: 2024-03-29

## 2024-03-28 RX ORDER — FAMOTIDINE 10 MG/ML
40 INJECTION INTRAVENOUS DAILY
Refills: 0 | Status: DISCONTINUED | OUTPATIENT
Start: 2024-03-28 | End: 2024-03-29

## 2024-03-28 RX ORDER — OLANZAPINE 15 MG/1
5 TABLET, FILM COATED ORAL ONCE
Refills: 0 | Status: COMPLETED | OUTPATIENT
Start: 2024-03-28 | End: 2024-03-28

## 2024-03-28 RX ORDER — SIMVASTATIN 20 MG/1
1 TABLET, FILM COATED ORAL
Qty: 0 | Refills: 0 | DISCHARGE

## 2024-03-28 RX ORDER — SIMVASTATIN 20 MG/1
40 TABLET, FILM COATED ORAL AT BEDTIME
Refills: 0 | Status: DISCONTINUED | OUTPATIENT
Start: 2024-03-28 | End: 2024-03-29

## 2024-03-28 RX ORDER — LANOLIN ALCOHOL/MO/W.PET/CERES
3 CREAM (GRAM) TOPICAL AT BEDTIME
Refills: 0 | Status: DISCONTINUED | OUTPATIENT
Start: 2024-03-28 | End: 2024-03-29

## 2024-03-28 RX ORDER — LANOLIN ALCOHOL/MO/W.PET/CERES
1 CREAM (GRAM) TOPICAL
Qty: 0 | Refills: 0 | DISCHARGE

## 2024-03-28 RX ORDER — SODIUM CHLORIDE 9 MG/ML
1000 INJECTION INTRAMUSCULAR; INTRAVENOUS; SUBCUTANEOUS
Refills: 0 | Status: DISCONTINUED | OUTPATIENT
Start: 2024-03-28 | End: 2024-03-28

## 2024-03-28 RX ORDER — FUROSEMIDE 40 MG
40 TABLET ORAL ONCE
Refills: 0 | Status: COMPLETED | OUTPATIENT
Start: 2024-03-28 | End: 2024-03-28

## 2024-03-28 RX ORDER — ACETAMINOPHEN 500 MG
1000 TABLET ORAL ONCE
Refills: 0 | Status: COMPLETED | OUTPATIENT
Start: 2024-03-28 | End: 2024-03-28

## 2024-03-28 RX ORDER — DONEPEZIL HYDROCHLORIDE 10 MG/1
10 TABLET, FILM COATED ORAL
Refills: 0 | Status: DISCONTINUED | OUTPATIENT
Start: 2024-03-28 | End: 2024-03-29

## 2024-03-28 RX ORDER — MEMANTINE HYDROCHLORIDE 10 MG/1
10 TABLET ORAL
Refills: 0 | Status: DISCONTINUED | OUTPATIENT
Start: 2024-03-28 | End: 2024-03-29

## 2024-03-28 RX ORDER — ONDANSETRON 8 MG/1
4 TABLET, FILM COATED ORAL EVERY 8 HOURS
Refills: 0 | Status: DISCONTINUED | OUTPATIENT
Start: 2024-03-28 | End: 2024-04-10

## 2024-03-28 RX ORDER — FINASTERIDE 5 MG/1
5 TABLET, FILM COATED ORAL DAILY
Refills: 0 | Status: DISCONTINUED | OUTPATIENT
Start: 2024-03-28 | End: 2024-04-10

## 2024-03-28 RX ORDER — CLOPIDOGREL BISULFATE 75 MG/1
75 TABLET, FILM COATED ORAL DAILY
Refills: 0 | Status: DISCONTINUED | OUTPATIENT
Start: 2024-03-28 | End: 2024-04-10

## 2024-03-28 RX ORDER — QUETIAPINE FUMARATE 200 MG/1
25 TABLET, FILM COATED ORAL AT BEDTIME
Refills: 0 | Status: DISCONTINUED | OUTPATIENT
Start: 2024-03-28 | End: 2024-03-29

## 2024-03-28 RX ORDER — ALBUTEROL 90 UG/1
2 AEROSOL, METERED ORAL
Qty: 0 | Refills: 0 | DISCHARGE

## 2024-03-28 RX ORDER — SODIUM CHLORIDE 9 MG/ML
1000 INJECTION INTRAMUSCULAR; INTRAVENOUS; SUBCUTANEOUS
Refills: 0 | Status: COMPLETED | OUTPATIENT
Start: 2024-03-28 | End: 2024-03-29

## 2024-03-28 RX ORDER — CITALOPRAM 10 MG/1
1 TABLET, FILM COATED ORAL
Qty: 0 | Refills: 0 | DISCHARGE

## 2024-03-28 RX ORDER — DONEPEZIL HYDROCHLORIDE 10 MG/1
1 TABLET, FILM COATED ORAL
Refills: 0 | DISCHARGE

## 2024-03-28 RX ORDER — DONEPEZIL HYDROCHLORIDE 10 MG/1
1 TABLET, FILM COATED ORAL
Qty: 0 | Refills: 0 | DISCHARGE

## 2024-03-28 RX ORDER — LATANOPROST 0.05 MG/ML
1 SOLUTION/ DROPS OPHTHALMIC; TOPICAL AT BEDTIME
Refills: 0 | Status: DISCONTINUED | OUTPATIENT
Start: 2024-03-28 | End: 2024-04-10

## 2024-03-28 RX ORDER — CETIRIZINE HYDROCHLORIDE 10 MG/1
1 TABLET ORAL
Qty: 0 | Refills: 0 | DISCHARGE

## 2024-03-28 RX ORDER — CEFEPIME 1 G/1
2000 INJECTION, POWDER, FOR SOLUTION INTRAMUSCULAR; INTRAVENOUS ONCE
Refills: 0 | Status: COMPLETED | OUTPATIENT
Start: 2024-03-28 | End: 2024-03-28

## 2024-03-28 RX ORDER — ALBUTEROL 90 UG/1
2 AEROSOL, METERED ORAL EVERY 6 HOURS
Refills: 0 | Status: DISCONTINUED | OUTPATIENT
Start: 2024-03-28 | End: 2024-04-10

## 2024-03-28 RX ORDER — AZITHROMYCIN 500 MG/1
TABLET, FILM COATED ORAL
Refills: 0 | Status: DISCONTINUED | OUTPATIENT
Start: 2024-03-28 | End: 2024-03-29

## 2024-03-28 RX ORDER — TAMSULOSIN HYDROCHLORIDE 0.4 MG/1
1 CAPSULE ORAL
Qty: 0 | Refills: 0 | DISCHARGE

## 2024-03-28 RX ORDER — QUETIAPINE FUMARATE 200 MG/1
100 TABLET, FILM COATED ORAL AT BEDTIME
Refills: 0 | Status: DISCONTINUED | OUTPATIENT
Start: 2024-03-28 | End: 2024-03-29

## 2024-03-28 RX ORDER — FLUTICASONE PROPIONATE AND SALMETEROL 50; 250 UG/1; UG/1
2 POWDER ORAL; RESPIRATORY (INHALATION)
Qty: 0 | Refills: 0 | DISCHARGE

## 2024-03-28 RX ORDER — IPRATROPIUM/ALBUTEROL SULFATE 18-103MCG
3 AEROSOL WITH ADAPTER (GRAM) INHALATION EVERY 6 HOURS
Refills: 0 | Status: DISCONTINUED | OUTPATIENT
Start: 2024-03-28 | End: 2024-04-10

## 2024-03-28 RX ORDER — AZITHROMYCIN 500 MG/1
500 TABLET, FILM COATED ORAL ONCE
Refills: 0 | Status: COMPLETED | OUTPATIENT
Start: 2024-03-28 | End: 2024-03-28

## 2024-03-28 RX ORDER — AMLODIPINE BESYLATE 2.5 MG/1
5 TABLET ORAL DAILY
Refills: 0 | Status: DISCONTINUED | OUTPATIENT
Start: 2024-03-28 | End: 2024-03-29

## 2024-03-28 RX ORDER — ASPIRIN/CALCIUM CARB/MAGNESIUM 324 MG
1 TABLET ORAL
Qty: 0 | Refills: 0 | DISCHARGE

## 2024-03-28 RX ORDER — ACETAMINOPHEN 500 MG
650 TABLET ORAL EVERY 6 HOURS
Refills: 0 | Status: DISCONTINUED | OUTPATIENT
Start: 2024-03-28 | End: 2024-04-10

## 2024-03-28 RX ORDER — SODIUM CHLORIDE 9 MG/ML
2100 INJECTION, SOLUTION INTRAVENOUS ONCE
Refills: 0 | Status: COMPLETED | OUTPATIENT
Start: 2024-03-28 | End: 2024-03-28

## 2024-03-28 RX ORDER — DONEPEZIL HYDROCHLORIDE 10 MG/1
5 TABLET, FILM COATED ORAL AT BEDTIME
Refills: 0 | Status: DISCONTINUED | OUTPATIENT
Start: 2024-03-28 | End: 2024-03-29

## 2024-03-28 RX ORDER — UMECLIDINIUM 62.5 UG/1
0 AEROSOL, POWDER ORAL
Qty: 0 | Refills: 0 | DISCHARGE

## 2024-03-28 RX ADMIN — CEFEPIME 100 MILLIGRAM(S): 1 INJECTION, POWDER, FOR SOLUTION INTRAMUSCULAR; INTRAVENOUS at 16:17

## 2024-03-28 RX ADMIN — Medication 400 MILLIGRAM(S): at 16:17

## 2024-03-28 RX ADMIN — Medication 40 MILLIGRAM(S): at 21:28

## 2024-03-28 RX ADMIN — SODIUM CHLORIDE 2100 MILLILITER(S): 9 INJECTION, SOLUTION INTRAVENOUS at 16:17

## 2024-03-28 RX ADMIN — AZITHROMYCIN 255 MILLIGRAM(S): 500 TABLET, FILM COATED ORAL at 22:57

## 2024-03-28 RX ADMIN — Medication 400 MILLIGRAM(S): at 22:34

## 2024-03-28 RX ADMIN — OLANZAPINE 5 MILLIGRAM(S): 15 TABLET, FILM COATED ORAL at 16:18

## 2024-03-28 NOTE — PATIENT PROFILE ADULT - PATIENT/CAREGIVER OFFERED  INTERPRETER SERVICES
Wife- Manoj- at the bedside- she is also a patient in the same room at this time.    Patient is not alert enough to answer questions at this time anyways. Patient is sleeping./yes

## 2024-03-28 NOTE — H&P ADULT - TIME BILLING
care coordination, plan of care discussed with patient's wife at bedside and daughter/hcp, Dr Hadley mooney

## 2024-03-28 NOTE — H&P ADULT - PROBLEM SELECTOR PLAN 3
- CT spine showing:   Slightly mottled appearance to the vertebral bodies of the cervical   and upper thoracic spine. This could be osteopenic in nature. A   underlying myeloma/lymphoma or other metastatic process is not entirely   excluded. Recommend clinical and laboratory correlation.  - consider hem/onc consult  - Instruct family that pt needs to f/u imaging with outpatient provider - CT spine showing:   Slightly mottled appearance to the vertebral bodies of the cervical   and upper thoracic spine. This could be osteopenic in nature. A   underlying myeloma/lymphoma or other metastatic process is not entirely   excluded. Recommend clinical and laboratory correlation.  - Day team: consider hem/onc consult  - Instruct family that pt needs to f/u imaging with outpatient provider

## 2024-03-28 NOTE — H&P ADULT - PROBLEM SELECTOR PLAN 7
- chronic, s/p TAVR in 2019  - c/w plavix - s/p PCI to LAD in 2019  - f/u trops, EKG  - low suspicion for ACS  - c/w Plavix  - remote tele  - follows with Dr. Coreas

## 2024-03-28 NOTE — ED ADULT NURSE NOTE - OBJECTIVE STATEMENT
Pt received in bed alert and confuse as per EMS. Pt FLU A+ and had a fever and fell today. As per MD's orders IV christina placed blood specimen obtained and sent to the lab nursing care ongoing and safety maintained.

## 2024-03-28 NOTE — H&P ADULT - PROBLEM SELECTOR PLAN 10
- 2/2 progressive dementia  - IVF  - nutrition consult - pt has been complaining of abdominal pain recently per daughter  - daughter states tht patient had endoscopy in January and found to be positive for H. Pylori   - has medication regiment at home, has not started management yet

## 2024-03-28 NOTE — H&P ADULT - PROBLEM SELECTOR PLAN 2
- unclear h/o multiple falls  - today patient with unwitnessed fall on face?  - CT HEAD:  1.  No evidence of acute intracranial hemorrhage or midline shift.  2.  Chronic small vessel disease.  - CT CERVICAL SPINE:  1.  No evidence of acute osseous fracture or rosario dislocation.  2.  Multilevel degenerative change of the cervical spine.  3.  Slightly mottled appearance to the vertebral bodies of the cervical   and upper thoracic spine. This could be osteopenic in nature. A   underlying myeloma/lymphoma or other metastatic process is not entirely   excluded. Recommend clinical and laboratory correlation.  - fall risk  - PT consult  - SW/Case management   - Nutrition consult

## 2024-03-28 NOTE — ED PROVIDER NOTE - CARE PLAN
Principal Discharge DX:	Influenza A  Secondary Diagnosis:	Acute encephalopathy  Secondary Diagnosis:	Pericardial effusion   1

## 2024-03-28 NOTE — H&P ADULT - NSHPPHYSICALEXAM_GEN_ALL_CORE
T(C): 38.4 (03-28-24 @ 14:32), Max: 38.4 (03-28-24 @ 14:32)  HR: 96 (03-28-24 @ 14:02) (96 - 96)  BP: 129/73 (03-28-24 @ 14:02) (129/73 - 129/73)  RR: 19 (03-28-24 @ 14:02) (19 - 19)  SpO2: 94% (03-28-24 @ 14:02) (94% - 94%)    Patient not cooperative throughout exam, unable to complete full exam  GENERAL: pt confused, feels warm, agitated and moving around in bed   HEENT: erythematous holly on patients forehead and nose, no laceration or bleeding noted   EYES: anicteric sclerae, no exudates  LUNGS: unable to assess, patient not taking deep breaths, no gross wheezing heard   HEART: S1/S2, regular rate and rhythm,, no lower extremity edema  GASTROINTESTINAL: abdomen is soft, nontender, nondistended   INTEGUMENT: good skin turgor, warm skin, appears well perfused  MUSCULOSKELETAL: no clubbing or cyanosis, no obvious deformity  NEUROLOGIC: A&O x 0, unable to cooperate in exam T(C): 38.4 (03-28-24 @ 14:32), Max: 38.4 (03-28-24 @ 14:32)  HR: 96 (03-28-24 @ 14:02) (96 - 96)  BP: 129/73 (03-28-24 @ 14:02) (129/73 - 129/73)  RR: 19 (03-28-24 @ 14:02) (19 - 19)  SpO2: 94% (03-28-24 @ 14:02) (94% - 94%)    Patient not cooperative throughout exam, unable to complete full exam  GENERAL: pt confused, feels warm, agitated and moving around in bed   HEENT: erythematous holly on patients forehead and nose, no bleeding noted   EYES: anicteric sclerae, no exudates  LUNGS: unable to assess, patient not taking deep breaths, no gross wheezing heard   HEART: S1/S2, regular rate and rhythm,, no lower extremity edema  GASTROINTESTINAL: abdomen is soft, nontender, nondistended   INTEGUMENT: good skin turgor, warm skin, appears well perfused  MUSCULOSKELETAL: no clubbing or cyanosis, no obvious deformity  NEUROLOGIC: A&O x 0, unable to cooperate in exam

## 2024-03-28 NOTE — H&P ADULT - PROBLEM SELECTOR PLAN 1
- Meets sepsis criteria, HR: 96, Temp: 98-->101.1, WC elevated, lactate WNL , source influneza A  - s/p Ofirmev x 1, Cefepime x 1, Zyprexa x1, LR bolus x1  - c/w Tamiflu 75mg BID x 5 days   - f/u ucx, blood cx x 2, UA  - IVF  - regular diet pending dysphagia screen  - ID Dr. Desai consulted, f/u reccs - Meets sepsis criteria, HR: 96, Temp: 98-->101.1, WC elevated, lactate WNL , source influneza A  - s/p Ofirmev x 1, Cefepime x 1, Zyprexa x1, LR bolus x1  - c/w Tamiflu 75mg BID x 5 days   - f/u ucx, blood cx x 2, UA  - IVF- per pts daughter, pt has poor appetite and does not drink many fluids   - regular diet pending dysphagia screen  - ID Dr. Desai consulted, f/u reccs - Meets sepsis criteria, HR: 96, Temp: 98-->101.1, WC elevated, lactate WNL , source influneza A  - CT chest: Linear atelectasis at the right lung base.  - s/p Ofirmev x 1, Cefepime x 1, Zyprexa x1, LR bolus x1  - c/w Tamiflu 75mg BID x 5 days   - f/u ucx, blood cx x 2, UA  - IVF- per pts daughter, pt has poor appetite and does not drink many fluids   - soft and bite size diet pending dysphagia screen   - ID Dr. Desai consulted, f/u reccs  - Pulm Dr. Marques consulted, f/u reccs - Meets sepsis criteria, HR: 96, Temp: 98-->101.1, WC elevated, lactate WNL , source influneza A  - CT chest: Linear atelectasis at the right lung base.  - s/p Ofirmev x 1, Cefepime x 1, Zyprexa x1, LR bolus x1  - c/w Tamiflu 75mg BID x 5 days   - f/u ucx, blood cx x 2, UA  - IVF- per pts daughter, pt has poor appetite and does not drink many fluids   - soft and bite size diet pending dysphagia screen   - Tylenol for fever, follow fever curve and daily cbc  - ID Dr. Desai consulted, f/u reccs  - Pulm Dr. Marques consulted, f/u reccs - Meets sepsis criteria, HR: 96, Temp: 98-->101.1, WC elevated, lactate WNL , source influneza A  - CT chest: Linear atelectasis at the right lung base.  - s/p Ofirmev x 1, Cefepime x 1, Zyprexa x1, LR bolus x1  - c/w Tamiflu 75mg BID x 5 days   - f/u ucx, blood cx x 2, UA  - IVF- per pts daughter, pt has poor appetite and does not drink many fluids   - soft and bite size diet pending dysphagia screen   - Tylenol for fever, follow fever curve and daily cbc  - ID Dr. Desai consulted, f/u reccs  - Pulm Dr. Butcher aware and to see patient during inpatient stay

## 2024-03-28 NOTE — H&P ADULT - HISTORY OF PRESENT ILLNESS
85 yo M with PMH aortic stenosis s/p TAVR 2011, dementia, HLD, BPH presents to the ED with XX     Of note, per Radha FERGUSON, patient was seen by pulmonologist Dr. Zuñiga in June 2023 for asthma exacerbation tx with Trelegy, ventolin inhaler- no steroids given.   Denies fever, chills, chest pain, palpitations, SOB, cough, abdominal pain, nausea, vomiting, diarrhea, constipation, urinary frequency, urgency, or dysuria, headaches, changes in vision, dizziness, numbness, tingling.  Denies recent travel, recent antibiotic use, or sick contacts.    ED Course:   Vitals: BP: , HR: , Temp: , RR: , SpO2: % on   Labs:    ABG: pH: , PO2: , PCO2: , HCO3: , SpO2: %  UA:   CXR: as per personal read, official read pending   CT:  EKG:   Received in the ED    83 yo M with PMH aortic stenosis s/p TAVR 2011, dementia, HLD, BPH presents to the ED with XX     Of note, per Radha FERGUSON, patient was seen by pulmonologist Dr. Zuñiga in June 2023 for asthma exacerbation tx with Trelegy, ventolin inhaler- no steroids given.   Denies fever, chills, chest pain, palpitations, SOB, cough, abdominal pain, nausea, vomiting, diarrhea, constipation, urinary frequency, urgency, or dysuria, headaches, changes in vision, dizziness, numbness, tingling.  Denies recent travel, recent antibiotic use, or sick contacts.    ED Course:   Vitals: BP: 129/73 , HR: 96, Temp: 98-->101.1 , RR: , SpO2: 96% on RA   Labs:  WBC: 10.58, Lactate WNL, Influenza A: Detected  UA Pending  CXR:   1. Mildly elevated right hemidiaphragm with subsegmental atelectasis near   the cardiophrenic angle on the right.  2. Status post TAVR. No CHF.  3. No evidence of infiltrate, pleural effusion or pulmonary edema.  CT Head:  CT HEAD:  1.  No evidence of acute intracranial hemorrhage or midline shift.  2.  Chronic small vessel disease.    CT CERVICAL SPINE:  1.  No evidence of acute osseous fracture or rosario dislocation.  2.  Multilevel degenerative change of the cervical spine.  3.  Slightly mottled appearance to the vertebral bodies of the cervical   and upper thoracic spine. This could be osteopenic in nature. A   underlying myeloma/lymphoma or other metastatic process is not entirely   excluded. Recommend clinical and laboratory correlation.    CT Chest:  Linear atelectasis at the right lung base. Minimal pericardial effusion No evidence of acute traumatic injury. Status post TAVR  EKG: Poor quality, will repeat   Received in the ED: Ofirmev x 1, Cefepime x 1, Zyprexa x1, LR bolus x1.    85 yo M with PMH aortic stenosis s/p TAVR 2011, dementia, HLD, BPH presents to the ED with XX . Found to be Influenza A positive. Patient also here s/p fall. Has had multiple falls recently. progressively getting worse. Patient is A&O x 0  and unable to answer questions about history.     atient with a past medical history of dementia, asthma, BPH, hyperlipidemia, aortic stenosis is presenting with concern of fall as well as flulike symptoms.  Patient lives with family member who has similar symptoms and complaints.  However he also had a witnessed fall today where he hit the front of his head.  There is no reported loss of consciousness.  Patient has been more confused compared to baseline.  History was obtained from patient's son as patient is unable to provide any additional information at this time.    Of note, per Radha FERGUSON, patient was seen by pulmonologist Dr. Zuñiga in June 2023 for asthma exacerbation tx with Trelegy, ventolin inhaler- no steroids given.   Denies fever, chills, chest pain, palpitations, SOB, cough, abdominal pain, nausea, vomiting, diarrhea, constipation, urinary frequency, urgency, or dysuria, headaches, changes in vision, dizziness, numbness, tingling.  Denies recent travel, recent antibiotic use, or sick contacts.    ED Course:   Vitals: BP: 129/73 , HR: 96, Temp: 98-->101.1 , RR: , SpO2: 96% on RA   Labs:  WBC: 10.58, Lactate WNL, Influenza A: Detected  UA Pending  CXR:   1. Mildly elevated right hemidiaphragm with subsegmental atelectasis near   the cardiophrenic angle on the right.  2. Status post TAVR. No CHF.  3. No evidence of infiltrate, pleural effusion or pulmonary edema.  CT Head:  CT HEAD:  1.  No evidence of acute intracranial hemorrhage or midline shift.  2.  Chronic small vessel disease.    CT CERVICAL SPINE:  1.  No evidence of acute osseous fracture or rosario dislocation.  2.  Multilevel degenerative change of the cervical spine.  3.  Slightly mottled appearance to the vertebral bodies of the cervical   and upper thoracic spine. This could be osteopenic in nature. A   underlying myeloma/lymphoma or other metastatic process is not entirely   excluded. Recommend clinical and laboratory correlation.    CT Chest:  Linear atelectasis at the right lung base. Minimal pericardial effusion No evidence of acute traumatic injury. Status post TAVR  EKG: Poor quality, will repeat   Received in the ED: Ofirmev x 1, Cefepime x 1, Zyprexa x1, LR bolus x1.    85 yo M with PMH aortic stenosis s/p TAVR 2011, dementia, HLD, BPH presents to the ED with lethargy/weakness. Interview was conducted on phone with patients daughter Kahtarina as pt has severe dementia and cannot answer questions, tried to communicate with Citizen of [place]  but patient could not participate. Per daughter, pt had a fall yesterday when trying to get out of bed in the morning (lives at home with daughter, son in law and wife- does not use walking device- per daughter "wouldn't know  how").  Last night, patient was found to be much weaker and more lethargic than usual and sustained another fall this AM. This fall was unwitnessed but patient was found face forward on the floor on the carpet. Of note, daughter was just diagnosed with flu at urgent care. Daughter denied pt had any new sx of cp,palpitations, sob, cough, n/v/d/constipation/urinary changes however daughter acknowledges that difficult to know for sure 2/2 mental status.     Of note, per Radha FERGUSON, patient was seen by pulmonologist Dr. Zuñiga in June 2023 for asthma exacerbation tx with Trelegy, ventolin inhaler- no steroids given.      ED Course:   Vitals: BP: 129/73 , HR: 96, Temp: 98-->101.1 , RR: , SpO2: 96% on RA   Labs:  WBC: 10.58, Lactate WNL, Influenza A: Detected  UA Pending  CXR:   1. Mildly elevated right hemidiaphragm with subsegmental atelectasis near   the cardiophrenic angle on the right.  2. Status post TAVR. No CHF.  3. No evidence of infiltrate, pleural effusion or pulmonary edema.  CT Head:  CT HEAD:  1.  No evidence of acute intracranial hemorrhage or midline shift.  2.  Chronic small vessel disease.    CT CERVICAL SPINE:  1.  No evidence of acute osseous fracture or rosario dislocation.  2.  Multilevel degenerative change of the cervical spine.  3.  Slightly mottled appearance to the vertebral bodies of the cervical   and upper thoracic spine. This could be osteopenic in nature. A   underlying myeloma/lymphoma or other metastatic process is not entirely   excluded. Recommend clinical and laboratory correlation.    CT Chest:  Linear atelectasis at the right lung base. Minimal pericardial effusion No evidence of acute traumatic injury. Status post TAVR  EKG: Poor quality, will repeat   [facility], Cefepime x 1, Zyprexa x1, LR bolus x1.    83 yo M with PMH aortic stenosis s/p TAVR 2019, dementia, HLD, asthm, COPD, overactive bladder ,H pylori, BPH presents to the ED with lethargy/weakness. Interview was conducted on phone with patients daughter Katharina as pt has severe dementia and cannot answer questions, tried to communicate with [place]  but patient could not participate. Per daughter, pt had a fall yesterday when trying to get out of bed in the morning (lives at home with daughter, son in law and wife- does not use walking device- per daughter "wouldn't know  how").  Last night, patient was found to be much weaker and more lethargic than usual and sustained another fall this AM. This fall was unwitnessed but patient was found face forward on the floor on the carpet. Of note, daughter was just diagnosed with flu at urgent care. Daughter denied pt had any new sx of cp,palpitations, sob, cough, n/v/d/constipation/urinary changes however daughter acknowledges that difficult to know for sure 2/2 mental status.     Of note, per Mt FERGUSON, patient was seen by pulmonologist Dr. Zuñiga in June 2023 for asthma exacerbation tx with Trelegy, ventolin inhaler- no steroids given.      ED Course:   Vitals: BP: 129/73 , HR: 96, Temp: 98-->101.1 , RR: , SpO2: 96% on RA   Labs:  WBC: 10.58, Lactate WNL, Influenza A: Detected  UA Pending  CXR:   1. Mildly elevated right hemidiaphragm with subsegmental atelectasis near   the cardiophrenic angle on the right.  2. Status post TAVR. No CHF.  3. No evidence of infiltrate, pleural effusion or pulmonary edema.  CT Head:  CT HEAD:  1.  No evidence of acute intracranial hemorrhage or midline shift.  2.  Chronic small vessel disease.    CT CERVICAL SPINE:  1.  No evidence of acute osseous fracture or rosario dislocation.  2.  Multilevel degenerative change of the cervical spine.  3.  Slightly mottled appearance to the vertebral bodies of the cervical   and upper thoracic spine. This could be osteopenic in nature. A   underlying myeloma/lymphoma or other metastatic process is not entirely   excluded. Recommend clinical and laboratory correlation.    CT Chest:  Linear atelectasis at the right lung base. Minimal pericardial effusion No evidence of acute traumatic injury. Status post TAVR  EKG: Poor quality, will repeat   Received in the ED: Ofirmev x 1, Cefepime x 1, Zyprexa x1, LR bolus x1.

## 2024-03-28 NOTE — ED PROVIDER NOTE - WR ORDER DATE AND TIME 1
T(C): --  HR: 82 (01-28-22 @ 15:19) (82 - 101)  BP: 131/67 (01-28-22 @ 15:19) (51/36 - 144/81)  RR: 20 (01-28-22 @ 15:19) (20 - 20)  SpO2: 99% (01-28-22 @ 15:19) (99% - 99%)    PHYSICAL EXAM:  GENERAL: NAD, AOx1  HEAD:  Atraumatic, Normocephalic  EYES: EOMI, PERRLA  NECK: Supple  CHEST/LUNG: Clear to auscultation bilaterally; No rales, rhonchi or wheezing  HEART: S1,S2 Regular rate and rhythm; No murmurs, rubs, or gallops  ABDOMEN: Soft, nontender, +distention, no rebound tenderness; + right inguinal hernia, +BS  EXTREMITIES:  2+ peripheral pulses bilaterally and symmetrically, no clubbing, cyanosis, or edema  NEUROLOGY: Muscle strength LUE/LLE 0/5 (residual from previous stroke) RUE 5/5, RLE 3/5, +sensation throughout all extremities  SKIN: No rashes or lesions 28-Mar-2024 14:37

## 2024-03-28 NOTE — H&P ADULT - PROBLEM SELECTOR PLAN 8
- Lovenox 40mg qD - per daughter, pt has h/o COPD  - never been hospitalized for it, has been well managed with home meds - chronic, s/p TAVR in 2019  - c/w plavix

## 2024-03-28 NOTE — ED ADULT NURSE REASSESSMENT NOTE - NS ED NURSE REASSESS COMMENT FT1
Patient is on respiratory distress. Patient placed on non rebreather mask @ 15LPM O2. Rapid response called.

## 2024-03-28 NOTE — ED ADULT NURSE NOTE - NSFALLHARMRISKINTERV_ED_ALL_ED
Assistance OOB with selected safe patient handling equipment if applicable/Assistance with ambulation/Communicate risk of Fall with Harm to all staff, patient, and family/Monitor gait and stability/Monitor for mental status changes and reorient to person, place, and time, as needed/Move patient closer to nursing station/within visual sight of ED staff/Provide visual cue: red socks, yellow wristband, yellow gown, etc/Reinforce activity limits and safety measures with patient and family/Toileting schedule using arm’s reach rule for commode and bathroom/Use of alarms - bed, stretcher, chair and/or video monitoring/Bed in lowest position, wheels locked, appropriate side rails in place/Call bell, personal items and telephone in reach/Instruct patient to call for assistance before getting out of bed/chair/stretcher/Non-slip footwear applied when patient is off stretcher/[place] to call system/Physically safe environment - no spills, clutter or unnecessary equipment/Purposeful Proactive Rounding/Room/bathroom lighting operational, light cord in reach

## 2024-03-28 NOTE — CHART NOTE - NSCHARTNOTEFT_GEN_A_CORE
Resident Rapid Response Note    Rapid response was called at 9:11pm for desaturation to 60s.    Events leading up to Rapid Response:    Patient was seen and examined at the bedside by the rapid response team. ICU PA at bedside. Patient desaturated to 60s while on RA.     Rapid Response Vital Signs:  BP: 171/104  HR: 115  RR: 32  SpO2: 63% on RA --> 88-89 on NRB 15L  Temp: 98F axillary  FS: 180          Physical Exam:  Gen: ill appearing, accessory respiratory muscle use  HEENT: NCAT, PEERLA b/l, EOMI b/l  Cardio: regular rate and rhythm, +s1s2, no murmurs, rubs, or gallops  Pulm: decreased inspiratory effort, scattered wheezes and crackles  Abdomen: soft, nontender, nondistended, +BS x4 quadrants, no guarding  Extremities: no cyanosis or edema, +2 pedal pulses  Neuro: AAOx0, arousable to voice  Skin: warm and dry        Assessment/Plan:  85 yo M with PMH aortic stenosis s/p TAVR 2011, dementia, HLD, BPH presents to the ED with fall and flu like symptoms admitted for supportive care. Rapid response called for desaturation to 60s    - Acute hypoxic and hypercapnic respiratory failure likely 2/2 COPD exacerbation with influenza  - Lasix 20mg IV x1  - Solumedrol 125mg x1  - ABG 7.22, CO2 61, O2 67  - Due to excess secretion and risk for aspiration, BIPAP not administered at this time  - High Flow O2 started 100% 40L  - RN to call if any changes.  - Discussed with Dr. Hawkins, agrees with above plan  - Family updated by Dr. Willett Resident Rapid Response Note    Rapid response was called at 9:11pm for desaturation to 60s.    Events leading up to Rapid Response:    Patient was seen and examined at the bedside by the rapid response team. ICU PA at bedside. Patient desaturated to 60s while on RA.     Rapid Response Vital Signs:  BP: 171/104  HR: 115  RR: 32  SpO2: 63% on RA --> 88-89 on NRB 15L  Temp: 98F axillary  FS: 180          Physical Exam:  Gen: ill appearing, accessory respiratory muscle use  HEENT: NCAT, PEERLA b/l, EOMI b/l  Cardio: regular rate and rhythm, +s1s2, no murmurs, rubs, or gallops  Pulm: decreased inspiratory effort, scattered wheezes and crackles  Abdomen: soft, nontender, nondistended, +BS x4 quadrants, no guarding  Extremities: no cyanosis or edema, +2 pedal pulses  Neuro: AAOx0, arousable to voice  Skin: warm and dry        Assessment/Plan:  85 yo M with PMH aortic stenosis s/p TAVR 2011, dementia, HLD, BPH presents to the ED with fall and flu like symptoms admitted for supportive care. Rapid response called for desaturation to 60s    - Acute hypoxic and hypercapnic respiratory failure likely 2/2 COPD exacerbation with influenza  - Lasix 20mg IV x1  - Solumedrol 125mg x1  - ABG 7.22, CO2 61, O2 67  - Due to excess secretion and risk for aspiration, BIPAP not administered at this time  - High Flow O2 started 100% 40L  - RN to call if any changes.  - Discussed with Dr. Hawkins, agrees with above plan  - Family updated by Dr. Willett    Follow up  - pt had removed high and desatted to 70%. once placed back in nose increased back to 89-90%  - cooling blanket  and ofirmev 1g ordered due to fever 103  - f/u repeat ABG Resident Rapid Response Note    Rapid response was called at 9:11pm for desaturation to 60s.    Events leading up to Rapid Response:    Patient was seen and examined at the bedside by the rapid response team. ICU PA at bedside. Patient desaturated to 60s while on RA.     Rapid Response Vital Signs:  BP: 171/104  HR: 115  RR: 32  SpO2: 63% on RA --> 88-89 on NRB 15L  Temp: 98F axillary  FS: 180          Physical Exam:  Gen: ill appearing, accessory respiratory muscle use  HEENT: NCAT, PEERLA b/l, EOMI b/l  Cardio: regular rate and rhythm, +s1s2, no murmurs, rubs, or gallops  Pulm: decreased inspiratory effort, scattered wheezes and crackles  Abdomen: soft, nontender, nondistended, +BS x4 quadrants, no guarding  Extremities: no cyanosis or edema, +2 pedal pulses  Neuro: AAOx0, arousable to voice  Skin: warm and dry        Assessment/Plan:  85 yo M with PMH aortic stenosis s/p TAVR 2011, dementia, HLD, BPH presents to the ED with fall and flu like symptoms admitted for supportive care. Rapid response called for desaturation to 60s    - Acute hypoxic and hypercapnic respiratory failure likely 2/2 COPD exacerbation with influenza  - Lasix 20mg IV x1  - Solumedrol 125mg x1  - ABG 7.22, CO2 61, O2 67  - Due to excess secretion and risk for aspiration, BIPAP not administered at this time  - High Flow O2 started 100% 40L  - Added azithromycin for COPD exacerbation  - RN to call if any changes.  - Discussed with Dr. Hawkins, agrees with above plan  - Family updated by Dr. Willett    Follow up  - pt had removed high and desatted to 70%. once placed back in nose increased back to 89-90%  - cooling blanket  and ofirmev 1g ordered due to fever 103  - f/u repeat ABG Resident Rapid Response Note    Rapid response was called at 9:11pm for desaturation to 60s.    Events leading up to Rapid Response:    Patient was seen and examined at the bedside by the rapid response team. ICU PA at bedside. Patient desaturated to 60s while on RA.     Rapid Response Vital Signs:  BP: 171/104  HR: 115  RR: 32  SpO2: 63% on RA --> 88-89 on NRB 15L  Temp: 98F axillary  FS: 180          Physical Exam:  Gen: ill appearing, accessory respiratory muscle use  HEENT: NCAT, PEERLA b/l, EOMI b/l  Cardio: regular rate and rhythm, +s1s2, no murmurs, rubs, or gallops  Pulm: decreased inspiratory effort, scattered wheezes and crackles  Abdomen: soft, nontender, nondistended, +BS x4 quadrants, no guarding  Extremities: no cyanosis or edema, +2 pedal pulses  Neuro: AAOx0, arousable to voice  Skin: warm and dry        Assessment/Plan:  85 yo M with PMH aortic stenosis s/p TAVR 2011, dementia, HLD, BPH presents to the ED with fall and flu like symptoms admitted for supportive care. Rapid response called for desaturation to 60s    - Acute hypoxic and hypercapnic respiratory failure likely 2/2 COPD exacerbation with influenza  - Lasix 20mg IV x1  - Solumedrol 125mg x1  - ABG 7.22, CO2 61, O2 67  - Due to excess secretion and risk for aspiration, BIPAP not administered at this time  - High Flow O2 started 100% 40L  - Added azithromycin for COPD exacerbation  - RN to call if any changes.  - Discussed with Dr. Hawkins, agrees with above plan  - Family updated by Dr. Willett    Follow up  - pt had removed high and desatted to 70%. once placed back in nose increased back to 89-90%  - cooling blanket  and ofirmev 1g ordered due to fever 103  - f/u repeat ABG      Follow up  - repeat ABG 7.31/46/53/23 taken when O2 83% when patient pulling  - NRB placed on in addition to HFNC, PaO2 increased to 93%  - being transferred to floor

## 2024-03-28 NOTE — H&P ADULT - NSHPSOCIALHISTORY_GEN_ALL_CORE
Lives: at home with wife   ADLs: dependent   Diet:  Occupation: NA  Alcohol Use: denies   Tobacco Use: quit in 2000, 40 pack year history Lives: at home with wife   ADLs: dependent- only ambulates from room to room in house, aware of home settings but otherwise disoriented at baseline, does not use walking device at home.  Diet: soft and bite size  Occupation: NA  Alcohol Use: denies   Tobacco Use: quit in 2000, 40 pack year history

## 2024-03-28 NOTE — ED PROVIDER NOTE - OBJECTIVE STATEMENT
Patient with a past medical history of dementia, asthma, BPH, hyperlipidemia, aortic stenosis is presenting with concern of fall as well as flulike symptoms.  Patient lives with family member who has similar symptoms and complaints.  However he also had a witnessed fall today where he hit the front of his head.  There is no reported loss of consciousness.  Patient has been more confused compared to baseline.  History was obtained from patient's son as patient is unable to provide any additional information at this time.

## 2024-03-28 NOTE — ED PROVIDER NOTE - PROGRESS NOTE DETAILS
Patient found to be flu positive.  Imaging negative for acute trauma.  Despite imaging reading concern for osteopenia, patient with no reported history of malignancy to suggest multiple myeloma or lymphoma.  Patient to be admitted for further workup of the weakness, flu positive state.  Family updated.  Spoke with Dr. Hawkins for admission.  Kar Aroryo MD.

## 2024-03-28 NOTE — H&P ADULT - PROBLEM SELECTOR PLAN 6
- chronic, c/w home meds - also with overactive bladder  - c/w home meds- do not have mybetriq here- daughter informed and told to bring from home when she comes in

## 2024-03-28 NOTE — H&P ADULT - ATTENDING COMMENTS
85 yo M with PMH aortic stenosis s/p TAVR 2011, dementia, HLD, BPH presents to the ED with fall and flu like symptoms admitted for supportive care. Plan: monitor clinical course, pt desatted after admission RRT was called now improved on hi flow, apprec ICU eval and recs, guarded prognosis, apprec pulm recs, pt appears to have advancing dementia

## 2024-03-28 NOTE — H&P ADULT - NSICDXPASTMEDICALHX_GEN_ALL_CORE_FT
PAST MEDICAL HISTORY:  Anxiety     Aortic stenosis     Asthma controlled on inhalers    BPH (benign prostatic hyperplasia)     CAD (coronary artery disease) s/p stent 10/2019    Dementia mild- on Memantine    ACE (dyspnea on exertion)     HLD (hyperlipidemia)     OH (ocular hypertension) on eye drops    Overactive bladder

## 2024-03-28 NOTE — ED PROVIDER NOTE - PHYSICAL EXAMINATION
Constitutional: Awake, Alert, confused  HEAD: Normocephalic, abrasion over middle of forehead  EYES: PERRL, EOM intact, conjunctiva and sclera are clear bilaterally.  ENT: External ears normal. No rhinorrhea, no tracheal deviation   NECK: Supple, non-tender  CARDIOVASCULAR: regular rate and rhythm.  RESPIRATORY: Normal respiratory effort; breath sounds CTAB, No accessory muscle use.   ABDOMEN: Soft; non-tender, non-distended. No rebound or guarding.   MSK:  no lower extremity edema, no deformities  SKIN: Warm, dry  NEURO: A&O x0. spontaneously moving all extremities

## 2024-03-28 NOTE — PROGRESS NOTE ADULT - SUBJECTIVE AND OBJECTIVE BOX
Patient is a 84y old  Male who presents with a chief complaint of     INTERVAL HPI/OVERNIGHT EVENTS:    85 YO male former smoker with history of COPD with asthma, hyperlipidemia, coronary artery disease - S/P stent, dementia, depression, valvular heart disease, and BPH, who presented to ER with cough, shortness of breath, and sputum production. He tested positive for Influenza A and has required high flow oxygen. The patient had received the flu vaccine.  He has been followed in our office by Dr. Zuñiga and medications have included Trelegy inhaler, Ipratropium via nebulizer, Pepcid, Celexa, Plavix, Aricept, Proscar, Xalatan eye drops, Xyzal, Namenda, Singulair, Simvastatin, Flomax, and Myrbetriq. There are no allergies. Pulmonary function testing in our office has revealed moderately severe obstructive ventilatory impairment with air trapping, and no significant reversibility. Chest xray several months ago revealed hyperinflation, and no pulmonary infiltrates. Surgical history is positive for nasal septum surgery, tonsillectomy and TAVR.    MEDICATIONS  (STANDING):  albuterol/ipratropium for Nebulization 3 milliLiter(s) Nebulizer every 6 hours  amLODIPine   Tablet 5 milliGRAM(s) Oral daily  azithromycin  IVPB      azithromycin  IVPB 500 milliGRAM(s) IV Intermittent once  budesonide  80 MICROgram(s)/formoterol 4.5 MICROgram(s) Inhaler 2 Puff(s) Inhalation two times a day  clopidogrel Tablet 75 milliGRAM(s) Oral daily  donepezil 10 milliGRAM(s) Oral with breakfast  donepezil 5 milliGRAM(s) Oral at bedtime  enoxaparin Injectable 40 milliGRAM(s) SubCutaneous every 24 hours  famotidine    Tablet 40 milliGRAM(s) Oral daily  finasteride 5 milliGRAM(s) Oral daily  latanoprost 0.005% Ophthalmic Solution 1 Drop(s) Both EYES at bedtime  memantine 10 milliGRAM(s) Oral two times a day  montelukast 10 milliGRAM(s) Oral daily  oseltamivir 75 milliGRAM(s) Oral two times a day  QUEtiapine 25 milliGRAM(s) Oral at bedtime  QUEtiapine 100 milliGRAM(s) Oral at bedtime  simvastatin 40 milliGRAM(s) Oral at bedtime  sodium chloride 0.9%. 1000 milliLiter(s) (75 mL/Hr) IV Continuous <Continuous>  tiotropium 2.5 MICROgram(s) Inhaler 2 Puff(s) Inhalation daily      MEDICATIONS  (PRN):  acetaminophen     Tablet .. 650 milliGRAM(s) Oral every 6 hours PRN Temp greater or equal to 38C (100.4F), Mild Pain (1 - 3)  albuterol    90 MICROgram(s) HFA Inhaler 2 Puff(s) Inhalation every 6 hours PRN Shortness of Breath and/or Wheezing  aluminum hydroxide/magnesium hydroxide/simethicone Suspension 30 milliLiter(s) Oral every 4 hours PRN Dyspepsia  melatonin 3 milliGRAM(s) Oral at bedtime PRN Insomnia  ondansetron Injectable 4 milliGRAM(s) IV Push every 8 hours PRN Nausea and/or Vomiting      Allergies    No Known Allergies    Intolerances        PAST MEDICAL & SURGICAL HISTORY:  Aortic stenosis      BPH (benign prostatic hyperplasia)      Dementia  mild- on Memantine      ACE (dyspnea on exertion)      Asthma  controlled on inhalers      HLD (hyperlipidemia)      CAD (coronary artery disease)  s/p stent 10/2019      OH (ocular hypertension)  on eye drops      Overactive bladder      Anxiety      History of tonsillectomy  childhood      H/O coronary angiogram  10/25/19, s/p PCI to LAD          Vital Signs Last 24 Hrs  T(C): 37.2 (28 Mar 2024 21:10), Max: 38.4 (28 Mar 2024 14:32)  T(F): 98.9 (28 Mar 2024 21:10), Max: 101.1 (28 Mar 2024 14:32)  HR: 125 (28 Mar 2024 21:46) (96 - 125)  BP: 153/86 (28 Mar 2024 21:46) (129/73 - 171/104)  BP(mean): --  RR: 30 (28 Mar 2024 21:46) (19 - 30)  SpO2: 90% (28 Mar 2024 21:46) (63% - 94%)    Parameters below as of 28 Mar 2024 21:46  Patient On (Oxygen Delivery Method): nasal cannula, high flow        PHYSICAL EXAMINATION:    GENERAL: The patient is fatigued but responsive with labored respirations.     HEENT: Head is normocephalic and atraumatic.     NECK: no JVD    LUNGS: bilateral wheezes and rhonchi with prolonged expiration    HEART: Regular rate and rhythm without murmur.    ABDOMEN: Soft, nontender, and nondistended.      EXTREMITIES: Without any cyanosis, clubbing, rash, lesions or edema.    NEUROLOGIC: no focal findings    SKIN: No ulceration or induration present.      LABS:                        14.9   10.58 )-----------( 172      ( 28 Mar 2024 16:00 )             43.9     03-28    144  |  109<H>  |  19  ----------------------------<  116<H>  4.1   |  27  |  0.98    Ca    9.0      28 Mar 2024 16:00    TPro  7.3  /  Alb  3.9  /  TBili  0.6  /  DBili  x   /  AST  51<H>  /  ALT  38  /  AlkPhos  77  03-28    PT/INR - ( 28 Mar 2024 16:00 )   PT: 14.4 sec;   INR: 1.24 ratio         PTT - ( 28 Mar 2024 16:00 )  PTT:29.0 sec  Urinalysis Basic - ( 28 Mar 2024 16:00 )    Color: x / Appearance: x / SG: x / pH: x  Gluc: 116 mg/dL / Ketone: x  / Bili: x / Urobili: x   Blood: x / Protein: x / Nitrite: x   Leuk Esterase: x / RBC: x / WBC x   Sq Epi: x / Non Sq Epi: x / Bacteria: x      ABG - ( 28 Mar 2024 21:26 )  pH, Arterial: 7.22  pH, Blood: x     /  pCO2: 61    /  pO2: 67    / HCO3: 25    / Base Excess: -2.7  /  SaO2: 92.3              CARDIAC MARKERS ( 28 Mar 2024 21:21 )  x     / x     / 3109 U/L / x     / 17.4 ng/mL          Lactate, Blood: 1.4 mmol/L (03-28-24 @ 16:00)          Assessment:    Influenza A Bronchitis  Acute Hypoxic and hypercapnic respiratory failure  COPD with exacerbation  Coronary artery disease - S/P stent  S/P TAVR  Hx Dementia    Plan:    Repeat ABG on BIPAP  IV solumedrol  Duoneb QID  Symbicort + Spiriva inhalers  DVT prophylaxis

## 2024-03-28 NOTE — H&P ADULT - NSICDXPASTSURGICALHX_GEN_ALL_CORE_FT
PAST SURGICAL HISTORY:  H/O coronary angiogram 10/25/19, s/p PCI to LAD    History of tonsillectomy childhood

## 2024-03-28 NOTE — H&P ADULT - PROBLEM SELECTOR PLAN 9
- pt has been complaining of abdominal pain recently per daughter  - daughter states tht patient had endoscopy in January and found to be positive for H. Pylori   - has medication regiment at home, has not started management yet - per daughter, pt has h/o COPD  - never been hospitalized for it, has been well managed with home meds

## 2024-03-28 NOTE — PATIENT PROFILE ADULT - FALL HARM RISK - RISK INTERVENTIONS
Assistance OOB with selected safe patient handling equipment/Assistance with ambulation/Communicate Fall Risk and Risk Factors to all staff, patient, and family/Discuss with provider need for PT consult/Monitor gait and stability/Reinforce activity limits and safety measures with patient and family/Visual Cue: Yellow wristband/Bed in lowest position, wheels locked, appropriate side rails in place/Call bell, personal items and telephone in reach/Instruct patient to call for assistance before getting out of bed or chair/Non-slip footwear when patient is out of bed/[place] to call system/Physically safe environment - no spills, clutter or unnecessary equipment/Purposeful Proactive Rounding/Room/bathroom lighting operational, light cord in reach

## 2024-03-28 NOTE — H&P ADULT - ASSESSMENT
83 yo M with PMH aortic stenosis s/p TAVR 2011, dementia, HLD, BPH presents to the ED with fall and flu like symptoms admitted for supportive care.

## 2024-03-28 NOTE — H&P ADULT - NSHPOUTPATIENTPROVIDERS_GEN_ALL_CORE
PCP: Dr. Steve Coreas   Pulm: Dr. Zuñiga PCP: Dr. Steve Coreas   Cardio: Dr. Patrick Coreas  Pulm: Dr. Zuñiga

## 2024-03-29 DIAGNOSIS — Z51.5 ENCOUNTER FOR PALLIATIVE CARE: ICD-10-CM

## 2024-03-29 DIAGNOSIS — J96.01 ACUTE RESPIRATORY FAILURE WITH HYPOXIA: ICD-10-CM

## 2024-03-29 DIAGNOSIS — I21.4 NON-ST ELEVATION (NSTEMI) MYOCARDIAL INFARCTION: ICD-10-CM

## 2024-03-29 DIAGNOSIS — Z71.89 OTHER SPECIFIED COUNSELING: ICD-10-CM

## 2024-03-29 DIAGNOSIS — J44.1 CHRONIC OBSTRUCTIVE PULMONARY DISEASE WITH (ACUTE) EXACERBATION: ICD-10-CM

## 2024-03-29 LAB
A1C WITH ESTIMATED AVERAGE GLUCOSE RESULT: 5.8 % — HIGH (ref 4–5.6)
ANION GAP SERPL CALC-SCNC: 8 MMOL/L — SIGNIFICANT CHANGE UP (ref 5–17)
ANION GAP SERPL CALC-SCNC: 8 MMOL/L — SIGNIFICANT CHANGE UP (ref 5–17)
APPEARANCE UR: ABNORMAL
APTT BLD: 173.9 SEC — CRITICAL HIGH (ref 24.5–35.6)
APTT BLD: 33 SEC — SIGNIFICANT CHANGE UP (ref 24.5–35.6)
APTT BLD: 66.2 SEC — HIGH (ref 24.5–35.6)
BASE EXCESS BLDA CALC-SCNC: -3.1 MMOL/L — LOW (ref -2–3)
BASOPHILS # BLD AUTO: 0.15 K/UL — SIGNIFICANT CHANGE UP (ref 0–0.2)
BASOPHILS NFR BLD AUTO: 1 % — SIGNIFICANT CHANGE UP (ref 0–2)
BILIRUB UR-MCNC: NEGATIVE — SIGNIFICANT CHANGE UP
BLOOD GAS COMMENTS ARTERIAL: SIGNIFICANT CHANGE UP
BUN SERPL-MCNC: 21 MG/DL — SIGNIFICANT CHANGE UP (ref 7–23)
BUN SERPL-MCNC: 23 MG/DL — SIGNIFICANT CHANGE UP (ref 7–23)
CALCIUM SERPL-MCNC: 8.3 MG/DL — LOW (ref 8.5–10.1)
CALCIUM SERPL-MCNC: 9.3 MG/DL — SIGNIFICANT CHANGE UP (ref 8.5–10.1)
CHLORIDE SERPL-SCNC: 110 MMOL/L — HIGH (ref 96–108)
CHLORIDE SERPL-SCNC: 112 MMOL/L — HIGH (ref 96–108)
CHOLEST SERPL-MCNC: 128 MG/DL — SIGNIFICANT CHANGE UP
CK MB BLD-MCNC: 0.6 % — SIGNIFICANT CHANGE UP (ref 0–3.5)
CK MB CFR SERPL CALC: 18.7 NG/ML — HIGH (ref 0–3.6)
CK MB CFR SERPL CALC: 19.1 NG/ML — HIGH (ref 0–3.6)
CK SERPL-CCNC: 3053 U/L — HIGH (ref 26–308)
CO2 SERPL-SCNC: 26 MMOL/L — SIGNIFICANT CHANGE UP (ref 22–31)
CO2 SERPL-SCNC: 27 MMOL/L — SIGNIFICANT CHANGE UP (ref 22–31)
COLOR SPEC: YELLOW — SIGNIFICANT CHANGE UP
CREAT SERPL-MCNC: 0.99 MG/DL — SIGNIFICANT CHANGE UP (ref 0.5–1.3)
CREAT SERPL-MCNC: 1.5 MG/DL — HIGH (ref 0.5–1.3)
DIFF PNL FLD: ABNORMAL
EGFR: 45 ML/MIN/1.73M2 — LOW
EGFR: 74 ML/MIN/1.73M2 — SIGNIFICANT CHANGE UP
EOSINOPHIL # BLD AUTO: 0 K/UL — SIGNIFICANT CHANGE UP (ref 0–0.5)
EOSINOPHIL NFR BLD AUTO: 0 % — SIGNIFICANT CHANGE UP (ref 0–6)
ESTIMATED AVERAGE GLUCOSE: 120 MG/DL — HIGH (ref 68–114)
GLUCOSE SERPL-MCNC: 152 MG/DL — HIGH (ref 70–99)
GLUCOSE SERPL-MCNC: 170 MG/DL — HIGH (ref 70–99)
GLUCOSE UR QL: NEGATIVE MG/DL — SIGNIFICANT CHANGE UP
HCO3 BLDA-SCNC: 23 MMOL/L — SIGNIFICANT CHANGE UP (ref 21–28)
HCT VFR BLD CALC: 42.2 % — SIGNIFICANT CHANGE UP (ref 39–50)
HCT VFR BLD CALC: 48.4 % — SIGNIFICANT CHANGE UP (ref 39–50)
HDLC SERPL-MCNC: 55 MG/DL — SIGNIFICANT CHANGE UP
HGB BLD-MCNC: 14.6 G/DL — SIGNIFICANT CHANGE UP (ref 13–17)
HGB BLD-MCNC: 16.3 G/DL — SIGNIFICANT CHANGE UP (ref 13–17)
HOROWITZ INDEX BLDA+IHG-RTO: 100 — SIGNIFICANT CHANGE UP
INR BLD: 1.37 RATIO — HIGH (ref 0.85–1.18)
KETONES UR-MCNC: NEGATIVE MG/DL — SIGNIFICANT CHANGE UP
LEUKOCYTE ESTERASE UR-ACNC: NEGATIVE — SIGNIFICANT CHANGE UP
LIPID PNL WITH DIRECT LDL SERPL: 57 MG/DL — SIGNIFICANT CHANGE UP
LYMPHOCYTES # BLD AUTO: 0.29 K/UL — LOW (ref 1–3.3)
LYMPHOCYTES # BLD AUTO: 2 % — LOW (ref 13–44)
MCHC RBC-ENTMCNC: 28.3 PG — SIGNIFICANT CHANGE UP (ref 27–34)
MCHC RBC-ENTMCNC: 28.8 PG — SIGNIFICANT CHANGE UP (ref 27–34)
MCHC RBC-ENTMCNC: 33.7 GM/DL — SIGNIFICANT CHANGE UP (ref 32–36)
MCHC RBC-ENTMCNC: 34.6 GM/DL — SIGNIFICANT CHANGE UP (ref 32–36)
MCV RBC AUTO: 83.2 FL — SIGNIFICANT CHANGE UP (ref 80–100)
MCV RBC AUTO: 84.2 FL — SIGNIFICANT CHANGE UP (ref 80–100)
MONOCYTES # BLD AUTO: 0.58 K/UL — SIGNIFICANT CHANGE UP (ref 0–0.9)
MONOCYTES NFR BLD AUTO: 4 % — SIGNIFICANT CHANGE UP (ref 2–14)
NEUTROPHILS # BLD AUTO: 13.54 K/UL — HIGH (ref 1.8–7.4)
NEUTROPHILS NFR BLD AUTO: 89 % — HIGH (ref 43–77)
NITRITE UR-MCNC: NEGATIVE — SIGNIFICANT CHANGE UP
NON HDL CHOLESTEROL: 73 MG/DL — SIGNIFICANT CHANGE UP
NRBC # BLD: 0 /100 WBCS — SIGNIFICANT CHANGE UP (ref 0–0)
NRBC # BLD: SIGNIFICANT CHANGE UP /100 WBCS (ref 0–0)
PCO2 BLDA: 46 MMHG — SIGNIFICANT CHANGE UP (ref 35–48)
PH BLDA: 7.31 — LOW (ref 7.35–7.45)
PH UR: 5 — SIGNIFICANT CHANGE UP (ref 5–8)
PLATELET # BLD AUTO: 161 K/UL — SIGNIFICANT CHANGE UP (ref 150–400)
PLATELET # BLD AUTO: 179 K/UL — SIGNIFICANT CHANGE UP (ref 150–400)
PO2 BLDA: 53 MMHG — LOW (ref 83–108)
POTASSIUM SERPL-MCNC: 3.6 MMOL/L — SIGNIFICANT CHANGE UP (ref 3.5–5.3)
POTASSIUM SERPL-MCNC: 4 MMOL/L — SIGNIFICANT CHANGE UP (ref 3.5–5.3)
POTASSIUM SERPL-SCNC: 3.6 MMOL/L — SIGNIFICANT CHANGE UP (ref 3.5–5.3)
POTASSIUM SERPL-SCNC: 4 MMOL/L — SIGNIFICANT CHANGE UP (ref 3.5–5.3)
PROT UR-MCNC: SIGNIFICANT CHANGE UP MG/DL
PROTHROM AB SERPL-ACNC: 15.9 SEC — HIGH (ref 9.5–13)
RBC # BLD: 5.07 M/UL — SIGNIFICANT CHANGE UP (ref 4.2–5.8)
RBC # BLD: 5.75 M/UL — SIGNIFICANT CHANGE UP (ref 4.2–5.8)
RBC # FLD: 13.9 % — SIGNIFICANT CHANGE UP (ref 10.3–14.5)
RBC # FLD: 14.1 % — SIGNIFICANT CHANGE UP (ref 10.3–14.5)
SAO2 % BLDA: 85.6 % — LOW (ref 94–98)
SODIUM SERPL-SCNC: 145 MMOL/L — SIGNIFICANT CHANGE UP (ref 135–145)
SODIUM SERPL-SCNC: 146 MMOL/L — HIGH (ref 135–145)
SP GR SPEC: 1.01 — SIGNIFICANT CHANGE UP (ref 1–1.03)
TRIGL SERPL-MCNC: 78 MG/DL — SIGNIFICANT CHANGE UP
TROPONIN I, HIGH SENSITIVITY RESULT: 1757.6 NG/L — HIGH
TROPONIN I, HIGH SENSITIVITY RESULT: 1789.3 NG/L — HIGH
TROPONIN I, HIGH SENSITIVITY RESULT: 2326.6 NG/L — HIGH
TROPONIN I, HIGH SENSITIVITY RESULT: 831.2 NG/L — HIGH
UROBILINOGEN FLD QL: 0.2 MG/DL — SIGNIFICANT CHANGE UP (ref 0.2–1)
WBC # BLD: 14.56 K/UL — HIGH (ref 3.8–10.5)
WBC # BLD: 15.84 K/UL — HIGH (ref 3.8–10.5)
WBC # FLD AUTO: 14.56 K/UL — HIGH (ref 3.8–10.5)
WBC # FLD AUTO: 15.84 K/UL — HIGH (ref 3.8–10.5)

## 2024-03-29 PROCEDURE — 99291 CRITICAL CARE FIRST HOUR: CPT

## 2024-03-29 PROCEDURE — 99221 1ST HOSP IP/OBS SF/LOW 40: CPT

## 2024-03-29 PROCEDURE — 99233 SBSQ HOSP IP/OBS HIGH 50: CPT

## 2024-03-29 PROCEDURE — 93010 ELECTROCARDIOGRAM REPORT: CPT | Mod: 76

## 2024-03-29 PROCEDURE — 73501 X-RAY EXAM HIP UNI 1 VIEW: CPT | Mod: 26,LT

## 2024-03-29 PROCEDURE — 73590 X-RAY EXAM OF LOWER LEG: CPT | Mod: 26,LT

## 2024-03-29 PROCEDURE — 99222 1ST HOSP IP/OBS MODERATE 55: CPT

## 2024-03-29 PROCEDURE — 71275 CT ANGIOGRAPHY CHEST: CPT | Mod: 26

## 2024-03-29 PROCEDURE — 73560 X-RAY EXAM OF KNEE 1 OR 2: CPT | Mod: 26,LT

## 2024-03-29 RX ORDER — HEPARIN SODIUM 5000 [USP'U]/ML
5000 INJECTION INTRAVENOUS; SUBCUTANEOUS EVERY 6 HOURS
Refills: 0 | Status: DISCONTINUED | OUTPATIENT
Start: 2024-03-29 | End: 2024-03-30

## 2024-03-29 RX ORDER — OLANZAPINE 15 MG/1
2.5 TABLET, FILM COATED ORAL ONCE
Refills: 0 | Status: COMPLETED | OUTPATIENT
Start: 2024-03-29 | End: 2024-03-29

## 2024-03-29 RX ORDER — MIRABEGRON 50 MG/1
25 TABLET, EXTENDED RELEASE ORAL AT BEDTIME
Refills: 0 | Status: DISCONTINUED | OUTPATIENT
Start: 2024-03-29 | End: 2024-04-08

## 2024-03-29 RX ORDER — HEPARIN SODIUM 5000 [USP'U]/ML
INJECTION INTRAVENOUS; SUBCUTANEOUS
Qty: 25000 | Refills: 0 | Status: DISCONTINUED | OUTPATIENT
Start: 2024-03-29 | End: 2024-03-30

## 2024-03-29 RX ORDER — MEMANTINE HYDROCHLORIDE 10 MG/1
10 TABLET ORAL AT BEDTIME
Refills: 0 | Status: DISCONTINUED | OUTPATIENT
Start: 2024-03-30 | End: 2024-03-31

## 2024-03-29 RX ORDER — SODIUM CHLORIDE 9 MG/ML
1000 INJECTION, SOLUTION INTRAVENOUS
Refills: 0 | Status: DISCONTINUED | OUTPATIENT
Start: 2024-03-29 | End: 2024-03-31

## 2024-03-29 RX ORDER — HEPARIN SODIUM 5000 [USP'U]/ML
5000 INJECTION INTRAVENOUS; SUBCUTANEOUS EVERY 6 HOURS
Refills: 0 | Status: DISCONTINUED | OUTPATIENT
Start: 2024-03-29 | End: 2024-03-29

## 2024-03-29 RX ORDER — RISPERIDONE 4 MG/1
0.5 TABLET ORAL
Refills: 0 | Status: DISCONTINUED | OUTPATIENT
Start: 2024-03-29 | End: 2024-03-31

## 2024-03-29 RX ORDER — HEPARIN SODIUM 5000 [USP'U]/ML
INJECTION INTRAVENOUS; SUBCUTANEOUS
Qty: 25000 | Refills: 0 | Status: DISCONTINUED | OUTPATIENT
Start: 2024-03-29 | End: 2024-03-29

## 2024-03-29 RX ORDER — RISPERIDONE 4 MG/1
1 TABLET ORAL AT BEDTIME
Refills: 0 | Status: DISCONTINUED | OUTPATIENT
Start: 2024-03-29 | End: 2024-04-03

## 2024-03-29 RX ORDER — PANTOPRAZOLE SODIUM 20 MG/1
40 TABLET, DELAYED RELEASE ORAL DAILY
Refills: 0 | Status: DISCONTINUED | OUTPATIENT
Start: 2024-03-29 | End: 2024-04-10

## 2024-03-29 RX ORDER — HEPARIN SODIUM 5000 [USP'U]/ML
5000 INJECTION INTRAVENOUS; SUBCUTANEOUS ONCE
Refills: 0 | Status: COMPLETED | OUTPATIENT
Start: 2024-03-29 | End: 2024-03-29

## 2024-03-29 RX ORDER — HEPARIN SODIUM 5000 [USP'U]/ML
5000 INJECTION INTRAVENOUS; SUBCUTANEOUS ONCE
Refills: 0 | Status: DISCONTINUED | OUTPATIENT
Start: 2024-03-29 | End: 2024-03-29

## 2024-03-29 RX ORDER — LANOLIN ALCOHOL/MO/W.PET/CERES
10 CREAM (GRAM) TOPICAL AT BEDTIME
Refills: 0 | Status: DISCONTINUED | OUTPATIENT
Start: 2024-03-29 | End: 2024-04-10

## 2024-03-29 RX ADMIN — FINASTERIDE 5 MILLIGRAM(S): 5 TABLET, FILM COATED ORAL at 12:21

## 2024-03-29 RX ADMIN — AMLODIPINE BESYLATE 5 MILLIGRAM(S): 2.5 TABLET ORAL at 09:38

## 2024-03-29 RX ADMIN — LATANOPROST 1 DROP(S): 0.05 SOLUTION/ DROPS OPHTHALMIC; TOPICAL at 22:40

## 2024-03-29 RX ADMIN — HEPARIN SODIUM 0 UNIT(S)/HR: 5000 INJECTION INTRAVENOUS; SUBCUTANEOUS at 15:33

## 2024-03-29 RX ADMIN — HEPARIN SODIUM 1000 UNIT(S)/HR: 5000 INJECTION INTRAVENOUS; SUBCUTANEOUS at 08:12

## 2024-03-29 RX ADMIN — FAMOTIDINE 40 MILLIGRAM(S): 10 INJECTION INTRAVENOUS at 12:21

## 2024-03-29 RX ADMIN — HEPARIN SODIUM 750 UNIT(S)/HR: 5000 INJECTION INTRAVENOUS; SUBCUTANEOUS at 16:35

## 2024-03-29 RX ADMIN — HEPARIN SODIUM 750 UNIT(S)/HR: 5000 INJECTION INTRAVENOUS; SUBCUTANEOUS at 23:58

## 2024-03-29 RX ADMIN — Medication 3 MILLILITER(S): at 01:08

## 2024-03-29 RX ADMIN — HEPARIN SODIUM 5000 UNIT(S): 5000 INJECTION INTRAVENOUS; SUBCUTANEOUS at 08:10

## 2024-03-29 RX ADMIN — RISPERIDONE 0.5 MILLIGRAM(S): 4 TABLET ORAL at 17:49

## 2024-03-29 RX ADMIN — CLOPIDOGREL BISULFATE 75 MILLIGRAM(S): 75 TABLET, FILM COATED ORAL at 12:21

## 2024-03-29 RX ADMIN — Medication 3 MILLILITER(S): at 20:07

## 2024-03-29 RX ADMIN — Medication 3 MILLILITER(S): at 07:53

## 2024-03-29 RX ADMIN — Medication 1000 MILLIGRAM(S): at 01:02

## 2024-03-29 RX ADMIN — MEMANTINE HYDROCHLORIDE 10 MILLIGRAM(S): 10 TABLET ORAL at 09:38

## 2024-03-29 RX ADMIN — HEPARIN SODIUM 750 UNIT(S)/HR: 5000 INJECTION INTRAVENOUS; SUBCUTANEOUS at 19:55

## 2024-03-29 RX ADMIN — ENOXAPARIN SODIUM 40 MILLIGRAM(S): 100 INJECTION SUBCUTANEOUS at 05:53

## 2024-03-29 RX ADMIN — Medication 75 MILLIGRAM(S): at 09:38

## 2024-03-29 RX ADMIN — DONEPEZIL HYDROCHLORIDE 10 MILLIGRAM(S): 10 TABLET, FILM COATED ORAL at 09:38

## 2024-03-29 RX ADMIN — MIRABEGRON 25 MILLIGRAM(S): 50 TABLET, EXTENDED RELEASE ORAL at 22:40

## 2024-03-29 RX ADMIN — OLANZAPINE 2.5 MILLIGRAM(S): 15 TABLET, FILM COATED ORAL at 01:04

## 2024-03-29 RX ADMIN — Medication 3 MILLILITER(S): at 12:54

## 2024-03-29 RX ADMIN — Medication 1 MILLIGRAM(S): at 22:54

## 2024-03-29 RX ADMIN — SODIUM CHLORIDE 75 MILLILITER(S): 9 INJECTION INTRAMUSCULAR; INTRAVENOUS; SUBCUTANEOUS at 05:53

## 2024-03-29 RX ADMIN — MONTELUKAST 10 MILLIGRAM(S): 4 TABLET, CHEWABLE ORAL at 12:22

## 2024-03-29 RX ADMIN — Medication 10 MILLIGRAM(S): at 21:50

## 2024-03-29 RX ADMIN — Medication 30 MILLIGRAM(S): at 17:49

## 2024-03-29 RX ADMIN — OLANZAPINE 2.5 MILLIGRAM(S): 15 TABLET, FILM COATED ORAL at 04:40

## 2024-03-29 RX ADMIN — RISPERIDONE 1 MILLIGRAM(S): 4 TABLET ORAL at 21:50

## 2024-03-29 NOTE — GOALS OF CARE CONVERSATION - ADVANCED CARE PLANNING - CONVERSATION DETAILS
Palliative care SW spoke with patient's wife and daughter to discuss GOC. Wife is also a patient at facility and is sharing a room with patient. Reviewed patient's medical and social history as well as events leading to patient's hospitalization. Writer discussed patient's current diagnosis (Flu, multiple falls, HX of Aortic stenosis s/p TAVR, advanced dementia, HLD, asthma, COPD, overactive bladder, H pylori, BPH), medical condition and management. As per wife and daughter, patient does not have any formal advanced directives in place. Wife states she and patient live with her daughter and son-in-law who assist them with their day to day needs. Inquired about patient's wishes regarding extent of medical care to be provided thoughts regarding cardiopulmonary resuscitation and mechanical ventilation/intubation. Patient currently on hi flow. Wife and daughter state that given patient's multiple medical issues they would not want him to go through CPR or intubation. They agreed to DNR/Trial of NIV/ if fails DNI. Daughter expressed that she and her  have difficulty caring for both patient and his wife. Introduced home hospice program. Reviewed home hospice criteria and services provided. Daughter stated she would like to see if patient is able to get better. Family showed insight into patient's medical condition. All questions answered.  MOLST form completed and placed on chart. Psychosocial support provided.

## 2024-03-29 NOTE — CONSULT NOTE ADULT - CRITICAL CARE ATTENDING COMMENT
Assessment: 83 yo M with PMH aortic stenosis s/p TAVR 2019, CAD s/p LELAND to LAD 2019, dementia, HLD, asthma, COPD, overactive bladder, H pylori, BPH admitted with Influenza A s/p multiple falls.    resp failure on the basis of flu  demand ischemia in the setting of acute viral illness  known cad, cont dapt  cpk out of prop to trop on basis of rhabdo  ekg without acute ischemia, varying qrs duration between ivcd and lbbb  hfnc to maintain o2 sat, at risk of abrupt decompensation  prev echo normal ef would repeat       Upon my evaluation, this patient is at high risk for imminent or life threatening deterioration due to resp failure, ischemia and other active medical issues which require my direct attention, intervention, and personal management.  I have personally spent >35 minutes  of critical care time exclusive of time spent on separate billing procedures. This includes review of laboratory data, radiology results, discussion with primary team\patient, and monitoring for potential decompensation Interventions were performed as documented above.

## 2024-03-29 NOTE — SWALLOW BEDSIDE ASSESSMENT ADULT - SWALLOW EVAL: ORAL MUSCULATURE
Unable to perform a comprehensive oral facial examination 2/2 pt difficulty following directions./generally intact

## 2024-03-29 NOTE — PROGRESS NOTE ADULT - PROBLEM SELECTOR PLAN 7
severe dementia: need assist for all ADLs, limited functional status, as per family only able to ambulate from bed to bathroom with walker and falls frequently. not able to make needs known   - d/c aricept due to QT prolongation   -decrease Memantine  - supportive care   -GDR Seroquel due to QT   -psy eval severe dementia: need assist for all ADLs, limited functional status, as per family only able to ambulate from bed to bathroom with walker and falls frequently. not able to make needs known   - d/c aricept due to QT prolongation 501  -decrease Memantine  - supportive care   -GDR Seroquel due to QT   -psy eval

## 2024-03-29 NOTE — CONSULT NOTE ADULT - PROBLEM SELECTOR PROBLEM 3
Oscar Abelardo 2005 was seen at this clinic for a PPD test.        The induration and result size is below, listed in millimeters.      Recent Results (from the past 504 hour(s))   TB SKIN TEST    Collection Time: 08/29/19  8:33 AM   Result Value Ref Range    SKIN TEST RESULT Negative     Induration 0 0 mm   TB SKIN TEST    Collection Time: 09/12/19  7:27 AM   Result Value Ref Range    SKIN TEST RESULT Negative     Induration 0 0 mm       No follow-up necessary at this time.      OS[facility]     Dementia

## 2024-03-29 NOTE — PROVIDER CONTACT NOTE (CRITICAL VALUE NOTIFICATION) - ACTION/TREATMENT ORDERED:
Dr. Higgins, Resident Doctor made aware. No new order made.
Please follow ACS nomogram as ordered
EKG

## 2024-03-29 NOTE — CHART NOTE - NSCHARTNOTEFT_GEN_A_CORE
RN called, patient pulling on high-flow nasal cannula and subsequently desaturating to 70s. Attempts at de-escalation unsuccessful. Of note, patient has dementia at baseline. Due to agitated state in the setting of hypoxia, patient requires treatment with parenteral antipsychotics (Olanzapine 2.5mg IM x1). Continue monitoring and supportive measures. The risk/benefits of antipsychotics and the black box warning which includes cardiovascular events, prolonged QTc and possible death were discussed and reviewed with daughter Georgia, who verbalized understanding.     #agitation/dementia  - Zyprexa (olanzapine) 2.5 mg IM x1  - RN to call if any changes

## 2024-03-29 NOTE — PROGRESS NOTE ADULT - ASSESSMENT
85 yo M with PMH aortic stenosis s/p TAVR 2011, dementia, HLD, BPH presents to the ED with fall and flu like symptoms admitted for supportive care.

## 2024-03-29 NOTE — BH CONSULTATION LIAISON ASSESSMENT NOTE - NSBHCHARTREVIEWVS_PSY_A_CORE FT
Vital Signs Last 24 Hrs  T(C): 36.9 (29 Mar 2024 12:56), Max: 39.4 (28 Mar 2024 22:21)  T(F): 98.4 (29 Mar 2024 12:56), Max: 103 (28 Mar 2024 22:21)  HR: 94 (29 Mar 2024 13:16) (86 - 125)  BP: 122/74 (29 Mar 2024 12:56) (101/56 - 171/104)  BP(mean): 84 (29 Mar 2024 09:32) (84 - 84)  RR: 18 (29 Mar 2024 12:56) (17 - 30)  SpO2: 92% (29 Mar 2024 13:16) (63% - 98%)    Parameters below as of 29 Mar 2024 13:16  Patient On (Oxygen Delivery Method): nasal cannula, high flow

## 2024-03-29 NOTE — BH CONSULTATION LIAISON ASSESSMENT NOTE - CURRENT MEDICATION
MEDICATIONS  (STANDING):  albuterol/ipratropium for Nebulization 3 milliLiter(s) Nebulizer every 6 hours  budesonide  80 MICROgram(s)/formoterol 4.5 MICROgram(s) Inhaler 2 Puff(s) Inhalation two times a day  clopidogrel Tablet 75 milliGRAM(s) Oral daily  finasteride 5 milliGRAM(s) Oral daily  heparin  Infusion.  Unit(s)/Hr (10 mL/Hr) IV Continuous <Continuous>  latanoprost 0.005% Ophthalmic Solution 1 Drop(s) Both EYES at bedtime  melatonin 10 milliGRAM(s) Oral at bedtime  methylPREDNISolone sodium succinate Injectable 40 milliGRAM(s) IV Push daily  montelukast 10 milliGRAM(s) Oral daily  oseltamivir 30 milliGRAM(s) Oral two times a day  pantoprazole  Injectable 40 milliGRAM(s) IV Push daily  risperiDONE   Tablet 0.5 milliGRAM(s) Oral two times a day  risperiDONE   Tablet 1 milliGRAM(s) Oral at bedtime  simvastatin 40 milliGRAM(s) Oral at bedtime  sodium chloride 0.45%. 1000 milliLiter(s) (75 mL/Hr) IV Continuous <Continuous>  tiotropium 2.5 MICROgram(s) Inhaler 2 Puff(s) Inhalation daily    MEDICATIONS  (PRN):  acetaminophen     Tablet .. 650 milliGRAM(s) Oral every 6 hours PRN Temp greater or equal to 38C (100.4F), Mild Pain (1 - 3)  albuterol    90 MICROgram(s) HFA Inhaler 2 Puff(s) Inhalation every 6 hours PRN Shortness of Breath and/or Wheezing  aluminum hydroxide/magnesium hydroxide/simethicone Suspension 30 milliLiter(s) Oral every 4 hours PRN Dyspepsia  heparin   Injectable 5000 Unit(s) IV Push every 6 hours PRN For aPTT less than 40  ondansetron Injectable 4 milliGRAM(s) IV Push every 8 hours PRN Nausea and/or Vomiting

## 2024-03-29 NOTE — CONSULT NOTE ADULT - SUBJECTIVE AND OBJECTIVE BOX
HPI:  85 yo M with PMH aortic stenosis s/p TAVR 2019, dementia, HLD, asthm, COPD, overactive bladder ,H pylori, BPH presents to the ED with lethargy/weakness. Interview was conducted on phone with patients daughter Katharina as pt has severe dementia and cannot answer questions, tried to communicate with [place]  but patient could not participate. Per daughter, pt had a fall yesterday when trying to get out of bed in the morning (lives at home with daughter, son in law and wife- does not use walking device- per daughter "wouldn't know  how").  Last night, patient was found to be much weaker and more lethargic than usual and sustained another fall this AM. This fall was unwitnessed but patient was found face forward on the floor on the carpet. Of note, daughter was just diagnosed with flu at urgent care. Daughter denied pt had any new sx of cp,palpitations, sob, cough, n/v/d/constipation/urinary changes however daughter acknowledges that difficult to know for sure 2/2 mental status.     Of note, per [place] [facility], patient was seen by pulmonologist Dr. Zuñiga in 2023 for asthma exacerbation tx with Trelegy, ventolin inhaler- no steroids given.      ED Course:   Vitals: BP: 129/73 , HR: 96, Temp: 98-->101.1 , RR: , SpO2: 96% on RA   Labs:  WBC: 10.58, Lactate WNL, Influenza A: Detected  UA Pending  CXR:   1. Mildly elevated right hemidiaphragm with subsegmental atelectasis near   the cardiophrenic angle on the right.  2. Status post TAVR. No CHF.  3. No evidence of infiltrate, pleural effusion or pulmonary edema.  CT Head:  CT HEAD:  1.  No evidence of acute intracranial hemorrhage or midline shift.  2.  Chronic small vessel disease.    CT CERVICAL SPINE:  1.  No evidence of acute osseous fracture or rosario dislocation.  2.  Multilevel degenerative change of the cervical spine.  3.  Slightly mottled appearance to the vertebral bodies of the cervical   and upper thoracic spine. This could be osteopenic in nature. A   underlying myeloma/lymphoma or other metastatic process is not entirely   excluded. Recommend clinical and laboratory correlation.    CT Chest:  Linear atelectasis at the right lung base. Minimal pericardial effusion No evidence of acute traumatic injury. Status post TAVR  EKG: Poor quality, will repeat   Received in the ED: Ofirmev x 1, Cefepime x 1, Zyprexa x1, LR bolus x1.    (28 Mar 2024 18:55)    PERTINENT PM/SXH:   Aortic stenosis    BPH (benign prostatic hyperplasia)    Dementia    ACE (dyspnea on exertion)    Asthma    HLD (hyperlipidemia)    CAD (coronary artery disease)    OH (ocular hypertension)    Overactive bladder    Anxiety      History of tonsillectomy    H/O coronary angiogram      FAMILY HISTORY: unable to obtain 2/2 mental status    Family Hx substance abuse [ ]yes [x ]no  ITEMS NOT CHECKED ARE NOT PRESENT    SOCIAL HISTORY:   Significant other/partner[x ]  Children[x ]  Yarsanism/Spirituality:  Substance hx:  [ ]   Tobacco hx:  [ ]   Alcohol hx: [ ]   Home Opioid hx:  [ ] I-Stop Reference No:  Living Situation: [x ]Home  [ ]Long term care  [ ]Rehab [ ]Other    ADVANCE DIRECTIVES:    DNR/MOLST  [ ]  Living Will  [ ]   DECISION MAKER(s):  [ ] Health Care Proxy(s)  [x ] Surrogate(s)  [ ] Guardian           Name(s): Phone Number(s): Georgia, number per EMR (daughter)    BASELINE (I)ADL(s) (prior to admission):  [place]: [ ]Total  [ x] Moderate [ ]Dependent    Allergies    No Known Allergies    Intolerances    MEDICATIONS  (STANDING):  albuterol/ipratropium for Nebulization 3 milliLiter(s) Nebulizer every 6 hours  budesonide  80 MICROgram(s)/formoterol 4.5 MICROgram(s) Inhaler 2 Puff(s) Inhalation two times a day  clopidogrel Tablet 75 milliGRAM(s) Oral daily  finasteride 5 milliGRAM(s) Oral daily  heparin  Infusion.  Unit(s)/Hr (10 mL/Hr) IV Continuous <Continuous>  latanoprost 0.005% Ophthalmic Solution 1 Drop(s) Both EYES at bedtime  methylPREDNISolone sodium succinate Injectable 40 milliGRAM(s) IV Push daily  montelukast 10 milliGRAM(s) Oral daily  oseltamivir 30 milliGRAM(s) Oral two times a day  pantoprazole  Injectable 40 milliGRAM(s) IV Push daily  QUEtiapine 100 milliGRAM(s) Oral at bedtime  simvastatin 40 milliGRAM(s) Oral at bedtime  sodium chloride 0.45%. 1000 milliLiter(s) (75 mL/Hr) IV Continuous <Continuous>  tiotropium 2.5 MICROgram(s) Inhaler 2 Puff(s) Inhalation daily    MEDICATIONS  (PRN):  acetaminophen     Tablet .. 650 milliGRAM(s) Oral every 6 hours PRN Temp greater or equal to 38C (100.4F), Mild Pain (1 - 3)  albuterol    90 MICROgram(s) HFA Inhaler 2 Puff(s) Inhalation every 6 hours PRN Shortness of Breath and/or Wheezing  aluminum hydroxide/magnesium hydroxide/simethicone Suspension 30 milliLiter(s) Oral every 4 hours PRN Dyspepsia  heparin   Injectable 5000 Unit(s) IV Push every 6 hours PRN For aPTT less than 40  melatonin 3 milliGRAM(s) Oral at bedtime PRN Insomnia  ondansetron Injectable 4 milliGRAM(s) IV Push every 8 hours PRN Nausea and/or Vomiting    PRESENT SYMPTOMS: [ ]Unable to self-report  [ ] CPOT [ ] PAINADs [ ] RDOS  Source if other than patient:  [ ]Family   [ ]Team     Pain: [ ]yes [ x]no  QOL impact -   Location -                    Aggravating factors -  Quality -  Radiation -  Timing-  Severity (0-10 scale):  Minimal acceptable level (0-10 scale):     CPOT:    https://www.[facility].org/getattachment/[email or web address]/Critical-Care-Pain-Observation-Tool-(CPOT)    PAIN AD Score:   http://geriatrictoolkit.[place].[facility]/cog/painad.pdf (press ctrl +  left click to view)    Dyspnea:                           [ x]Mild [ ]Moderate [ ]Severe      RDOS:  0 to 2  minimal or no respiratory distress   3  mild distress  4 to 6 moderate distress  >7 severe distress  https://homecareinformation.net/handouts/hen/Respiratory_Distress_Observation_Scale.pdf (Ctrl +  left click to view)     Anxiety:                             [ ]Mild [ ]Moderate [ ]Severe  Fatigue:                             [ ]Mild [ ]Moderate [ ]Severe  Nausea:                             [ ]Mild [ ]Moderate [ ]Severe  Loss of appetite:              [ ]Mild [ ]Moderate [ ]Severe  Constipation:                    [ ]Mild [ ]Moderate [ ]Severe    PCSSQ[Palliative Care Spiritual Screening Question]   Severity (0-10):  Score of 4 or > indicate consideration of Chaplaincy referral.  Chaplaincy Referral: [ ] yes [ ] refused [ ] following [ x] Deferred     Caregiver [place]? : [ ] yes [ ] no [x ] Deferred [ ] Declined             Social work referral [ ] Patient & Family Centered Care Referral [ ]     Anticipatory Grief present?:  [ ] yes [ ] no  [x ] Deferred                  Social work referral [ ] Chaplaincy Referral[ ]      Other Symptoms:  [ ]All other review of systems negative     Palliative Performance Status Version 2:    20     %    http://npcrc.org/files/news/palliative_performance_scale_ppsv2.pdf  PHYSICAL EXAM:  Vital Signs Last 24 Hrs  T(C): 36.6 (29 Mar 2024 09:32), Max: 39.4 (28 Mar 2024 22:21)  T(F): 97.9 (29 Mar 2024 09:32), Max: 103 (28 Mar 2024 22:21)  HR: 95 (29 Mar 2024 09:32) (86 - 125)  BP: 112/69 (29 Mar 2024 09:32) (101/56 - 171/104)  BP(mean): 84 (29 Mar 2024 09:32) (84 - 84)  RR: 18 (29 Mar 2024 09:32) (17 - 30)  SpO2: 98% (29 Mar 2024 09:32) (63% - 98%)    Parameters below as of 29 Mar 2024 09:32  Patient On (Oxygen Delivery Method): nasal cannula, high flow  O2 Flow (L/min): 40  O2 Concentration (%): 100 I&O's Summary    28 Mar 2024 07:01  -  29 Mar 2024 07:00  --------------------------------------------------------  IN: 0 mL / OUT: 900 mL / NET: -900 mL      GENERAL: [ ]Cachexia    [x ]Alert  [ ]Oriented x   [ ]Lethargic  [ ]Unarousable  [ ]Verbal  [ ]Non-Verbal  Behavioral:   [ ] Anxiety  [ ] Delirium [x ] Agitation [ ] Other  HEENT:  [ ]Normal   [x ]Dry mouth   [ ]ET Tube/Trach  [ ]Oral lesions  PULMONARY:   [ ]Clear [x ]Tachypnea  [ ]Audible excessive secretions   [ ]Rhonchi        [ ]Right [ ]Left [ ]Bilateral  [ ]Crackles        [ ]Right [ ]Left [ ]Bilateral  [ ]Wheezing     [ ]Right [ ]Left [ ]Bilateral  [x ]Diminished breath sounds [ ]right [ ]left [ ]bilateral  CARDIOVASCULAR:    [x ]Regular [ ]Irregular [ ]Tachy  [ ][place] [ ]Murmur [ ]Other  GASTROINTESTINAL:  [x ]Soft  [ ]Distended   [ x]+BS  [ x]Non tender [ ]Tender  [ ]Other [ ]PEG [ ]OGT/ NGT  Last BM:  GENITOURINARY:  [ ]Normal [x ] Incontinent   [ ]Oliguria/Anuria   [ ][place]  MUSCULOSKELETAL:   [ ]Normal   [ ]Weakness  [x ]Bed/Wheelchair bound [ ]Edema  NEUROLOGIC:   [ ]No focal deficits  [x ]Cognitive impairment  [ ]Dysphagia [ ]Dysarthria [ ]Paresis [ ]Other   SKIN:   [ ]Normal  [ ]Rash  [ ]Other  [ ]Pressure ulcer(s)       Present on admission [ ]y [ ]n    CRITICAL CARE:  [ ] Shock Present  [ ]Septic [ ]Cardiogenic [ ]Neurologic [ ]Hypovolemic  [ ]  Vasopressors [ ]  Inotropes   [x ]Respiratory failure present [ ]Mechanical ventilation [ ]Non-invasive ventilatory support [x ]High flow    [x ]Acute  [ ]Chronic [ ]Hypoxic  [ ]Hypercarbic [ ]Other  [ ]Other organ failure     LABS:                        16.3   14.56 )-----------( 179      ( 29 Mar 2024 03:30 )             48.4       146<H>  |  112<H>  |  21  ----------------------------<  170<H>  4.0   |  26  |  1.50<H>    Ca    9.3      29 Mar 2024 03:30    TPro  7.3  /  Alb  3.9  /  TBili  0.6  /  DBili  x   /  AST  51<H>  /  ALT  38  /  AlkPhos  77  03-28  PT/INR - ( 29 Mar 2024 07:15 )   PT: 15.9 sec;   INR: 1.37 ratio         PTT - ( 29 Mar 2024 07:15 )  PTT:33.0 sec    Urinalysis Basic - ( 29 Mar 2024 06:30 )    Color: Yellow / Appearance: Cloudy / S.012 / pH: x  Gluc: x / Ketone: Negative mg/dL  / Bili: Negative / Urobili: 0.2 mg/dL   Blood: x / Protein: Trace mg/dL / Nitrite: Negative   Leuk Esterase: Negative / RBC: Too Numerous to count /HPF / WBC 0 /HPF   Sq Epi: x / Non Sq Epi: x / Bacteria: x      RADIOLOGY & ADDITIONAL STUDIES:  < from: CT Chest No Cont (24 @ 15:31) >    ACC: [id number] EXAM:  CT CHEST   ORDERED BY:  SANJANA PERALTA     PROCEDURE DATE:  2024          INTERPRETATION:  CLINICAL INFORMATION: Abnormal chest x-ray. Fall    COMPARISON: Chest x-ray of the same date and cardiac CT of 10/17/2019    CONTRAST/COMPLICATIONS:  IV Contrast: NONE  Oral Contrast: NONE  Complications: None reported at time of study completion    PROCEDURE:  CT of the Chest was performed.  Sagittal and coronal reformats were performed.    FINDINGS:    LUNGS AND AIRWAYS: Patent central airways.  Lungs are remarkable for   linear atelectasis at the right lung base  PLEURA: No pleural effusion.  MEDIASTINUM AND NANY: No lymphadenopathy.  VESSELS: Atherosclerotic calcification of the aorta and of the coronary   arteries. TAVR  HEART: Heart size is normal. Minimal pericardial effusion.  CHEST WALL AND LOWER NECK: Within normal limits.  VISUALIZED UPPER ABDOMEN: Punctate calcification mid pole right kidney.   Upper pole exophytic right renal cysts  BONES: Degenerative changes of the spine are appreciated no evidence of   fracture    IMPRESSION: Linear atelectasis at the right lung base. Minimal   pericardial effusion  No evidence of acute traumatic injury  Status post TAVR    --- End of Report ---        DIVINA WOOD MD; Attending Radiologist  This document has been electronically signed. Mar 28 2024  3:56PM    < end of copied text >      PROTEIN CALORIE MALNUTRITION PRESENT: [ ]mild [ ]moderate [ ]severe [ ]underweight [ ]morbid obesity  https://www.andeal.org/vault/2440/web/files/ONC/Table_Clinical%20Characteristics%20to%20Document%20Malnutrition-White%20JV%20et%20al%2020.pdf    Height (cm): 172.7 (24 @ 14:02)  Weight (kg): 83.9 (24 @ 14:02)  BMI (kg/m2): 28.1 (24 @ 14:02)    [ ]PPSV2 < or = to 30% [ ]significant weight loss  [ ]poor nutritional intake  [ ]anasarca[ ]Artificial Nutrition      Other REFERRALS:  [ ]Hospice  [ ]Child Life  [ ]Social Work  [ ]Case management [ ]Holistic Therapy     Goals of Care Document:

## 2024-03-29 NOTE — CONSULT NOTE ADULT - PROBLEM SELECTOR RECOMMENDATION 4
DNR/DNI/trial non invasive discussed with family  see GOC narrative written separately on this date  MOLST on chart  hospice introduced but current barrier would be high flow- family wishes to continue medical management

## 2024-03-29 NOTE — CONSULT NOTE ADULT - PROBLEM SELECTOR RECOMMENDATION 5
will continue to follow given respiratory failure  Marya Bull MD, [facility]; Palliative Care Attending, Ethicist. [phone]

## 2024-03-29 NOTE — SWALLOW BEDSIDE ASSESSMENT ADULT - SWALLOW EVAL: DIAGNOSIS
The pt presented with functional oral stage for puree, mildly thick liquids, and moderately thick liquids. Mild oral dysphagia noted for minced and moist marked by reduced mastication and prolonged oral transit. Mild oral dysphagia noted for thin liquids marked by rapid oral transit. Pharyngeal stage of the swallow suspected to be functional for all consistencies trialed. No overt s/s of aspiration or penetration noted across consistencies administered.

## 2024-03-29 NOTE — CHART NOTE - NSCHARTNOTEFT_GEN_A_CORE
Troponin increased 194->831->1757. Unable to assess for symptoms due to dementia. EKG showing NSR 91, no signs of ischemia at this time. Repeat cardiac enzymes and EKG scheduled. Day team to follow up.

## 2024-03-29 NOTE — BH CONSULTATION LIAISON ASSESSMENT NOTE - NSBHCHARTREVIEWLAB_PSY_A_CORE FT
14.6   15.84 )-----------( 161      ( 29 Mar 2024 14:10 )             42.2   03-29    146<H>  |  112<H>  |  21  ----------------------------<  170<H>  4.0   |  26  |  1.50<H>    Ca    9.3      29 Mar 2024 03:30    TPro  7.3  /  Alb  3.9  /  TBili  0.6  /  DBili  x   /  AST  51<H>  /  ALT  38  /  AlkPhos  77  03-28

## 2024-03-29 NOTE — CONSULT NOTE ADULT - PROBLEM SELECTOR PROBLEM 2
Problem: At Risk for Falls  Goal: Patient does not fall  Outcome: Not met, plan adjusted  Goal: Patient takes action to control fall-related risks  Outcome: Not met, plan adjusted     Problem: At Risk for Injury Due to Fall  Goal: Patient does not fall  Outcome: Not met, plan adjusted  Goal: Takes action to control condition specific risks  Outcome: Not met, plan adjusted  Goal: Verbalizes understanding of fall-related injury personal risks  Description: Document education using the patient education activity  Outcome: Not met, plan adjusted     Problem: Alcohol Withdrawal Management  Goal: # No alcohol-related delirium or seizures  Outcome: Not met, plan adjusted  Goal: # Verbalizes understanding of alcohol withdrawal and health effects of excessive alcohol intake  Description: Documented on patient education activity  Outcome: Not met, plan adjusted     Problem: Pain  Goal: Acceptable pain level achieved/maintained at rest using appropriate pain scale for the patient  Outcome: Not met, plan adjusted  Goal: Acceptable pain level achieved/maintained with activity using appropriate pain scale for the patient  Outcome: Not met, plan adjusted  Goal: Acceptable pain level achieved/maintained without oversedation  Outcome: Not met, plan adjusted     Problem: Diabetes  Goal: Achieves glycemic balance  Description: Goal is to maintain blood sugar within range with no episodes of hypoglycemia  Outcome: Not met, plan adjusted  Goal: Verbalizes/demonstrates understanding of NEW diagnosis of diabetes and management  Description: Document on Patient Education Activity  Outcome: Not met, plan adjusted  Goal: Verbalizes understanding of diabetes management including how to use HbA1C to evaluate status of blood sugar over time (Diabetes is NOT a new diagnosis)  Description: Diabetes Education  Outcome: Not met, plan adjusted  Goal: Demonstrates ability to self-administer insulin  Description: Document on Patient Education  Activity  Outcome: Not met, plan adjusted     Problem: Wound or Pressure Injury  Goal: Skin remains intact with no new/deterioration of wound or pressure injury  Outcome: Not met, plan adjusted  Goal: Participates in wound care activities  Outcome: Not met, plan adjusted      Influenza

## 2024-03-29 NOTE — SWALLOW BEDSIDE ASSESSMENT ADULT - ASR SWALLOW RECOMMEND DIAG
Defer at this time as medical provider reported no active concern for aspiration at this time. Further, no overt s/s of aspiration or penetration noted during today's evaluation.

## 2024-03-29 NOTE — CONSULT NOTE ADULT - SUBJECTIVE AND OBJECTIVE BOX
[facility]  INFECTIOUS DISEASES   [address] [place] [address]  Tel: [phone]     Fax: [phone]  ========================================================  MD Claudia Landaverde Kaushal, MD Cho, Michelle, MD Sunjit, Jaspal, MD  ========================================================    [id number]  MARLON KRUEGER     CC: Patient is a 84y old  Male who presents with a chief complaint of   HPI:  83 yo M with PMH aortic stenosis s/p TAVR 2019, dementia, HLD, asthm, COPD, overactive bladder ,H pylori, BPH presents to the ED with lethargy/weakness. Interview was conducted on phone with patients daughter Katharina as pt has severe dementia and cannot answer questions, tried to communicate with Portuguese  but patient could not participate. Per daughter, pt had a fall yesterday when trying to get out of bed in the morning (lives at home with daughter, son in law and wife- does not use walking device- per daughter "wouldn't know  how").  Last night, patient was found to be much weaker and more lethargic than usual and sustained another fall this AM. This fall was unwitnessed but patient was found face forward on the floor on the carpet. Of note, daughter was just diagnosed with flu at urgent care. Daughter denied pt had any new sx of cp,palpitations, sob, cough, n/v/d/constipation/urinary changes however daughter acknowledges that difficult to know for sure 2/2 mental status.     Of note, per [facility], patient was seen by pulmonologist Dr. Zuñiga in June 2023 for asthma exacerbation tx with Trelegy, ventolin inhaler- no steroids given.      ED Course:   Vitals: BP: 129/73 , HR: 96, Temp: 98-->101.1 , RR: , SpO2: 96% on RA   Labs:  WBC: 10.58, Lactate WNL, Influenza A: Detected  UA Pending  CXR:   1. Mildly elevated right hemidiaphragm with subsegmental atelectasis near   the cardiophrenic angle on the right.  2. Status post TAVR. No CHF.  3. No evidence of infiltrate, pleural effusion or pulmonary edema.  CT Head:  CT HEAD:  1.  No evidence of acute intracranial hemorrhage or midline shift.  2.  Chronic small vessel disease.    CT CERVICAL SPINE:  1.  No evidence of acute osseous fracture or rosario dislocation.  2.  Multilevel degenerative change of the cervical spine.  3.  Slightly mottled appearance to the vertebral bodies of the cervical   and upper thoracic spine. This could be osteopenic in nature. A   underlying myeloma/lymphoma or other metastatic process is not entirely   excluded. Recommend clinical and laboratory correlation.    CT Chest:  Linear atelectasis at the right lung base. Minimal pericardial effusion No evidence of acute traumatic injury. Status post TAVR  EKG: Poor quality, will repeat   Received in the ED: Ofirmev x 1, Cefepime x 1, Zyprexa x1, LR bolus x1.    (28 Mar 2024 18:55)      PAST MEDICAL & SURGICAL HISTORY:  Aortic stenosis  BPH (benign prostatic hyperplasia)  Dementia  mild- on Memantine  ACE (dyspnea on exertion)  Asthma  controlled on inhalers  HLD (hyperlipidemia)  CAD (coronary artery disease)  s/p stent 10/2019  OH (ocular hypertension)  on eye drops  Overactive bladder  Anxiety  History of tonsillectomy  childhood  H/O coronary angiogram  10/25/19, s/p PCI to LAD    Social Hx: No current smoking, EtOH or drugs     FAMILY HISTORY:  Noncontributory     Allergies  No Known Allergies    MEDICATIONS  (STANDING):  albuterol/ipratropium for Nebulization 3 milliLiter(s) Nebulizer every 6 hours  amLODIPine   Tablet 5 milliGRAM(s) Oral daily  azithromycin  IVPB 500 milliGRAM(s) IV Intermittent every 24 hours  azithromycin  IVPB      budesonide  80 MICROgram(s)/formoterol 4.5 MICROgram(s) Inhaler 2 Puff(s) Inhalation two times a day  clopidogrel Tablet 75 milliGRAM(s) Oral daily  donepezil 10 milliGRAM(s) Oral with breakfast  donepezil 5 milliGRAM(s) Oral at bedtime  famotidine    Tablet 40 milliGRAM(s) Oral daily  finasteride 5 milliGRAM(s) Oral daily  heparin  Infusion.  Unit(s)/Hr (10 mL/Hr) IV Continuous <Continuous>  latanoprost 0.005% Ophthalmic Solution 1 Drop(s) Both EYES at bedtime  memantine 10 milliGRAM(s) Oral two times a day  montelukast 10 milliGRAM(s) Oral daily  oseltamivir 75 milliGRAM(s) Oral two times a day  QUEtiapine 25 milliGRAM(s) Oral at bedtime  QUEtiapine 100 milliGRAM(s) Oral at bedtime  simvastatin 40 milliGRAM(s) Oral at bedtime  tiotropium 2.5 MICROgram(s) Inhaler 2 Puff(s) Inhalation daily    MEDICATIONS  (PRN):  acetaminophen     Tablet .. 650 milliGRAM(s) Oral every 6 hours PRN Temp greater or equal to 38C (100.4F), Mild Pain (1 - 3)  albuterol    90 MICROgram(s) HFA Inhaler 2 Puff(s) Inhalation every 6 hours PRN Shortness of Breath and/or Wheezing  aluminum hydroxide/magnesium hydroxide/simethicone Suspension 30 milliLiter(s) Oral every 4 hours PRN Dyspepsia  heparin   Injectable 5000 Unit(s) IV Push every 6 hours PRN For aPTT less than 40  melatonin 3 milliGRAM(s) Oral at bedtime PRN Insomnia  ondansetron Injectable 4 milliGRAM(s) IV Push every 8 hours PRN Nausea and/or Vomiting     REVIEW OF SYSTEMS:  CONSTITUTIONAL:  No Fever or chills  HEENT:  No diplopia or blurred vision.  No sore throat or runny nose.  CARDIOVASCULAR:  No chest pain   RESPIRATORY:  No cough, shortness of breath, PND or orthopnea.  GASTROINTESTINAL:  No nausea, vomiting or diarrhea.  GENITOURINARY:  No dysuria, frequency or urgency. No Blood in urine  MUSCULOSKELETAL:  no joint aches, no muscle pain  SKIN:  No change in skin, hair or nails.    Physical Exam:  Vital Signs Last 24 Hrs  T(C): 36.7 (29 Mar 2024 05:09), Max: 39.4 (28 Mar 2024 22:21)  T(F): 98 (29 Mar 2024 05:09), Max: 103 (28 Mar 2024 22:21)  HR: 94 (29 Mar 2024 05:55) (86 - 125)  BP: 101/56 (29 Mar 2024 05:09) (101/56 - 171/104)  BP(mean): --  RR: 17 (29 Mar 2024 05:09) (17 - 30)  SpO2: 95% (29 Mar 2024 05:55) (63% - 95%)  Parameters below as of 29 Mar 2024 05:55  Patient On (Oxygen Delivery Method): nasal cannula, high flow  Height (cm): 172.7 (03-28 @ 14:02)  Weight (kg): 83.9 (03-28 @ 14:02)  BMI (kg/m2): 28.1 (03-28 @ 14:02)  BSA (m2): 1.98 (03-28 @ 14:02)  GEN: NAD  HEENT: normocephalic and atraumatic. EOMI. PERRL.    NECK: Supple.  No lymphadenopathy   LUNGS: Clear to auscultation.  HEART: Regular rate and rhythm without murmur.  ABDOMEN: Soft, nontender, and nondistended.  Positive bowel sounds.    : No CVA tenderness  EXTREMITIES: Without edema.  NEUROLOGIC: grossly intact.  PSYCHIATRIC: Appropriate affect .  SKIN: No rash     Labs:  03-29    146<H>  |  112<H>  |  21  ----------------------------<  170<H>  4.0   |  26  |  1.50<H>    Ca    9.3      29 Mar 2024 03:30    TPro  7.3  /  Alb  3.9  /  TBili  0.6  /  DBili  x   /  AST  51<H>  /  ALT  38  /  AlkPhos  77  03-28                          16.3   14.56 )-----------( 179      ( 29 Mar 2024 03:30 )             48.4     PT/INR - ( 29 Mar 2024 07:15 )   PT: 15.9 sec;   INR: 1.37 ratio    PTT - ( 29 Mar 2024 07:15 )  PTT:33.0 sec  Urinalysis Basic - ( 29 Mar 2024 03:30 )    Color: x / Appearance: x / SG: x / pH: x  Gluc: 170 mg/dL / Ketone: x  / Bili: x / Urobili: x   Blood: x / Protein: x / Nitrite: x   Leuk Esterase: x / RBC: x / WBC x   Sq Epi: x / Non Sq Epi: x / Bacteria: x    LIVER FUNCTIONS - ( 28 Mar 2024 16:00 )  Alb: 3.9 g/dL / Pro: 7.3 g/dL / ALK PHOS: 77 U/L / ALT: 38 U/L / AST: 51 U/L / GGT: x           CARDIAC MARKERS ( 29 Mar 2024 06:33 )  x     / x     / 3112 U/L / x     / 23.5 ng/mL  CARDIAC MARKERS ( 29 Mar 2024 03:30 )  x     / x     / 3053 U/L / x     / 18.7 ng/mL  CARDIAC MARKERS ( 28 Mar 2024 21:21 )  x     / x     / 3109 U/L / x     / 17.4 ng/mL    ABG - ( 29 Mar 2024 01:18 )  pH, Arterial: 7.31  pH, Blood: x     /  pCO2: 46    /  pO2: 53    / HCO3: 23    / Base Excess: -3.1  /  SaO2: 85.6      SARS-CoV-2 Result: NotDetec (03-28-24 @ 16:00)    All imaging and other data have been reviewed.  < from: CT Chest No Cont (03.28.24 @ 15:31) >  IMPRESSION: Linear atelectasis at the right lung base. Minimal   pericardial effusion  No evidence of acute traumatic injury  Status post TAVR      Assessment and Plan:   83 yo M with PMH aortic stenosis s/p TAVR 2019, dementia, HLD, asthm, COPD, overactive bladder ,H pylori, BPH presents to the ED with lethargy/weakness.  In ED was found to have Influenza. Last night had RRT for hypoxia in 60s.     Thank you for courtesy of this consult.     Will follow.  Discussed with the primary team.     Deniz Desai MD  Division of Infectious Diseases   Please call ID service at [phone] with any question.    75 minutes spent on total encounter assessing patient, examination, chart review, counseling and coordinating care by the attending physician/nurse/care manager.   [facility]  INFECTIOUS DISEASES   [address] [place] [address]  Tel: [phone]     Fax: [phone]  ========================================================  MD Claudia Landaverde Kaushal, MD Cho, Michelle, MD Sunjit, Jaspal, MD  ========================================================    N-[id number]  MARLON KRUEGER     CC: Patient is a 84y old  Male who presents with a chief complaint of   HPI:  83 yo M with PMH aortic stenosis s/p TAVR 2019, dementia, HLD, asthm, COPD, overactive bladder ,H pylori, BPH presents to the ED with lethargy/weakness. Interview was conducted on phone with patients daughter Katharina as pt has severe dementia and cannot answer questions, tried to communicate with Malay  but patient could not participate. Per daughter, pt had a fall yesterday when trying to get out of bed in the morning (lives at home with daughter, son in law and wife- does not use walking device- per daughter "wouldn't know  how").  Last night, patient was found to be much weaker and more lethargic than usual and sustained another fall this AM. This fall was unwitnessed but patient was found face forward on the floor on the carpet. Of note, daughter was just diagnosed with flu at urgent care. Daughter denied pt had any new sx of cp,palpitations, sob, cough, n/v/d/constipation/urinary changes however daughter acknowledges that difficult to know for sure 2/2 mental status.     Of note, per [facility], patient was seen by pulmonologist Dr. Zuñiga in June 2023 for asthma exacerbation tx with Trelegy, ventolin inhaler- no steroids given.      ED Course:   Vitals: BP: 129/73 , HR: 96, Temp: 98-->101.1 , RR: , SpO2: 96% on RA   Labs:  WBC: 10.58, Lactate WNL, Influenza A: Detected  UA Pending  CXR:   1. Mildly elevated right hemidiaphragm with subsegmental atelectasis near   the cardiophrenic angle on the right.  2. Status post TAVR. No CHF.  3. No evidence of infiltrate, pleural effusion or pulmonary edema.  CT Head:  CT HEAD:  1.  No evidence of acute intracranial hemorrhage or midline shift.  2.  Chronic small vessel disease.    CT CERVICAL SPINE:  1.  No evidence of acute osseous fracture or rosario dislocation.  2.  Multilevel degenerative change of the cervical spine.  3.  Slightly mottled appearance to the vertebral bodies of the cervical   and upper thoracic spine. This could be osteopenic in nature. A   underlying myeloma/lymphoma or other metastatic process is not entirely   excluded. Recommend clinical and laboratory correlation.    CT Chest:  Linear atelectasis at the right lung base. Minimal pericardial effusion No evidence of acute traumatic injury. Status post TAVR  EKG: Poor quality, will repeat   Received in the ED: Ofirmev x 1, Cefepime x 1, Zyprexa x1, LR bolus x1.    (28 Mar 2024 18:55)      PAST MEDICAL & SURGICAL HISTORY:  Aortic stenosis  BPH (benign prostatic hyperplasia)  Dementia  mild- on Memantine  ACE (dyspnea on exertion)  Asthma  controlled on inhalers  HLD (hyperlipidemia)  CAD (coronary artery disease)  s/p stent 10/2019  OH (ocular hypertension)  on eye drops  Overactive bladder  Anxiety  History of tonsillectomy  childhood  H/O coronary angiogram  10/25/19, s/p PCI to LAD    Social Hx: No current smoking, EtOH or drugs     FAMILY HISTORY:  Noncontributory     Allergies  No Known Allergies    MEDICATIONS  (STANDING):  albuterol/ipratropium for Nebulization 3 milliLiter(s) Nebulizer every 6 hours  amLODIPine   Tablet 5 milliGRAM(s) Oral daily  azithromycin  IVPB 500 milliGRAM(s) IV Intermittent every 24 hours  azithromycin  IVPB      budesonide  80 MICROgram(s)/formoterol 4.5 MICROgram(s) Inhaler 2 Puff(s) Inhalation two times a day  clopidogrel Tablet 75 milliGRAM(s) Oral daily  donepezil 10 milliGRAM(s) Oral with breakfast  donepezil 5 milliGRAM(s) Oral at bedtime  famotidine    Tablet 40 milliGRAM(s) Oral daily  finasteride 5 milliGRAM(s) Oral daily  heparin  Infusion.  Unit(s)/Hr (10 mL/Hr) IV Continuous <Continuous>  latanoprost 0.005% Ophthalmic Solution 1 Drop(s) Both EYES at bedtime  memantine 10 milliGRAM(s) Oral two times a day  montelukast 10 milliGRAM(s) Oral daily  oseltamivir 75 milliGRAM(s) Oral two times a day  QUEtiapine 25 milliGRAM(s) Oral at bedtime  QUEtiapine 100 milliGRAM(s) Oral at bedtime  simvastatin 40 milliGRAM(s) Oral at bedtime  tiotropium 2.5 MICROgram(s) Inhaler 2 Puff(s) Inhalation daily    MEDICATIONS  (PRN):  acetaminophen     Tablet .. 650 milliGRAM(s) Oral every 6 hours PRN Temp greater or equal to 38C (100.4F), Mild Pain (1 - 3)  albuterol    90 MICROgram(s) HFA Inhaler 2 Puff(s) Inhalation every 6 hours PRN Shortness of Breath and/or Wheezing  aluminum hydroxide/magnesium hydroxide/simethicone Suspension 30 milliLiter(s) Oral every 4 hours PRN Dyspepsia  heparin   Injectable 5000 Unit(s) IV Push every 6 hours PRN For aPTT less than 40  melatonin 3 milliGRAM(s) Oral at bedtime PRN Insomnia  ondansetron Injectable 4 milliGRAM(s) IV Push every 8 hours PRN Nausea and/or Vomiting     REVIEW OF SYSTEMS:  Unable     Physical Exam:  Vital Signs Last 24 Hrs  T(C): 36.7 (29 Mar 2024 05:09), Max: 39.4 (28 Mar 2024 22:21)  T(F): 98 (29 Mar 2024 05:09), Max: 103 (28 Mar 2024 22:21)  HR: 94 (29 Mar 2024 05:55) (86 - 125)  BP: 101/56 (29 Mar 2024 05:09) (101/56 - 171/104)  BP(mean): --  RR: 17 (29 Mar 2024 05:09) (17 - 30)  SpO2: 95% (29 Mar 2024 05:55) (63% - 95%)  Parameters below as of 29 Mar 2024 05:55  Patient On (Oxygen Delivery Method): nasal cannula, high flow  Height (cm): 172.7 (03-28 @ 14:02)  Weight (kg): 83.9 (03-28 @ 14:02)  BMI (kg/m2): 28.1 (03-28 @ 14:02)  BSA (m2): 1.98 (03-28 @ 14:02)  GEN: NAD on high flow NC  HEENT: normocephalic and atraumatic. EOMI. PERRL.    NECK: Supple.  No lymphadenopathy   LUNGS: Clear to auscultation.  HEART: Regular rate and rhythm   ABDOMEN: Soft, nontender, and nondistended.   : No CVA tenderness  EXTREMITIES: Without edema.  NEUROLOGIC: grossly intact.  PSYCHIATRIC: Confused   SKIN: No rash     Labs:  03-29    146<H>  |  112<H>  |  21  ----------------------------<  170<H>  4.0   |  26  |  1.50<H>    Ca    9.3      29 Mar 2024 03:30    TPro  7.3  /  Alb  3.9  /  TBili  0.6  /  DBili  x   /  AST  51<H>  /  ALT  38  /  AlkPhos  77  03-28                        16.3   14.56 )-----------( 179      ( 29 Mar 2024 03:30 )             48.4     PT/INR - ( 29 Mar 2024 07:15 )   PT: 15.9 sec;   INR: 1.37 ratio    PTT - ( 29 Mar 2024 07:15 )  PTT:33.0 sec  Urinalysis Basic - ( 29 Mar 2024 03:30 )    Color: x / Appearance: x / SG: x / pH: x  Gluc: 170 mg/dL / Ketone: x  / Bili: x / Urobili: x   Blood: x / Protein: x / Nitrite: x   Leuk Esterase: x / RBC: x / WBC x   Sq Epi: x / Non Sq Epi: x / Bacteria: x    LIVER FUNCTIONS - ( 28 Mar 2024 16:00 )  Alb: 3.9 g/dL / Pro: 7.3 g/dL / ALK PHOS: 77 U/L / ALT: 38 U/L / AST: 51 U/L / GGT: x           CARDIAC MARKERS ( 29 Mar 2024 06:33 )  x     / x     / 3112 U/L / x     / 23.5 ng/mL  CARDIAC MARKERS ( 29 Mar 2024 03:30 )  x     / x     / 3053 U/L / x     / 18.7 ng/mL  CARDIAC MARKERS ( 28 Mar 2024 21:21 )  x     / x     / 3109 U/L / x     / 17.4 ng/mL    ABG - ( 29 Mar 2024 01:18 )  pH, Arterial: 7.31  pH, Blood: x     /  pCO2: 46    /  pO2: 53    / HCO3: 23    / Base Excess: -3.1  /  SaO2: 85.6      SARS-CoV-2 Result: NotDetec (03-28-24 @ 16:00)    All imaging and other data have been reviewed.  < from: CT Chest No Cont (03.28.24 @ 15:31) >  IMPRESSION: Linear atelectasis at the right lung base. Minimal   pericardial effusion  No evidence of acute traumatic injury  Status post TAVR    Assessment and Plan:   83 yo M with PMH aortic stenosis s/p TAVR 2019, dementia, HLD, asthm, COPD, overactive bladder ,H pylori, BPH presents to the ED with lethargy/weakness.  In ED was found to have Influenza. Last night had RRT for hypoxia in 60s and labs showed high Troponin.  Leukocytosis and hypoxia most likely related to cardiac event. Since no sign of opacities in lungs, can hold antibiotics. High fever due to influenza.     # Influenza  # Acute respiratory failure   # MI    - Will monitor labs  - Blood cultures   - UA and UC  - Pulmonary and cardiology follow up   - Nephrology is called for RAY  - Tamiflu adjusted for renal function for 5days   - Can watch off antibiotics for now     Thank you for courtesy of this consult.     Will follow.  Discussed with the primary team.     Deniz Desai MD  Division of Infectious Diseases   Please call ID service at [phone] with any question.    75 minutes spent on total encounter assessing patient, examination, chart review, counseling and coordinating care by the attending physician/nurse/care manager.

## 2024-03-29 NOTE — BH CONSULTATION LIAISON ASSESSMENT NOTE - HPI (INCLUDE ILLNESS QUALITY, SEVERITY, DURATION, TIMING, CONTEXT, MODIFYING FACTORS, ASSOCIATED SIGNS AND SYMPTOMS)
Patient seen, evaluted and chart reviewed. 83 yo M with PMH aortic stenosis s/p TAVR 2019, dementia, HLD, asthm, COPD, overactive bladder ,H pylori, BPH presents to the ED with lethargy/weakness. Interview was conducted on phone with patients daughter Katharina as pt has severe dementia and cannot answer questions, tried to communicate with Telugu  but patient could not participate. Per daughter, pt had a fall yesterday when trying to get out of bed in the morning (lives at home with daughter, son in law and wife- does not use walking device- per daughter "wouldn't know  how").  Last night, patient was found to be much weaker and more lethargic than usual and sustained another fall this AM. This fall was unwitnessed but patient was found face forward on the floor on the carpet. Of note, daughter was just diagnosed with flu at urgent care. Daughter denied pt had any new sx of cp,palpitations, sob, cough, n/v/d/constipation/urinary changes however daughter acknowledges that difficult to know for sure 2/2 mental status. As per medical patient QT was prolonged, Seroquel and Namenda were decreased. Currently patient is confused and unable to engage.

## 2024-03-29 NOTE — BH CONSULTATION LIAISON ASSESSMENT NOTE - ACCESS TO FIREARM
Blood pressure in clinic elevated, she states she is in a lot of back pain and walked a distance to get coffee here this am.
Diabetes Mellitus: Not at goal and reports that she very motivated to change. She needs to see diabetes education and get started on insulin. From a cost standpoint, would suggest bedtime NPH as a starting place. She though cannot see our office any longer due to insurance. Referral placed to bill for endocrine and diabetes education. In the interim, increase the glimepiride. Follow-up as needed with me.     patient knows needs to check her blood glucoses and needs to take her medications. Really needs to take a more active role in self management and responsibility for diabetes in terms of taking her medications and checking her glucoses. Discussed consequences of poorly controlled dm. Plan:I have recommended the following steps for improving diabetic care and outcome to her: referral to Diabetic Education department and/or ongoing followup with diabetic education, home glucose monitoring emphasized, annual eye examinations at Ophthalmology discussed, glycohemoglobin and other lab monitoring discussed, long term diabetic complications discussed and labs immediately prior to next visit. I answered all of her questions and she is comfortable with the plan as outlined. she voices understanding of the follow-up plan including labs, office visits, and was provided with an after visit summary. she will call our office with any further questions, concerns, or problems.
Lab Results   Component Value Date    TSH 2.950 09/17/2019       Last TSH at goal, as per primary care provider.
On ACE
Per last eye report shows no retinopathy, routine follow-up recommended as per eye care.
She reports not using her CPAP.  Routine usage of CPAP encouraged
Wt Readings from Last 5 Encounters:   02/11/20 93.3 kg   01/14/20 89.8 kg   01/14/20 90 kg   10/14/19 90.7 kg   09/17/19 90.3 kg       We discussed the importance of weight loss - she is planning on joining [employer] and continuing to work on diet changes.
No

## 2024-03-29 NOTE — PROGRESS NOTE ADULT - SUBJECTIVE AND OBJECTIVE BOX
Patient is a 84y old  Male who presents with a chief complaint of     Subjective:  INTERVAL HPI/OVERNIGHT EVENTS: Patient seen and examined at bedside.  Patient has episodes of agitation, RF on HFNC and non re breather   RRT noted  MEDICATIONS  (STANDING):  albuterol/ipratropium for Nebulization 3 milliLiter(s) Nebulizer every 6 hours  budesonide  80 MICROgram(s)/formoterol 4.5 MICROgram(s) Inhaler 2 Puff(s) Inhalation two times a day  clopidogrel Tablet 75 milliGRAM(s) Oral daily  finasteride 5 milliGRAM(s) Oral daily  heparin  Infusion.  Unit(s)/Hr (10 mL/Hr) IV Continuous <Continuous>  latanoprost 0.005% Ophthalmic Solution 1 Drop(s) Both EYES at bedtime  methylPREDNISolone sodium succinate Injectable 40 milliGRAM(s) IV Push daily  montelukast 10 milliGRAM(s) Oral daily  oseltamivir 30 milliGRAM(s) Oral two times a day  pantoprazole  Injectable 40 milliGRAM(s) IV Push daily  QUEtiapine 100 milliGRAM(s) Oral at bedtime  simvastatin 40 milliGRAM(s) Oral at bedtime  sodium chloride 0.45%. 1000 milliLiter(s) (75 mL/Hr) IV Continuous <Continuous>  tiotropium 2.5 MICROgram(s) Inhaler 2 Puff(s) Inhalation daily    MEDICATIONS  (PRN):  acetaminophen     Tablet .. 650 milliGRAM(s) Oral every 6 hours PRN Temp greater or equal to 38C (100.4F), Mild Pain (1 - 3)  albuterol    90 MICROgram(s) HFA Inhaler 2 Puff(s) Inhalation every 6 hours PRN Shortness of Breath and/or Wheezing  aluminum hydroxide/magnesium hydroxide/simethicone Suspension 30 milliLiter(s) Oral every 4 hours PRN Dyspepsia  heparin   Injectable 5000 Unit(s) IV Push every 6 hours PRN For aPTT less than 40  melatonin 3 milliGRAM(s) Oral at bedtime PRN Insomnia  ondansetron Injectable 4 milliGRAM(s) IV Push every 8 hours PRN Nausea and/or Vomiting      Allergies    No Known Allergies    Intolerances        REVIEW OF SYSTEMS:  not able to obtain due to dementia       Objective:  Vital Signs Last 24 Hrs  T(C): 36.6 (29 Mar 2024 09:32), Max: 39.4 (28 Mar 2024 22:21)  T(F): 97.9 (29 Mar 2024 09:32), Max: 103 (28 Mar 2024 22:21)  HR: 95 (29 Mar 2024 09:32) (86 - 125)  BP: 112/69 (29 Mar 2024 09:32) (101/56 - 171/104)  BP(mean): 84 (29 Mar 2024 09:32) (84 - 84)  RR: 18 (29 Mar 2024 09:32) (17 - 30)  SpO2: 98% (29 Mar 2024 09:32) (63% - 98%)    Parameters below as of 29 Mar 2024 09:32  Patient On (Oxygen Delivery Method): nasal cannula, high flow  O2 Flow (L/min): 40  O2 Concentration (%): 100    GENERAL: NAD, lying in bed, trying to get out of bed.   HEAD:  Normocephalic  EYES:  conjunctiva and sclera clear  ENT: Moist mucous membranes  NECK: Supple  CHEST/LUNG: occasional rhonchi; no wheezing. Unlabored respirations  HEART: Regular rate and rhythm; S1S2+  ABDOMEN: Bowel sounds present; Soft, Nontender, mild distended.   EXTREMITIES:  + distal Peripheral Pulses;  No cyanosis, or edema  NERVOUS SYSTEM:  Alert & Oriented X0;  No gross focal deficits   MSK: moves all extremities  SKIN: No rashes     LABS:                        16.3   14.56 )-----------( 179      ( 29 Mar 2024 03:30 )             48.4     29 Mar 2024 03:30    146    |  112    |  21     ----------------------------<  170    4.0     |  26     |  1.50     Ca    9.3        29 Mar 2024 03:30    TPro  7.3    /  Alb  3.9    /  TBili  0.6    /  DBili  x      /  AST  51     /  ALT  38     /  AlkPhos  77     28 Mar 2024 16:00    PT/INR - ( 29 Mar 2024 07:15 )   PT: 15.9 sec;   INR: 1.37 ratio         PTT - ( 29 Mar 2024 07:15 )  PTT:33.0 sec  Urinalysis Basic - ( 29 Mar 2024 06:30 )    Color: Yellow / Appearance: Cloudy / S.012 / pH: x  Gluc: x / Ketone: Negative mg/dL  / Bili: Negative / Urobili: 0.2 mg/dL   Blood: x / Protein: Trace mg/dL / Nitrite: Negative   Leuk Esterase: Negative / RBC: Too Numerous to count /HPF / WBC 0 /HPF   Sq Epi: x / Non Sq Epi: x / Bacteria: x      CAPILLARY BLOOD GLUCOSE      POCT Blood Glucose.: 190 mg/dL (28 Mar 2024 21:13)          RADIOLOGY & ADDITIONAL TESTS:    Personally reviewed.     Consultant(s) Notes Reviewed:  [x] YES  [ ] NO    Plan of care discussed with patient /family wife in the room and daughter on the phone ; all questions answered

## 2024-03-29 NOTE — CONSULT NOTE ADULT - SUBJECTIVE AND OBJECTIVE BOX
[facility]         Jimenez Varghese Patel, Mey, Valentin      [phone] (office)    Reason for Consult: Elevated troponins    Interval HPI: Patient seen and examined at bedside. Patient had multiple events overnight involving hypoxia 2/2 COPD exacerbation and increased agitation 2/2 dementia. The patient was given 1 dose Olanzapine and put on HFNC 40L/min at 100% FiO2.      HPI:  85 yo M with PMH aortic stenosis s/p TAVR 2019, dementia, HLD, asthma, COPD, overactive bladder ,H pylori, BPH presents to the ED with lethargy/weakness. Interview was conducted on phone with patients daughter Katharina as pt has severe dementia and cannot answer questions, tried to communicate with [place]  but patient could not participate. Per daughter, pt had a fall yesterday when trying to get out of bed in the morning (lives at home with daughter, son in law and wife- does not use walking device- per daughter "wouldn't know  how").  Last night, patient was found to be much weaker and more lethargic than usual and sustained another fall this AM. This fall was unwitnessed but patient was found face forward on the floor on the carpet. Of note, daughter was just diagnosed with flu at urgent care. Daughter denied pt had any new sx of cp,palpitations, sob, cough, n/v/d/constipation/urinary changes however daughter acknowledges that difficult to know for sure 2/2 mental status.     Of note, per [facility], patient was seen by pulmonologist Dr. Zuñiga in June 2023 for asthma exacerbation tx with Trelegy, ventolin inhaler- no steroids given.      ED Course:   Vitals: BP: 129/73 , HR: 96, Temp: 98-->101.1 , RR: , SpO2: 96% on RA   Labs:  WBC: 10.58, Lactate WNL, Influenza A: Detected  UA Pending  CXR:   1. Mildly elevated right hemidiaphragm with subsegmental atelectasis near   the cardiophrenic angle on the right.  2. Status post TAVR. No CHF.  3. No evidence of infiltrate, pleural effusion or pulmonary edema.  CT Head:  CT HEAD:  1.  No evidence of acute intracranial hemorrhage or midline shift.  2.  Chronic small vessel disease.    CT CERVICAL SPINE:  1.  No evidence of acute osseous fracture or rosario dislocation.  2.  Multilevel degenerative change of the cervical spine.  3.  Slightly mottled appearance to the vertebral bodies of the cervical   and upper thoracic spine. This could be osteopenic in nature. A   underlying myeloma/lymphoma or other metastatic process is not entirely   excluded. Recommend clinical and laboratory correlation.    CT Chest:  Linear atelectasis at the right lung base. Minimal pericardial effusion No evidence of acute traumatic injury. Status post TAVR  EKG: Poor quality, will repeat   Received in the ED: Ofirmev x 1, Cefepime x 1, Zyprexa x1, LR bolus x1.    (28 Mar 2024 18:55)      PAST MEDICAL & SURGICAL HISTORY:  Aortic stenosis      BPH (benign prostatic hyperplasia)      Dementia  mild- on Memantine      ACE (dyspnea on exertion)      Asthma  controlled on inhalers      HLD (hyperlipidemia)      CAD (coronary artery disease)  s/p stent 10/2019      OH (ocular hypertension)  on eye drops      Overactive bladder      Anxiety      History of tonsillectomy  childhood      H/O coronary angiogram  10/25/19, s/p PCI to LAD          SOCIAL HISTORY: No active tobacco, alcohol or illicit drug use        Home Medications:  amLODIPine 5 mg oral tablet: 1 tab(s) orally once a day (28 Mar 2024 19:09)  donepezil 10 mg oral tablet: 1 tab(s) orally once a day (in the morning) (28 Mar 2024 19:13)  donepezil 5 mg oral tablet: 1 tab(s) orally once a day (at bedtime) (28 Mar 2024 19:13)  famotidine 40 mg oral tablet: 1 tab(s) orally once a day (28 Mar 2024 19:13)  finasteride 5 mg oral tablet: 1 tab(s) orally once a day (28 Mar 2024 19:12)  memantine 10 mg oral tablet: 1 tab(s) orally 2 times a day (28 Mar 2024 19:12)  Myrbetriq 25 mg oral tablet, extended release: 1 tab(s) orally once a day (at bedtime) (28 Mar 2024 19:12)  QUEtiapine 100 mg oral tablet: 1 tab(s) orally once a day (at bedtime) (28 Mar 2024 19:13)  QUEtiapine 25 mg oral tablet: 1 tab(s) orally once a day (at bedtime) (28 Mar 2024 19:13)  simvastatin 40 mg oral tablet: 1 tab(s) orally once a day (at bedtime) (28 Mar 2024 19:12)  Singulair 10 mg oral tablet: 1 tab(s) orally once a day (28 Mar 2024 19:12)  Trelegy Ellipta 200 mcg-62.5 mcg-25 mcg/inh inhalation powder: 1 puff(s) inhaled once a day (28 Mar 2024 19:13)  Ventolin HFA 90 mcg/inh inhalation aerosol: 2 puff(s) inhaled 4 times a day, As Needed (28 Mar 2024 19:12)  Xalatan 0.005% ophthalmic solution: 1 drop(s) to each affected eye once a day (in the evening) (28 Mar 2024 19:12)      MEDICATIONS  (STANDING):  albuterol/ipratropium for Nebulization 3 milliLiter(s) Nebulizer every 6 hours  budesonide  80 MICROgram(s)/formoterol 4.5 MICROgram(s) Inhaler 2 Puff(s) Inhalation two times a day  clopidogrel Tablet 75 milliGRAM(s) Oral daily  finasteride 5 milliGRAM(s) Oral daily  heparin  Infusion.  Unit(s)/Hr (10 mL/Hr) IV Continuous <Continuous>  latanoprost 0.005% Ophthalmic Solution 1 Drop(s) Both EYES at bedtime  methylPREDNISolone sodium succinate Injectable 40 milliGRAM(s) IV Push daily  montelukast 10 milliGRAM(s) Oral daily  oseltamivir 30 milliGRAM(s) Oral two times a day  pantoprazole  Injectable 40 milliGRAM(s) IV Push daily  QUEtiapine 100 milliGRAM(s) Oral at bedtime  simvastatin 40 milliGRAM(s) Oral at bedtime  sodium chloride 0.45%. 1000 milliLiter(s) (75 mL/Hr) IV Continuous <Continuous>  tiotropium 2.5 MICROgram(s) Inhaler 2 Puff(s) Inhalation daily    MEDICATIONS  (PRN):  acetaminophen     Tablet .. 650 milliGRAM(s) Oral every 6 hours PRN Temp greater or equal to 38C (100.4F), Mild Pain (1 - 3)  albuterol    90 MICROgram(s) HFA Inhaler 2 Puff(s) Inhalation every 6 hours PRN Shortness of Breath and/or Wheezing  aluminum hydroxide/magnesium hydroxide/simethicone Suspension 30 milliLiter(s) Oral every 4 hours PRN Dyspepsia  heparin   Injectable 5000 Unit(s) IV Push every 6 hours PRN For aPTT less than 40  melatonin 3 milliGRAM(s) Oral at bedtime PRN Insomnia  ondansetron Injectable 4 milliGRAM(s) IV Push every 8 hours PRN Nausea and/or Vomiting      Allergies    No Known Allergies    Intolerances        REVIEW OF SYSTEMS: Unable to obtain.    VITAL SIGNS:   Vital Signs Last 24 Hrs  T(C): 36.9 (29 Mar 2024 12:56), Max: 39.4 (28 Mar 2024 22:21)  T(F): 98.4 (29 Mar 2024 12:56), Max: 103 (28 Mar 2024 22:21)  HR: 94 (29 Mar 2024 13:16) (86 - 125)  BP: 122/74 (29 Mar 2024 12:56) (101/56 - 171/104)  BP(mean): 84 (29 Mar 2024 09:32) (84 - 84)  RR: 18 (29 Mar 2024 12:56) (17 - 30)  SpO2: 92% (29 Mar 2024 13:16) (63% - 98%)    Parameters below as of 29 Mar 2024 13:16  Patient On (Oxygen Delivery Method): nasal cannula, high flow        I&O's Summary    28 Mar 2024 07:01  -  29 Mar 2024 07:00  --------------------------------------------------------  IN: 0 mL / OUT: 900 mL / NET: -900 mL    29 Mar 2024 07:01  -  29 Mar 2024 14:16  --------------------------------------------------------  IN: 0 mL / OUT: 650 mL / NET: -650 mL        PHYSICAL EXAM:  Constitutional: NAD on HFNC  HEENT NC/AT, moist mucous membranes  Pulmonary: Non-labored, breath sounds are clear bilaterally, no wheezing, rales or rhonchi  Cardiovascular: +S1, S2, RRR, no murmur  Gastrointestinal: Soft, nontender, nondistended, normoactive bowel sounds  Extremities: No peripheral edema   Neurological: A&Ox0 but responds to his name.  Skin: No obvious lesions/rashes      LABS: All Labs Reviewed:                        16.3   14.56 )-----------( 179      ( 29 Mar 2024 03:30 )             48.4                         14.9   10.58 )-----------( 172      ( 28 Mar 2024 16:00 )             43.9     29 Mar 2024 03:30    146    |  112    |  21     ----------------------------<  170    4.0     |  26     |  1.50   28 Mar 2024 16:00    144    |  109    |  19     ----------------------------<  116    4.1     |  27     |  0.98     Ca    9.3        29 Mar 2024 03:30  Ca    9.0        28 Mar 2024 16:00    TPro  7.3    /  Alb  3.9    /  TBili  0.6    /  DBili  x      /  AST  51     /  ALT  38     /  AlkPhos  77     28 Mar 2024 16:00    PT/INR - ( 29 Mar 2024 07:15 )   PT: 15.9 sec;   INR: 1.37 ratio         PTT - ( 29 Mar 2024 07:15 )  PTT:33.0 sec  CARDIAC MARKERS ( 29 Mar 2024 06:33 )  x     / x     / 3112 U/L / x     / 23.5 ng/mL  CARDIAC MARKERS ( 29 Mar 2024 03:30 )  x     / x     / 3053 U/L / x     / 18.7 ng/mL  CARDIAC MARKERS ( 28 Mar 2024 21:21 )  x     / x     / 3109 U/L / x     / 17.4 ng/mL

## 2024-03-29 NOTE — SWALLOW BEDSIDE ASSESSMENT ADULT - ASR SWALLOW ASPIRATION MONITOR
If s/s of aspiration arise please d/c PO, initiate NPO, and reconsult ST/change of breathing pattern/oral hygiene/position upright (90Y)/cough/gurgly voice/fever/pneumonia/throat clearing/upper respiratory infection

## 2024-03-29 NOTE — PROGRESS NOTE ADULT - PROBLEM SELECTOR PLAN 2
Hypoxic to 60s , initial ABG respiratory acidosis with Co2 retention, repeat ABG improved   due to flu and COPD exacerbation   HFNC for now, wean as tolerated.   GOC discussed with palliative team  and family- DNR/DNI: patient with multiple significant acute and chronic medical conditions, risk of sudden decompensation. prognosis poor. candidate for palliative hospice service Hypoxic to 60s , initial ABG respiratory acidosis with Co2 retention, repeat ABG improved   likely due to flu and COPD exacerbation /NSTEMI   -Will obtain CTA chest to r/o PE when renal function improves , on heparin drip now   HFNC for now, wean as tolerated.   GOC discussed with palliative team  and family- DNR/DNI: patient with multiple significant acute and chronic medical conditions, risk of sudden decompensation. prognosis poor. candidate for palliative hospice service

## 2024-03-29 NOTE — CONSULT NOTE ADULT - PROBLEM SELECTOR PROBLEM 1
Left message to reschedule surgery as surgeon will be off 2/18/2021   Acute hypoxic respiratory failure

## 2024-03-29 NOTE — CONSULT NOTE ADULT - SUBJECTIVE AND OBJECTIVE BOX
Patient is a 84y old  Male who presents with a chief complaint of   HPI:  85 yo M with PMH aortic stenosis s/p TAVR 2019, dementia, HLD, asthm, COPD, overactive bladder ,H pylori, BPH presents to the ED with lethargy/weakness. Interview was conducted on phone with patients daughter Katharina as pt has severe dementia and cannot answer questions, tried to communicate with Faroese  but patient could not participate. Per daughter, pt had a fall yesterday when trying to get out of bed in the morning (lives at home with daughter, son in law and wife- does not use walking device- per daughter "wouldn't know  how").  Last night, patient was found to be much weaker and more lethargic than usual and sustained another fall this AM. This fall was unwitnessed but patient was found face forward on the floor on the carpet. Of note, daughter was just diagnosed with flu at urgent care. Daughter denied pt had any new sx of cp,palpitations, sob, cough, n/v/d/constipation/urinary changes however daughter acknowledges that difficult to know for sure 2/2 mental status.     Of note, per [facility], patient was seen by pulmonologist Dr. Zuñiga in June 2023 for asthma exacerbation tx with Trelegy, ventolin inhaler- no steroids given.      ED Course:   Vitals: BP: 129/73 , HR: 96, Temp: 98-->101.1 , RR: , SpO2: 96% on RA   Labs:  WBC: 10.58, Lactate WNL, Influenza A: Detected  UA Pending  CXR:   1. Mildly elevated right hemidiaphragm with subsegmental atelectasis near   the cardiophrenic angle on the right.  2. Status post TAVR. No CHF.  3. No evidence of infiltrate, pleural effusion or pulmonary edema.  CT Head:  CT HEAD:  1.  No evidence of acute intracranial hemorrhage or midline shift.  2.  Chronic small vessel disease.    CT CERVICAL SPINE:  1.  No evidence of acute osseous fracture or rosario dislocation.  2.  Multilevel degenerative change of the cervical spine.  3.  Slightly mottled appearance to the vertebral bodies of the cervical   and upper thoracic spine. This could be osteopenic in nature. A   underlying myeloma/lymphoma or other metastatic process is not entirely   excluded. Recommend clinical and laboratory correlation.    CT Chest:  Linear atelectasis at the right lung base. Minimal pericardial effusion No evidence of acute traumatic injury. Status post TAVR  EKG: Poor quality, will repeat   Received in the ED: Ofirmev x 1, Cefepime x 1, Zyprexa x1, LR bolus x1.    (28 Mar 2024 18:55)      PAST MEDICAL HISTORY:  Aortic stenosis    BPH (benign prostatic hyperplasia)    Dementia    ACE (dyspnea on exertion)    Asthma    HLD (hyperlipidemia)    CAD (coronary artery disease)    OH (ocular hypertension)    Overactive bladder    Anxiety        PAST SURGICAL HISTORY:  History of tonsillectomy    H/O coronary angiogram        FAMILY HISTORY:      SOCIAL HISTORY:    Allergies    No Known Allergies    Intolerances      Home Medications:  amLODIPine 5 mg oral tablet: 1 tab(s) orally once a day (28 Mar 2024 19:09)  donepezil 10 mg oral tablet: 1 tab(s) orally once a day (in the morning) (28 Mar 2024 19:13)  donepezil 5 mg oral tablet: 1 tab(s) orally once a day (at bedtime) (28 Mar 2024 19:13)  famotidine 40 mg oral tablet: 1 tab(s) orally once a day (28 Mar 2024 19:13)  finasteride 5 mg oral tablet: 1 tab(s) orally once a day (28 Mar 2024 19:12)  memantine 10 mg oral tablet: 1 tab(s) orally 2 times a day (28 Mar 2024 19:12)  Myrbetriq 25 mg oral tablet, extended release: 1 tab(s) orally once a day (at bedtime) (28 Mar 2024 19:12)  QUEtiapine 100 mg oral tablet: 1 tab(s) orally once a day (at bedtime) (28 Mar 2024 19:13)  QUEtiapine 25 mg oral tablet: 1 tab(s) orally once a day (at bedtime) (28 Mar 2024 19:13)  simvastatin 40 mg oral tablet: 1 tab(s) orally once a day (at bedtime) (28 Mar 2024 19:12)  Singulair 10 mg oral tablet: 1 tab(s) orally once a day (28 Mar 2024 19:12)  Trelegy Ellipta 200 mcg-62.5 mcg-25 mcg/inh inhalation powder: 1 puff(s) inhaled once a day (28 Mar 2024 19:13)  Ventolin HFA 90 mcg/inh inhalation aerosol: 2 puff(s) inhaled 4 times a day, As Needed (28 Mar 2024 19:12)  Xalatan 0.005% ophthalmic solution: 1 drop(s) to each affected eye once a day (in the evening) (28 Mar 2024 19:12)    MEDICATIONS  (STANDING):  albuterol/ipratropium for Nebulization 3 milliLiter(s) Nebulizer every 6 hours  amLODIPine   Tablet 5 milliGRAM(s) Oral daily  azithromycin  IVPB      azithromycin  IVPB 500 milliGRAM(s) IV Intermittent every 24 hours  budesonide  80 MICROgram(s)/formoterol 4.5 MICROgram(s) Inhaler 2 Puff(s) Inhalation two times a day  clopidogrel Tablet 75 milliGRAM(s) Oral daily  donepezil 10 milliGRAM(s) Oral with breakfast  donepezil 5 milliGRAM(s) Oral at bedtime  famotidine    Tablet 40 milliGRAM(s) Oral daily  finasteride 5 milliGRAM(s) Oral daily  heparin  Infusion.  Unit(s)/Hr (10 mL/Hr) IV Continuous <Continuous>  latanoprost 0.005% Ophthalmic Solution 1 Drop(s) Both EYES at bedtime  memantine 10 milliGRAM(s) Oral two times a day  montelukast 10 milliGRAM(s) Oral daily  oseltamivir 75 milliGRAM(s) Oral two times a day  QUEtiapine 25 milliGRAM(s) Oral at bedtime  QUEtiapine 100 milliGRAM(s) Oral at bedtime  simvastatin 40 milliGRAM(s) Oral at bedtime  tiotropium 2.5 MICROgram(s) Inhaler 2 Puff(s) Inhalation daily    MEDICATIONS  (PRN):  acetaminophen     Tablet .. 650 milliGRAM(s) Oral every 6 hours PRN Temp greater or equal to 38C (100.4F), Mild Pain (1 - 3)  albuterol    90 MICROgram(s) HFA Inhaler 2 Puff(s) Inhalation every 6 hours PRN Shortness of Breath and/or Wheezing  aluminum hydroxide/magnesium hydroxide/simethicone Suspension 30 milliLiter(s) Oral every 4 hours PRN Dyspepsia  heparin   Injectable 5000 Unit(s) IV Push every 6 hours PRN For aPTT less than 40  melatonin 3 milliGRAM(s) Oral at bedtime PRN Insomnia  ondansetron Injectable 4 milliGRAM(s) IV Push every 8 hours PRN Nausea and/or Vomiting      REVIEW OF SYSTEMS:  General:   Respiratory: No cough, SOB  Cardiovascular: No CP or Palpitations	  Gastrointestinal: No nausea, Vomiting. No diarrhea  Genitourinary: No urinary complaints	  Musculoskeletal: No leg swelling, No new rash or lesions	  Neurological: 	  all other systems negative    T(F): 98 (03-29-24 @ 05:09), Max: 103 (03-28-24 @ 22:21)  HR: 90 (03-29-24 @ 07:53) (86 - 125)  BP: 101/56 (03-29-24 @ 05:09) (101/56 - 171/104)  RR: 17 (03-29-24 @ 05:09) (17 - 30)  SpO2: 97% (03-29-24 @ 07:53) (63% - 97%)  Wt(kg): --    PHYSICAL EXAM:  General: NAD  Respiratory: b/l air entry  Cardiovascular: S1 S2  Gastrointestinal: soft  Extremities: edema        03-29    146<H>  |  112<H>  |  21  ----------------------------<  170<H>  4.0   |  26  |  1.50<H>    Ca    9.3      29 Mar 2024 03:30    TPro  7.3  /  Alb  3.9  /  TBili  0.6  /  DBili  x   /  AST  51<H>  /  ALT  38  /  AlkPhos  77  03-28                          16.3   14.56 )-----------( 179      ( 29 Mar 2024 03:30 )             48.4       Potassium: 4.0 mmol/L (03-29 @ 03:30)  Blood Urea Nitrogen: 21 mg/dL (03-29 @ 03:30)  Calcium: 9.3 mg/dL (03-29 @ 03:30)  Hemoglobin: 16.3 g/dL (03-29 @ 03:30)      Creatinine, Serum: 1.50 (03-29 @ 03:30)  Creatinine, Serum: 0.98 (03-28 @ 16:00)      Urinalysis Basic - ( 29 Mar 2024 03:30 )    Color: x / Appearance: x / SG: x / pH: x  Gluc: 170 mg/dL / Ketone: x  / Bili: x / Urobili: x   Blood: x / Protein: x / Nitrite: x   Leuk Esterase: x / RBC: x / WBC x   Sq Epi: x / Non Sq Epi: x / Bacteria: x      LIVER FUNCTIONS - ( 28 Mar 2024 16:00 )  Alb: 3.9 g/dL / Pro: 7.3 g/dL / ALK PHOS: 77 U/L / ALT: 38 U/L / AST: 51 U/L / GGT: x           CARDIAC MARKERS ( 29 Mar 2024 06:33 )  x     / x     / 3112 U/L / x     / 23.5 ng/mL  CARDIAC MARKERS ( 29 Mar 2024 03:30 )  x     / x     / 3053 U/L / x     / 18.7 ng/mL  CARDIAC MARKERS ( 28 Mar 2024 21:21 )  x     / x     / 3109 U/L / x     / 17.4 ng/mL      Creatine Kinase, Serum: 3112 U/L (03-29-24 @ 06:33)  Creatine Kinase, Serum: 3053 U/L (03-29-24 @ 03:30)  Creatine Kinase, Serum: 3109 U/L (03-28-24 @ 21:21)        ABG - ( 29 Mar 2024 01:18 )  pH, Arterial: 7.31  pH, Blood: x     /  pCO2: 46    /  pO2: 53    / HCO3: 23    / Base Excess: -3.1  /  SaO2: 85.6              I&O's Detail    28 Mar 2024 07:01  -  29 Mar 2024 07:00  --------------------------------------------------------  IN:  Total IN: 0 mL    OUT:    Intermittent Catheterization - Urethral (mL): 900 mL  Total OUT: 900 mL    Total NET: -900 mL             Patient is a 84y old  Male who presents with a chief complaint of weakness.     HPI:  83 yo M with PMH aortic stenosis s/p TAVR 2019, dementia, HLD, asthm, COPD, overactive bladder ,H pylori, BPH presents to the ED with lethargy/weakness. Interview was conducted on phone with patients daughter Katharina as pt has severe dementia and cannot answer questions, tried to communicate with Malian  but patient could not participate. Per daughter, pt had a fall yesterday when trying to get out of bed in the morning (lives at home with daughter, son in law and wife- does not use walking device- per daughter "wouldn't know  how").  Last night, patient was found to be much weaker and more lethargic than usual and sustained another fall this AM. This fall was unwitnessed but patient was found face forward on the floor on the carpet. Of note, daughter was just diagnosed with flu at urgent care. Daughter denied pt had any new sx of cp,palpitations, sob, cough, n/v/d/constipation/urinary changes however daughter acknowledges that difficult to know for sure 2/2 mental status.     Of note, per [facility]US[facility], patient was seen by pulmonologist Dr. Zuñiga in June 2023 for asthma exacerbation tx with Trelegy, ventolin inhaler- no steroids given.      Renal consult called for RAY. History obtained from chart.       PAST MEDICAL HISTORY:  Aortic stenosis    BPH (benign prostatic hyperplasia)    Dementia    ACE (dyspnea on exertion)    Asthma    HLD (hyperlipidemia)    CAD (coronary artery disease)    OH (ocular hypertension)    Overactive bladder    Anxiety        PAST SURGICAL HISTORY:  History of tonsillectomy    H/O coronary angiogram        FAMILY HISTORY:      SOCIAL HISTORY: No smoking or alcohol use     Allergies    No Known Allergies    Intolerances      Home Medications:  amLODIPine 5 mg oral tablet: 1 tab(s) orally once a day (28 Mar 2024 19:09)  donepezil 10 mg oral tablet: 1 tab(s) orally once a day (in the morning) (28 Mar 2024 19:13)  donepezil 5 mg oral tablet: 1 tab(s) orally once a day (at bedtime) (28 Mar 2024 19:13)  famotidine 40 mg oral tablet: 1 tab(s) orally once a day (28 Mar 2024 19:13)  finasteride 5 mg oral tablet: 1 tab(s) orally once a day (28 Mar 2024 19:12)  memantine 10 mg oral tablet: 1 tab(s) orally 2 times a day (28 Mar 2024 19:12)  Myrbetriq 25 mg oral tablet, extended release: 1 tab(s) orally once a day (at bedtime) (28 Mar 2024 19:12)  QUEtiapine 100 mg oral tablet: 1 tab(s) orally once a day (at bedtime) (28 Mar 2024 19:13)  QUEtiapine 25 mg oral tablet: 1 tab(s) orally once a day (at bedtime) (28 Mar 2024 19:13)  simvastatin 40 mg oral tablet: 1 tab(s) orally once a day (at bedtime) (28 Mar 2024 19:12)  Singulair 10 mg oral tablet: 1 tab(s) orally once a day (28 Mar 2024 19:12)  Trelegy Ellipta 200 mcg-62.5 mcg-25 mcg/inh inhalation powder: 1 puff(s) inhaled once a day (28 Mar 2024 19:13)  Ventolin HFA 90 mcg/inh inhalation aerosol: 2 puff(s) inhaled 4 times a day, As Needed (28 Mar 2024 19:12)  Xalatan 0.005% ophthalmic solution: 1 drop(s) to each affected eye once a day (in the evening) (28 Mar 2024 19:12)    MEDICATIONS  (STANDING):  albuterol/ipratropium for Nebulization 3 milliLiter(s) Nebulizer every 6 hours  amLODIPine   Tablet 5 milliGRAM(s) Oral daily  azithromycin  IVPB      azithromycin  IVPB 500 milliGRAM(s) IV Intermittent every 24 hours  budesonide  80 MICROgram(s)/formoterol 4.5 MICROgram(s) Inhaler 2 Puff(s) Inhalation two times a day  clopidogrel Tablet 75 milliGRAM(s) Oral daily  donepezil 10 milliGRAM(s) Oral with breakfast  donepezil 5 milliGRAM(s) Oral at bedtime  famotidine    Tablet 40 milliGRAM(s) Oral daily  finasteride 5 milliGRAM(s) Oral daily  heparin  Infusion.  Unit(s)/Hr (10 mL/Hr) IV Continuous <Continuous>  latanoprost 0.005% Ophthalmic Solution 1 Drop(s) Both EYES at bedtime  memantine 10 milliGRAM(s) Oral two times a day  montelukast 10 milliGRAM(s) Oral daily  oseltamivir 75 milliGRAM(s) Oral two times a day  QUEtiapine 25 milliGRAM(s) Oral at bedtime  QUEtiapine 100 milliGRAM(s) Oral at bedtime  simvastatin 40 milliGRAM(s) Oral at bedtime  tiotropium 2.5 MICROgram(s) Inhaler 2 Puff(s) Inhalation daily    MEDICATIONS  (PRN):  acetaminophen     Tablet .. 650 milliGRAM(s) Oral every 6 hours PRN Temp greater or equal to 38C (100.4F), Mild Pain (1 - 3)  albuterol    90 MICROgram(s) HFA Inhaler 2 Puff(s) Inhalation every 6 hours PRN Shortness of Breath and/or Wheezing  aluminum hydroxide/magnesium hydroxide/simethicone Suspension 30 milliLiter(s) Oral every 4 hours PRN Dyspepsia  heparin   Injectable 5000 Unit(s) IV Push every 6 hours PRN For aPTT less than 40  melatonin 3 milliGRAM(s) Oral at bedtime PRN Insomnia  ondansetron Injectable 4 milliGRAM(s) IV Push every 8 hours PRN Nausea and/or Vomiting      REVIEW OF SYSTEMS:  General: confused, unable to obtain    T(F): 98 (03-29-24 @ 05:09), Max: 103 (03-28-24 @ 22:21)  HR: 90 (03-29-24 @ 07:53) (86 - 125)  BP: 101/56 (03-29-24 @ 05:09) (101/56 - 171/104)  RR: 17 (03-29-24 @ 05:09) (17 - 30)  SpO2: 97% (03-29-24 @ 07:53) (63% - 97%)  Wt(kg): --    PHYSICAL EXAM:  General: NAD  Respiratory: b/l air entry  Cardiovascular: S1 S2  Gastrointestinal: soft  Extremities: no edema        03-29    146<H>  |  112<H>  |  21  ----------------------------<  170<H>  4.0   |  26  |  1.50<H>    Ca    9.3      29 Mar 2024 03:30    TPro  7.3  /  Alb  3.9  /  TBili  0.6  /  DBili  x   /  AST  51<H>  /  ALT  38  /  AlkPhos  77  03-28                          16.3   14.56 )-----------( 179      ( 29 Mar 2024 03:30 )             48.4       Potassium: 4.0 mmol/L (03-29 @ 03:30)  Blood Urea Nitrogen: 21 mg/dL (03-29 @ 03:30)  Calcium: 9.3 mg/dL (03-29 @ 03:30)  Hemoglobin: 16.3 g/dL (03-29 @ 03:30)      Creatinine, Serum: 1.50 (03-29 @ 03:30)  Creatinine, Serum: 0.98 (03-28 @ 16:00)      Urinalysis Basic - ( 29 Mar 2024 03:30 )    Color: x / Appearance: x / SG: x / pH: x  Gluc: 170 mg/dL / Ketone: x  / Bili: x / Urobili: x   Blood: x / Protein: x / Nitrite: x   Leuk Esterase: x / RBC: x / WBC x   Sq Epi: x / Non Sq Epi: x / Bacteria: x      LIVER FUNCTIONS - ( 28 Mar 2024 16:00 )  Alb: 3.9 g/dL / Pro: 7.3 g/dL / ALK PHOS: 77 U/L / ALT: 38 U/L / AST: 51 U/L / GGT: x           CARDIAC MARKERS ( 29 Mar 2024 06:33 )  x     / x     / 3112 U/L / x     / 23.5 ng/mL  CARDIAC MARKERS ( 29 Mar 2024 03:30 )  x     / x     / 3053 U/L / x     / 18.7 ng/mL  CARDIAC MARKERS ( 28 Mar 2024 21:21 )  x     / x     / 3109 U/L / x     / 17.4 ng/mL      Creatine Kinase, Serum: 3112 U/L (03-29-24 @ 06:33)  Creatine Kinase, Serum: 3053 U/L (03-29-24 @ 03:30)  Creatine Kinase, Serum: 3109 U/L (03-28-24 @ 21:21)        ABG - ( 29 Mar 2024 01:18 )  pH, Arterial: 7.31  pH, Blood: x     /  pCO2: 46    /  pO2: 53    / HCO3: 23    / Base Excess: -3.1  /  SaO2: 85.6              I&O's Detail    28 Mar 2024 07:01  -  29 Mar 2024 07:00  --------------------------------------------------------  IN:  Total IN: 0 mL    OUT:    Intermittent Catheterization - Urethral (mL): 900 mL  Total OUT: 900 mL    Total NET: -900 mL      < from: Xray Chest 1 View- PORTABLE-Urgent (03.28.24 @ 15:04) >    ACC: [id number] EXAM:  XR CHEST PORTABLE URGENT 1V   ORDERED BY:  SANJANA PERALTA     PROCEDURE DATE:  03/28/2024          INTERPRETATION:  An AP portable supine chest x-ray was performed for   sepsis.    Comparison is made to 11/23/2019.    There is an elevated right hemidiaphragm with adjacent subsegmental   atelectasis near the cardiophrenic angle on the right side. No   infiltrates are seen. The remaining lungs are clear. There is no   pneumothorax. There are no pleural effusions. There is no hilar or   mediastinal widening. The cardiac silhouette cannot be accurately   assessed due to rotation and may be magnified by technique. There has   been previous TAVR. There is no pulmonary edema. The bony thorax appears   stable.    IMPRESSION:  1.Mildly elevated right hemidiaphragm with subsegmental atelectasis near   the cardiophrenic angle on the right.  2. Status post TAVR. No CHF.  3. No evidence of infiltrate, pleural effusion or pulmonary edema.    --- End of Report ---            LILA OBRIEN MD; Attending Radiologist  This document has been electronically signed. Mar 28 2024  3:07PM    < end of copied text >

## 2024-03-29 NOTE — ED ADULT NURSE REASSESSMENT NOTE - NS ED NURSE REASSESS COMMENT FT1
Patient is sleeping at this time. Vital signs taken noted GA=414/min, pmww=282A, Dr. Carey Resident made aware and told patient can be transferred to the floor.

## 2024-03-29 NOTE — PHARMACOTHERAPY INTERVENTION NOTE - COMMENTS
Patient is an 83 yo male being currently ordered for Tamiflu 75 mg BID x 5 days for influenza treatment. Discussed with DEBBI Desai and recommended dose adjustment based on patient's renal function (CrCl- 40, eGFR- 45). Recommendation accepted and order was entered per discussion. Next dose due 3/29 at 18:00.

## 2024-03-29 NOTE — SWALLOW BEDSIDE ASSESSMENT ADULT - COMMENTS
"83 yo M with PMH aortic stenosis s/p TAVR 2019, dementia, HLD, asthm, COPD, overactive bladder ,H pylori, BPH presents to the ED with lethargy/weakness. Interview was conducted on phone with patients daughter Katharina as pt has severe dementia and cannot answer questions, tried to communicate with Welsh  but patient could not participate. Per daughter, pt had a fall yesterday when trying to get out of bed in the morning (lives at home with daughter, son in law and wife- does not use walking device- per daughter "wouldn't know  how").  Last night, patient was found to be much weaker and more lethargic than usual and sustained another fall this AM. This fall was unwitnessed but patient was found face forward on the floor on the carpet. Of note, daughter was just diagnosed with flu at urgent care. Daughter denied pt had any new sx of cp,palpitations, sob, cough, n/v/d/constipation/urinary changes however daughter acknowledges that difficult to know for sure 2/2 mental status.   Of note, per [facility]US[facility], patient was seen by pulmonologist Dr. Zuñiga in June 2023 for asthma exacerbation tx with Trelegy, ventolin inhaler- no steroids given."     CT Chest 3/28/24:  "IMPRESSION: Linear atelectasis at the right lung base. Minimal   pericardial effusion  No evidence of acute traumatic injury  Status post TAVR"    CT Head 3/28/24:  IMPRESSION:  CT HEAD:  1.  No evidence of acute intracranial hemorrhage or midline shift.  2.  Chronic small vessel disease.

## 2024-03-29 NOTE — SWALLOW BEDSIDE ASSESSMENT ADULT - SLP GENERAL OBSERVATIONS
The pt was received at bedside with his family present. Pt was receiving supplemental 02 via HFNC. Pt's family stated that the pt has a baseline of poor PO intake. They endorsed that he was consuming ground like solids with thin liquids at baseline. Pt's family served as primary  in the pt's primary language, Azeri. Pt was noted with difficulty following directions. Pt's Sp02 was 96%-97% prior to, during, and after the evaluation. Pt was seated upright for purposes of the evaluation and remained upright following the evaluation. Evaluation results were communicated to the pt's family. Following the evaluation, the pt was awake, alert, and in NAD.

## 2024-03-30 DIAGNOSIS — J18.9 PNEUMONIA, UNSPECIFIED ORGANISM: ICD-10-CM

## 2024-03-30 DIAGNOSIS — N17.9 ACUTE KIDNEY FAILURE, UNSPECIFIED: ICD-10-CM

## 2024-03-30 LAB
ALBUMIN SERPL ELPH-MCNC: 2.7 G/DL — LOW (ref 3.3–5)
ALP SERPL-CCNC: 53 U/L — SIGNIFICANT CHANGE UP (ref 40–120)
ALT FLD-CCNC: 42 U/L — SIGNIFICANT CHANGE UP (ref 12–78)
ANION GAP SERPL CALC-SCNC: 7 MMOL/L — SIGNIFICANT CHANGE UP (ref 5–17)
APTT BLD: 30.1 SEC — SIGNIFICANT CHANGE UP (ref 24.5–35.6)
AST SERPL-CCNC: 82 U/L — HIGH (ref 15–37)
BASOPHILS # BLD AUTO: 0.01 K/UL — SIGNIFICANT CHANGE UP (ref 0–0.2)
BASOPHILS NFR BLD AUTO: 0.1 % — SIGNIFICANT CHANGE UP (ref 0–2)
BILIRUB SERPL-MCNC: 0.2 MG/DL — SIGNIFICANT CHANGE UP (ref 0.2–1.2)
BUN SERPL-MCNC: 24 MG/DL — HIGH (ref 7–23)
CALCIUM SERPL-MCNC: 8 MG/DL — LOW (ref 8.5–10.1)
CHLORIDE SERPL-SCNC: 109 MMOL/L — HIGH (ref 96–108)
CO2 SERPL-SCNC: 27 MMOL/L — SIGNIFICANT CHANGE UP (ref 22–31)
CREAT SERPL-MCNC: 0.94 MG/DL — SIGNIFICANT CHANGE UP (ref 0.5–1.3)
CULTURE RESULTS: SIGNIFICANT CHANGE UP
EGFR: 79 ML/MIN/1.73M2 — SIGNIFICANT CHANGE UP
EOSINOPHIL # BLD AUTO: 0 K/UL — SIGNIFICANT CHANGE UP (ref 0–0.5)
EOSINOPHIL NFR BLD AUTO: 0 % — SIGNIFICANT CHANGE UP (ref 0–6)
GLUCOSE SERPL-MCNC: 195 MG/DL — HIGH (ref 70–99)
HCT VFR BLD CALC: 39.2 % — SIGNIFICANT CHANGE UP (ref 39–50)
HGB BLD-MCNC: 13.3 G/DL — SIGNIFICANT CHANGE UP (ref 13–17)
IMM GRANULOCYTES NFR BLD AUTO: 0.5 % — SIGNIFICANT CHANGE UP (ref 0–0.9)
LYMPHOCYTES # BLD AUTO: 0.6 K/UL — LOW (ref 1–3.3)
LYMPHOCYTES # BLD AUTO: 4.6 % — LOW (ref 13–44)
MCHC RBC-ENTMCNC: 28.9 PG — SIGNIFICANT CHANGE UP (ref 27–34)
MCHC RBC-ENTMCNC: 33.9 GM/DL — SIGNIFICANT CHANGE UP (ref 32–36)
MCV RBC AUTO: 85.2 FL — SIGNIFICANT CHANGE UP (ref 80–100)
MONOCYTES # BLD AUTO: 0.36 K/UL — SIGNIFICANT CHANGE UP (ref 0–0.9)
MONOCYTES NFR BLD AUTO: 2.7 % — SIGNIFICANT CHANGE UP (ref 2–14)
NEUTROPHILS # BLD AUTO: 12.06 K/UL — HIGH (ref 1.8–7.4)
NEUTROPHILS NFR BLD AUTO: 92.1 % — HIGH (ref 43–77)
NRBC # BLD: 0 /100 WBCS — SIGNIFICANT CHANGE UP (ref 0–0)
PLATELET # BLD AUTO: 147 K/UL — LOW (ref 150–400)
POTASSIUM SERPL-MCNC: 3.9 MMOL/L — SIGNIFICANT CHANGE UP (ref 3.5–5.3)
POTASSIUM SERPL-SCNC: 3.9 MMOL/L — SIGNIFICANT CHANGE UP (ref 3.5–5.3)
PROT SERPL-MCNC: 5.6 G/DL — LOW (ref 6–8.3)
RBC # BLD: 4.6 M/UL — SIGNIFICANT CHANGE UP (ref 4.2–5.8)
RBC # FLD: 14 % — SIGNIFICANT CHANGE UP (ref 10.3–14.5)
SODIUM SERPL-SCNC: 143 MMOL/L — SIGNIFICANT CHANGE UP (ref 135–145)
SPECIMEN SOURCE: SIGNIFICANT CHANGE UP
TROPONIN I, HIGH SENSITIVITY RESULT: 1130.9 NG/L — HIGH
WBC # BLD: 13.1 K/UL — HIGH (ref 3.8–10.5)
WBC # FLD AUTO: 13.1 K/UL — HIGH (ref 3.8–10.5)

## 2024-03-30 PROCEDURE — 99233 SBSQ HOSP IP/OBS HIGH 50: CPT | Mod: GC

## 2024-03-30 PROCEDURE — 99291 CRITICAL CARE FIRST HOUR: CPT

## 2024-03-30 PROCEDURE — 99232 SBSQ HOSP IP/OBS MODERATE 35: CPT

## 2024-03-30 PROCEDURE — 93010 ELECTROCARDIOGRAM REPORT: CPT

## 2024-03-30 RX ORDER — HEPARIN SODIUM 5000 [USP'U]/ML
5000 INJECTION INTRAVENOUS; SUBCUTANEOUS EVERY 12 HOURS
Refills: 0 | Status: DISCONTINUED | OUTPATIENT
Start: 2024-03-30 | End: 2024-03-31

## 2024-03-30 RX ORDER — CEFTRIAXONE 500 MG/1
1000 INJECTION, POWDER, FOR SOLUTION INTRAMUSCULAR; INTRAVENOUS EVERY 24 HOURS
Refills: 0 | Status: COMPLETED | OUTPATIENT
Start: 2024-03-30 | End: 2024-04-03

## 2024-03-30 RX ORDER — SODIUM CHLORIDE 9 MG/ML
4 INJECTION INTRAMUSCULAR; INTRAVENOUS; SUBCUTANEOUS EVERY 12 HOURS
Refills: 0 | Status: DISCONTINUED | OUTPATIENT
Start: 2024-03-30 | End: 2024-04-07

## 2024-03-30 RX ORDER — ASPIRIN/CALCIUM CARB/MAGNESIUM 324 MG
81 TABLET ORAL DAILY
Refills: 0 | Status: DISCONTINUED | OUTPATIENT
Start: 2024-03-30 | End: 2024-04-10

## 2024-03-30 RX ADMIN — Medication 10 MILLIGRAM(S): at 21:48

## 2024-03-30 RX ADMIN — MIRABEGRON 25 MILLIGRAM(S): 50 TABLET, EXTENDED RELEASE ORAL at 21:48

## 2024-03-30 RX ADMIN — LATANOPROST 1 DROP(S): 0.05 SOLUTION/ DROPS OPHTHALMIC; TOPICAL at 21:48

## 2024-03-30 RX ADMIN — Medication 40 MILLIGRAM(S): at 06:21

## 2024-03-30 RX ADMIN — PANTOPRAZOLE SODIUM 40 MILLIGRAM(S): 20 TABLET, DELAYED RELEASE ORAL at 13:15

## 2024-03-30 RX ADMIN — Medication 75 MILLIGRAM(S): at 17:47

## 2024-03-30 RX ADMIN — Medication 3 MILLILITER(S): at 20:09

## 2024-03-30 RX ADMIN — Medication 1 MILLIGRAM(S): at 22:50

## 2024-03-30 RX ADMIN — RISPERIDONE 1 MILLIGRAM(S): 4 TABLET ORAL at 21:49

## 2024-03-30 RX ADMIN — BUDESONIDE AND FORMOTEROL FUMARATE DIHYDRATE 2 PUFF(S): 160; 4.5 AEROSOL RESPIRATORY (INHALATION) at 20:06

## 2024-03-30 RX ADMIN — MEMANTINE HYDROCHLORIDE 10 MILLIGRAM(S): 10 TABLET ORAL at 21:48

## 2024-03-30 RX ADMIN — Medication 3 MILLILITER(S): at 01:37

## 2024-03-30 RX ADMIN — RISPERIDONE 0.5 MILLIGRAM(S): 4 TABLET ORAL at 17:47

## 2024-03-30 RX ADMIN — Medication 3 MILLILITER(S): at 13:00

## 2024-03-30 RX ADMIN — Medication 81 MILLIGRAM(S): at 13:14

## 2024-03-30 RX ADMIN — MONTELUKAST 10 MILLIGRAM(S): 4 TABLET, CHEWABLE ORAL at 13:14

## 2024-03-30 RX ADMIN — HEPARIN SODIUM 5000 UNIT(S): 5000 INJECTION INTRAVENOUS; SUBCUTANEOUS at 09:08

## 2024-03-30 RX ADMIN — CEFTRIAXONE 100 MILLIGRAM(S): 500 INJECTION, POWDER, FOR SOLUTION INTRAMUSCULAR; INTRAVENOUS at 06:21

## 2024-03-30 RX ADMIN — CLOPIDOGREL BISULFATE 75 MILLIGRAM(S): 75 TABLET, FILM COATED ORAL at 13:14

## 2024-03-30 RX ADMIN — SODIUM CHLORIDE 4 MILLILITER(S): 9 INJECTION INTRAMUSCULAR; INTRAVENOUS; SUBCUTANEOUS at 20:09

## 2024-03-30 RX ADMIN — Medication 3 MILLILITER(S): at 07:43

## 2024-03-30 RX ADMIN — Medication 75 MILLIGRAM(S): at 09:08

## 2024-03-30 RX ADMIN — HEPARIN SODIUM 5000 UNIT(S): 5000 INJECTION INTRAVENOUS; SUBCUTANEOUS at 17:47

## 2024-03-30 RX ADMIN — FINASTERIDE 5 MILLIGRAM(S): 5 TABLET, FILM COATED ORAL at 13:14

## 2024-03-30 NOTE — DIETITIAN INITIAL EVALUATION ADULT - PERTINENT MEDS FT
MEDICATIONS  (STANDING):  albuterol/ipratropium for Nebulization 3 milliLiter(s) Nebulizer every 6 hours  aspirin enteric coated 81 milliGRAM(s) Oral daily  budesonide  80 MICROgram(s)/formoterol 4.5 MICROgram(s) Inhaler 2 Puff(s) Inhalation two times a day  cefTRIAXone   IVPB 1000 milliGRAM(s) IV Intermittent every 24 hours  clopidogrel Tablet 75 milliGRAM(s) Oral daily  finasteride 5 milliGRAM(s) Oral daily  heparin   Injectable 5000 Unit(s) SubCutaneous every 12 hours  latanoprost 0.005% Ophthalmic Solution 1 Drop(s) Both EYES at bedtime  melatonin 10 milliGRAM(s) Oral at bedtime  memantine 10 milliGRAM(s) Oral at bedtime  methylPREDNISolone sodium succinate Injectable 40 milliGRAM(s) IV Push daily  mirabegron ER 25 milliGRAM(s) Oral at bedtime  montelukast 10 milliGRAM(s) Oral daily  oseltamivir 75 milliGRAM(s) Oral two times a day  pantoprazole  Injectable 40 milliGRAM(s) IV Push daily  risperiDONE   Tablet 0.5 milliGRAM(s) Oral two times a day  risperiDONE   Tablet 1 milliGRAM(s) Oral at bedtime  sodium chloride 0.45%. 1000 milliLiter(s) (75 mL/Hr) IV Continuous <Continuous>  tiotropium 2.5 MICROgram(s) Inhaler 2 Puff(s) Inhalation daily    MEDICATIONS  (PRN):  acetaminophen     Tablet .. 650 milliGRAM(s) Oral every 6 hours PRN Temp greater or equal to 38C (100.4F), Mild Pain (1 - 3)  albuterol    90 MICROgram(s) HFA Inhaler 2 Puff(s) Inhalation every 6 hours PRN Shortness of Breath and/or Wheezing  aluminum hydroxide/magnesium hydroxide/simethicone Suspension 30 milliLiter(s) Oral every 4 hours PRN Dyspepsia  ondansetron Injectable 4 milliGRAM(s) IV Push every 8 hours PRN Nausea and/or Vomiting

## 2024-03-30 NOTE — DIETITIAN INITIAL EVALUATION ADULT - ADD RECOMMEND
1. recommend MVI 2. as above recommend glucera BID  and  JAVIER magic cup daily 3. trend blood sugar

## 2024-03-30 NOTE — PROGRESS NOTE ADULT - ASSESSMENT
RAY: Prerenal azotemia, Rhabdomyolysis  Hypotension, h/o Hypertension  Dyspnea: Influenza A  MI  Dementia  s/p Fall, Rhabdomyolysis    Improved and stable renal indices. Continue IV hydration. Monitor CPK trend. s/p CTA.   Pt on IV heparin. BP stable. Cardiology follow up. Will follow electrolytes and renal function trend.

## 2024-03-30 NOTE — DIETITIAN INITIAL EVALUATION ADULT - ORAL INTAKE PTA/DIET HISTORY
patient seen in bed. unable to obtain history from patient. RD attempted to contact daughter no answer on phone.   per RN patient took yogurt this morning and juice. no food allergies  speech recommends puree mild thick   education deferred in this patient with hx dementia

## 2024-03-30 NOTE — CARE COORDINATION ASSESSMENT. - OTHER PERTINENT DISCHARGE PLANNING INFORMATION:
show pta pt living w spouse, dtr and son in law . pt using walker to asst w ambulation. pt does not drive and that family assts w transportation. Pt is spouse caretaker along w other family members. PT to assess pt for needs. Sw has left message for dtr in re to dc planning. sw to continue to provide support to pt and spouse. sw lm for dtr in re to dc planning /

## 2024-03-30 NOTE — PROGRESS NOTE ADULT - PROBLEM SELECTOR PLAN 4
likely pre renal due to decreased oral intake and hypotension.   improved   will monitor   c/w IVF   taylor inserted for urinary retention.

## 2024-03-30 NOTE — DIETITIAN INITIAL EVALUATION ADULT - PERTINENT LABORATORY DATA
03-30    143  |  109<H>  |  24<H>  ----------------------------<  195<H>  3.9   |  27  |  0.94    Ca    8.0<L>      30 Mar 2024 10:39    TPro  5.6<L>  /  Alb  2.7<L>  /  TBili  0.2  /  DBili  x   /  AST  82<H>  /  ALT  42  /  AlkPhos  53  03-30  A1C with Estimated Average Glucose Result: 5.8 % (03-29-24 @ 03:30)

## 2024-03-30 NOTE — DIETITIAN INITIAL EVALUATION ADULT - PROBLEM SELECTOR PLAN 6
- also with overactive bladder  - c/w home meds- do not have mybetriq here- daughter informed and told to bring from home when she comes in

## 2024-03-30 NOTE — PROGRESS NOTE ADULT - ASSESSMENT
85 yo M with PMH aortic stenosis s/p TAVR 2019, CAD s/p LELAND to LAD 2019, dementia, HLD, asthma, COPD, overactive bladder, H pylori, BPH admitted with Influenza A s/p multiple falls.     - Troponins elevated, peaked 2300, now downtrending  -likely elevated on the basis of demand ischemia in setting of influenza, RAY, rhabdomyolysis, agitation.  - History of LELAND to LAD in 2019, continue ASA and Plavix.   - With CPK out of proportion to Troponin, likely rhabdomyolysis s/p fall.       - H/o EKG with IVCD vs LBBB in the past.    -heart rate has been reasonable but is at risk of af in the setting of resp failure and structural heart disease   - EKG's reveal elevated QTc ~500. Avoid QT prolonging medications.  - Continue telemetry monitoring.     - No meaningful evidence of volume overload.  - Previous TTE (6/23/2022): Normal ventricular systolic function, no segmental wall motion abnormalities, mild diastolic dysfunction.  - Repeat TTE.     - BP controlled, soft at times, monitor routine hemodynamics.    - Other cardiovascular workup will depend on clinical course.  - Patient requiring oxygen and supportive care.  - All other workup per primary team.  - Will continue to follow.     -at risk of abrupt decompensation     Upon my evaluation, this patient is at high risk for imminent or life threatening deterioration due to resp failure, demand ischemia  and other active medical issues which require my direct attention, intervention, and personal management.  I have personally spent >35 minutes  of critical care time exclusive of time spent on separate billing procedures. This includes review of laboratory data, radiology results, discussion with primary team\patient, and monitoring for potential decompensation Interventions were performed as documented above.

## 2024-03-30 NOTE — PROGRESS NOTE ADULT - SUBJECTIVE AND OBJECTIVE BOX
Patient is a 84y old  Male who presents with a chief complaint of Influenza A, falls (29 Mar 2024 14:15)      Subjective:  INTERVAL HPI/OVERNIGHT EVENTS: Patient seen and examined at bedside.  Patient  was agitated last night, ativan was given   MEDICATIONS  (STANDING):  albuterol/ipratropium for Nebulization 3 milliLiter(s) Nebulizer every 6 hours  aspirin enteric coated 81 milliGRAM(s) Oral daily  budesonide  80 MICROgram(s)/formoterol 4.5 MICROgram(s) Inhaler 2 Puff(s) Inhalation two times a day  cefTRIAXone   IVPB 1000 milliGRAM(s) IV Intermittent every 24 hours  clopidogrel Tablet 75 milliGRAM(s) Oral daily  finasteride 5 milliGRAM(s) Oral daily  heparin   Injectable 5000 Unit(s) SubCutaneous every 12 hours  latanoprost 0.005% Ophthalmic Solution 1 Drop(s) Both EYES at bedtime  melatonin 10 milliGRAM(s) Oral at bedtime  memantine 10 milliGRAM(s) Oral at bedtime  methylPREDNISolone sodium succinate Injectable 40 milliGRAM(s) IV Push daily  mirabegron ER 25 milliGRAM(s) Oral at bedtime  montelukast 10 milliGRAM(s) Oral daily  oseltamivir 75 milliGRAM(s) Oral two times a day  pantoprazole  Injectable 40 milliGRAM(s) IV Push daily  risperiDONE   Tablet 1 milliGRAM(s) Oral at bedtime  risperiDONE   Tablet 0.5 milliGRAM(s) Oral two times a day  sodium chloride 0.45%. 1000 milliLiter(s) (75 mL/Hr) IV Continuous <Continuous>  tiotropium 2.5 MICROgram(s) Inhaler 2 Puff(s) Inhalation daily    MEDICATIONS  (PRN):  acetaminophen     Tablet .. 650 milliGRAM(s) Oral every 6 hours PRN Temp greater or equal to 38C (100.4F), Mild Pain (1 - 3)  albuterol    90 MICROgram(s) HFA Inhaler 2 Puff(s) Inhalation every 6 hours PRN Shortness of Breath and/or Wheezing  aluminum hydroxide/magnesium hydroxide/simethicone Suspension 30 milliLiter(s) Oral every 4 hours PRN Dyspepsia  ondansetron Injectable 4 milliGRAM(s) IV Push every 8 hours PRN Nausea and/or Vomiting      Allergies    No Known Allergies    Intolerances        REVIEW OF SYSTEMS:  not able to obtain due to dementia       Objective:  Vital Signs Last 24 Hrs  T(C): 36.8 (30 Mar 2024 05:19), Max: 36.9 (29 Mar 2024 12:56)  T(F): 98.3 (30 Mar 2024 05:19), Max: 98.4 (29 Mar 2024 12:56)  HR: 85 (30 Mar 2024 07:52) (85 - 95)  BP: 118/48 (30 Mar 2024 05:19) (106/66 - 122/74)  BP(mean): 84 (29 Mar 2024 09:32) (84 - 84)  RR: 18 (30 Mar 2024 05:19) (18 - 18)  SpO2: 99% (30 Mar 2024 07:52) (92% - 99%)    Parameters below as of 30 Mar 2024 07:52  Patient On (Oxygen Delivery Method): nasal cannula, high flow        GENERAL: NAD, lying in bed   HEAD:  Normocephalic  EYES:  conjunctiva and sclera clear  ENT: Moist mucous membranes, + mouth breath  NECK: Supple  CHEST/LUNG: Clear to auscultation bilaterally; No rales or rhonchi; no wheezing. Unlabored respirations  HEART: Regular rate and rhythm; S1S2+  ABDOMEN: Bowel sounds present; Soft, Nontender, Nondistended.   EXTREMITIES:  + distal Peripheral Pulses;  No cyanosis, or edema  NERVOUS SYSTEM:  Alert & Oriented X0;  No gross focal deficits   MSK: moves all extremities  SKIN: No rashes     LABS:                        14.6   15.84 )-----------( 161      ( 29 Mar 2024 14:10 )             42.2     29 Mar 2024 16:05    145    |  110    |  23     ----------------------------<  152    3.6     |  27     |  0.99     Ca    8.3        29 Mar 2024 16:05      PT/INR - ( 29 Mar 2024 07:15 )   PT: 15.9 sec;   INR: 1.37 ratio         PTT - ( 29 Mar 2024 22:22 )  PTT:66.2 sec  Urinalysis Basic - ( 29 Mar 2024 16:05 )    Color: x / Appearance: x / SG: x / pH: x  Gluc: 152 mg/dL / Ketone: x  / Bili: x / Urobili: x   Blood: x / Protein: x / Nitrite: x   Leuk Esterase: x / RBC: x / WBC x   Sq Epi: x / Non Sq Epi: x / Bacteria: x      CAPILLARY BLOOD GLUCOSE            Culture - Blood (collected 03-28-24 @ 16:00)  Source: .Blood Blood-Peripheral  Preliminary Report (03-30-24 @ 01:02):    No growth at 24 hours    Culture - Blood (collected 03-28-24 @ 15:50)  Source: .Blood Blood-Peripheral  Preliminary Report (03-30-24 @ 01:02):    No growth at 24 hours        RADIOLOGY & ADDITIONAL TESTS:    Personally reviewed.     Consultant(s) Notes Reviewed:  [x] YES  [ ] NO    Plan of care discussed with family; all questions answered

## 2024-03-30 NOTE — DIETITIAN INITIAL EVALUATION ADULT - PROBLEM SELECTOR PLAN 3
- CT spine showing:   Slightly mottled appearance to the vertebral bodies of the cervical   and upper thoracic spine. This could be osteopenic in nature. A   underlying myeloma/lymphoma or other metastatic process is not entirely   excluded. Recommend clinical and laboratory correlation.  - Day team: consider hem/onc consult  - Instruct family that pt needs to f/u imaging with outpatient provider

## 2024-03-30 NOTE — PROGRESS NOTE ADULT - SUBJECTIVE AND OBJECTIVE BOX
[facility]    Halima Hayes, Henry Gaona, Mey, Valentin      [phone]    CHIEF COMPLAINT: Patient is a 84y old  Male who presents with a chief complaint of RF, Flu (30 Mar 2024 08:45)      Follow Up: cad, tavr, resp failure/flu    Interim history: Unable to provide a history on the basis of a poor mental status.  Events noted. remains on hfnc    MEDICATIONS  (STANDING):  albuterol/ipratropium for Nebulization 3 milliLiter(s) Nebulizer every 6 hours  aspirin enteric coated 81 milliGRAM(s) Oral daily  budesonide  80 MICROgram(s)/formoterol 4.5 MICROgram(s) Inhaler 2 Puff(s) Inhalation two times a day  cefTRIAXone   IVPB 1000 milliGRAM(s) IV Intermittent every 24 hours  clopidogrel Tablet 75 milliGRAM(s) Oral daily  finasteride 5 milliGRAM(s) Oral daily  heparin   Injectable 5000 Unit(s) SubCutaneous every 12 hours  latanoprost 0.005% Ophthalmic Solution 1 Drop(s) Both EYES at bedtime  melatonin 10 milliGRAM(s) Oral at bedtime  memantine 10 milliGRAM(s) Oral at bedtime  methylPREDNISolone sodium succinate Injectable 40 milliGRAM(s) IV Push daily  mirabegron ER 25 milliGRAM(s) Oral at bedtime  montelukast 10 milliGRAM(s) Oral daily  oseltamivir 75 milliGRAM(s) Oral two times a day  pantoprazole  Injectable 40 milliGRAM(s) IV Push daily  risperiDONE   Tablet 0.5 milliGRAM(s) Oral two times a day  risperiDONE   Tablet 1 milliGRAM(s) Oral at bedtime  sodium chloride 0.45%. 1000 milliLiter(s) (75 mL/Hr) IV Continuous <Continuous>  tiotropium 2.5 MICROgram(s) Inhaler 2 Puff(s) Inhalation daily    MEDICATIONS  (PRN):  acetaminophen     Tablet .. 650 milliGRAM(s) Oral every 6 hours PRN Temp greater or equal to 38C (100.4F), Mild Pain (1 - 3)  albuterol    90 MICROgram(s) HFA Inhaler 2 Puff(s) Inhalation every 6 hours PRN Shortness of Breath and/or Wheezing  aluminum hydroxide/magnesium hydroxide/simethicone Suspension 30 milliLiter(s) Oral every 4 hours PRN Dyspepsia  ondansetron Injectable 4 milliGRAM(s) IV Push every 8 hours PRN Nausea and/or Vomiting      REVIEW OF SYSTEMS: unable to provide  Vital Signs Last 24 Hrs  T(C): 36.8 (30 Mar 2024 05:19), Max: 36.9 (29 Mar 2024 12:56)  T(F): 98.3 (30 Mar 2024 05:19), Max: 98.4 (29 Mar 2024 12:56)  HR: 85 (30 Mar 2024 07:52) (85 - 95)  BP: 118/48 (30 Mar 2024 05:19) (106/66 - 122/74)  BP(mean): --  RR: 18 (30 Mar 2024 05:19) (18 - 18)  SpO2: 99% (30 Mar 2024 07:52) (92% - 99%)    Parameters below as of 30 Mar 2024 08:58  Patient On (Oxygen Delivery Method): nasal cannula, high flow        I&O's Summary    29 Mar 2024 07:01  -  30 Mar 2024 07:00  --------------------------------------------------------  IN: 867.5 mL / OUT: 1550 mL / NET: -682.5 mL        Telemetry past 24h: sr    PHYSICAL EXAM:    Constitutional: well-nourished, well-developed, NAD   HEENT:  MMM, sclerae anicteric, conjunctivae clear, no oral cyanosis.  Pulmonary: Non-labored, breath sounds are clear bilaterally, No wheezing, rales or rhonchi  Cardiovascular: Regular, S1 and S2.  No murmur.  No rubs, gallops or clicks  Gastrointestinal: Bowel Sounds present, soft, nontender.   Lymph: No peripheral edema.   Neurological: unable to evaluate, poor mental status  Skin: No rashes.  Psych:  Mood & affect not evaluable    LABS: All Labs Reviewed:                        13.3   13.10 )-----------( 147      ( 30 Mar 2024 10:39 )             39.2                         14.6   15.84 )-----------( 161      ( 29 Mar 2024 14:10 )             42.2                         16.3   14.56 )-----------( 179      ( 29 Mar 2024 03:30 )             48.4     30 Mar 2024 10:39    143    |  109    |  24     ----------------------------<  195    3.9     |  27     |  0.94   29 Mar 2024 16:05    145    |  110    |  23     ----------------------------<  152    3.6     |  27     |  0.99   29 Mar 2024 03:30    146    |  112    |  21     ----------------------------<  170    4.0     |  26     |  1.50     Ca    8.0        30 Mar 2024 10:39  Ca    8.3        29 Mar 2024 16:05  Ca    9.3        29 Mar 2024 03:30    TPro  5.6    /  Alb  2.7    /  TBili  0.2    /  DBili  x      /  AST  82     /  ALT  42     /  AlkPhos  53     30 Mar 2024 10:39  TPro  7.3    /  Alb  3.9    /  TBili  0.6    /  DBili  x      /  AST  51     /  ALT  38     /  AlkPhos  77     28 Mar 2024 16:00    PT/INR - ( 29 Mar 2024 07:15 )   PT: 15.9 sec;   INR: 1.37 ratio         PTT - ( 30 Mar 2024 10:39 )  PTT:30.1 sec  CARDIAC MARKERS ( 29 Mar 2024 16:05 )  x     / x     / x     / x     / 19.1 ng/mL  CARDIAC MARKERS ( 29 Mar 2024 06:33 )  x     / x     / 3112 U/L / x     / 23.5 ng/mL  CARDIAC MARKERS ( 29 Mar 2024 03:30 )  x     / x     / 3053 U/L / x     / 18.7 ng/mL  CARDIAC MARKERS ( 28 Mar 2024 21:21 )  x     / x     / 3109 U/L / x     / 17.4 ng/mL      Blood Culture: Organism --  Gram Stain Blood -- Gram Stain --  Specimen Source .Blood Blood-Peripheral  Culture-Blood --    Organism --  Gram Stain Blood -- Gram Stain --  Specimen Source .Blood Blood-Peripheral  Culture-Blood --            RADIOLOGY:  < from: CT Angio Chest PE Protocol w/ IV Cont (03.29.24 @ 21:29) >    ACC: [id number] EXAM:  CT ANGIO CHEST PULM [facility]   ORDERED BY:  MARITA NEWBERRY     PROCEDURE DATE:  03/29/2024          INTERPRETATION:  History: Influenza.    COMPARISON: Noncontrast CT of the thorax March 28, 2024.    PROCEDURE:  CT Angiography of the Chest.  Sagittal and coronal reformats were performed as well as 3D (MIP)   reconstructions.    FINDINGS:    LUNGS AND AIRWAYS: Patent central airways. There is new extensive mucous   plugging throughout the bilateral lower lobes. There is complete   consolidative collapse of the basilar segments of the right lower lobe   and severe volume loss in the superior segment. There is extensive   consolidation in the dependent left lower lobe and moderate consolidation   in the dependent left upper lobe. There is new peribronchovascular   nodularity in the dependent right upper lobe.  PLEURA: No pleural effusion.  MEDIASTINUM AND NANY: No lymphadenopathy.  VESSELS: There is no evidence of filling defect in the first, second, or   third order branches of the pulmonary arteries. The pulmonary trunk and   main pulmonary arteries are unremarkable. Status post TAVR. Otherwise,   the thoracic aorta and great vessels are unremarkable.  HEART: Heart size is normal. No pericardial effusion.  CHEST WALL AND LOWER NECK: Within normal limits.  VISUALIZED UPPER ABDOMEN: Tiny layering gallstones. The pancreas is   moderately atrophic and fatty replaced.  BONES: Within normal limits.    IMPRESSION: Extensive mucous plugging in the dependent lower lobes with   near complete right lower lobe consolidative collapse and extensive left   upper lobe and left lower lobe dependent consolidation. Correlate   clinically for aspiration pneumonia.    --- End of Report ---            DAYA HALL MD; Attending Radiologist  This document has been electronically signed. Mar 30 2024  9:23AM    < end of copied text >    EKG:    Echo:

## 2024-03-30 NOTE — CARE COORDINATION ASSESSMENT. - NSCAREPROVIDERS_GEN_ALL_CORE_FT
CARE PROVIDERS:  Accepting Physician: Rosendo Hawkins  Administration: Chase Ching  Administration: Yovany Pedroza  Admitting: Rosendo Hawkins  Attending: Germain Dorman  Cardiology Technician: Penny Tovar  Consultant: Shahla Fisher  Consultant: Young Shepard  Consultant: Gaudencio Varghese  Consultant: Cruz Menchaca  Consultant: Darvin Butcher  Consultant: Roberta Lewis  Consultant: Luis Juares  Consultant: Deniz Desai  Covering Team: Darvin Horowitz  ED Attending: Kar Arroyo  ED Nurse: Thee Hanson  Nurse: Nahun Doss  Nurse: Petrona Ward  Ordered: ADM, User  Ordered: Doctor, Unknown  Outpatient Provider: Zay Martinez  Outpatient Provider: Young Shepard  Outpatient Provider: Darvin Butcher  PCA/Nursing Assistant: Rosas Hurst  PCA/Nursing Assistant: Saskia Moya  Physical Therapy: Radha Lo  Physical Therapy: Swapna Lind  Primary Team: Shiloh Corbett  Primary Team: Gladis Johnson  Primary Team: Germain Dorman  Primary Team: Nat Guaman  Primary Team: Walt Willett  Registered Dietitian: Charlene Ferrari  Respiratory Therapy: Ramona Moore  Respiratory Therapy: Sae Garcia  : Shannan Gale  Speech Pathology: Arun Patino  Student: Jennifer Perez  Team: [facility], Team

## 2024-03-30 NOTE — PROGRESS NOTE ADULT - ASSESSMENT
85 yo M with PMH aortic stenosis s/p TAVR 2019, dementia, HLD, asthm, COPD, overactive bladder ,H pylori, BPH presents to the ED with lethargy/weakness.  In ED was found to have Influenza. Last night had RRT for hypoxia in 60s and labs showed high Troponin.  Leukocytosis and hypoxia most likely related to cardiac event. Since no sign of opacities in lungs, can hold antibiotics. High fever due to influenza.     # Influenza  # Acute respiratory failure   # MI    - Will monitor labs  - Blood cultures 3/28 no growth   - UA with no pyuria   - Pulmonary and cardiology follow up   - Nephrology following for RAY, now improved   - Tamiflu dose adjusted given renal function improvement for 5days   - Was started on Ceftriaxone 3/29  - Will check Procalcitonin level in AM   - Leukocytosis due to steroids     Thank you for courtesy of this consult.     Will follow.

## 2024-03-30 NOTE — CONSULT NOTE ADULT - ASSESSMENT
83 yo M with PMH aortic stenosis s/p TAVR 2019, dementia, HLD, asthm, COPD, overactive bladder ,H pylori, BPH presents to the ED with lethargy/weakness. Patient with acute respiratory failure, on high flow. palliative consulted for goc
Assessment: 85 yo M with PMH aortic stenosis s/p TAVR 2019, CAD s/p LELAND to LAD 2019, dementia, HLD, asthma, COPD, overactive bladder, H pylori, BPH admitted with Influenza A s/p multiple falls.    Plan:   Ischemia:  - Troponins elevated on repeat x3 194 -> 2070. Likely elevated in the setting of demand ischemia s/p influenza, RAY, rhabdomyolysis, agitation.  - Can discontinue heparin gtt.  - History of LELAND to LAD in 2019, continue ASA and Plavix.  - His CKs are flat, suggesting against acute atherosclerotic plaque rupture.  - With CPK out of proportion to Troponin, likely rhabdomyolysis s/p fall.   - Patient without symptoms of angina  at this time.    Arrythmia:  - H/o EKG with IVCD vs LBBB in the past. EKG currently at NSR @ 86bpm w/ LBBB.  - Monitor and replete lytes, keep K>4, Mg>2.  - EKG's reveal elevated QTc ~500. Avoid QT prolonging medications.  - Continue telemetry monitoring.    Volume Status:  - No meaningful evidence of volume overload.  - Previous TTE (6/23/2022): Normal ventricular systolic function, no segmental wall motion abnormalities, mild diastolic dysfunction.  - Repeat TTE.    Hemodynamics:  - BP well controlled, monitor routine hemodynamics.    - Other cardiovascular workup will depend on clinical course.  - Patient requiring oxygen and supportive care.  - All other workup per primary team.  - Will continue to follow.            
anemia  constipation  abd pain    plan  start bowel regimen  add lactulose 10cc q 6 hours and glycerin suppository  gas pill with simethicone and maalox q 6 hours  monitor stool output  daily cbc   transfuse prn   check iron studies   ppi once a day  check stool occult blood  further recommendations pending above
RAY: Prerenal azotemia  Hypotension, h/o Hypertension  Dyspnea: Influenza A  MI  Dementia  s/p Fall    IV hydration. Rx for flu. Will hold CTA until repeat renal indices better. Pt on IV heparin. Hold amlodipine as BP low today.   Cardiology follow up. Will follow electrolytes and renal function trend.     Further recommendations pending clinical course. Thank you for the courtesy of this referral.

## 2024-03-30 NOTE — DIETITIAN INITIAL EVALUATION ADULT - PROBLEM SELECTOR PLAN 10
- pt has been complaining of abdominal pain recently per daughter  - daughter states tht patient had endoscopy in January and found to be positive for H. Pylori   - has medication regiment at home, has not started management yet

## 2024-03-30 NOTE — PROGRESS NOTE ADULT - SUBJECTIVE AND OBJECTIVE BOX
[facility]  INFECTIOUS DISEASES   [address] [place] [address]  Tel: [phone]     Fax: [phone]  MD Molly Landaverde MD Shah, Kaushal, MD Sunjit, Jaspal, MD  ========================================================  Weekend Coverage  =======================================================      RICKIEAMIRAORINLEON [id number]    Follow up: Respiratory failure    On HFNC, not responding       Allergies:  No Known Allergies       REVIEW OF SYSTEMS:  unable to obtain due to medical condition      Physical Exam:  GEN: On HFNC  HEENT: normocephalic and atraumatic. Anicteric    NECK: Supple.   LUNGS: Course BS B/L   HEART: RRR  ABDOMEN: Soft, NT, ND.  +BS.    EXTREMITIES: trace edema.  MSK: No joint swelling  NEUROLOGIC: Confused   SKIN: No rash      Vitals:  T(F): 98.3 (30 Mar 2024 05:19), Max: 98.4 (29 Mar 2024 12:56)  HR: 86 (30 Mar 2024 05:19)  BP: 118/48 (30 Mar 2024 05:19)  RR: 18 (30 Mar 2024 05:19)  SpO2: 98% (30 Mar 2024 05:19) (92% - 98%)  temp max in last 48H T(F): , Max: 103 (03-28-24 @ 22:21)      Current Antibiotics:  cefTRIAXone   IVPB 1000 milliGRAM(s) IV Intermittent every 24 hours  oseltamivir 30 milliGRAM(s) Oral two times a day    Other medications:  albuterol/ipratropium for Nebulization 3 milliLiter(s) Nebulizer every 6 hours  aspirin enteric coated 81 milliGRAM(s) Oral daily  budesonide  80 MICROgram(s)/formoterol 4.5 MICROgram(s) Inhaler 2 Puff(s) Inhalation two times a day  clopidogrel Tablet 75 milliGRAM(s) Oral daily  finasteride 5 milliGRAM(s) Oral daily  heparin   Injectable 5000 Unit(s) SubCutaneous every 12 hours  latanoprost 0.005% Ophthalmic Solution 1 Drop(s) Both EYES at bedtime  melatonin 10 milliGRAM(s) Oral at bedtime  memantine 10 milliGRAM(s) Oral at bedtime  methylPREDNISolone sodium succinate Injectable 40 milliGRAM(s) IV Push daily  mirabegron ER 25 milliGRAM(s) Oral at bedtime  montelukast 10 milliGRAM(s) Oral daily  pantoprazole  Injectable 40 milliGRAM(s) IV Push daily  risperiDONE   Tablet 0.5 milliGRAM(s) Oral two times a day  risperiDONE   Tablet 1 milliGRAM(s) Oral at bedtime  sodium chloride 0.45%. 1000 milliLiter(s) IV Continuous <Continuous>  tiotropium 2.5 MICROgram(s) Inhaler 2 Puff(s) Inhalation daily                 14.6   15.84 )-----------( 161      ( 29 Mar 2024 14:10 )             42.2     03-29    145  |  110<H>  |  23  ----------------------------<  152<H>  3.6   |  27  |  0.99    Ca    8.3<L>      29 Mar 2024 16:05    TPro  7.3  /  Alb  3.9  /  TBili  0.6  /  DBili  x   /  AST  51<H>  /  ALT  38  /  AlkPhos  77  03-28    RECENT CULTURES:  03-28 @ 16:00 .Blood Blood-Peripheral     No growth at 24 hours    03-28 @ 15:50 .Blood Blood-Peripheral     No growth at 24 hours      WBC Count: 15.84 K/uL (03-29-24 @ 14:10)  WBC Count: 14.56 K/uL (03-29-24 @ 03:30)  WBC Count: 10.58 K/uL (03-28-24 @ 16:00)    Creatinine: 0.99 mg/dL (03-29-24 @ 16:05)  Creatinine: 1.50 mg/dL (03-29-24 @ 03:30)  Creatinine: 0.98 mg/dL (03-28-24 @ 16:00)     SARS-CoV-2 Result: NotDetec (03-28-24 @ 16:00)    Urinalysis (03.29.24 @ 06:30)    Glucose Qualitative, Urine: Negative mg/dL   Blood, Urine: Large   pH Urine: 5.0   Color: Yellow   Urine Appearance: Cloudy   Bilirubin: Negative   Ketone - Urine: Negative mg/dL   Specific Gravity: 1.012   Protein, Urine: Trace mg/dL   Urobilinogen: 0.2 mg/dL   Nitrite: Negative   Leukocyte Esterase Concentration: Negative  Urine Microscopic-Add On (NC) (03.29.24 @ 06:30)    Red Blood Cell - Urine: Too Numerous to count /HPF   White Blood Cell - Urine: 0 /HPF   Squamous Epithelial Cells: None Seen

## 2024-03-30 NOTE — DIETITIAN INITIAL EVALUATION ADULT - ENERGY INTAKE
History of Present Illness:   85 yo M with PMH aortic stenosis s/p TAVR 2019, dementia, HLD, asthma , COPD, overactive bladder ,H pylori, BPH presents to the ED with lethargy/weakness. Interview was conducted on phone with patients daughter Katharina as pt has severe dementia and cannot answer questions,  Per daughter, pt had a fall yesterday when trying to get out of bed in the morning (lives at home with daughter, son in law and wife- does not use walking device- per daughter "wouldn't know  how").  Last night, patient was found to be much weaker and more lethargic than usual and sustained another fall this AM. This fall was unwitnessed but patient was found face forward on the floor on the carpet. Of note, daughter was just diagnosed with flu at urgent care. Daughter denied pt had any new sx of cp ,palpitations, sob, cough, n/v/d/constipation/urinary changes however daughter acknowledges that difficult to know for sure 2/2 mental status.   weight 184# this admit   patient is DNR/DNI   HgbA1c 5.8% glucose 152 patient on prednisone   3/28 fecal incontinence  Poor (<50%)

## 2024-03-30 NOTE — DIETITIAN INITIAL EVALUATION ADULT - PROBLEM SELECTOR PLAN 7
- s/p PCI to LAD in 2019  - f/u trops, EKG  - low suspicion for ACS  - c/w Plavix  - remote tele  - follows with Dr. Coreas

## 2024-03-30 NOTE — PROGRESS NOTE ADULT - SUBJECTIVE AND OBJECTIVE BOX
Patient is a 84y old  Male who presents with a chief complaint of Influenza with other respiratory manifestations, other influenza virus identified     (30 Mar 2024 11:54)    Patient seen in follow up for RAY.        PAST MEDICAL HISTORY:  Aortic stenosis    BPH (benign prostatic hyperplasia)    Dementia    ACE (dyspnea on exertion)    Asthma    HLD (hyperlipidemia)    CAD (coronary artery disease)    OH (ocular hypertension)    Overactive bladder    Anxiety      MEDICATIONS  (STANDING):  albuterol/ipratropium for Nebulization 3 milliLiter(s) Nebulizer every 6 hours  aspirin enteric coated 81 milliGRAM(s) Oral daily  budesonide  80 MICROgram(s)/formoterol 4.5 MICROgram(s) Inhaler 2 Puff(s) Inhalation two times a day  cefTRIAXone   IVPB 1000 milliGRAM(s) IV Intermittent every 24 hours  clopidogrel Tablet 75 milliGRAM(s) Oral daily  finasteride 5 milliGRAM(s) Oral daily  heparin   Injectable 5000 Unit(s) SubCutaneous every 12 hours  latanoprost 0.005% Ophthalmic Solution 1 Drop(s) Both EYES at bedtime  melatonin 10 milliGRAM(s) Oral at bedtime  memantine 10 milliGRAM(s) Oral at bedtime  methylPREDNISolone sodium succinate Injectable 40 milliGRAM(s) IV Push daily  mirabegron ER 25 milliGRAM(s) Oral at bedtime  montelukast 10 milliGRAM(s) Oral daily  oseltamivir 75 milliGRAM(s) Oral two times a day  pantoprazole  Injectable 40 milliGRAM(s) IV Push daily  risperiDONE   Tablet 0.5 milliGRAM(s) Oral two times a day  risperiDONE   Tablet 1 milliGRAM(s) Oral at bedtime  sodium chloride 0.45%. 1000 milliLiter(s) (75 mL/Hr) IV Continuous <Continuous>  tiotropium 2.5 MICROgram(s) Inhaler 2 Puff(s) Inhalation daily    MEDICATIONS  (PRN):  acetaminophen     Tablet .. 650 milliGRAM(s) Oral every 6 hours PRN Temp greater or equal to 38C (100.4F), Mild Pain (1 - 3)  albuterol    90 MICROgram(s) HFA Inhaler 2 Puff(s) Inhalation every 6 hours PRN Shortness of Breath and/or Wheezing  aluminum hydroxide/magnesium hydroxide/simethicone Suspension 30 milliLiter(s) Oral every 4 hours PRN Dyspepsia  ondansetron Injectable 4 milliGRAM(s) IV Push every 8 hours PRN Nausea and/or Vomiting    T(C): 36.8 (03-30-24 @ 13:24), Max: 37.7 (03-29-24 @ 02:48)  HR: 90 (03-30-24 @ 13:24) (85 - 104)  BP: 114/60 (03-30-24 @ 13:24) (101/56 - 131/82)  RR: 18 (03-30-24 @ 13:24) (17 - 20)  SpO2: 98% (03-30-24 @ 13:24) (92% - 99%)  Wt(kg): --  I&O's Detail    29 Mar 2024 07:01  -  30 Mar 2024 07:00  --------------------------------------------------------  IN:    Heparin Infusion: 117.5 mL    sodium chloride 0.45%: 750 mL  Total IN: 867.5 mL    OUT:    Indwelling Catheter - Urethral (mL): 1550 mL  Total OUT: 1550 mL    Total NET: -682.5 mL          PHYSICAL EXAM:  General: No distress  Respiratory: b/l air entry  Cardiovascular: S1 S2  Gastrointestinal: soft  Extremities:  no edema                              13.3   13.10 )-----------( 147      ( 30 Mar 2024 10:39 )             39.2     03-30    143  |  109<H>  |  24<H>  ----------------------------<  195<H>  3.9   |  27  |  0.94    Ca    8.0<L>      30 Mar 2024 10:39    TPro  5.6<L>  /  Alb  2.7<L>  /  TBili  0.2  /  DBili  x   /  AST  82<H>  /  ALT  42  /  AlkPhos  53  03-30    CARDIAC MARKERS ( 29 Mar 2024 16:05 )  x     / x     / x     / x     / 19.1 ng/mL  CARDIAC MARKERS ( 29 Mar 2024 06:33 )  x     / x     / 3112 U/L / x     / 23.5 ng/mL  CARDIAC MARKERS ( 29 Mar 2024 03:30 )  x     / x     / 3053 U/L / x     / 18.7 ng/mL  CARDIAC MARKERS ( 28 Mar 2024 21:21 )  x     / x     / 3109 U/L / x     / 17.4 ng/mL      LIVER FUNCTIONS - ( 30 Mar 2024 10:39 )  Alb: 2.7 g/dL / Pro: 5.6 g/dL / ALK PHOS: 53 U/L / ALT: 42 U/L / AST: 82 U/L / GGT: x           Urinalysis Basic - ( 30 Mar 2024 10:39 )    Color: x / Appearance: x / SG: x / pH: x  Gluc: 195 mg/dL / Ketone: x  / Bili: x / Urobili: x   Blood: x / Protein: x / Nitrite: x   Leuk Esterase: x / RBC: x / WBC x   Sq Epi: x / Non Sq Epi: x / Bacteria: x      ABG - ( 29 Mar 2024 01:18 )  pH, Arterial: 7.31  pH, Blood: x     /  pCO2: 46    /  pO2: 53    / HCO3: 23    / Base Excess: -3.1  /  SaO2: 85.6              Sodium, Serum: 143 (03-30 @ 10:39)  Sodium, Serum: 145 (03-29 @ 16:05)  Sodium, Serum: 146 (03-29 @ 03:30)  Sodium, Serum: 144 (03-28 @ 16:00)    Creatinine, Serum: 0.94 (03-30 @ 10:39)  Creatinine, Serum: 0.99 (03-29 @ 16:05)  Creatinine, Serum: 1.50 (03-29 @ 03:30)  Creatinine, Serum: 0.98 (03-28 @ 16:00)    Potassium, Serum: 3.9 (03-30 @ 10:39)  Potassium, Serum: 3.6 (03-29 @ 16:05)  Potassium, Serum: 4.0 (03-29 @ 03:30)  Potassium, Serum: 4.1 (03-28 @ 16:00)    Hemoglobin: 13.3 (03-30 @ 10:39)  Hemoglobin: 14.6 (03-29 @ 14:10)  Hemoglobin: 16.3 (03-29 @ 03:30)  Hemoglobin: 14.9 (03-28 @ 16:00)      < from: CT Angio Chest PE Protocol w/ IV Cont (03.29.24 @ 21:29) >    ACC: [id number] EXAM:  CT ANGIO CHEST PULM [facility]   ORDERED BY:  MARITA NEWBERRY     PROCEDURE DATE:  03/29/2024          INTERPRETATION:  History: Influenza.    COMPARISON: Noncontrast CT of the thorax March 28, 2024.    PROCEDURE:  CT Angiography of the Chest.  Sagittal and coronal reformats were performed as well as 3D (MIP)   reconstructions.    FINDINGS:    LUNGS AND AIRWAYS: Patent central airways. There is new extensive mucous   plugging throughout the bilateral lower lobes. There is complete   consolidative collapse of the basilar segments of the right lower lobe   and severe volume loss in the superior segment. There is extensive   consolidation in the dependent left lower lobe and moderate consolidation   in the dependent left upper lobe. There is new peribronchovascular   nodularity in the dependent right upper lobe.  PLEURA: No pleural effusion.  MEDIASTINUM AND NANY: No lymphadenopathy.  VESSELS: There is no evidence of filling defect in the first, second, or   third order branches of the pulmonary arteries. The pulmonary trunk and   main pulmonary arteries are unremarkable. Status post TAVR. Otherwise,   the thoracic aorta and great vessels are unremarkable.  HEART: Heart size is normal. No pericardial effusion.  CHEST WALL AND LOWER NECK: Within normal limits.  VISUALIZED UPPER ABDOMEN: Tiny layering gallstones. The pancreas is   moderately atrophic and fatty replaced.  BONES: Within normal limits.    IMPRESSION: Extensive mucous plugging in the dependent lower lobes with   near complete right lower lobe consolidative collapse and extensive left   upper lobe and left lower lobe dependent consolidation. Correlate   clinically for aspiration pneumonia.    --- End of Report ---            DAYA HALL MD; Attending Radiologist  This document has been electronically signed. Mar 30 2024  9:23AM    < end of copied text >

## 2024-03-30 NOTE — DIETITIAN INITIAL EVALUATION ADULT - ORAL NUTRITION SUPPLEMENTS
recommend glucerna  kcals and 10 gms protein per serving  , will add SF  magic cup ice cream daily 260kcals and 9 gms protein left message for MD

## 2024-03-30 NOTE — DIETITIAN INITIAL EVALUATION ADULT - PROBLEM SELECTOR PLAN 9
- per daughter, pt has h/o COPD  - never been hospitalized for it, has been well managed with home meds

## 2024-03-30 NOTE — PROGRESS NOTE ADULT - PROBLEM SELECTOR PLAN 7
- CT spine showing:   Slightly mottled appearance to the vertebral bodies of the cervical   and upper thoracic spine. This could be osteopenic in nature. A   underlying myeloma/lymphoma or other metastatic process is not entirely   excluded. Recommend clinical and laboratory correlation.  will obtain further work up when current condition improves

## 2024-03-30 NOTE — CARE COORDINATION ASSESSMENT. - NSPASTMEDSURGHISTORY_GEN_ALL_CORE_FT
PAST MEDICAL & SURGICAL HISTORY:  HLD (hyperlipidemia)      Asthma  controlled on inhalers      ACE (dyspnea on exertion)      Dementia  mild- on Memantine      BPH (benign prostatic hyperplasia)      Aortic stenosis      History of tonsillectomy  childhood      Anxiety      Overactive bladder      OH (ocular hypertension)  on eye drops      CAD (coronary artery disease)  s/p stent 10/2019      H/O coronary angiogram  10/25/19, s/p PCI to LAD

## 2024-03-30 NOTE — PROGRESS NOTE ADULT - PROBLEM SELECTOR PLAN 8
severe dementia: need assist for all ADLs, limited functional status, as per family only able to ambulate from bed to bathroom with walker and falls frequently. not able to make needs known   - d/c aricept due to QT prolongation 501  -decrease Memantine  - supportive care   - d/c Seroquel due to QT   -psy eval noted -started risperdol

## 2024-03-30 NOTE — DIETITIAN INITIAL EVALUATION ADULT - PROBLEM SELECTOR PLAN 1
- Meets sepsis criteria, HR: 96, Temp: 98-->101.1, WC elevated, lactate WNL , source influneza A  - CT chest: Linear atelectasis at the right lung base.  - s/p Ofirmev x 1, Cefepime x 1, Zyprexa x1, LR bolus x1  - c/w Tamiflu 75mg BID x 5 days   - f/u ucx, blood cx x 2, UA  - IVF- per pts daughter, pt has poor appetite and does not drink many fluids   - soft and bite size diet pending dysphagia screen   - Tylenol for fever, follow fever curve and daily cbc  - ID Dr. Desai consulted, f/u reccs  - Pulm Dr. Butcher aware and to see patient during inpatient stay

## 2024-03-30 NOTE — PROGRESS NOTE ADULT - PROBLEM SELECTOR PLAN 2
Hypoxic to 60s , initial ABG respiratory acidosis with Co2 retention, repeat ABG improved   likely due to flu and COPD exacerbation / pneumonia   -Will obtain CTA chest to r/o PE when renal function improves , on heparin drip now   HFNC for now,  Wean as tolerated 100%->90% at current   GOC discussed with palliative team  and family- DNR/DNI: patient with multiple significant acute and chronic medical conditions, risk of sudden decompensation. prognosis poor. candidate for palliative hospice service  CTA negative for PE , Heparin Drip stopped. + multi focal pneumonia

## 2024-03-30 NOTE — CONSULT NOTE ADULT - SUBJECTIVE AND OBJECTIVE BOX
Chief Complaint:  Patient is a 84y old  Male who presents with a chief complaint of Influenza with other respiratory manifestations, other influenza virus identified called to see ot with abd pain and some constipation  History of Present Illness:   83 yo M with PMH aortic stenosis s/p TAVR 2019, dementia, HLD, asthm, COPD, overactive bladder ,H pylori, BPH presents to the ED with lethargy/weakness. Interview was conducted on phone with patients daughter Katharina as pt has severe dementia and cannot answer questions, tried to communicate with [place]  but patient could not participate. Per daughter, pt had a fall yesterday when trying to get out of bed in the morning (lives at home with daughter, son in law and wife- does not use walking device- per daughter "wouldn't know  how").  Last night, patient was found to be much weaker and more lethargic than usual and sustained another fall this AM. This fall was unwitnessed but patient was found face forward on the floor on the carpet. Of note, daughter was just diagnosed with flu at urgent care. Daughter denied pt had any new sx of cp,palpitations, sob, cough, n/v/d/constipation/urinary changes however daughter acknowledges that difficult to know for sure 2/2 mental status.     Of note, per [facility], patient was seen by pulmonologist Dr. Zuñiga in 2023 for asthma exacerbation tx with Trelegy, ventolin inhaler- no steroids given.      ED Course:   Vitals: BP: 129/73 , HR: 96, Temp: 98-->101.1 , RR: , SpO2: 96% on RA   Labs:  WBC: 10.58, Lactate WNL, Influenza A: Detected  UA Pending  CXR:   1. Mildly elevated right hemidiaphragm with subsegmental atelectasis near   the cardiophrenic angle on the right.  2. Status post TAVR. No CHF.  3. No evidence of infiltrate, pleural effusion or pulmonary edema.  CT Head:  CT HEAD:  1.  No evidence of acute intracranial hemorrhage or midline shift.  2.  Chronic small vessel disease.    CT CERVICAL SPINE:  1.  No evidence of acute osseous fracture or rosario dislocation.  2.  Multilevel degenerative change of the cervical spine.  3.  Slightly mottled appearance to the vertebral bodies of the cervical   and upper thoracic spine. This could be osteopenic in nature. A   underlying myeloma/lymphoma or other metastatic process is not entirely   excluded. Recommend clinical and laboratory correlation.    CT Chest:  Linear atelectasis at the right lung base. Minimal pericardial effusion No evidence of acute traumatic injury. Status post TAVR  EKG: Poor quality, will repeat   Received in the ED: Ofirmev x 1, Cefepime x 1, Zyprexa x1, LR bolus x1.          Review of Systems:  Unable to obtain due to: Dementia        HPI:    Allergies:  No Known Allergies      Medications:  acetaminophen     Tablet .. 650 milliGRAM(s) Oral every 6 hours PRN  albuterol    90 MICROgram(s) HFA Inhaler 2 Puff(s) Inhalation every 6 hours PRN  albuterol/ipratropium for Nebulization 3 milliLiter(s) Nebulizer every 6 hours  aluminum hydroxide/magnesium hydroxide/simethicone Suspension 30 milliLiter(s) Oral every 4 hours PRN  aspirin enteric coated 81 milliGRAM(s) Oral daily  budesonide  80 MICROgram(s)/formoterol 4.5 MICROgram(s) Inhaler 2 Puff(s) Inhalation two times a day  cefTRIAXone   IVPB 1000 milliGRAM(s) IV Intermittent every 24 hours  clopidogrel Tablet 75 milliGRAM(s) Oral daily  finasteride 5 milliGRAM(s) Oral daily  heparin   Injectable 5000 Unit(s) SubCutaneous every 12 hours  latanoprost 0.005% Ophthalmic Solution 1 Drop(s) Both EYES at bedtime  melatonin 10 milliGRAM(s) Oral at bedtime  memantine 10 milliGRAM(s) Oral at bedtime  methylPREDNISolone sodium succinate Injectable 40 milliGRAM(s) IV Push daily  mirabegron ER 25 milliGRAM(s) Oral at bedtime  montelukast 10 milliGRAM(s) Oral daily  ondansetron Injectable 4 milliGRAM(s) IV Push every 8 hours PRN  oseltamivir 75 milliGRAM(s) Oral two times a day  pantoprazole  Injectable 40 milliGRAM(s) IV Push daily  risperiDONE   Tablet 1 milliGRAM(s) Oral at bedtime  risperiDONE   Tablet 0.5 milliGRAM(s) Oral two times a day  sodium chloride 0.45%. 1000 milliLiter(s) IV Continuous <Continuous>  sodium chloride 3%  Inhalation 4 milliLiter(s) Inhalation every 12 hours  tiotropium 2.5 MICROgram(s) Inhaler 2 Puff(s) Inhalation daily      PMHX/PSHX:  Aortic stenosis    BPH (benign prostatic hyperplasia)    Dementia    ACE (dyspnea on exertion)    Asthma    HLD (hyperlipidemia)    CAD (coronary artery disease)    OH (ocular hypertension)    Overactive bladder    Anxiety    History of tonsillectomy    H/O coronary angiogram        Family history:      Social History:     ROS:     General:  No wt loss, fevers, chills, night sweats, fatigue,   Eyes:  Good vision, no reported pain  ENT:  No sore throat, pain, runny nose, dysphagia  CV:  No pain, palpitations, hypo/hypertension  Resp:  No dyspnea, cough, tachypnea, wheezing  GI:  No pain, No nausea, No vomiting, No diarrhea, No constipation, No weight loss, No fever, No pruritis, No rectal bleeding, No tarry stools, No dysphagia,  :  No pain, bleeding, incontinence, nocturia  Muscle:  No pain, weakness  Neuro:  No weakness, tingling, memory problems  Psych:  No fatigue, insomnia, mood problems, depression  Endocrine:  No polyuria, polydipsia, cold/heat intolerance  Heme:  No petechiae, ecchymosis, easy bruisability  Skin:  No rash, tattoos, scars, edema      PHYSICAL EXAM:   Vital Signs:  Vital Signs Last 24 Hrs  T(C): 36.8 (30 Mar 2024 20:06), Max: 36.8 (30 Mar 2024 05:19)  T(F): 98.3 (30 Mar 2024 20:06), Max: 98.3 (30 Mar 2024 05:19)  HR: 75 (30 Mar 2024 20:35) (75 - 104)  BP: 146/63 (30 Mar 2024 20:06) (114/60 - 146/63)  BP(mean): --  RR: 17 (30 Mar 2024 20:06) (17 - 18)  SpO2: 95% (30 Mar 2024 20:35) (95% - 99%)    Parameters below as of 30 Mar 2024 20:35  Patient On (Oxygen Delivery Method): nasal cannula, high flow      Daily     Daily Weight in k.1 (30 Mar 2024 05:19)    GENERAL:  Appears stated age, well-groomed, well-nourished, no distress  HEENT:  NC/AT,  conjunctivae clear and pink, no thyromegaly, nodules, adenopathy, no JVD, sclera -anicteric  CHEST:  Full & symmetric excursion, no increased effort, breath sounds clear  HEART:  Regular rhythm, S1, S2, no murmur/rub/S3/S4, no abdominal bruit, no edema  ABDOMEN:  Soft, non-tender, non-distended, normoactive bowel sounds,  no masses ,no hepato-splenomegaly, no signs of chronic liver disease  EXTEREMITIES:  no cyanosis,clubbing or edema  SKIN:  No rash/erythema/ecchymoses/petechiae/wounds/abscess/warm/dry  NEURO:  Alert, oriented, no asterixis, no tremor, no encephalopathy    LABS:                        13.3   13.10 )-----------( 147      ( 30 Mar 2024 10:39 )             39.2     03-30    143  |  109<H>  |  24<H>  ----------------------------<  195<H>  3.9   |  27  |  0.94    Ca    8.0<L>      30 Mar 2024 10:39    TPro  5.6<L>  /  Alb  2.7<L>  /  TBili  0.2  /  DBili  x   /  AST  82<H>  /  ALT  42  /  AlkPhos  53  03-30    LIVER FUNCTIONS - ( 30 Mar 2024 10:39 )  Alb: 2.7 g/dL / Pro: 5.6 g/dL / ALK PHOS: 53 U/L / ALT: 42 U/L / AST: 82 U/L / GGT: x           PT/INR - ( 29 Mar 2024 07:15 )   PT: 15.9 sec;   INR: 1.37 ratio         PTT - ( 30 Mar 2024 10:39 )  PTT:30.1 sec  Urinalysis Basic - ( 30 Mar 2024 10:39 )    Color: x / Appearance: x / SG: x / pH: x  Gluc: 195 mg/dL / Ketone: x  / Bili: x / Urobili: x   Blood: x / Protein: x / Nitrite: x   Leuk Esterase: x / RBC: x / WBC x   Sq Epi: x / Non Sq Epi: x / Bacteria: x          Imaging:

## 2024-03-30 NOTE — PROGRESS NOTE ADULT - SUBJECTIVE AND OBJECTIVE BOX
Patient is a 84y old  Male who presents with a chief complaint of Influenza with other respiratory manifestations, other influenza virus identified     (30 Mar 2024 11:54)      INTERVAL HPI/OVERNIGHT EVENTS:    remains confused, but more alert today. remains on high flow oxygen with FIO2 90%    MEDICATIONS  (STANDING):  albuterol/ipratropium for Nebulization 3 milliLiter(s) Nebulizer every 6 hours  aspirin enteric coated 81 milliGRAM(s) Oral daily  budesonide  80 MICROgram(s)/formoterol 4.5 MICROgram(s) Inhaler 2 Puff(s) Inhalation two times a day  cefTRIAXone   IVPB 1000 milliGRAM(s) IV Intermittent every 24 hours  clopidogrel Tablet 75 milliGRAM(s) Oral daily  finasteride 5 milliGRAM(s) Oral daily  heparin   Injectable 5000 Unit(s) SubCutaneous every 12 hours  latanoprost 0.005% Ophthalmic Solution 1 Drop(s) Both EYES at bedtime  melatonin 10 milliGRAM(s) Oral at bedtime  memantine 10 milliGRAM(s) Oral at bedtime  methylPREDNISolone sodium succinate Injectable 40 milliGRAM(s) IV Push daily  mirabegron ER 25 milliGRAM(s) Oral at bedtime  montelukast 10 milliGRAM(s) Oral daily  oseltamivir 75 milliGRAM(s) Oral two times a day  pantoprazole  Injectable 40 milliGRAM(s) IV Push daily  risperiDONE   Tablet 1 milliGRAM(s) Oral at bedtime  risperiDONE   Tablet 0.5 milliGRAM(s) Oral two times a day  sodium chloride 0.45%. 1000 milliLiter(s) (50 mL/Hr) IV Continuous <Continuous>  tiotropium 2.5 MICROgram(s) Inhaler 2 Puff(s) Inhalation daily      MEDICATIONS  (PRN):  acetaminophen     Tablet .. 650 milliGRAM(s) Oral every 6 hours PRN Temp greater or equal to 38C (100.4F), Mild Pain (1 - 3)  albuterol    90 MICROgram(s) HFA Inhaler 2 Puff(s) Inhalation every 6 hours PRN Shortness of Breath and/or Wheezing  aluminum hydroxide/magnesium hydroxide/simethicone Suspension 30 milliLiter(s) Oral every 4 hours PRN Dyspepsia  ondansetron Injectable 4 milliGRAM(s) IV Push every 8 hours PRN Nausea and/or Vomiting      Allergies    No Known Allergies    Intolerances        PAST MEDICAL & SURGICAL HISTORY:  Aortic stenosis      BPH (benign prostatic hyperplasia)      Dementia  mild- on Memantine      ACE (dyspnea on exertion)      Asthma  controlled on inhalers      HLD (hyperlipidemia)      CAD (coronary artery disease)  s/p stent 10/2019      OH (ocular hypertension)  on eye drops      Overactive bladder      Anxiety      History of tonsillectomy  childhood      H/O coronary angiogram  10/25/19, s/p PCI to LAD          Vital Signs Last 24 Hrs  T(C): 36.8 (30 Mar 2024 13:24), Max: 36.8 (30 Mar 2024 05:19)  T(F): 98.2 (30 Mar 2024 13:24), Max: 98.3 (30 Mar 2024 05:19)  HR: 90 (30 Mar 2024 13:24) (85 - 104)  BP: 114/60 (30 Mar 2024 13:24) (106/66 - 118/48)  BP(mean): --  RR: 18 (30 Mar 2024 13:24) (18 - 18)  SpO2: 98% (30 Mar 2024 13:24) (93% - 99%)    Parameters below as of 30 Mar 2024 13:24  Patient On (Oxygen Delivery Method): nasal cannula, high flow        PHYSICAL EXAMINATION:    GENERAL: The patient is awake and in no apparent distress.     HEENT: Head is normocephalic and atraumatic    NECK: no JVD    LUNGS: diminished air entry bilateral    HEART: Regular rate and rhythm without murmur.    ABDOMEN: Soft, nontender, and nondistended.      EXTREMITIES: Without any cyanosis, clubbing, rash, lesions or edema.    NEUROLOGIC: Grossly intact.    SKIN: No ulceration or induration present.      LABS:                        13.3   13.10 )-----------( 147      ( 30 Mar 2024 10:39 )             39.2     03-30    143  |  109<H>  |  24<H>  ----------------------------<  195<H>  3.9   |  27  |  0.94    Ca    8.0<L>      30 Mar 2024 10:39    TPro  5.6<L>  /  Alb  2.7<L>  /  TBili  0.2  /  DBili  x   /  AST  82<H>  /  ALT  42  /  AlkPhos  53  03-30    PT/INR - ( 29 Mar 2024 07:15 )   PT: 15.9 sec;   INR: 1.37 ratio         PTT - ( 30 Mar 2024 10:39 )  PTT:30.1 sec  Urinalysis Basic - ( 30 Mar 2024 10:39 )    Color: x / Appearance: x / SG: x / pH: x  Gluc: 195 mg/dL / Ketone: x  / Bili: x / Urobili: x   Blood: x / Protein: x / Nitrite: x   Leuk Esterase: x / RBC: x / WBC x   Sq Epi: x / Non Sq Epi: x / Bacteria: x      ABG - ( 29 Mar 2024 01:18 )  pH, Arterial: 7.31  pH, Blood: x     /  pCO2: 46    /  pO2: 53    / HCO3: 23    / Base Excess: -3.1  /  SaO2: 85.6              CARDIAC MARKERS ( 29 Mar 2024 16:05 )  x     / x     / x     / x     / 19.1 ng/mL  CARDIAC MARKERS ( 29 Mar 2024 06:33 )  x     / x     / 3112 U/L / x     / 23.5 ng/mL  CARDIAC MARKERS ( 29 Mar 2024 03:30 )  x     / x     / 3053 U/L / x     / 18.7 ng/mL  CARDIAC MARKERS ( 28 Mar 2024 21:21 )  x     / x     / 3109 U/L / x     / 17.4 ng/mL                MICROBIOLOGY:  Culture Results:   No growth at 24 hours (03-28 @ 16:00)  Culture Results:   No growth at 24 hours (03-28 @ 15:50)        Assessment:    Influenza A bronchitis with multilobar pneumonia - viral or superimposed bacterial  Acute Hypoxic and Hypercapnic Respiratory failure  Cognitive Decline  Coronary artery disease - S/P stent  Aortic stenosis    Plan:    High flow oxygen  IV Solumedrol  Monitor pulse oximetry  Complete course of Tamiflu  IV Rocephin for superimposed bacterial pneumonia  Duoneb QID     Patient is a 84y old  Male who presents with a chief complaint of Influenza with other respiratory manifestations, other influenza virus identified     (30 Mar 2024 11:54)      INTERVAL HPI/OVERNIGHT EVENTS:    remains confused, but more alert today. remains on high flow oxygen with FIO2 90%    MEDICATIONS  (STANDING):  albuterol/ipratropium for Nebulization 3 milliLiter(s) Nebulizer every 6 hours  aspirin enteric coated 81 milliGRAM(s) Oral daily  budesonide  80 MICROgram(s)/formoterol 4.5 MICROgram(s) Inhaler 2 Puff(s) Inhalation two times a day  cefTRIAXone   IVPB 1000 milliGRAM(s) IV Intermittent every 24 hours  clopidogrel Tablet 75 milliGRAM(s) Oral daily  finasteride 5 milliGRAM(s) Oral daily  heparin   Injectable 5000 Unit(s) SubCutaneous every 12 hours  latanoprost 0.005% Ophthalmic Solution 1 Drop(s) Both EYES at bedtime  melatonin 10 milliGRAM(s) Oral at bedtime  memantine 10 milliGRAM(s) Oral at bedtime  methylPREDNISolone sodium succinate Injectable 40 milliGRAM(s) IV Push daily  mirabegron ER 25 milliGRAM(s) Oral at bedtime  montelukast 10 milliGRAM(s) Oral daily  oseltamivir 75 milliGRAM(s) Oral two times a day  pantoprazole  Injectable 40 milliGRAM(s) IV Push daily  risperiDONE   Tablet 1 milliGRAM(s) Oral at bedtime  risperiDONE   Tablet 0.5 milliGRAM(s) Oral two times a day  sodium chloride 0.45%. 1000 milliLiter(s) (50 mL/Hr) IV Continuous <Continuous>  tiotropium 2.5 MICROgram(s) Inhaler 2 Puff(s) Inhalation daily      MEDICATIONS  (PRN):  acetaminophen     Tablet .. 650 milliGRAM(s) Oral every 6 hours PRN Temp greater or equal to 38C (100.4F), Mild Pain (1 - 3)  albuterol    90 MICROgram(s) HFA Inhaler 2 Puff(s) Inhalation every 6 hours PRN Shortness of Breath and/or Wheezing  aluminum hydroxide/magnesium hydroxide/simethicone Suspension 30 milliLiter(s) Oral every 4 hours PRN Dyspepsia  ondansetron Injectable 4 milliGRAM(s) IV Push every 8 hours PRN Nausea and/or Vomiting      Allergies    No Known Allergies    Intolerances        PAST MEDICAL & SURGICAL HISTORY:  Aortic stenosis      BPH (benign prostatic hyperplasia)      Dementia  mild- on Memantine      ACE (dyspnea on exertion)      Asthma  controlled on inhalers      HLD (hyperlipidemia)      CAD (coronary artery disease)  s/p stent 10/2019      OH (ocular hypertension)  on eye drops      Overactive bladder      Anxiety      History of tonsillectomy  childhood      H/O coronary angiogram  10/25/19, s/p PCI to LAD          Vital Signs Last 24 Hrs  T(C): 36.8 (30 Mar 2024 13:24), Max: 36.8 (30 Mar 2024 05:19)  T(F): 98.2 (30 Mar 2024 13:24), Max: 98.3 (30 Mar 2024 05:19)  HR: 90 (30 Mar 2024 13:24) (85 - 104)  BP: 114/60 (30 Mar 2024 13:24) (106/66 - 118/48)  BP(mean): --  RR: 18 (30 Mar 2024 13:24) (18 - 18)  SpO2: 98% (30 Mar 2024 13:24) (93% - 99%)    Parameters below as of 30 Mar 2024 13:24  Patient On (Oxygen Delivery Method): nasal cannula, high flow        PHYSICAL EXAMINATION:    GENERAL: The patient is awake and in no apparent distress.     HEENT: Head is normocephalic and atraumatic    NECK: no JVD    LUNGS: diminished air entry bilateral    HEART: Regular rate and rhythm without murmur.    ABDOMEN: Soft, nontender, and nondistended.      EXTREMITIES: Without any cyanosis, clubbing, rash, lesions or edema.    NEUROLOGIC: Grossly intact.    SKIN: No ulceration or induration present.      LABS:                        13.3   13.10 )-----------( 147      ( 30 Mar 2024 10:39 )             39.2     03-30    143  |  109<H>  |  24<H>  ----------------------------<  195<H>  3.9   |  27  |  0.94    Ca    8.0<L>      30 Mar 2024 10:39    TPro  5.6<L>  /  Alb  2.7<L>  /  TBili  0.2  /  DBili  x   /  AST  82<H>  /  ALT  42  /  AlkPhos  53  03-30    PT/INR - ( 29 Mar 2024 07:15 )   PT: 15.9 sec;   INR: 1.37 ratio         PTT - ( 30 Mar 2024 10:39 )  PTT:30.1 sec  Urinalysis Basic - ( 30 Mar 2024 10:39 )    Color: x / Appearance: x / SG: x / pH: x  Gluc: 195 mg/dL / Ketone: x  / Bili: x / Urobili: x   Blood: x / Protein: x / Nitrite: x   Leuk Esterase: x / RBC: x / WBC x   Sq Epi: x / Non Sq Epi: x / Bacteria: x      ABG - ( 29 Mar 2024 01:18 )  pH, Arterial: 7.31  pH, Blood: x     /  pCO2: 46    /  pO2: 53    / HCO3: 23    / Base Excess: -3.1  /  SaO2: 85.6              CARDIAC MARKERS ( 29 Mar 2024 16:05 )  x     / x     / x     / x     / 19.1 ng/mL  CARDIAC MARKERS ( 29 Mar 2024 06:33 )  x     / x     / 3112 U/L / x     / 23.5 ng/mL  CARDIAC MARKERS ( 29 Mar 2024 03:30 )  x     / x     / 3053 U/L / x     / 18.7 ng/mL  CARDIAC MARKERS ( 28 Mar 2024 21:21 )  x     / x     / 3109 U/L / x     / 17.4 ng/mL                MICROBIOLOGY:  Culture Results:   No growth at 24 hours (03-28 @ 16:00)  Culture Results:   No growth at 24 hours (03-28 @ 15:50)    CT chest revealed no evidence of pulmonary emboli but also revealed extensive pulmonary infiltrates with left lower lobe atelectasis and mucous plugs    Assessment:    Influenza A bronchitis with multilobar pneumonia - viral or superimposed bacterial  Acute Hypoxic and Hypercapnic Respiratory failure  Cognitive Decline  Coronary artery disease - S/P stent  Aortic stenosis    Plan:    High flow oxygen  IV Solumedrol  Monitor pulse oximetry  Complete course of Tamiflu  IV Rocephin for superimposed bacterial pneumonia  Duoneb QID  Add saline to nebulizer treatments     Patient is a 84y old  Male who presents with a chief complaint of Influenza with other respiratory manifestations, other influenza virus identified     (30 Mar 2024 11:54)      INTERVAL HPI/OVERNIGHT EVENTS:    remains confused, but more alert today. remains on high flow oxygen with FIO2 90%    MEDICATIONS  (STANDING):  albuterol/ipratropium for Nebulization 3 milliLiter(s) Nebulizer every 6 hours  aspirin enteric coated 81 milliGRAM(s) Oral daily  budesonide  80 MICROgram(s)/formoterol 4.5 MICROgram(s) Inhaler 2 Puff(s) Inhalation two times a day  cefTRIAXone   IVPB 1000 milliGRAM(s) IV Intermittent every 24 hours  clopidogrel Tablet 75 milliGRAM(s) Oral daily  finasteride 5 milliGRAM(s) Oral daily  heparin   Injectable 5000 Unit(s) SubCutaneous every 12 hours  latanoprost 0.005% Ophthalmic Solution 1 Drop(s) Both EYES at bedtime  melatonin 10 milliGRAM(s) Oral at bedtime  memantine 10 milliGRAM(s) Oral at bedtime  methylPREDNISolone sodium succinate Injectable 40 milliGRAM(s) IV Push daily  mirabegron ER 25 milliGRAM(s) Oral at bedtime  montelukast 10 milliGRAM(s) Oral daily  oseltamivir 75 milliGRAM(s) Oral two times a day  pantoprazole  Injectable 40 milliGRAM(s) IV Push daily  risperiDONE   Tablet 1 milliGRAM(s) Oral at bedtime  risperiDONE   Tablet 0.5 milliGRAM(s) Oral two times a day  sodium chloride 0.45%. 1000 milliLiter(s) (50 mL/Hr) IV Continuous <Continuous>  tiotropium 2.5 MICROgram(s) Inhaler 2 Puff(s) Inhalation daily      MEDICATIONS  (PRN):  acetaminophen     Tablet .. 650 milliGRAM(s) Oral every 6 hours PRN Temp greater or equal to 38C (100.4F), Mild Pain (1 - 3)  albuterol    90 MICROgram(s) HFA Inhaler 2 Puff(s) Inhalation every 6 hours PRN Shortness of Breath and/or Wheezing  aluminum hydroxide/magnesium hydroxide/simethicone Suspension 30 milliLiter(s) Oral every 4 hours PRN Dyspepsia  ondansetron Injectable 4 milliGRAM(s) IV Push every 8 hours PRN Nausea and/or Vomiting      Allergies    No Known Allergies    Intolerances        PAST MEDICAL & SURGICAL HISTORY:  Aortic stenosis      BPH (benign prostatic hyperplasia)      Dementia  mild- on Memantine      ACE (dyspnea on exertion)      Asthma  controlled on inhalers      HLD (hyperlipidemia)      CAD (coronary artery disease)  s/p stent 10/2019      OH (ocular hypertension)  on eye drops      Overactive bladder      Anxiety      History of tonsillectomy  childhood      H/O coronary angiogram  10/25/19, s/p PCI to LAD          Vital Signs Last 24 Hrs  T(C): 36.8 (30 Mar 2024 13:24), Max: 36.8 (30 Mar 2024 05:19)  T(F): 98.2 (30 Mar 2024 13:24), Max: 98.3 (30 Mar 2024 05:19)  HR: 90 (30 Mar 2024 13:24) (85 - 104)  BP: 114/60 (30 Mar 2024 13:24) (106/66 - 118/48)  BP(mean): --  RR: 18 (30 Mar 2024 13:24) (18 - 18)  SpO2: 98% (30 Mar 2024 13:24) (93% - 99%)    Parameters below as of 30 Mar 2024 13:24  Patient On (Oxygen Delivery Method): nasal cannula, high flow        PHYSICAL EXAMINATION:    GENERAL: The patient is awake and in no apparent distress.     HEENT: Head is normocephalic and atraumatic    NECK: no JVD    LUNGS: diminished air entry bilateral    HEART: Regular rate and rhythm without murmur.    ABDOMEN: Soft, nontender, and nondistended.      EXTREMITIES: Without any cyanosis, clubbing, rash, lesions or edema.    NEUROLOGIC: Grossly intact.    SKIN: No ulceration or induration present.      LABS:                        13.3   13.10 )-----------( 147      ( 30 Mar 2024 10:39 )             39.2     03-30    143  |  109<H>  |  24<H>  ----------------------------<  195<H>  3.9   |  27  |  0.94    Ca    8.0<L>      30 Mar 2024 10:39    TPro  5.6<L>  /  Alb  2.7<L>  /  TBili  0.2  /  DBili  x   /  AST  82<H>  /  ALT  42  /  AlkPhos  53  03-30    PT/INR - ( 29 Mar 2024 07:15 )   PT: 15.9 sec;   INR: 1.37 ratio         PTT - ( 30 Mar 2024 10:39 )  PTT:30.1 sec  Urinalysis Basic - ( 30 Mar 2024 10:39 )    Color: x / Appearance: x / SG: x / pH: x  Gluc: 195 mg/dL / Ketone: x  / Bili: x / Urobili: x   Blood: x / Protein: x / Nitrite: x   Leuk Esterase: x / RBC: x / WBC x   Sq Epi: x / Non Sq Epi: x / Bacteria: x      ABG - ( 29 Mar 2024 01:18 )  pH, Arterial: 7.31  pH, Blood: x     /  pCO2: 46    /  pO2: 53    / HCO3: 23    / Base Excess: -3.1  /  SaO2: 85.6              CARDIAC MARKERS ( 29 Mar 2024 16:05 )  x     / x     / x     / x     / 19.1 ng/mL  CARDIAC MARKERS ( 29 Mar 2024 06:33 )  x     / x     / 3112 U/L / x     / 23.5 ng/mL  CARDIAC MARKERS ( 29 Mar 2024 03:30 )  x     / x     / 3053 U/L / x     / 18.7 ng/mL  CARDIAC MARKERS ( 28 Mar 2024 21:21 )  x     / x     / 3109 U/L / x     / 17.4 ng/mL                MICROBIOLOGY:  Culture Results:   No growth at 24 hours (03-28 @ 16:00)  Culture Results:   No growth at 24 hours (03-28 @ 15:50)    CT angiogram of chest revealed no evidence of pulmonary emboli but also revealed extensive pulmonary infiltrates with left lower lobe atelectasis and mucous plugs    Assessment:    Influenza A bronchitis with multilobar pneumonia - viral or superimposed bacterial  Acute Hypoxic and Hypercapnic Respiratory failure  Cognitive Decline  Coronary artery disease - S/P stent  Aortic stenosis - S/P TAVR    Plan:    High flow oxygen  IV Solumedrol  Monitor pulse oximetry  Complete course of Tamiflu  IV Rocephin for superimposed bacterial pneumonia  Duoneb QID  Add saline to nebulizer treatments

## 2024-03-31 LAB
ALBUMIN SERPL ELPH-MCNC: 2.8 G/DL — LOW (ref 3.3–5)
ALP SERPL-CCNC: 55 U/L — SIGNIFICANT CHANGE UP (ref 40–120)
ALT FLD-CCNC: 45 U/L — SIGNIFICANT CHANGE UP (ref 12–78)
ANION GAP SERPL CALC-SCNC: 7 MMOL/L — SIGNIFICANT CHANGE UP (ref 5–17)
AST SERPL-CCNC: 70 U/L — HIGH (ref 15–37)
BASE EXCESS BLDA CALC-SCNC: 9.8 MMOL/L — HIGH (ref -2–3)
BASOPHILS # BLD AUTO: 0.01 K/UL — SIGNIFICANT CHANGE UP (ref 0–0.2)
BASOPHILS NFR BLD AUTO: 0.1 % — SIGNIFICANT CHANGE UP (ref 0–2)
BILIRUB SERPL-MCNC: 0.4 MG/DL — SIGNIFICANT CHANGE UP (ref 0.2–1.2)
BLOOD GAS COMMENTS ARTERIAL: SIGNIFICANT CHANGE UP
BUN SERPL-MCNC: 21 MG/DL — SIGNIFICANT CHANGE UP (ref 7–23)
CALCIUM SERPL-MCNC: 8.3 MG/DL — LOW (ref 8.5–10.1)
CHLORIDE SERPL-SCNC: 107 MMOL/L — SIGNIFICANT CHANGE UP (ref 96–108)
CK SERPL-CCNC: 937 U/L — HIGH (ref 26–308)
CO2 SERPL-SCNC: 31 MMOL/L — SIGNIFICANT CHANGE UP (ref 22–31)
CREAT SERPL-MCNC: 0.7 MG/DL — SIGNIFICANT CHANGE UP (ref 0.5–1.3)
EGFR: 90 ML/MIN/1.73M2 — SIGNIFICANT CHANGE UP
EOSINOPHIL # BLD AUTO: 0 K/UL — SIGNIFICANT CHANGE UP (ref 0–0.5)
EOSINOPHIL NFR BLD AUTO: 0 % — SIGNIFICANT CHANGE UP (ref 0–6)
GAS PNL BLDA: SIGNIFICANT CHANGE UP
GLUCOSE SERPL-MCNC: 140 MG/DL — HIGH (ref 70–99)
HCO3 BLDA-SCNC: 34 MMOL/L — HIGH (ref 21–28)
HCT VFR BLD CALC: 37.7 % — LOW (ref 39–50)
HGB BLD-MCNC: 12.7 G/DL — LOW (ref 13–17)
IMM GRANULOCYTES NFR BLD AUTO: 0.3 % — SIGNIFICANT CHANGE UP (ref 0–0.9)
LYMPHOCYTES # BLD AUTO: 0.84 K/UL — LOW (ref 1–3.3)
LYMPHOCYTES # BLD AUTO: 7 % — LOW (ref 13–44)
MAGNESIUM SERPL-MCNC: 2.1 MG/DL — SIGNIFICANT CHANGE UP (ref 1.6–2.6)
MCHC RBC-ENTMCNC: 28.3 PG — SIGNIFICANT CHANGE UP (ref 27–34)
MCHC RBC-ENTMCNC: 33.7 GM/DL — SIGNIFICANT CHANGE UP (ref 32–36)
MCV RBC AUTO: 84.2 FL — SIGNIFICANT CHANGE UP (ref 80–100)
MONOCYTES # BLD AUTO: 0.57 K/UL — SIGNIFICANT CHANGE UP (ref 0–0.9)
MONOCYTES NFR BLD AUTO: 4.8 % — SIGNIFICANT CHANGE UP (ref 2–14)
NEUTROPHILS # BLD AUTO: 10.47 K/UL — HIGH (ref 1.8–7.4)
NEUTROPHILS NFR BLD AUTO: 87.8 % — HIGH (ref 43–77)
NRBC # BLD: 0 /100 WBCS — SIGNIFICANT CHANGE UP (ref 0–0)
PCO2 BLDA: 46 MMHG — SIGNIFICANT CHANGE UP (ref 35–48)
PH BLDA: 7.47 — HIGH (ref 7.35–7.45)
PLATELET # BLD AUTO: 158 K/UL — SIGNIFICANT CHANGE UP (ref 150–400)
PO2 BLDA: 146 MMHG — HIGH (ref 83–108)
POTASSIUM SERPL-MCNC: 3.9 MMOL/L — SIGNIFICANT CHANGE UP (ref 3.5–5.3)
POTASSIUM SERPL-SCNC: 3.9 MMOL/L — SIGNIFICANT CHANGE UP (ref 3.5–5.3)
PROCALCITONIN SERPL-MCNC: 0.38 NG/ML — HIGH
PROT SERPL-MCNC: 5.7 G/DL — LOW (ref 6–8.3)
RBC # BLD: 4.48 M/UL — SIGNIFICANT CHANGE UP (ref 4.2–5.8)
RBC # FLD: 13.7 % — SIGNIFICANT CHANGE UP (ref 10.3–14.5)
SAO2 % BLDA: 100 % — HIGH (ref 94–98)
SODIUM SERPL-SCNC: 145 MMOL/L — SIGNIFICANT CHANGE UP (ref 135–145)
WBC # BLD: 11.92 K/UL — HIGH (ref 3.8–10.5)
WBC # FLD AUTO: 11.92 K/UL — HIGH (ref 3.8–10.5)

## 2024-03-31 PROCEDURE — 99232 SBSQ HOSP IP/OBS MODERATE 35: CPT

## 2024-03-31 PROCEDURE — 99291 CRITICAL CARE FIRST HOUR: CPT

## 2024-03-31 PROCEDURE — 99233 SBSQ HOSP IP/OBS HIGH 50: CPT

## 2024-03-31 RX ORDER — IPRATROPIUM/ALBUTEROL SULFATE 18-103MCG
3 AEROSOL WITH ADAPTER (GRAM) INHALATION ONCE
Refills: 0 | Status: COMPLETED | OUTPATIENT
Start: 2024-03-31 | End: 2024-03-31

## 2024-03-31 RX ORDER — HEPARIN SODIUM 5000 [USP'U]/ML
5000 INJECTION INTRAVENOUS; SUBCUTANEOUS EVERY 12 HOURS
Refills: 0 | Status: DISCONTINUED | OUTPATIENT
Start: 2024-03-31 | End: 2024-04-08

## 2024-03-31 RX ORDER — RISPERIDONE 4 MG/1
0.5 TABLET ORAL DAILY
Refills: 0 | Status: DISCONTINUED | OUTPATIENT
Start: 2024-03-31 | End: 2024-03-31

## 2024-03-31 RX ORDER — FUROSEMIDE 40 MG
40 TABLET ORAL ONCE
Refills: 0 | Status: DISCONTINUED | OUTPATIENT
Start: 2024-03-31 | End: 2024-03-31

## 2024-03-31 RX ADMIN — SODIUM CHLORIDE 50 MILLILITER(S): 9 INJECTION, SOLUTION INTRAVENOUS at 02:57

## 2024-03-31 RX ADMIN — Medication 3 MILLILITER(S): at 19:22

## 2024-03-31 RX ADMIN — BUDESONIDE AND FORMOTEROL FUMARATE DIHYDRATE 2 PUFF(S): 160; 4.5 AEROSOL RESPIRATORY (INHALATION) at 17:32

## 2024-03-31 RX ADMIN — Medication 5 MILLIGRAM(S): at 21:44

## 2024-03-31 RX ADMIN — SODIUM CHLORIDE 4 MILLILITER(S): 9 INJECTION INTRAMUSCULAR; INTRAVENOUS; SUBCUTANEOUS at 07:54

## 2024-03-31 RX ADMIN — Medication 81 MILLIGRAM(S): at 12:08

## 2024-03-31 RX ADMIN — CLOPIDOGREL BISULFATE 75 MILLIGRAM(S): 75 TABLET, FILM COATED ORAL at 12:08

## 2024-03-31 RX ADMIN — MONTELUKAST 10 MILLIGRAM(S): 4 TABLET, CHEWABLE ORAL at 12:09

## 2024-03-31 RX ADMIN — TIOTROPIUM BROMIDE 2 PUFF(S): 18 CAPSULE ORAL; RESPIRATORY (INHALATION) at 05:54

## 2024-03-31 RX ADMIN — Medication 75 MILLIGRAM(S): at 17:20

## 2024-03-31 RX ADMIN — Medication 3 MILLILITER(S): at 07:55

## 2024-03-31 RX ADMIN — BUDESONIDE AND FORMOTEROL FUMARATE DIHYDRATE 2 PUFF(S): 160; 4.5 AEROSOL RESPIRATORY (INHALATION) at 05:55

## 2024-03-31 RX ADMIN — RISPERIDONE 0.5 MILLIGRAM(S): 4 TABLET ORAL at 05:40

## 2024-03-31 RX ADMIN — RISPERIDONE 1 MILLIGRAM(S): 4 TABLET ORAL at 21:44

## 2024-03-31 RX ADMIN — Medication 3 MILLILITER(S): at 13:28

## 2024-03-31 RX ADMIN — Medication 3 MILLILITER(S): at 01:36

## 2024-03-31 RX ADMIN — MIRABEGRON 25 MILLIGRAM(S): 50 TABLET, EXTENDED RELEASE ORAL at 21:44

## 2024-03-31 RX ADMIN — Medication 75 MILLIGRAM(S): at 05:40

## 2024-03-31 RX ADMIN — Medication 10 MILLIGRAM(S): at 21:44

## 2024-03-31 RX ADMIN — LATANOPROST 1 DROP(S): 0.05 SOLUTION/ DROPS OPHTHALMIC; TOPICAL at 21:43

## 2024-03-31 RX ADMIN — FINASTERIDE 5 MILLIGRAM(S): 5 TABLET, FILM COATED ORAL at 12:08

## 2024-03-31 RX ADMIN — PANTOPRAZOLE SODIUM 40 MILLIGRAM(S): 20 TABLET, DELAYED RELEASE ORAL at 12:09

## 2024-03-31 RX ADMIN — SODIUM CHLORIDE 4 MILLILITER(S): 9 INJECTION INTRAMUSCULAR; INTRAVENOUS; SUBCUTANEOUS at 19:23

## 2024-03-31 RX ADMIN — Medication 40 MILLIGRAM(S): at 05:40

## 2024-03-31 RX ADMIN — CEFTRIAXONE 100 MILLIGRAM(S): 500 INJECTION, POWDER, FOR SOLUTION INTRAMUSCULAR; INTRAVENOUS at 05:40

## 2024-03-31 RX ADMIN — HEPARIN SODIUM 5000 UNIT(S): 5000 INJECTION INTRAVENOUS; SUBCUTANEOUS at 05:40

## 2024-03-31 NOTE — PROGRESS NOTE ADULT - ASSESSMENT
83 yo M with PMH aortic stenosis s/p TAVR 2019, CAD s/p LELAND to LAD 2019, dementia, HLD, asthma, COPD, overactive bladder, H pylori, BPH admitted with Influenza A s/p multiple falls.     - Troponins elevated, peaked 2300, now downtrending  -likely elevated on the basis of demand ischemia in setting of influenza, RAY, rhabdomyolysis, agitation.  - History of LELAND to LAD in 2019, continue ASA and Plavix.   - With CPK out of proportion to Troponin, likely rhabdomyolysis s/p fall.  cpk downtrending     - H/o EKG with IVCD vs LBBB in the past.    -heart rate has been reasonable but is at risk of af in the setting of resp failure and structural heart disease   - EKG's reveal elevated QTc ~500. Avoid QT prolonging medications.  - Continue telemetry monitoring.     - No meaningful evidence of volume overload.  - Previous TTE (6/23/2022): Normal ventricular systolic function, no segmental wall motion abnormalities, mild diastolic dysfunction.  - Repeat TTE.     - BP controlled, soft at times, monitor routine hemodynamics.    - Other cardiovascular workup will depend on clinical course.  - Patient requiring oxygen and supportive care.  - All other workup per primary team.  - Will continue to follow.     -at risk of abrupt decompensation     Upon my evaluation, this patient is at high risk for imminent or life threatening deterioration due to resp failure, demand ischemia  and other active medical issues which require my direct attention, intervention, and personal management.  I have personally spent >35 minutes  of critical care time exclusive of time spent on separate billing procedures. This includes review of laboratory data, radiology results, discussion with primary team, and monitoring for potential decompensation Interventions were performed as documented above.

## 2024-03-31 NOTE — PROGRESS NOTE ADULT - PROBLEM SELECTOR PLAN 4
likely pre renal due to decreased oral intake and hypotension.   improved   will monitor   taylor inserted for urinary retention.

## 2024-03-31 NOTE — PROGRESS NOTE ADULT - PROBLEM SELECTOR PLAN 2
Hypoxic to 60s , initial ABG respiratory acidosis with Co2 retention, repeat ABG improved   likely due to flu and COPD exacerbation / pneumonia   HFNC for now,  Wean as tolerated 100% at current   GOC discussed with palliative team  and family- DNR/DNI: patient with multiple significant acute and chronic medical conditions, risk of sudden decompensation. prognosis poor. candidate for palliative hospice service  CTA negative for PE , Heparin Drip stopped. + multi focal pneumonia  + wheezing today, will add duone treatment and hold off IVF ,lasix one dose Hypoxic to 60s , initial ABG respiratory acidosis with Co2 retention, repeat ABG improved   likely due to flu and COPD exacerbation / pneumonia   HFNC for now,  Wean as tolerated 100% at current   GOC discussed with palliative team  and family- DNR/DNI: patient with multiple significant acute and chronic medical conditions, risk of sudden decompensation. prognosis poor. candidate for palliative hospice service  CTA negative for PE , Heparin Drip stopped. + multi focal pneumonia  + wheezing today, will add duoneb treatment and hold off IVF ,lasix one dose, ABG

## 2024-03-31 NOTE — PROGRESS NOTE ADULT - ASSESSMENT
85 yo M with PMH aortic stenosis s/p TAVR 2019, dementia, HLD, asthm, COPD, overactive bladder ,H pylori, BPH presents to the ED with lethargy/weakness.  In ED was found to have Influenza. Last night had RRT for hypoxia in 60s and labs showed high Troponin.  Leukocytosis and hypoxia most likely related to cardiac event. Since no sign of opacities in lungs, can hold antibiotics. High fever due to influenza.     # Influenza  # Acute respiratory failure   # MI    - Will monitor labs  - Blood cultures 3/28 no growth   - UA with no pyuria   - Pulmonary and cardiology follow up   - Nephrology following for RAY, now improved   - Tamiflu dose adjusted given renal function improvement for 5days   - Was started on Ceftriaxone 3/29, will continue  - Procalcitonin level pending  - Leukocytosis due to steroids       Will follow.    discussed plan with Dr Dorman

## 2024-03-31 NOTE — PROGRESS NOTE ADULT - SUBJECTIVE AND OBJECTIVE BOX
[facility]    Halima Hayes, Henry Gaona, Mey, Valentin      [phone]    CHIEF COMPLAINT: Patient is a 84y old  Male who presents with a chief complaint of RF , Flu (31 Mar 2024 10:27)      Follow Up: flu, multifocal pna on hfnc    Interim history: Unable to provide a history on the basis of a poor mental status.  Events noted. remains on hfnc    MEDICATIONS  (STANDING):  albuterol/ipratropium for Nebulization 3 milliLiter(s) Nebulizer every 6 hours  albuterol/ipratropium for Nebulization 3 milliLiter(s) Nebulizer once  aspirin enteric coated 81 milliGRAM(s) Oral daily  bisacodyl 5 milliGRAM(s) Oral at bedtime  budesonide  80 MICROgram(s)/formoterol 4.5 MICROgram(s) Inhaler 2 Puff(s) Inhalation two times a day  cefTRIAXone   IVPB 1000 milliGRAM(s) IV Intermittent every 24 hours  clopidogrel Tablet 75 milliGRAM(s) Oral daily  finasteride 5 milliGRAM(s) Oral daily  heparin   Injectable 5000 Unit(s) SubCutaneous every 12 hours  latanoprost 0.005% Ophthalmic Solution 1 Drop(s) Both EYES at bedtime  melatonin 10 milliGRAM(s) Oral at bedtime  memantine 10 milliGRAM(s) Oral at bedtime  methylPREDNISolone sodium succinate Injectable 40 milliGRAM(s) IV Push daily  mirabegron ER 25 milliGRAM(s) Oral at bedtime  montelukast 10 milliGRAM(s) Oral daily  oseltamivir 75 milliGRAM(s) Oral two times a day  pantoprazole  Injectable 40 milliGRAM(s) IV Push daily  risperiDONE   Tablet 1 milliGRAM(s) Oral at bedtime  sodium chloride 3%  Inhalation 4 milliLiter(s) Inhalation every 12 hours  tiotropium 2.5 MICROgram(s) Inhaler 2 Puff(s) Inhalation daily    MEDICATIONS  (PRN):  acetaminophen     Tablet .. 650 milliGRAM(s) Oral every 6 hours PRN Temp greater or equal to 38C (100.4F), Mild Pain (1 - 3)  albuterol    90 MICROgram(s) HFA Inhaler 2 Puff(s) Inhalation every 6 hours PRN Shortness of Breath and/or Wheezing  aluminum hydroxide/magnesium hydroxide/simethicone Suspension 30 milliLiter(s) Oral every 4 hours PRN Dyspepsia  ondansetron Injectable 4 milliGRAM(s) IV Push every 8 hours PRN Nausea and/or Vomiting      REVIEW OF SYSTEMS: unable to provide  Vital Signs Last 24 Hrs  T(C): 37.1 (31 Mar 2024 04:49), Max: 37.1 (31 Mar 2024 04:49)  T(F): 98.7 (31 Mar 2024 04:49), Max: 98.7 (31 Mar 2024 04:49)  HR: 74 (31 Mar 2024 11:26) (69 - 104)  BP: 133/68 (31 Mar 2024 04:49) (114/60 - 146/63)  BP(mean): --  RR: 17 (31 Mar 2024 04:49) (17 - 18)  SpO2: 97% (31 Mar 2024 11:26) (93% - 98%)    Parameters below as of 31 Mar 2024 08:05  Patient On (Oxygen Delivery Method): nasal cannula, high flow        I&O's Summary    30 Mar 2024 07:01  -  31 Mar 2024 07:00  --------------------------------------------------------  IN: 500 mL / OUT: 2150 mL / NET: -1650 mL        Telemetry past 24h: sr    PHYSICAL EXAM:    Constitutional: well-nourished, well-developed, NAD   HEENT:  MMM, sclerae anicteric, conjunctivae clear, no oral cyanosis.  Pulmonary: Non-labored, breath sounds are rhonchorous bilaterally with occ wheeze  Cardiovascular: Regular, S1 and S2.  No murmur.  No rubs, gallops or clicks  Gastrointestinal: Bowel Sounds present, soft, nontender.   Lymph: No peripheral edema.   Neurological: unable to evaluate, poor mental status  Skin: No rashes.  Psych:  Mood & affect not evaluable    LABS: All Labs Reviewed:                        12.7   11.92 )-----------( 158      ( 31 Mar 2024 08:13 )             37.7                         13.3   13.10 )-----------( 147      ( 30 Mar 2024 10:39 )             39.2                         14.6   15.84 )-----------( 161      ( 29 Mar 2024 14:10 )             42.2     31 Mar 2024 08:13    145    |  107    |  21     ----------------------------<  140    3.9     |  31     |  0.70   30 Mar 2024 10:39    143    |  109    |  24     ----------------------------<  195    3.9     |  27     |  0.94   29 Mar 2024 16:05    145    |  110    |  23     ----------------------------<  152    3.6     |  27     |  0.99     Ca    8.3        31 Mar 2024 08:13  Ca    8.0        30 Mar 2024 10:39  Ca    8.3        29 Mar 2024 16:05  Mg     2.1       31 Mar 2024 08:13    TPro  5.7    /  Alb  2.8    /  TBili  0.4    /  DBili  x      /  AST  70     /  ALT  45     /  AlkPhos  55     31 Mar 2024 08:13  TPro  5.6    /  Alb  2.7    /  TBili  0.2    /  DBili  x      /  AST  82     /  ALT  42     /  AlkPhos  53     30 Mar 2024 10:39  TPro  7.3    /  Alb  3.9    /  TBili  0.6    /  DBili  x      /  AST  51     /  ALT  38     /  AlkPhos  77     28 Mar 2024 16:00    PTT - ( 30 Mar 2024 10:39 )  PTT:30.1 sec  CARDIAC MARKERS ( 31 Mar 2024 08:13 )  x     / x     / 937 U/L / x     / x      CARDIAC MARKERS ( 29 Mar 2024 16:05 )  x     / x     / x     / x     / 19.1 ng/mL      Blood Culture: Organism --  Gram Stain Blood -- Gram Stain --  Specimen Source Clean Catch Clean Catch (Midstream)  Culture-Blood --    Organism --  Gram Stain Blood -- Gram Stain --  Specimen Source .Blood Blood-Peripheral  Culture-Blood --    Organism --  Gram Stain Blood -- Gram Stain --  Specimen Source .Blood Blood-Peripheral  Culture-Blood --            RADIOLOGY:    EKG:    Echo:

## 2024-03-31 NOTE — PROGRESS NOTE ADULT - SUBJECTIVE AND OBJECTIVE BOX
INTERVAL HPI/OVERNIGHT EVENTS:  No new overnight event.  No N/V/D.  Tolerating diet.  c/o cough  bm present    Allergies    No Known Allergies    Intolerances    General:  No wt loss, fevers, chills, night sweats, fatigue,   Eyes:  Good vision, no reported pain  ENT:  No sore throat, pain, runny nose, dysphagia  CV:  No pain, palpitations, hypo/hypertension  Resp:  No dyspnea, cough, tachypnea, wheezing  GI:  No pain, No nausea, No vomiting, No diarrhea, No constipation, No weight loss, No fever, No pruritis, No rectal bleeding, No tarry stools, No dysphagia,  :  No pain, bleeding, incontinence, nocturia  Muscle:  No pain, weakness  Neuro:  No weakness, tingling, memory problems  Psych:  No fatigue, insomnia, mood problems, depression  Endocrine:  No polyuria, polydipsia, cold/heat intolerance  Heme:  No petechiae, ecchymosis, easy bruisability  Skin:  No rash, tattoos, scars, edema      PHYSICAL EXAM:   Vital Signs:  Vital Signs Last 24 Hrs  T(C): 37.1 (31 Mar 2024 04:49), Max: 37.1 (31 Mar 2024 04:49)  T(F): 98.7 (31 Mar 2024 04:49), Max: 98.7 (31 Mar 2024 04:49)  HR: 69 (31 Mar 2024 08:05) (69 - 104)  BP: 133/68 (31 Mar 2024 04:49) (114/60 - 146/63)  BP(mean): --  RR: 17 (31 Mar 2024 04:49) (17 - 18)  SpO2: 93% (31 Mar 2024 08:05) (93% - 98%)    Parameters below as of 31 Mar 2024 08:05  Patient On (Oxygen Delivery Method): nasal cannula, high flow      Daily     Daily Weight in k.6 (31 Mar 2024 04:49)I&O's Summary    30 Mar 2024 07:01  -  31 Mar 2024 07:00  --------------------------------------------------------  IN: 500 mL / OUT: 2150 mL / NET: -1650 mL        GENERAL:  Appears stated age, well-groomed, well-nourished, no distress  HEENT:  NC/AT,  conjunctivae clear and pink, no thyromegaly, nodules, adenopathy, no JVD, sclera -anicteric  CHEST:  Full & symmetric excursion, no increased effort, breath sounds clear  HEART:  Regular rhythm, S1, S2, no murmur/rub/S3/S4, no abdominal bruit, no edema  ABDOMEN:  Soft, non-tender, non-distended, normoactive bowel sounds,  no masses ,no hepato-splenomegaly, no signs of chronic liver disease  EXTEREMITIES:  no cyanosis,clubbing or edema  SKIN:  No rash/erythema/ecchymoses/petechiae/wounds/abscess/warm/dry  NEURO:  Alert, oriented, no asterixis, no tremor, no encephalopathy      LABS:                        12.7   11.92 )-----------( 158      ( 31 Mar 2024 08:13 )             37.7         145  |  107  |  21  ----------------------------<  140<H>  3.9   |  31  |  0.70    Ca    8.3<L>      31 Mar 2024 08:13  Mg     2.1         TPro  5.7<L>  /  Alb  2.8<L>  /  TBili  0.4  /  DBili  x   /  AST  70<H>  /  ALT  45  /  AlkPhos  55      PTT - ( 30 Mar 2024 10:39 )  PTT:30.1 sec  Urinalysis Basic - ( 31 Mar 2024 08:13 )    Color: x / Appearance: x / SG: x / pH: x  Gluc: 140 mg/dL / Ketone: x  / Bili: x / Urobili: x   Blood: x / Protein: x / Nitrite: x   Leuk Esterase: x / RBC: x / WBC x   Sq Epi: x / Non Sq Epi: x / Bacteria: x      amylase   lipase  RADIOLOGY & ADDITIONAL TESTS:

## 2024-03-31 NOTE — PROGRESS NOTE ADULT - SUBJECTIVE AND OBJECTIVE BOX
[facility]  INFECTIOUS DISEASES   [address] [place] [address]  Tel: [phone]     Fax: [phone]  MD Molly Landaverde MD Shah, Kaushal, MD Sunjit, Jaspal, MD  ========================================================  Weekend Coverage  =======================================================      RICKIEAMIRANICOLES [id number]    Follow up: Respiratory failure    On HFNC, not responding       Allergies:  No Known Allergies       REVIEW OF SYSTEMS:  unable to obtain due to medical condition      Physical Exam:  GEN: On HFNC  HEENT: normocephalic and atraumatic. Anicteric    NECK: Supple.   LUNGS: Course BS B/L   HEART: RRR  ABDOMEN: Soft, NT, ND.  +BS.    EXTREMITIES: trace edema.  MSK: No joint swelling  NEUROLOGIC: Confused   SKIN: No rash      Vitals:  T(F): 98.7 (31 Mar 2024 04:49), Max: 98.7 (31 Mar 2024 04:49)  HR: 86 (31 Mar 2024 05:19)  BP: 133/68 (31 Mar 2024 04:49)  RR: 17 (31 Mar 2024 04:49)  SpO2: 95% (31 Mar 2024 05:19) (93% - 98%)  temp max in last 48H T(F): , Max: 98.7 (03-31-24 @ 04:49)      Current Antibiotics:  cefTRIAXone   IVPB 1000 milliGRAM(s) IV Intermittent every 24 hours  oseltamivir 75 milliGRAM(s) Oral two times a day    Other medications:  albuterol/ipratropium for Nebulization 3 milliLiter(s) Nebulizer every 6 hours  aspirin enteric coated 81 milliGRAM(s) Oral daily  bisacodyl 5 milliGRAM(s) Oral at bedtime  budesonide  80 MICROgram(s)/formoterol 4.5 MICROgram(s) Inhaler 2 Puff(s) Inhalation two times a day  clopidogrel Tablet 75 milliGRAM(s) Oral daily  finasteride 5 milliGRAM(s) Oral daily  heparin   Injectable 5000 Unit(s) SubCutaneous every 12 hours  latanoprost 0.005% Ophthalmic Solution 1 Drop(s) Both EYES at bedtime  melatonin 10 milliGRAM(s) Oral at bedtime  memantine 10 milliGRAM(s) Oral at bedtime  methylPREDNISolone sodium succinate Injectable 40 milliGRAM(s) IV Push daily  mirabegron ER 25 milliGRAM(s) Oral at bedtime  montelukast 10 milliGRAM(s) Oral daily  pantoprazole  Injectable 40 milliGRAM(s) IV Push daily  risperiDONE   Tablet 1 milliGRAM(s) Oral at bedtime  risperiDONE   Tablet 0.5 milliGRAM(s) Oral two times a day  sodium chloride 0.45%. 1000 milliLiter(s) IV Continuous <Continuous>  sodium chloride 3%  Inhalation 4 milliLiter(s) Inhalation every 12 hours  tiotropium 2.5 MICROgram(s) Inhaler 2 Puff(s) Inhalation daily                            13.3   13.10 )-----------( 147      ( 30 Mar 2024 10:39 )             39.2     03-30    143  |  109<H>  |  24<H>  ----------------------------<  195<H>  3.9   |  27  |  0.94    Ca    8.0<L>      30 Mar 2024 10:39    TPro  5.6<L>  /  Alb  2.7<L>  /  TBili  0.2  /  DBili  x   /  AST  82<H>  /  ALT  42  /  AlkPhos  53  03-30    RECENT CULTURES:  03-29 @ 06:30 Clean Catch Clean Catch (Midstream)     <10,000 CFU/mL Normal Urogenital Carolyn    03-28 @ 16:00 .Blood Blood-Peripheral     No growth at 48 Hours    03-28 @ 15:50 .Blood Blood-Peripheral     No growth at 48 Hours      WBC Count: 13.10 K/uL (03-30-24 @ 10:39)  WBC Count: 15.84 K/uL (03-29-24 @ 14:10)  WBC Count: 14.56 K/uL (03-29-24 @ 03:30)  WBC Count: 10.58 K/uL (03-28-24 @ 16:00)    Creatinine: 0.94 mg/dL (03-30-24 @ 10:39)  Creatinine: 0.99 mg/dL (03-29-24 @ 16:05)  Creatinine: 1.50 mg/dL (03-29-24 @ 03:30)  Creatinine: 0.98 mg/dL (03-28-24 @ 16:00)     SARS-CoV-2 Result: NotDetec (03-28-24 @ 16:00)    Urinalysis (03.29.24 @ 06:30)    Glucose Qualitative, Urine: Negative mg/dL   Blood, Urine: Large   pH Urine: 5.0   Color: Yellow   Urine Appearance: Cloudy   Bilirubin: Negative   Ketone - Urine: Negative mg/dL   Specific Gravity: 1.012   Protein, Urine: Trace mg/dL   Urobilinogen: 0.2 mg/dL   Nitrite: Negative   Leukocyte Esterase Concentration: Negative  Urine Microscopic-Add On (NC) (03.29.24 @ 06:30)    Red Blood Cell - Urine: Too Numerous to count /HPF   White Blood Cell - Urine: 0 /HPF   Squamous Epithelial Cells: None Seen

## 2024-03-31 NOTE — PROGRESS NOTE ADULT - SUBJECTIVE AND OBJECTIVE BOX
Patient is a 84y old  Male who presents with a chief complaint of RF , Flu (31 Mar 2024 10:27)      INTERVAL HPI/OVERNIGHT EVENTS:    confused. on high flow oxygen @90% FI02    MEDICATIONS  (STANDING):  albuterol/ipratropium for Nebulization 3 milliLiter(s) Nebulizer every 6 hours  aspirin enteric coated 81 milliGRAM(s) Oral daily  bisacodyl 5 milliGRAM(s) Oral at bedtime  budesonide  80 MICROgram(s)/formoterol 4.5 MICROgram(s) Inhaler 2 Puff(s) Inhalation two times a day  cefTRIAXone   IVPB 1000 milliGRAM(s) IV Intermittent every 24 hours  clopidogrel Tablet 75 milliGRAM(s) Oral daily  finasteride 5 milliGRAM(s) Oral daily  latanoprost 0.005% Ophthalmic Solution 1 Drop(s) Both EYES at bedtime  melatonin 10 milliGRAM(s) Oral at bedtime  methylPREDNISolone sodium succinate Injectable 40 milliGRAM(s) IV Push daily  mirabegron ER 25 milliGRAM(s) Oral at bedtime  montelukast 10 milliGRAM(s) Oral daily  oseltamivir 75 milliGRAM(s) Oral two times a day  pantoprazole  Injectable 40 milliGRAM(s) IV Push daily  risperiDONE   Tablet 1 milliGRAM(s) Oral at bedtime  sodium chloride 3%  Inhalation 4 milliLiter(s) Inhalation every 12 hours  tiotropium 2.5 MICROgram(s) Inhaler 2 Puff(s) Inhalation daily      MEDICATIONS  (PRN):  acetaminophen     Tablet .. 650 milliGRAM(s) Oral every 6 hours PRN Temp greater or equal to 38C (100.4F), Mild Pain (1 - 3)  albuterol    90 MICROgram(s) HFA Inhaler 2 Puff(s) Inhalation every 6 hours PRN Shortness of Breath and/or Wheezing  aluminum hydroxide/magnesium hydroxide/simethicone Suspension 30 milliLiter(s) Oral every 4 hours PRN Dyspepsia  ondansetron Injectable 4 milliGRAM(s) IV Push every 8 hours PRN Nausea and/or Vomiting      Allergies    No Known Allergies    Intolerances        PAST MEDICAL & SURGICAL HISTORY:  Aortic stenosis      BPH (benign prostatic hyperplasia)      Dementia  mild- on Memantine      ACE (dyspnea on exertion)      Asthma  controlled on inhalers      HLD (hyperlipidemia)      CAD (coronary artery disease)  s/p stent 10/2019      OH (ocular hypertension)  on eye drops      Overactive bladder      Anxiety      History of tonsillectomy  childhood      H/O coronary angiogram  10/25/19, s/p PCI to LAD          Vital Signs Last 24 Hrs  T(C): 36.9 (31 Mar 2024 13:35), Max: 37.1 (31 Mar 2024 04:49)  T(F): 98.4 (31 Mar 2024 13:35), Max: 98.7 (31 Mar 2024 04:49)  HR: 79 (31 Mar 2024 14:59) (69 - 98)  BP: 143/72 (31 Mar 2024 13:35) (133/68 - 146/63)  BP(mean): --  RR: 18 (31 Mar 2024 13:35) (17 - 18)  SpO2: 93% (31 Mar 2024 14:59) (93% - 98%)    Parameters below as of 31 Mar 2024 14:59  Patient On (Oxygen Delivery Method): nasal cannula, high flow        PHYSICAL EXAMINATION:    GENERAL: The patient is awake and confused and in no apparent distress.     HEENT: Head is normocephalic and atraumatic.     NECK: no JVD    LUNGS: few bilateral scattered rhonchi    HEART: Regular rate and rhythm without murmur.    ABDOMEN: Soft, nontender, and nondistended.      EXTREMITIES: Without any cyanosis, clubbing, rash, lesions or edema.    NEUROLOGIC: Grossly intact.    SKIN: No ulceration or induration present.      LABS:                        12.7   11.92 )-----------( 158      ( 31 Mar 2024 08:13 )             37.7     03-31    145  |  107  |  21  ----------------------------<  140<H>  3.9   |  31  |  0.70    Ca    8.3<L>      31 Mar 2024 08:13  Mg     2.1     03-31    TPro  5.7<L>  /  Alb  2.8<L>  /  TBili  0.4  /  DBili  x   /  AST  70<H>  /  ALT  45  /  AlkPhos  55  03-31    PTT - ( 30 Mar 2024 10:39 )  PTT:30.1 sec  Urinalysis Basic - ( 31 Mar 2024 08:13 )    Color: x / Appearance: x / SG: x / pH: x  Gluc: 140 mg/dL / Ketone: x  / Bili: x / Urobili: x   Blood: x / Protein: x / Nitrite: x   Leuk Esterase: x / RBC: x / WBC x   Sq Epi: x / Non Sq Epi: x / Bacteria: x      ABG - ( 31 Mar 2024 11:16 )  pH, Arterial: 7.47  pH, Blood: x     /  pCO2: 46    /  pO2: 146   / HCO3: 34    / Base Excess: 9.8   /  SaO2: 100.0             CARDIAC MARKERS ( 31 Mar 2024 08:13 )  x     / x     / 937 U/L / x     / x                Procalcitonin, Serum: 0.38 ng/mL (03-31-24 @ 08:13)      MICROBIOLOGY:  Culture Results:   <10,000 CFU/mL Normal Urogenital Carolyn (03-29 @ 06:30)  Culture Results:   No growth at 48 Hours (03-28 @ 16:00)  Culture Results:   No growth at 48 Hours (03-28 @ 15:50)      Assessment:    Acute on Chronic Hypoxic respiratory failure  Influenza A bronchitis  Superimposed bacterial pneumonia  Aortic Stenosis - S/P TAVR  Coronary artery disease - S/P stent    Plan:    Will titrate FIO2 as tolerated  Complete course of Tamiflu  Duoneb + Hypertonic Saline by nebulizer  Monitor pulse oximetry  IV Rocephin  symbicort + Spiriva inhalers     Patient is a 84y old  Male who presents with a chief complaint of RF , Flu (31 Mar 2024 10:27)      INTERVAL HPI/OVERNIGHT EVENTS:    confused. on high flow oxygen @90% FI02    MEDICATIONS  (STANDING):  albuterol/ipratropium for Nebulization 3 milliLiter(s) Nebulizer every 6 hours  aspirin enteric coated 81 milliGRAM(s) Oral daily  bisacodyl 5 milliGRAM(s) Oral at bedtime  budesonide  80 MICROgram(s)/formoterol 4.5 MICROgram(s) Inhaler 2 Puff(s) Inhalation two times a day  cefTRIAXone   IVPB 1000 milliGRAM(s) IV Intermittent every 24 hours  clopidogrel Tablet 75 milliGRAM(s) Oral daily  finasteride 5 milliGRAM(s) Oral daily  latanoprost 0.005% Ophthalmic Solution 1 Drop(s) Both EYES at bedtime  melatonin 10 milliGRAM(s) Oral at bedtime  methylPREDNISolone sodium succinate Injectable 40 milliGRAM(s) IV Push daily  mirabegron ER 25 milliGRAM(s) Oral at bedtime  montelukast 10 milliGRAM(s) Oral daily  oseltamivir 75 milliGRAM(s) Oral two times a day  pantoprazole  Injectable 40 milliGRAM(s) IV Push daily  risperiDONE   Tablet 1 milliGRAM(s) Oral at bedtime  sodium chloride 3%  Inhalation 4 milliLiter(s) Inhalation every 12 hours  tiotropium 2.5 MICROgram(s) Inhaler 2 Puff(s) Inhalation daily      MEDICATIONS  (PRN):  acetaminophen     Tablet .. 650 milliGRAM(s) Oral every 6 hours PRN Temp greater or equal to 38C (100.4F), Mild Pain (1 - 3)  albuterol    90 MICROgram(s) HFA Inhaler 2 Puff(s) Inhalation every 6 hours PRN Shortness of Breath and/or Wheezing  aluminum hydroxide/magnesium hydroxide/simethicone Suspension 30 milliLiter(s) Oral every 4 hours PRN Dyspepsia  ondansetron Injectable 4 milliGRAM(s) IV Push every 8 hours PRN Nausea and/or Vomiting      Allergies    No Known Allergies    Intolerances        PAST MEDICAL & SURGICAL HISTORY:  Aortic stenosis      BPH (benign prostatic hyperplasia)      Dementia  mild- on Memantine      ACE (dyspnea on exertion)      Asthma  controlled on inhalers      HLD (hyperlipidemia)      CAD (coronary artery disease)  s/p stent 10/2019      OH (ocular hypertension)  on eye drops      Overactive bladder      Anxiety      History of tonsillectomy  childhood      H/O coronary angiogram  10/25/19, s/p PCI to LAD          Vital Signs Last 24 Hrs  T(C): 36.9 (31 Mar 2024 13:35), Max: 37.1 (31 Mar 2024 04:49)  T(F): 98.4 (31 Mar 2024 13:35), Max: 98.7 (31 Mar 2024 04:49)  HR: 79 (31 Mar 2024 14:59) (69 - 98)  BP: 143/72 (31 Mar 2024 13:35) (133/68 - 146/63)  BP(mean): --  RR: 18 (31 Mar 2024 13:35) (17 - 18)  SpO2: 93% (31 Mar 2024 14:59) (93% - 98%)    Parameters below as of 31 Mar 2024 14:59  Patient On (Oxygen Delivery Method): nasal cannula, high flow        PHYSICAL EXAMINATION:    GENERAL: The patient is awake and confused and in no apparent distress.     HEENT: Head is normocephalic and atraumatic.     NECK: no JVD    LUNGS: few bilateral scattered rhonchi    HEART: Regular rate and rhythm without murmur.    ABDOMEN: Soft, nontender, and nondistended.      EXTREMITIES: Without any cyanosis, clubbing, rash, lesions or edema.    NEUROLOGIC: Grossly intact.    SKIN: No ulceration or induration present.      LABS:                        12.7   11.92 )-----------( 158      ( 31 Mar 2024 08:13 )             37.7     03-31    145  |  107  |  21  ----------------------------<  140<H>  3.9   |  31  |  0.70    Ca    8.3<L>      31 Mar 2024 08:13  Mg     2.1     03-31    TPro  5.7<L>  /  Alb  2.8<L>  /  TBili  0.4  /  DBili  x   /  AST  70<H>  /  ALT  45  /  AlkPhos  55  03-31    PTT - ( 30 Mar 2024 10:39 )  PTT:30.1 sec  Urinalysis Basic - ( 31 Mar 2024 08:13 )    Color: x / Appearance: x / SG: x / pH: x  Gluc: 140 mg/dL / Ketone: x  / Bili: x / Urobili: x   Blood: x / Protein: x / Nitrite: x   Leuk Esterase: x / RBC: x / WBC x   Sq Epi: x / Non Sq Epi: x / Bacteria: x      ABG - ( 31 Mar 2024 11:16 )  pH, Arterial: 7.47  pH, Blood: x     /  pCO2: 46    /  pO2: 146   / HCO3: 34    / Base Excess: 9.8   /  SaO2: 100.0             CARDIAC MARKERS ( 31 Mar 2024 08:13 )  x     / x     / 937 U/L / x     / x                Procalcitonin, Serum: 0.38 ng/mL (03-31-24 @ 08:13)      MICROBIOLOGY:  Culture Results:   <10,000 CFU/mL Normal Urogenital Carolyn (03-29 @ 06:30)  Culture Results:   No growth at 48 Hours (03-28 @ 16:00)  Culture Results:   No growth at 48 Hours (03-28 @ 15:50)      Assessment:    Acute on Chronic Hypoxic respiratory failure  Influenza A bronchitis  Superimposed bacterial pneumonia  Aortic Stenosis - S/P TAVR  Coronary artery disease - S/P stent    Plan:    Will titrate FIO2 as tolerated  Complete course of Tamiflu  Duoneb + Hypertonic Saline by nebulizer  Monitor pulse oximetry  IV Rocephin  symbicort + Spiriva inhalers  Solumedrol 40 mg daily

## 2024-03-31 NOTE — PROGRESS NOTE ADULT - PROBLEM SELECTOR PLAN 8
severe dementia: need assist for all ADLs, limited functional status, as per family only able to ambulate from bed to bathroom with walker and falls frequently. not able to make needs known   - d/c aricept due to QT prolongation 501  -decrease Memantine  - supportive care   - d/c Seroquel due to QT   -psy skyleral noted   - patient hard to arouse this morning, will hold off risperdol day time to avoid daytime sleepiness, avoid benzo.

## 2024-03-31 NOTE — PROGRESS NOTE ADULT - SUBJECTIVE AND OBJECTIVE BOX
Patient is a 84y old  Male who presents with a chief complaint of cardiac enzymes positive (30 Mar 2024 22:55)      Subjective:  INTERVAL HPI/OVERNIGHT EVENTS: Patient seen and examined at bedside.  Patient has no complaints at this time.   MEDICATIONS  (STANDING):  albuterol/ipratropium for Nebulization 3 milliLiter(s) Nebulizer once  albuterol/ipratropium for Nebulization 3 milliLiter(s) Nebulizer every 6 hours  aspirin enteric coated 81 milliGRAM(s) Oral daily  bisacodyl 5 milliGRAM(s) Oral at bedtime  budesonide  80 MICROgram(s)/formoterol 4.5 MICROgram(s) Inhaler 2 Puff(s) Inhalation two times a day  cefTRIAXone   IVPB 1000 milliGRAM(s) IV Intermittent every 24 hours  clopidogrel Tablet 75 milliGRAM(s) Oral daily  finasteride 5 milliGRAM(s) Oral daily  heparin   Injectable 5000 Unit(s) SubCutaneous every 12 hours  latanoprost 0.005% Ophthalmic Solution 1 Drop(s) Both EYES at bedtime  melatonin 10 milliGRAM(s) Oral at bedtime  memantine 10 milliGRAM(s) Oral at bedtime  methylPREDNISolone sodium succinate Injectable 40 milliGRAM(s) IV Push daily  mirabegron ER 25 milliGRAM(s) Oral at bedtime  montelukast 10 milliGRAM(s) Oral daily  oseltamivir 75 milliGRAM(s) Oral two times a day  pantoprazole  Injectable 40 milliGRAM(s) IV Push daily  risperiDONE   Tablet 1 milliGRAM(s) Oral at bedtime  sodium chloride 0.45%. 1000 milliLiter(s) (50 mL/Hr) IV Continuous <Continuous>  sodium chloride 3%  Inhalation 4 milliLiter(s) Inhalation every 12 hours  tiotropium 2.5 MICROgram(s) Inhaler 2 Puff(s) Inhalation daily    MEDICATIONS  (PRN):  acetaminophen     Tablet .. 650 milliGRAM(s) Oral every 6 hours PRN Temp greater or equal to 38C (100.4F), Mild Pain (1 - 3)  albuterol    90 MICROgram(s) HFA Inhaler 2 Puff(s) Inhalation every 6 hours PRN Shortness of Breath and/or Wheezing  aluminum hydroxide/magnesium hydroxide/simethicone Suspension 30 milliLiter(s) Oral every 4 hours PRN Dyspepsia  ondansetron Injectable 4 milliGRAM(s) IV Push every 8 hours PRN Nausea and/or Vomiting      Allergies    No Known Allergies    Intolerances        REVIEW OF SYSTEMS:  CONSTITUTIONAL: No fever or chills  HEENT:  No headache, no sore throat  RESPIRATORY: No cough or shortness of breath  CARDIOVASCULAR: No chest pain or palpitations  GASTROINTESTINAL: No abd pain, nausea, vomiting, or diarrhea      Objective:  Vital Signs Last 24 Hrs  T(C): 37.1 (31 Mar 2024 04:49), Max: 37.1 (31 Mar 2024 04:49)  T(F): 98.7 (31 Mar 2024 04:49), Max: 98.7 (31 Mar 2024 04:49)  HR: 69 (31 Mar 2024 08:05) (69 - 104)  BP: 133/68 (31 Mar 2024 04:49) (114/60 - 146/63)  BP(mean): --  RR: 17 (31 Mar 2024 04:49) (17 - 18)  SpO2: 93% (31 Mar 2024 08:05) (93% - 98%)    Parameters below as of 31 Mar 2024 08:05  Patient On (Oxygen Delivery Method): nasal cannula, high flow        GENERAL: NAD, lying in bed comfortably  HEAD:  Normocephalic  EYES:  conjunctiva and sclera clear  ENT: Moist mucous membranes  NECK: Supple  CHEST/LUNG: Clear to auscultation bilaterally; No rales or rhonchi; no wheezing. Unlabored respirations  HEART: Regular rate and rhythm; S1S2+  ABDOMEN: Bowel sounds present; Soft, Nontender, Nondistended.   EXTREMITIES:  + distal Peripheral Pulses;  No cyanosis, or edema  NERVOUS SYSTEM:  Alert & Oriented X3;  No gross focal deficits   MSK: moves all extremities  SKIN: No rashes     LABS:                        12.7   11.92 )-----------( 158      ( 31 Mar 2024 08:13 )             37.7     31 Mar 2024 08:13    145    |  107    |  21     ----------------------------<  140    3.9     |  31     |  0.70     Ca    8.3        31 Mar 2024 08:13  Mg     2.1       31 Mar 2024 08:13    TPro  5.7    /  Alb  2.8    /  TBili  0.4    /  DBili  x      /  AST  70     /  ALT  45     /  AlkPhos  55     31 Mar 2024 08:13    PTT - ( 30 Mar 2024 10:39 )  PTT:30.1 sec  Urinalysis Basic - ( 31 Mar 2024 08:13 )    Color: x / Appearance: x / SG: x / pH: x  Gluc: 140 mg/dL / Ketone: x  / Bili: x / Urobili: x   Blood: x / Protein: x / Nitrite: x   Leuk Esterase: x / RBC: x / WBC x   Sq Epi: x / Non Sq Epi: x / Bacteria: x      CAPILLARY BLOOD GLUCOSE            Culture - Urine (collected 03-29-24 @ 06:30)  Source: Clean Catch Clean Catch (Midstream)  Final Report (03-30-24 @ 17:39):    <10,000 CFU/mL Normal Urogenital Carolyn    Culture - Blood (collected 03-28-24 @ 16:00)  Source: .Blood Blood-Peripheral  Preliminary Report (03-31-24 @ 01:02):    No growth at 48 Hours    Culture - Blood (collected 03-28-24 @ 15:50)  Source: .Blood Blood-Peripheral  Preliminary Report (03-31-24 @ 01:02):    No growth at 48 Hours        RADIOLOGY & ADDITIONAL TESTS:    Personally reviewed.     Consultant(s) Notes Reviewed:  [x] YES  [ ] NO    Plan of care discussed with family; all questions answered   Patient is a 84y old  Male who presents with a chief complaint of cardiac enzymes positive (30 Mar 2024 22:55)      Subjective:  INTERVAL HPI/OVERNIGHT EVENTS: Patient seen and examined at bedside.  Patient is sleeping   MEDICATIONS  (STANDING):  albuterol/ipratropium for Nebulization 3 milliLiter(s) Nebulizer once  albuterol/ipratropium for Nebulization 3 milliLiter(s) Nebulizer every 6 hours  aspirin enteric coated 81 milliGRAM(s) Oral daily  bisacodyl 5 milliGRAM(s) Oral at bedtime  budesonide  80 MICROgram(s)/formoterol 4.5 MICROgram(s) Inhaler 2 Puff(s) Inhalation two times a day  cefTRIAXone   IVPB 1000 milliGRAM(s) IV Intermittent every 24 hours  clopidogrel Tablet 75 milliGRAM(s) Oral daily  finasteride 5 milliGRAM(s) Oral daily  heparin   Injectable 5000 Unit(s) SubCutaneous every 12 hours  latanoprost 0.005% Ophthalmic Solution 1 Drop(s) Both EYES at bedtime  melatonin 10 milliGRAM(s) Oral at bedtime  memantine 10 milliGRAM(s) Oral at bedtime  methylPREDNISolone sodium succinate Injectable 40 milliGRAM(s) IV Push daily  mirabegron ER 25 milliGRAM(s) Oral at bedtime  montelukast 10 milliGRAM(s) Oral daily  oseltamivir 75 milliGRAM(s) Oral two times a day  pantoprazole  Injectable 40 milliGRAM(s) IV Push daily  risperiDONE   Tablet 1 milliGRAM(s) Oral at bedtime  sodium chloride 0.45%. 1000 milliLiter(s) (50 mL/Hr) IV Continuous <Continuous>  sodium chloride 3%  Inhalation 4 milliLiter(s) Inhalation every 12 hours  tiotropium 2.5 MICROgram(s) Inhaler 2 Puff(s) Inhalation daily    MEDICATIONS  (PRN):  acetaminophen     Tablet .. 650 milliGRAM(s) Oral every 6 hours PRN Temp greater or equal to 38C (100.4F), Mild Pain (1 - 3)  albuterol    90 MICROgram(s) HFA Inhaler 2 Puff(s) Inhalation every 6 hours PRN Shortness of Breath and/or Wheezing  aluminum hydroxide/magnesium hydroxide/simethicone Suspension 30 milliLiter(s) Oral every 4 hours PRN Dyspepsia  ondansetron Injectable 4 milliGRAM(s) IV Push every 8 hours PRN Nausea and/or Vomiting      Allergies    No Known Allergies    Intolerances        REVIEW OF SYSTEMS:  not able to obtain due to AMS     Objective:  Vital Signs Last 24 Hrs  T(C): 37.1 (31 Mar 2024 04:49), Max: 37.1 (31 Mar 2024 04:49)  T(F): 98.7 (31 Mar 2024 04:49), Max: 98.7 (31 Mar 2024 04:49)  HR: 69 (31 Mar 2024 08:05) (69 - 104)  BP: 133/68 (31 Mar 2024 04:49) (114/60 - 146/63)  BP(mean): --  RR: 17 (31 Mar 2024 04:49) (17 - 18)  SpO2: 93% (31 Mar 2024 08:05) (93% - 98%)    Parameters below as of 31 Mar 2024 08:05  Patient On (Oxygen Delivery Method): nasal cannula, high flow        GENERAL: NAD, lying in bed  HEAD:  Normocephalic  EYES:  conjunctiva and sclera clear, NATALIE   ENT: Moist mucous membranes  NECK: Supple  CHEST/LUNG: + wheezing.   HEART: Regular rate and rhythm; S1S2+  ABDOMEN: Bowel sounds present; Soft, Nondistended.   EXTREMITIES:  + distal Peripheral Pulses;  No cyanosis, or edema  NERVOUS SYSTEM: doesn't respond to painful stimuli   MSK: + decreased strength   SKIN: No rashes     LABS:                        12.7   11.92 )-----------( 158      ( 31 Mar 2024 08:13 )             37.7     31 Mar 2024 08:13    145    |  107    |  21     ----------------------------<  140    3.9     |  31     |  0.70     Ca    8.3        31 Mar 2024 08:13  Mg     2.1       31 Mar 2024 08:13    TPro  5.7    /  Alb  2.8    /  TBili  0.4    /  DBili  x      /  AST  70     /  ALT  45     /  AlkPhos  55     31 Mar 2024 08:13    PTT - ( 30 Mar 2024 10:39 )  PTT:30.1 sec  Urinalysis Basic - ( 31 Mar 2024 08:13 )    Color: x / Appearance: x / SG: x / pH: x  Gluc: 140 mg/dL / Ketone: x  / Bili: x / Urobili: x   Blood: x / Protein: x / Nitrite: x   Leuk Esterase: x / RBC: x / WBC x   Sq Epi: x / Non Sq Epi: x / Bacteria: x      CAPILLARY BLOOD GLUCOSE            Culture - Urine (collected 03-29-24 @ 06:30)  Source: Clean Catch Clean Catch (Midstream)  Final Report (03-30-24 @ 17:39):    <10,000 CFU/mL Normal Urogenital Carolyn    Culture - Blood (collected 03-28-24 @ 16:00)  Source: .Blood Blood-Peripheral  Preliminary Report (03-31-24 @ 01:02):    No growth at 48 Hours    Culture - Blood (collected 03-28-24 @ 15:50)  Source: .Blood Blood-Peripheral  Preliminary Report (03-31-24 @ 01:02):    No growth at 48 Hours        RADIOLOGY & ADDITIONAL TESTS:    Personally reviewed.     Consultant(s) Notes Reviewed:  [x] YES  [ ] NO    Plan of care discussed with family daughter on the phone and wife in the room ; all questions answered

## 2024-03-31 NOTE — PROGRESS NOTE ADULT - ASSESSMENT
anemia  constipation  abd pain    plan  start bowel regimen  add lactulose 10cc q 6 hours and glycerin suppository  gas pill with simethicone and maalox q 6 hours  monitor stool output  daily cbc   transfuse prn   check iron studies   ppi once a day  check stool occult blood  further recommendations pending above

## 2024-04-01 LAB
ALBUMIN SERPL ELPH-MCNC: 3 G/DL — LOW (ref 3.3–5)
ALP SERPL-CCNC: 64 U/L — SIGNIFICANT CHANGE UP (ref 40–120)
ALT FLD-CCNC: 61 U/L — SIGNIFICANT CHANGE UP (ref 12–78)
ANION GAP SERPL CALC-SCNC: 6 MMOL/L — SIGNIFICANT CHANGE UP (ref 5–17)
AST SERPL-CCNC: 71 U/L — HIGH (ref 15–37)
BASOPHILS # BLD AUTO: 0.01 K/UL — SIGNIFICANT CHANGE UP (ref 0–0.2)
BASOPHILS NFR BLD AUTO: 0.1 % — SIGNIFICANT CHANGE UP (ref 0–2)
BILIRUB SERPL-MCNC: 0.6 MG/DL — SIGNIFICANT CHANGE UP (ref 0.2–1.2)
BUN SERPL-MCNC: 26 MG/DL — HIGH (ref 7–23)
CALCIUM SERPL-MCNC: 9.2 MG/DL — SIGNIFICANT CHANGE UP (ref 8.5–10.1)
CHLORIDE SERPL-SCNC: 110 MMOL/L — HIGH (ref 96–108)
CO2 SERPL-SCNC: 31 MMOL/L — SIGNIFICANT CHANGE UP (ref 22–31)
CREAT SERPL-MCNC: 0.8 MG/DL — SIGNIFICANT CHANGE UP (ref 0.5–1.3)
EGFR: 86 ML/MIN/1.73M2 — SIGNIFICANT CHANGE UP
EOSINOPHIL # BLD AUTO: 0 K/UL — SIGNIFICANT CHANGE UP (ref 0–0.5)
EOSINOPHIL NFR BLD AUTO: 0 % — SIGNIFICANT CHANGE UP (ref 0–6)
GLUCOSE SERPL-MCNC: 124 MG/DL — HIGH (ref 70–99)
HCT VFR BLD CALC: 41.2 % — SIGNIFICANT CHANGE UP (ref 39–50)
HGB BLD-MCNC: 14.1 G/DL — SIGNIFICANT CHANGE UP (ref 13–17)
IMM GRANULOCYTES NFR BLD AUTO: 0.5 % — SIGNIFICANT CHANGE UP (ref 0–0.9)
LEGIONELLA AG UR QL: NEGATIVE — SIGNIFICANT CHANGE UP
LYMPHOCYTES # BLD AUTO: 1.94 K/UL — SIGNIFICANT CHANGE UP (ref 1–3.3)
LYMPHOCYTES # BLD AUTO: 14.7 % — SIGNIFICANT CHANGE UP (ref 13–44)
MCHC RBC-ENTMCNC: 28.5 PG — SIGNIFICANT CHANGE UP (ref 27–34)
MCHC RBC-ENTMCNC: 34.2 GM/DL — SIGNIFICANT CHANGE UP (ref 32–36)
MCV RBC AUTO: 83.4 FL — SIGNIFICANT CHANGE UP (ref 80–100)
MONOCYTES # BLD AUTO: 0.96 K/UL — HIGH (ref 0–0.9)
MONOCYTES NFR BLD AUTO: 7.3 % — SIGNIFICANT CHANGE UP (ref 2–14)
NEUTROPHILS # BLD AUTO: 10.21 K/UL — HIGH (ref 1.8–7.4)
NEUTROPHILS NFR BLD AUTO: 77.4 % — HIGH (ref 43–77)
NRBC # BLD: 0 /100 WBCS — SIGNIFICANT CHANGE UP (ref 0–0)
NT-PROBNP SERPL-SCNC: 6304 PG/ML — HIGH (ref 0–450)
PLATELET # BLD AUTO: 173 K/UL — SIGNIFICANT CHANGE UP (ref 150–400)
POTASSIUM SERPL-MCNC: 3.5 MMOL/L — SIGNIFICANT CHANGE UP (ref 3.5–5.3)
POTASSIUM SERPL-SCNC: 3.5 MMOL/L — SIGNIFICANT CHANGE UP (ref 3.5–5.3)
PROT SERPL-MCNC: 6.3 G/DL — SIGNIFICANT CHANGE UP (ref 6–8.3)
RBC # BLD: 4.94 M/UL — SIGNIFICANT CHANGE UP (ref 4.2–5.8)
RBC # FLD: 13.7 % — SIGNIFICANT CHANGE UP (ref 10.3–14.5)
SODIUM SERPL-SCNC: 147 MMOL/L — HIGH (ref 135–145)
WBC # BLD: 13.18 K/UL — HIGH (ref 3.8–10.5)
WBC # FLD AUTO: 13.18 K/UL — HIGH (ref 3.8–10.5)

## 2024-04-01 PROCEDURE — 71045 X-RAY EXAM CHEST 1 VIEW: CPT | Mod: 26

## 2024-04-01 PROCEDURE — 93010 ELECTROCARDIOGRAM REPORT: CPT

## 2024-04-01 PROCEDURE — 99232 SBSQ HOSP IP/OBS MODERATE 35: CPT

## 2024-04-01 PROCEDURE — 99233 SBSQ HOSP IP/OBS HIGH 50: CPT

## 2024-04-01 PROCEDURE — 99291 CRITICAL CARE FIRST HOUR: CPT

## 2024-04-01 RX ORDER — MAGNESIUM HYDROXIDE 400 MG/1
30 TABLET, CHEWABLE ORAL ONCE
Refills: 0 | Status: COMPLETED | OUTPATIENT
Start: 2024-04-01 | End: 2024-04-01

## 2024-04-01 RX ORDER — POLYETHYLENE GLYCOL 3350 17 G/17G
17 POWDER, FOR SOLUTION ORAL DAILY
Refills: 0 | Status: DISCONTINUED | OUTPATIENT
Start: 2024-04-01 | End: 2024-04-10

## 2024-04-01 RX ORDER — SENNA PLUS 8.6 MG/1
2 TABLET ORAL AT BEDTIME
Refills: 0 | Status: DISCONTINUED | OUTPATIENT
Start: 2024-04-01 | End: 2024-04-10

## 2024-04-01 RX ORDER — SODIUM CHLORIDE 9 MG/ML
1000 INJECTION, SOLUTION INTRAVENOUS
Refills: 0 | Status: DISCONTINUED | OUTPATIENT
Start: 2024-04-01 | End: 2024-04-04

## 2024-04-01 RX ADMIN — RISPERIDONE 1 MILLIGRAM(S): 4 TABLET ORAL at 21:50

## 2024-04-01 RX ADMIN — SODIUM CHLORIDE 4 MILLILITER(S): 9 INJECTION INTRAMUSCULAR; INTRAVENOUS; SUBCUTANEOUS at 07:50

## 2024-04-01 RX ADMIN — BUDESONIDE AND FORMOTEROL FUMARATE DIHYDRATE 2 PUFF(S): 160; 4.5 AEROSOL RESPIRATORY (INHALATION) at 06:04

## 2024-04-01 RX ADMIN — Medication 3 MILLILITER(S): at 20:31

## 2024-04-01 RX ADMIN — CLOPIDOGREL BISULFATE 75 MILLIGRAM(S): 75 TABLET, FILM COATED ORAL at 12:09

## 2024-04-01 RX ADMIN — MIRABEGRON 25 MILLIGRAM(S): 50 TABLET, EXTENDED RELEASE ORAL at 21:50

## 2024-04-01 RX ADMIN — MAGNESIUM HYDROXIDE 30 MILLILITER(S): 400 TABLET, CHEWABLE ORAL at 12:19

## 2024-04-01 RX ADMIN — HEPARIN SODIUM 5000 UNIT(S): 5000 INJECTION INTRAVENOUS; SUBCUTANEOUS at 06:03

## 2024-04-01 RX ADMIN — SODIUM CHLORIDE 30 MILLILITER(S): 9 INJECTION, SOLUTION INTRAVENOUS at 09:55

## 2024-04-01 RX ADMIN — FINASTERIDE 5 MILLIGRAM(S): 5 TABLET, FILM COATED ORAL at 12:10

## 2024-04-01 RX ADMIN — Medication 75 MILLIGRAM(S): at 17:30

## 2024-04-01 RX ADMIN — Medication 3 MILLILITER(S): at 00:36

## 2024-04-01 RX ADMIN — Medication 81 MILLIGRAM(S): at 12:10

## 2024-04-01 RX ADMIN — Medication 40 MILLIGRAM(S): at 06:04

## 2024-04-01 RX ADMIN — LATANOPROST 1 DROP(S): 0.05 SOLUTION/ DROPS OPHTHALMIC; TOPICAL at 21:50

## 2024-04-01 RX ADMIN — Medication 3 MILLILITER(S): at 13:19

## 2024-04-01 RX ADMIN — HEPARIN SODIUM 5000 UNIT(S): 5000 INJECTION INTRAVENOUS; SUBCUTANEOUS at 17:29

## 2024-04-01 RX ADMIN — Medication 5 MILLIGRAM(S): at 21:50

## 2024-04-01 RX ADMIN — CEFTRIAXONE 100 MILLIGRAM(S): 500 INJECTION, POWDER, FOR SOLUTION INTRAMUSCULAR; INTRAVENOUS at 06:04

## 2024-04-01 RX ADMIN — Medication 1 MILLIGRAM(S): at 00:36

## 2024-04-01 RX ADMIN — Medication 10 MILLIGRAM(S): at 21:50

## 2024-04-01 RX ADMIN — SODIUM CHLORIDE 4 MILLILITER(S): 9 INJECTION INTRAMUSCULAR; INTRAVENOUS; SUBCUTANEOUS at 20:29

## 2024-04-01 RX ADMIN — PANTOPRAZOLE SODIUM 40 MILLIGRAM(S): 20 TABLET, DELAYED RELEASE ORAL at 12:09

## 2024-04-01 RX ADMIN — Medication 75 MILLIGRAM(S): at 06:03

## 2024-04-01 RX ADMIN — Medication 1 MILLIGRAM(S): at 21:06

## 2024-04-01 RX ADMIN — SENNA PLUS 2 TABLET(S): 8.6 TABLET ORAL at 21:50

## 2024-04-01 RX ADMIN — POLYETHYLENE GLYCOL 3350 17 GRAM(S): 17 POWDER, FOR SOLUTION ORAL at 12:18

## 2024-04-01 RX ADMIN — MONTELUKAST 10 MILLIGRAM(S): 4 TABLET, CHEWABLE ORAL at 12:10

## 2024-04-01 RX ADMIN — Medication 3 MILLILITER(S): at 07:50

## 2024-04-01 RX ADMIN — TIOTROPIUM BROMIDE 2 PUFF(S): 18 CAPSULE ORAL; RESPIRATORY (INHALATION) at 06:04

## 2024-04-01 RX ADMIN — BUDESONIDE AND FORMOTEROL FUMARATE DIHYDRATE 2 PUFF(S): 160; 4.5 AEROSOL RESPIRATORY (INHALATION) at 17:30

## 2024-04-01 NOTE — PROGRESS NOTE ADULT - SUBJECTIVE AND OBJECTIVE BOX
Patient is a 84y old  Male who presents with a chief complaint of RF , Flu (31 Mar 2024 10:27)      Subjective:  INTERVAL HPI/OVERNIGHT EVENTS: Patient seen and examined at bedside.  Patient appears comfortable   MEDICATIONS  (STANDING):  albuterol/ipratropium for Nebulization 3 milliLiter(s) Nebulizer every 6 hours  aspirin enteric coated 81 milliGRAM(s) Oral daily  bisacodyl 5 milliGRAM(s) Oral at bedtime  budesonide  80 MICROgram(s)/formoterol 4.5 MICROgram(s) Inhaler 2 Puff(s) Inhalation two times a day  cefTRIAXone   IVPB 1000 milliGRAM(s) IV Intermittent every 24 hours  clopidogrel Tablet 75 milliGRAM(s) Oral daily  dextrose 5%. 1000 milliLiter(s) (30 mL/Hr) IV Continuous <Continuous>  finasteride 5 milliGRAM(s) Oral daily  heparin   Injectable 5000 Unit(s) SubCutaneous every 12 hours  latanoprost 0.005% Ophthalmic Solution 1 Drop(s) Both EYES at bedtime  melatonin 10 milliGRAM(s) Oral at bedtime  methylPREDNISolone sodium succinate Injectable 40 milliGRAM(s) IV Push daily  mirabegron ER 25 milliGRAM(s) Oral at bedtime  montelukast 10 milliGRAM(s) Oral daily  oseltamivir 75 milliGRAM(s) Oral two times a day  pantoprazole  Injectable 40 milliGRAM(s) IV Push daily  risperiDONE   Tablet 1 milliGRAM(s) Oral at bedtime  sodium chloride 3%  Inhalation 4 milliLiter(s) Inhalation every 12 hours  tiotropium 2.5 MICROgram(s) Inhaler 2 Puff(s) Inhalation daily    MEDICATIONS  (PRN):  acetaminophen     Tablet .. 650 milliGRAM(s) Oral every 6 hours PRN Temp greater or equal to 38C (100.4F), Mild Pain (1 - 3)  albuterol    90 MICROgram(s) HFA Inhaler 2 Puff(s) Inhalation every 6 hours PRN Shortness of Breath and/or Wheezing  aluminum hydroxide/magnesium hydroxide/simethicone Suspension 30 milliLiter(s) Oral every 4 hours PRN Dyspepsia  ondansetron Injectable 4 milliGRAM(s) IV Push every 8 hours PRN Nausea and/or Vomiting      Allergies    No Known Allergies    Intolerances        REVIEW OF SYSTEMS:  not able to obtain due to dementia       Objective:  Vital Signs Last 24 Hrs  T(C): 37.1 (01 Apr 2024 05:05), Max: 37.1 (31 Mar 2024 20:21)  T(F): 98.8 (01 Apr 2024 05:05), Max: 98.8 (31 Mar 2024 20:21)  HR: 90 (01 Apr 2024 07:50) (79 - 92)  BP: 143/79 (01 Apr 2024 05:05) (113/66 - 143/79)  BP(mean): --  RR: 18 (01 Apr 2024 05:05) (18 - 18)  SpO2: 92% (01 Apr 2024 07:50) (92% - 97%)    Parameters below as of 01 Apr 2024 08:35  Patient On (Oxygen Delivery Method): nasal cannula, high flow        GENERAL: NAD, lying in bed   HEAD:  Normocephalic  EYES:  conjunctiva and sclera clear  ENT: Moist mucous membranes  NECK: Supple  CHEST/LUNG: Clear to auscultation bilaterally; No rales or rhonchi; L occasional  wheezing. Unlabored respirations  HEART: Regular rate and rhythm; S1S2+  ABDOMEN: Bowel sounds present; Soft, Nontender, Nondistended.   EXTREMITIES:  + distal Peripheral Pulses;  No cyanosis, or edema  NERVOUS SYSTEM:  Alert & Oriented X1;  No gross focal deficits   MSK: moves all extremities  SKIN: No rashes     LABS:                        14.1   13.18 )-----------( 173      ( 01 Apr 2024 06:47 )             41.2     01 Apr 2024 06:47    147    |  110    |  26     ----------------------------<  124    3.5     |  31     |  0.80     Ca    9.2        01 Apr 2024 06:47    TPro  6.3    /  Alb  3.0    /  TBili  0.6    /  DBili  x      /  AST  71     /  ALT  61     /  AlkPhos  64     01 Apr 2024 06:47      Urinalysis Basic - ( 01 Apr 2024 06:47 )    Color: x / Appearance: x / SG: x / pH: x  Gluc: 124 mg/dL / Ketone: x  / Bili: x / Urobili: x   Blood: x / Protein: x / Nitrite: x   Leuk Esterase: x / RBC: x / WBC x   Sq Epi: x / Non Sq Epi: x / Bacteria: x      CAPILLARY BLOOD GLUCOSE            Culture - Urine (collected 03-29-24 @ 06:30)  Source: Clean Catch Clean Catch (Midstream)  Final Report (03-30-24 @ 17:39):    <10,000 CFU/mL Normal Urogenital Carolyn    Culture - Blood (collected 03-28-24 @ 16:00)  Source: .Blood Blood-Peripheral  Preliminary Report (04-01-24 @ 01:01):    No growth at 72 Hours    Culture - Blood (collected 03-28-24 @ 15:50)  Source: .Blood Blood-Peripheral  Preliminary Report (04-01-24 @ 01:01):    No growth at 72 Hours        RADIOLOGY & ADDITIONAL TESTS:    Personally reviewed.     Consultant(s) Notes Reviewed:  [x] YES  [ ] NO    Plan of care discussed with patient /family; all questions answered   Patient is a 84y old  Male who presents with a chief complaint of RF , Flu (31 Mar 2024 10:27)      Subjective:  INTERVAL HPI/OVERNIGHT EVENTS: Patient seen and examined at bedside.  Patient appears comfortable     MEDICATIONS  (STANDING):  albuterol/ipratropium for Nebulization 3 milliLiter(s) Nebulizer every 6 hours  aspirin enteric coated 81 milliGRAM(s) Oral daily  bisacodyl 5 milliGRAM(s) Oral at bedtime  budesonide  80 MICROgram(s)/formoterol 4.5 MICROgram(s) Inhaler 2 Puff(s) Inhalation two times a day  cefTRIAXone   IVPB 1000 milliGRAM(s) IV Intermittent every 24 hours  clopidogrel Tablet 75 milliGRAM(s) Oral daily  dextrose 5%. 1000 milliLiter(s) (30 mL/Hr) IV Continuous <Continuous>  finasteride 5 milliGRAM(s) Oral daily  heparin   Injectable 5000 Unit(s) SubCutaneous every 12 hours  latanoprost 0.005% Ophthalmic Solution 1 Drop(s) Both EYES at bedtime  melatonin 10 milliGRAM(s) Oral at bedtime  methylPREDNISolone sodium succinate Injectable 40 milliGRAM(s) IV Push daily  mirabegron ER 25 milliGRAM(s) Oral at bedtime  montelukast 10 milliGRAM(s) Oral daily  oseltamivir 75 milliGRAM(s) Oral two times a day  pantoprazole  Injectable 40 milliGRAM(s) IV Push daily  risperiDONE   Tablet 1 milliGRAM(s) Oral at bedtime  sodium chloride 3%  Inhalation 4 milliLiter(s) Inhalation every 12 hours  tiotropium 2.5 MICROgram(s) Inhaler 2 Puff(s) Inhalation daily    MEDICATIONS  (PRN):  acetaminophen     Tablet .. 650 milliGRAM(s) Oral every 6 hours PRN Temp greater or equal to 38C (100.4F), Mild Pain (1 - 3)  albuterol    90 MICROgram(s) HFA Inhaler 2 Puff(s) Inhalation every 6 hours PRN Shortness of Breath and/or Wheezing  aluminum hydroxide/magnesium hydroxide/simethicone Suspension 30 milliLiter(s) Oral every 4 hours PRN Dyspepsia  ondansetron Injectable 4 milliGRAM(s) IV Push every 8 hours PRN Nausea and/or Vomiting      Allergies    No Known Allergies    Intolerances        REVIEW OF SYSTEMS:  not able to obtain due to dementia       Objective:  Vital Signs Last 24 Hrs  T(C): 37.1 (01 Apr 2024 05:05), Max: 37.1 (31 Mar 2024 20:21)  T(F): 98.8 (01 Apr 2024 05:05), Max: 98.8 (31 Mar 2024 20:21)  HR: 90 (01 Apr 2024 07:50) (79 - 92)  BP: 143/79 (01 Apr 2024 05:05) (113/66 - 143/79)  BP(mean): --  RR: 18 (01 Apr 2024 05:05) (18 - 18)  SpO2: 92% (01 Apr 2024 07:50) (92% - 97%)    Parameters below as of 01 Apr 2024 08:35  Patient On (Oxygen Delivery Method): nasal cannula, high flow        GENERAL: NAD, lying in bed   HEAD:  Normocephalic  EYES:  conjunctiva and sclera clear  ENT: Moist mucous membranes  NECK: Supple  CHEST/LUNG: Clear to auscultation bilaterally; No rales or rhonchi; L occasional  wheezing. Unlabored respirations  HEART: Regular rate and rhythm; S1S2+  ABDOMEN: Bowel sounds present; Soft, Nontender, Nondistended.   EXTREMITIES:  + distal Peripheral Pulses;  No cyanosis, or edema  NERVOUS SYSTEM:  Alert & Oriented X1;  No gross focal deficits   MSK: moves all extremities  SKIN: No rashes     LABS:                        14.1   13.18 )-----------( 173      ( 01 Apr 2024 06:47 )             41.2     01 Apr 2024 06:47    147    |  110    |  26     ----------------------------<  124    3.5     |  31     |  0.80     Ca    9.2        01 Apr 2024 06:47    TPro  6.3    /  Alb  3.0    /  TBili  0.6    /  DBili  x      /  AST  71     /  ALT  61     /  AlkPhos  64     01 Apr 2024 06:47      Urinalysis Basic - ( 01 Apr 2024 06:47 )    Color: x / Appearance: x / SG: x / pH: x  Gluc: 124 mg/dL / Ketone: x  / Bili: x / Urobili: x   Blood: x / Protein: x / Nitrite: x   Leuk Esterase: x / RBC: x / WBC x   Sq Epi: x / Non Sq Epi: x / Bacteria: x      CAPILLARY BLOOD GLUCOSE            Culture - Urine (collected 03-29-24 @ 06:30)  Source: Clean Catch Clean Catch (Midstream)  Final Report (03-30-24 @ 17:39):    <10,000 CFU/mL Normal Urogenital Carolyn    Culture - Blood (collected 03-28-24 @ 16:00)  Source: .Blood Blood-Peripheral  Preliminary Report (04-01-24 @ 01:01):    No growth at 72 Hours    Culture - Blood (collected 03-28-24 @ 15:50)  Source: .Blood Blood-Peripheral  Preliminary Report (04-01-24 @ 01:01):    No growth at 72 Hours        RADIOLOGY & ADDITIONAL TESTS:    Personally reviewed.     Consultant(s) Notes Reviewed:  [x] YES  [ ] NO    Plan of care discussed with family; all questions answered

## 2024-04-01 NOTE — PROGRESS NOTE ADULT - SUBJECTIVE AND OBJECTIVE BOX
[facility]  INFECTIOUS DISEASES   [address] [place] [address]  =======================================================  Tel: [phone]     Fax: [phone]  MD Molly Landaverde MD Shah, Kaushal, MD Sunjit, Jaspal, MD  ========================================================      MARLON KRUEGER [id number]    Follow up: Respiratory failure    On HFNC, awake and alert. No fever.     Allergies:  No Known Allergies    MEDICATIONS  (STANDING):  albuterol/ipratropium for Nebulization 3 milliLiter(s) Nebulizer every 6 hours  aspirin enteric coated 81 milliGRAM(s) Oral daily  bisacodyl 5 milliGRAM(s) Oral at bedtime  budesonide  80 MICROgram(s)/formoterol 4.5 MICROgram(s) Inhaler 2 Puff(s) Inhalation two times a day  cefTRIAXone   IVPB 1000 milliGRAM(s) IV Intermittent every 24 hours  clopidogrel Tablet 75 milliGRAM(s) Oral daily  dextrose 5%. 1000 milliLiter(s) (30 mL/Hr) IV Continuous <Continuous>  finasteride 5 milliGRAM(s) Oral daily  heparin   Injectable 5000 Unit(s) SubCutaneous every 12 hours  latanoprost 0.005% Ophthalmic Solution 1 Drop(s) Both EYES at bedtime  magnesium hydroxide Suspension 30 milliLiter(s) Oral once  melatonin 10 milliGRAM(s) Oral at bedtime  methylPREDNISolone sodium succinate Injectable 40 milliGRAM(s) IV Push daily  mirabegron ER 25 milliGRAM(s) Oral at bedtime  montelukast 10 milliGRAM(s) Oral daily  oseltamivir 75 milliGRAM(s) Oral two times a day  pantoprazole  Injectable 40 milliGRAM(s) IV Push daily  polyethylene glycol 3350 17 Gram(s) Oral daily  risperiDONE   Tablet 1 milliGRAM(s) Oral at bedtime  senna 2 Tablet(s) Oral at bedtime  sodium chloride 3%  Inhalation 4 milliLiter(s) Inhalation every 12 hours  tiotropium 2.5 MICROgram(s) Inhaler 2 Puff(s) Inhalation daily    REVIEW OF SYSTEMS:  unable to obtain due to medical condition    Physical Exam:  Vital Signs Last 24 Hrs  T(C): 37.1 (01 Apr 2024 05:05), Max: 37.1 (31 Mar 2024 20:21)  T(F): 98.8 (01 Apr 2024 05:05), Max: 98.8 (31 Mar 2024 20:21)  HR: 90 (01 Apr 2024 07:50) (79 - 92)  BP: 143/79 (01 Apr 2024 05:05) (113/66 - 143/79)  RR: 18 (01 Apr 2024 05:05) (18 - 18)  SpO2: 92% (01 Apr 2024 07:50) (92% - 97%)  Parameters below as of 01 Apr 2024 08:35  Patient On (Oxygen Delivery Method): nasal cannula, high flow  GEN: On HFNC  HEENT: normocephalic and atraumatic. Anicteric    NECK: Supple.   LUNGS: Course BS B/L   HEART: RRR  ABDOMEN: Soft, NT, ND.  +BS.    EXTREMITIES: trace edema.  MSK: No joint swelling  NEUROLOGIC: Confused   SKIN: No rash      Labs:                        14.1   13.18 )-----------( 173      ( 01 Apr 2024 06:47 )             41.2     04-01    147<H>  |  110<H>  |  26<H>  ----------------------------<  124<H>  3.5   |  31  |  0.80    Ca    9.2      01 Apr 2024 06:47  Mg     2.1     03-31    TPro  6.3  /  Alb  3.0<L>  /  TBili  0.6  /  DBili  x   /  AST  71<H>  /  ALT  61  /  AlkPhos  64  04-01    Culture - Urine (collected 03-29-24 @ 06:30)  Source: Clean Catch Clean Catch (Midstream)  Final Report (03-30-24 @ 17:39):    <10,000 CFU/mL Normal Urogenital Carolyn    Culture - Blood (collected 03-28-24 @ 16:00)  Source: .Blood Blood-Peripheral  Preliminary Report (04-01-24 @ 01:01):    No growth at 72 Hours    Culture - Blood (collected 03-28-24 @ 15:50)  Source: .Blood Blood-Peripheral  Preliminary Report (04-01-24 @ 01:01):    No growth at 72 Hours    WBC Count: 13.18 K/uL (04-01-24 @ 06:47)  WBC Count: 11.92 K/uL (03-31-24 @ 08:13)  WBC Count: 13.10 K/uL (03-30-24 @ 10:39)  WBC Count: 15.84 K/uL (03-29-24 @ 14:10)  WBC Count: 14.56 K/uL (03-29-24 @ 03:30)  WBC Count: 10.58 K/uL (03-28-24 @ 16:00)    Creatinine: 0.80 mg/dL (04-01-24 @ 06:47)  Creatinine: 0.70 mg/dL (03-31-24 @ 08:13)  Creatinine: 0.94 mg/dL (03-30-24 @ 10:39)  Creatinine: 0.99 mg/dL (03-29-24 @ 16:05)  Creatinine: 1.50 mg/dL (03-29-24 @ 03:30)  Creatinine: 0.98 mg/dL (03-28-24 @ 16:00)    Procalcitonin, Serum: 0.38 ng/mL (03-31-24 @ 08:13)     SARS-CoV-2 Result: NotDetec (03-28-24 @ 16:00)    Assessment and plan:   83 yo M with PMH aortic stenosis s/p TAVR 2019, dementia, HLD, asthm, COPD, overactive bladder ,H pylori, BPH presents to the ED with lethargy/weakness.  In ED was found to have Influenza. Last night had RRT for hypoxia in 60s and labs showed high Troponin.  Leukocytosis and hypoxia most likely related to cardiac event. Since no sign of opacities in lungs, can hold antibiotics. High fever due to influenza.     # Influenza  # Acute respiratory failure   # MI    - Blood cultures 3/28 no growth   - UA and UC negative    - Pulmonary and cardiology follow up   - Creat back to normal   - Tamiflu for total of 5days   - Was started on Ceftriaxone 3/29, will continue, can complete total 5days  - Procalcitonin level 0.39 not high   - Leukocytosis due to steroids     Will follow PRN.     Deniz Desai MD  Division of Infectious Diseases   Please call ID service at [phone] with any question.      50 minutes spent on total encounter assessing patient, examination, chart review, counseling and coordinating care by the attending physician/nurse/care manager.

## 2024-04-01 NOTE — PROGRESS NOTE ADULT - SUBJECTIVE AND OBJECTIVE BOX
[facility]  Yunier Dorman PA-C  [address] [place] [address]  [phone]      INTERVAL HPI/OVERNIGHT EVENTS:  Pt s/e  Wife who is an inpatient in the same room translated for patient  +constipation, no BM overnight  C/o abdominal bloating    MEDICATIONS  (STANDING):  albuterol/ipratropium for Nebulization 3 milliLiter(s) Nebulizer every 6 hours  aspirin enteric coated 81 milliGRAM(s) Oral daily  bisacodyl 5 milliGRAM(s) Oral at bedtime  budesonide  80 MICROgram(s)/formoterol 4.5 MICROgram(s) Inhaler 2 Puff(s) Inhalation two times a day  cefTRIAXone   IVPB 1000 milliGRAM(s) IV Intermittent every 24 hours  clopidogrel Tablet 75 milliGRAM(s) Oral daily  dextrose 5%. 1000 milliLiter(s) (30 mL/Hr) IV Continuous <Continuous>  finasteride 5 milliGRAM(s) Oral daily  heparin   Injectable 5000 Unit(s) SubCutaneous every 12 hours  latanoprost 0.005% Ophthalmic Solution 1 Drop(s) Both EYES at bedtime  melatonin 10 milliGRAM(s) Oral at bedtime  methylPREDNISolone sodium succinate Injectable 40 milliGRAM(s) IV Push daily  mirabegron ER 25 milliGRAM(s) Oral at bedtime  montelukast 10 milliGRAM(s) Oral daily  oseltamivir 75 milliGRAM(s) Oral two times a day  pantoprazole  Injectable 40 milliGRAM(s) IV Push daily  polyethylene glycol 3350 17 Gram(s) Oral daily  risperiDONE   Tablet 1 milliGRAM(s) Oral at bedtime  senna 2 Tablet(s) Oral at bedtime  sodium chloride 3%  Inhalation 4 milliLiter(s) Inhalation every 12 hours  tiotropium 2.5 MICROgram(s) Inhaler 2 Puff(s) Inhalation daily    MEDICATIONS  (PRN):  acetaminophen     Tablet .. 650 milliGRAM(s) Oral every 6 hours PRN Temp greater or equal to 38C (100.4F), Mild Pain (1 - 3)  albuterol    90 MICROgram(s) HFA Inhaler 2 Puff(s) Inhalation every 6 hours PRN Shortness of Breath and/or Wheezing  aluminum hydroxide/magnesium hydroxide/simethicone Suspension 30 milliLiter(s) Oral every 4 hours PRN Dyspepsia  ondansetron Injectable 4 milliGRAM(s) IV Push every 8 hours PRN Nausea and/or Vomiting      Allergies    No Known Allergies      PHYSICAL EXAM:   Vital Signs:  Vital Signs Last 24 Hrs  T(C): 37.1 (2024 05:05), Max: 37.1 (31 Mar 2024 20:21)  T(F): 98.8 (2024 05:05), Max: 98.8 (31 Mar 2024 20:21)  HR: 90 (2024 07:50) (79 - 92)  BP: 143/79 (2024 05:05) (113/66 - 143/79)  BP(mean): --  RR: 18 (2024 05:05) (18 - 18)  SpO2: 92% (2024 07:50) (92% - 97%)    Parameters below as of 2024 08:35  Patient On (Oxygen Delivery Method): nasal cannula, high flow      Daily     Daily Weight in k.7 (2024 05:05)    GENERAL:  Appears stated age  HEENT:  NC/AT  CHEST:  Full & symmetric excursion  HEART:  Regular rhythm  ABDOMEN:  Soft, non-tender, non-distended  EXTEREMITIES:  no cyanosis  SKIN:  No rash  NEURO:  Alert      LABS:                        14.1   13.18 )-----------( 173      ( 2024 06:47 )             41.2     04-01    147<H>  |  110<H>  |  26<H>  ----------------------------<  124<H>  3.5   |  31  |  0.80    Ca    9.2      2024 06:47  Mg     2.1     03-31    TPro  6.3  /  Alb  3.0<L>  /  TBili  0.6  /  DBili  x   /  AST  71<H>  /  ALT  61  /  AlkPhos  64  04-01      Urinalysis Basic - ( 2024 06:47 )    Color: x / Appearance: x / SG: x / pH: x  Gluc: 124 mg/dL / Ketone: x  / Bili: x / Urobili: x   Blood: x / Protein: x / Nitrite: x   Leuk Esterase: x / RBC: x / WBC x   Sq Epi: x / Non Sq Epi: x / Bacteria: x

## 2024-04-01 NOTE — PROGRESS NOTE ADULT - SUBJECTIVE AND OBJECTIVE BOX
Patient is a 84y old  Male who presents with a chief complaint of RF, FLU (01 Apr 2024 11:15)      INTERVAL HPI/OVERNIGHT EVENTS:    MEDICATIONS  (STANDING):  albuterol/ipratropium for Nebulization 3 milliLiter(s) Nebulizer every 6 hours  aspirin enteric coated 81 milliGRAM(s) Oral daily  bisacodyl 5 milliGRAM(s) Oral at bedtime  budesonide  80 MICROgram(s)/formoterol 4.5 MICROgram(s) Inhaler 2 Puff(s) Inhalation two times a day  cefTRIAXone   IVPB 1000 milliGRAM(s) IV Intermittent every 24 hours  clopidogrel Tablet 75 milliGRAM(s) Oral daily  dextrose 5%. 1000 milliLiter(s) (30 mL/Hr) IV Continuous <Continuous>  finasteride 5 milliGRAM(s) Oral daily  heparin   Injectable 5000 Unit(s) SubCutaneous every 12 hours  latanoprost 0.005% Ophthalmic Solution 1 Drop(s) Both EYES at bedtime  melatonin 10 milliGRAM(s) Oral at bedtime  methylPREDNISolone sodium succinate Injectable 40 milliGRAM(s) IV Push daily  mirabegron ER 25 milliGRAM(s) Oral at bedtime  montelukast 10 milliGRAM(s) Oral daily  oseltamivir 75 milliGRAM(s) Oral two times a day  pantoprazole  Injectable 40 milliGRAM(s) IV Push daily  polyethylene glycol 3350 17 Gram(s) Oral daily  risperiDONE   Tablet 1 milliGRAM(s) Oral at bedtime  senna 2 Tablet(s) Oral at bedtime  sodium chloride 3%  Inhalation 4 milliLiter(s) Inhalation every 12 hours  tiotropium 2.5 MICROgram(s) Inhaler 2 Puff(s) Inhalation daily      MEDICATIONS  (PRN):  acetaminophen     Tablet .. 650 milliGRAM(s) Oral every 6 hours PRN Temp greater or equal to 38C (100.4F), Mild Pain (1 - 3)  albuterol    90 MICROgram(s) HFA Inhaler 2 Puff(s) Inhalation every 6 hours PRN Shortness of Breath and/or Wheezing  aluminum hydroxide/magnesium hydroxide/simethicone Suspension 30 milliLiter(s) Oral every 4 hours PRN Dyspepsia  ondansetron Injectable 4 milliGRAM(s) IV Push every 8 hours PRN Nausea and/or Vomiting      Allergies    No Known Allergies    Intolerances        PAST MEDICAL & SURGICAL HISTORY:  Aortic stenosis      BPH (benign prostatic hyperplasia)      Dementia  mild- on Memantine      ACE (dyspnea on exertion)      Asthma  controlled on inhalers      HLD (hyperlipidemia)      CAD (coronary artery disease)  s/p stent 10/2019      OH (ocular hypertension)  on eye drops      Overactive bladder      Anxiety      History of tonsillectomy  childhood      H/O coronary angiogram  10/25/19, s/p PCI to LAD          Vital Signs Last 24 Hrs  T(C): 36.6 (01 Apr 2024 21:27), Max: 37.7 (01 Apr 2024 13:37)  T(F): 97.9 (01 Apr 2024 21:27), Max: 99.8 (01 Apr 2024 13:37)  HR: 104 (01 Apr 2024 21:27) (85 - 104)  BP: 162/87 (01 Apr 2024 21:27) (138/67 - 162/87)  BP(mean): --  RR: 17 (01 Apr 2024 21:27) (17 - 18)  SpO2: 94% (01 Apr 2024 21:27) (90% - 97%)    Parameters below as of 01 Apr 2024 21:27  Patient On (Oxygen Delivery Method): nasal cannula, high flow        PHYSICAL EXAMINATION:    GENERAL: The patient is awake and alert in no apparent distress.     HEENT: Head is normocephalic and atraumatic. Extraocular muscles are intact. Mucous membranes are moist.    NECK: Supple.    LUNGS: Clear to auscultation without wheezing, rales or rhonchi; respirations unlabored    HEART: Regular rate and rhythm without murmur.    ABDOMEN: Soft, nontender, and nondistended.      EXTREMITIES: Without any cyanosis, clubbing, rash, lesions or edema.    NEUROLOGIC: Grossly intact.    SKIN: No ulceration or induration present.      LABS:                        14.1   13.18 )-----------( 173      ( 01 Apr 2024 06:47 )             41.2     04-01    147<H>  |  110<H>  |  26<H>  ----------------------------<  124<H>  3.5   |  31  |  0.80    Ca    9.2      01 Apr 2024 06:47  Mg     2.1     03-31    TPro  6.3  /  Alb  3.0<L>  /  TBili  0.6  /  DBili  x   /  AST  71<H>  /  ALT  61  /  AlkPhos  64  04-01      Urinalysis Basic - ( 01 Apr 2024 06:47 )    Color: x / Appearance: x / SG: x / pH: x  Gluc: 124 mg/dL / Ketone: x  / Bili: x / Urobili: x   Blood: x / Protein: x / Nitrite: x   Leuk Esterase: x / RBC: x / WBC x   Sq Epi: x / Non Sq Epi: x / Bacteria: x      ABG - ( 31 Mar 2024 11:16 )  pH, Arterial: 7.47  pH, Blood: x     /  pCO2: 46    /  pO2: 146   / HCO3: 34    / Base Excess: 9.8   /  SaO2: 100.0             CARDIAC MARKERS ( 31 Mar 2024 08:13 )  x     / x     / 937 U/L / x     / x                Procalcitonin, Serum: 0.38 ng/mL (03-31-24 @ 08:13)      MICROBIOLOGY:  Culture Results:   <10,000 CFU/mL Normal Urogenital Carolyn (03-29 @ 06:30)      RADIOLOGY & ADDITIONAL STUDIES:    Assessment:    Plan:       Patient is a 84y old  Male who presents with a chief complaint of RF, FLU (01 Apr 2024 11:15)      INTERVAL HPI/OVERNIGHT EVENTS:    lethargic but responsive    MEDICATIONS  (STANDING):  albuterol/ipratropium for Nebulization 3 milliLiter(s) Nebulizer every 6 hours  aspirin enteric coated 81 milliGRAM(s) Oral daily  bisacodyl 5 milliGRAM(s) Oral at bedtime  budesonide  80 MICROgram(s)/formoterol 4.5 MICROgram(s) Inhaler 2 Puff(s) Inhalation two times a day  cefTRIAXone   IVPB 1000 milliGRAM(s) IV Intermittent every 24 hours  clopidogrel Tablet 75 milliGRAM(s) Oral daily  dextrose 5%. 1000 milliLiter(s) (30 mL/Hr) IV Continuous <Continuous>  finasteride 5 milliGRAM(s) Oral daily  heparin   Injectable 5000 Unit(s) SubCutaneous every 12 hours  latanoprost 0.005% Ophthalmic Solution 1 Drop(s) Both EYES at bedtime  melatonin 10 milliGRAM(s) Oral at bedtime  methylPREDNISolone sodium succinate Injectable 40 milliGRAM(s) IV Push daily  mirabegron ER 25 milliGRAM(s) Oral at bedtime  montelukast 10 milliGRAM(s) Oral daily  oseltamivir 75 milliGRAM(s) Oral two times a day  pantoprazole  Injectable 40 milliGRAM(s) IV Push daily  polyethylene glycol 3350 17 Gram(s) Oral daily  risperiDONE   Tablet 1 milliGRAM(s) Oral at bedtime  senna 2 Tablet(s) Oral at bedtime  sodium chloride 3%  Inhalation 4 milliLiter(s) Inhalation every 12 hours  tiotropium 2.5 MICROgram(s) Inhaler 2 Puff(s) Inhalation daily      MEDICATIONS  (PRN):  acetaminophen     Tablet .. 650 milliGRAM(s) Oral every 6 hours PRN Temp greater or equal to 38C (100.4F), Mild Pain (1 - 3)  albuterol    90 MICROgram(s) HFA Inhaler 2 Puff(s) Inhalation every 6 hours PRN Shortness of Breath and/or Wheezing  aluminum hydroxide/magnesium hydroxide/simethicone Suspension 30 milliLiter(s) Oral every 4 hours PRN Dyspepsia  ondansetron Injectable 4 milliGRAM(s) IV Push every 8 hours PRN Nausea and/or Vomiting      Allergies    No Known Allergies    Intolerances        PAST MEDICAL & SURGICAL HISTORY:  Aortic stenosis      BPH (benign prostatic hyperplasia)      Dementia  mild- on Memantine      ACE (dyspnea on exertion)      Asthma  controlled on inhalers      HLD (hyperlipidemia)      CAD (coronary artery disease)  s/p stent 10/2019      OH (ocular hypertension)  on eye drops      Overactive bladder      Anxiety      History of tonsillectomy  childhood      H/O coronary angiogram  10/25/19, s/p PCI to LAD          Vital Signs Last 24 Hrs  T(C): 36.6 (01 Apr 2024 21:27), Max: 37.7 (01 Apr 2024 13:37)  T(F): 97.9 (01 Apr 2024 21:27), Max: 99.8 (01 Apr 2024 13:37)  HR: 104 (01 Apr 2024 21:27) (85 - 104)  BP: 162/87 (01 Apr 2024 21:27) (138/67 - 162/87)  BP(mean): --  RR: 17 (01 Apr 2024 21:27) (17 - 18)  SpO2: 94% (01 Apr 2024 21:27) (90% - 97%)    Parameters below as of 01 Apr 2024 21:27  Patient On (Oxygen Delivery Method): nasal cannula, high flow        PHYSICAL EXAMINATION:    GENERAL: The patient is lethargic but responsive and in no apparent distress.     HEENT: Head is normocephalic and atraumatic.    NECK: no JVD    LUNGS: Clear to auscultation without wheezing, rales or rhonchi; respirations unlabored    HEART: Regular rate and rhythm without murmur.    ABDOMEN: Soft, nontender, and nondistended.      EXTREMITIES: Without any cyanosis, clubbing, rash, lesions or edema.    NEUROLOGIC: no focal findings    SKIN: No ulceration or induration present.      LABS:                        14.1   13.18 )-----------( 173      ( 01 Apr 2024 06:47 )             41.2     04-01    147<H>  |  110<H>  |  26<H>  ----------------------------<  124<H>  3.5   |  31  |  0.80    Ca    9.2      01 Apr 2024 06:47  Mg     2.1     03-31    TPro  6.3  /  Alb  3.0<L>  /  TBili  0.6  /  DBili  x   /  AST  71<H>  /  ALT  61  /  AlkPhos  64  04-01      Urinalysis Basic - ( 01 Apr 2024 06:47 )    Color: x / Appearance: x / SG: x / pH: x  Gluc: 124 mg/dL / Ketone: x  / Bili: x / Urobili: x   Blood: x / Protein: x / Nitrite: x   Leuk Esterase: x / RBC: x / WBC x   Sq Epi: x / Non Sq Epi: x / Bacteria: x      ABG - ( 31 Mar 2024 11:16 )  pH, Arterial: 7.47  pH, Blood: x     /  pCO2: 46    /  pO2: 146   / HCO3: 34    / Base Excess: 9.8   /  SaO2: 100.0             CARDIAC MARKERS ( 31 Mar 2024 08:13 )  x     / x     / 937 U/L / x     / x                Procalcitonin, Serum: 0.38 ng/mL (03-31-24 @ 08:13)      MICROBIOLOGY:  Culture Results:   <10,000 CFU/mL Normal Urogenital Carolyn (03-29 @ 06:30)          Assessment:    Influenza A Bronchitis  Superimposed bacterial pneumonia  Acute on Chronic Hypoxic respiratory failure  COPD with exacerbation  Altered Mentation    Plan:    Continue oxygen and titrate FI02 as tolerated  Complete course of Tamiflu  IV Solumedrol 40 mg daily  IV Rocephin  Duoneb QID  Hypertonic saline via nebulizer  Symbicort + Spiriva inhalers  Heparin for DVT prophylaxis

## 2024-04-01 NOTE — PROGRESS NOTE ADULT - ASSESSMENT
anemia  constipation  abd pain    bowel regimen  monitor for BM  simethicone  continue to monitor cbc  d/w pt and wife at bedside    I reviewed the overnight course of events on the unit, re-confirming the patient history. I discussed the care with the patient  Differential diagnosis and plan of care discussed with patient after the evaluation  35 minutes spent on total encounter of which more than fifty percent of the encounter was spent counseling and/or coordinating care by the attending physician.

## 2024-04-01 NOTE — PROGRESS NOTE ADULT - PROBLEM SELECTOR PLAN 4
likely pre renal due to decreased oral intake and hypotension.   improved   will monitor   taylor inserted for urinary retention. likely pre renal due to decreased oral intake and hypotension.   improved   will monitor   taylor inserted for urinary retention, voiding trial

## 2024-04-01 NOTE — PROGRESS NOTE ADULT - ASSESSMENT
85 yo M with PMH aortic stenosis s/p TAVR 2019, CAD s/p LELAND to LAD 2019, dementia, HLD, asthma, COPD, overactive bladder, H pylori, BPH admitted with Influenza A s/p multiple falls.     CAD, respiratory failure, elev trop   - Troponins elevated, peaked 2300, now downtrending  - likely elevated on the basis of demand ischemia in setting of influenza, RAY, rhabdomyolysis, agitation.  - With CPK out of proportion to Troponin, likely rhabdomyolysis s/p fall, now downtrending   - History of LELAND to LAD in 2019  - continue ASA and Plavix.     - H/o EKG with IVCD vs LBBB in the past.    - EKG's reveal elevated QTc ~500. Avoid QT prolonging medications. f/u repeat (ordered)   - Telemetry: SR/ST 80 up to 110's, would continue tele monitoring for now,  he is at risk of Afib in the setting of resp failure and structural heart disease   - BP stable   - Monitor and replete Lytes. Keep K > 4 and Mg > 2  - continue to monitor routine hemodynamics    - No meaningful evidence of volume overload.  - Previous TTE (6/23/2022): Normal ventricular systolic function, no segmental wall motion abnormalities, mild diastolic dysfunction.  - f/u TTE.    - + Flu, multifocal PNA, management per primary   - on HFNC,   - at risk of abrupt decompensation     - Will continue to follow.    Ally Gonsalez [facility]  Nurse Practitioner - Cardiology   call TEAMS

## 2024-04-01 NOTE — PROGRESS NOTE ADULT - SUBJECTIVE AND OBJECTIVE BOX
Patient is a 84y old  Male who presents with a chief complaint of Influenza with other respiratory manifestations, other influenza virus identified   (30 Mar 2024 11:54)    Patient seen in follow up for RAY.        PAST MEDICAL HISTORY:  Aortic stenosis    BPH (benign prostatic hyperplasia)    Dementia    ACE (dyspnea on exertion)    Asthma    HLD (hyperlipidemia)    CAD (coronary artery disease)    OH (ocular hypertension)    Overactive bladder    Anxiety      MEDICATIONS  (STANDING):  albuterol/ipratropium for Nebulization 3 milliLiter(s) Nebulizer every 6 hours  aspirin enteric coated 81 milliGRAM(s) Oral daily  bisacodyl 5 milliGRAM(s) Oral at bedtime  budesonide  80 MICROgram(s)/formoterol 4.5 MICROgram(s) Inhaler 2 Puff(s) Inhalation two times a day  cefTRIAXone   IVPB 1000 milliGRAM(s) IV Intermittent every 24 hours  clopidogrel Tablet 75 milliGRAM(s) Oral daily  dextrose 5%. 1000 milliLiter(s) (30 mL/Hr) IV Continuous <Continuous>  finasteride 5 milliGRAM(s) Oral daily  heparin   Injectable 5000 Unit(s) SubCutaneous every 12 hours  latanoprost 0.005% Ophthalmic Solution 1 Drop(s) Both EYES at bedtime  melatonin 10 milliGRAM(s) Oral at bedtime  methylPREDNISolone sodium succinate Injectable 40 milliGRAM(s) IV Push daily  mirabegron ER 25 milliGRAM(s) Oral at bedtime  montelukast 10 milliGRAM(s) Oral daily  oseltamivir 75 milliGRAM(s) Oral two times a day  pantoprazole  Injectable 40 milliGRAM(s) IV Push daily  risperiDONE   Tablet 1 milliGRAM(s) Oral at bedtime  sodium chloride 3%  Inhalation 4 milliLiter(s) Inhalation every 12 hours  tiotropium 2.5 MICROgram(s) Inhaler 2 Puff(s) Inhalation daily    MEDICATIONS  (PRN):  acetaminophen     Tablet .. 650 milliGRAM(s) Oral every 6 hours PRN Temp greater or equal to 38C (100.4F), Mild Pain (1 - 3)  albuterol    90 MICROgram(s) HFA Inhaler 2 Puff(s) Inhalation every 6 hours PRN Shortness of Breath and/or Wheezing  aluminum hydroxide/magnesium hydroxide/simethicone Suspension 30 milliLiter(s) Oral every 4 hours PRN Dyspepsia  ondansetron Injectable 4 milliGRAM(s) IV Push every 8 hours PRN Nausea and/or Vomiting    T(C): 37.1 (04-01-24 @ 05:05), Max: 37.1 (03-31-24 @ 04:49)  HR: 90 (04-01-24 @ 07:50) (69 - 104)  BP: 143/79 (04-01-24 @ 05:05) (113/66 - 146/63)  RR: 18 (04-01-24 @ 05:05)  SpO2: 92% (04-01-24 @ 07:50)  Wt(kg): --  I&O's Detail    31 Mar 2024 07:01  -  01 Apr 2024 07:00  --------------------------------------------------------  IN:    IV PiggyBack: 50 mL  Total IN: 50 mL    OUT:    Indwelling Catheter - Urethral (mL): 900 mL    Voided (mL): 700 mL  Total OUT: 1600 mL    Total NET: -1550 mL            PHYSICAL EXAM:  General: No distress  Respiratory: b/l air entry  Cardiovascular: S1 S2  Gastrointestinal: soft  Extremities:  no edema                         LABORATORY:                        14.1   13.18 )-----------( 173      ( 01 Apr 2024 06:47 )             41.2     04-01    147<H>  |  110<H>  |  26<H>  ----------------------------<  124<H>  3.5   |  31  |  0.80    Ca    9.2      01 Apr 2024 06:47  Mg     2.1     03-31    TPro  6.3  /  Alb  3.0<L>  /  TBili  0.6  /  DBili  x   /  AST  71<H>  /  ALT  61  /  AlkPhos  64  04-01    Sodium: 147 mmol/L (04-01 @ 06:47)  Sodium: 145 mmol/L (03-31 @ 08:13)    Potassium: 3.5 mmol/L (04-01 @ 06:47)  Potassium: 3.9 mmol/L (03-31 @ 08:13)    Hemoglobin: 14.1 g/dL (04-01 @ 06:47)  Hemoglobin: 12.7 g/dL (03-31 @ 08:13)  Hemoglobin: 13.3 g/dL (03-30 @ 10:39)  Hemoglobin: 14.6 g/dL (03-29 @ 14:10)    Creatinine, Serum 0.80 (04-01 @ 06:47)  Creatinine, Serum 0.70 (03-31 @ 08:13)  Creatinine, Serum 0.94 (03-30 @ 10:39)  Creatinine, Serum 0.99 (03-29 @ 16:05)    CARDIAC MARKERS ( 31 Mar 2024 08:13 )  x     / x     / 937 U/L / x     / x          LIVER FUNCTIONS - ( 01 Apr 2024 06:47 )  Alb: 3.0 g/dL / Pro: 6.3 g/dL / ALK PHOS: 64 U/L / ALT: 61 U/L / AST: 71 U/L / GGT: x           Urinalysis Basic - ( 01 Apr 2024 06:47 )    Color: x / Appearance: x / SG: x / pH: x  Gluc: 124 mg/dL / Ketone: x  / Bili: x / Urobili: x   Blood: x / Protein: x / Nitrite: x   Leuk Esterase: x / RBC: x / WBC x   Sq Epi: x / Non Sq Epi: x / Bacteria: x      ABG - ( 31 Mar 2024 11:16 )  pH, Arterial: 7.47  pH, Blood: x     /  pCO2: 46    /  pO2: 146   / HCO3: 34    / Base Excess: 9.8   /  SaO2: 100.0

## 2024-04-01 NOTE — PROGRESS NOTE ADULT - ASSESSMENT
RAY: Prerenal azotemia, Rhabdomyolysis  Hypotension, h/o Hypertension  Dyspnea: Influenza A  MI  Dementia  s/p Fall, Rhabdomyolysis    Stable renal indices. Continue IV hydration. Poor PO intake. To continue current meds. BP stable.  Cardiology follow up. Will follow electrolytes and renal function trend.

## 2024-04-01 NOTE — PROGRESS NOTE ADULT - PROBLEM SELECTOR PLAN 2
Hypoxic to 60s , initial ABG respiratory acidosis with Co2 retention, repeat ABG improved   likely due to flu and COPD exacerbation / pneumonia   HFNC for now,  Wean as tolerated 100% at current   GOC discussed with palliative team  and family- DNR/DNI: patient with multiple significant acute and chronic medical conditions, risk of sudden decompensation. prognosis poor. candidate for palliative hospice service  CTA negative for PE , Heparin Drip stopped. + multi focal pneumonia   repeat CXR done Hypoxic to 60s , initial ABG respiratory acidosis with Co2 retention, repeat ABG improved   likely due to flu and COPD exacerbation / pneumonia   HFNC for now,  Wean as tolerated 70% at current   GOC discussed with palliative team  and family- DNR/DNI: patient with multiple significant acute and chronic medical conditions, risk of sudden decompensation. prognosis poor. candidate for palliative hospice service  CTA negative for PE , Heparin Drip stopped. + multi focal pneumonia   repeat CXR done: more opacities on left, possibly due to mucus plug.

## 2024-04-01 NOTE — CHART NOTE - NSCHARTNOTEFT_GEN_A_CORE
Discussed with Dr. Dorman; patient clinically improving.  Please reconsult if needs arise.    Marya Bull MD, [facility]; Palliative Care Attending, Ethicist. [phone]

## 2024-04-02 ENCOUNTER — RESULT REVIEW (OUTPATIENT)
Age: 84
End: 2024-04-02

## 2024-04-02 ENCOUNTER — TRANSCRIPTION ENCOUNTER (OUTPATIENT)
Age: 84
End: 2024-04-02

## 2024-04-02 LAB
ANION GAP SERPL CALC-SCNC: 6 MMOL/L — SIGNIFICANT CHANGE UP (ref 5–17)
BASOPHILS # BLD AUTO: 0.01 K/UL — SIGNIFICANT CHANGE UP (ref 0–0.2)
BASOPHILS NFR BLD AUTO: 0.1 % — SIGNIFICANT CHANGE UP (ref 0–2)
BUN SERPL-MCNC: 29 MG/DL — HIGH (ref 7–23)
CALCIUM SERPL-MCNC: 9.3 MG/DL — SIGNIFICANT CHANGE UP (ref 8.5–10.1)
CHLORIDE SERPL-SCNC: 108 MMOL/L — SIGNIFICANT CHANGE UP (ref 96–108)
CO2 SERPL-SCNC: 32 MMOL/L — HIGH (ref 22–31)
CREAT SERPL-MCNC: 0.79 MG/DL — SIGNIFICANT CHANGE UP (ref 0.5–1.3)
EGFR: 87 ML/MIN/1.73M2 — SIGNIFICANT CHANGE UP
EOSINOPHIL # BLD AUTO: 0 K/UL — SIGNIFICANT CHANGE UP (ref 0–0.5)
EOSINOPHIL NFR BLD AUTO: 0 % — SIGNIFICANT CHANGE UP (ref 0–6)
GLUCOSE SERPL-MCNC: 138 MG/DL — HIGH (ref 70–99)
HCT VFR BLD CALC: 42 % — SIGNIFICANT CHANGE UP (ref 39–50)
HGB BLD-MCNC: 13.7 G/DL — SIGNIFICANT CHANGE UP (ref 13–17)
IMM GRANULOCYTES NFR BLD AUTO: 0.7 % — SIGNIFICANT CHANGE UP (ref 0–0.9)
LYMPHOCYTES # BLD AUTO: 1.45 K/UL — SIGNIFICANT CHANGE UP (ref 1–3.3)
LYMPHOCYTES # BLD AUTO: 17.1 % — SIGNIFICANT CHANGE UP (ref 13–44)
MCHC RBC-ENTMCNC: 28.1 PG — SIGNIFICANT CHANGE UP (ref 27–34)
MCHC RBC-ENTMCNC: 32.6 GM/DL — SIGNIFICANT CHANGE UP (ref 32–36)
MCV RBC AUTO: 86.2 FL — SIGNIFICANT CHANGE UP (ref 80–100)
MONOCYTES # BLD AUTO: 0.89 K/UL — SIGNIFICANT CHANGE UP (ref 0–0.9)
MONOCYTES NFR BLD AUTO: 10.5 % — SIGNIFICANT CHANGE UP (ref 2–14)
NEUTROPHILS # BLD AUTO: 6.09 K/UL — SIGNIFICANT CHANGE UP (ref 1.8–7.4)
NEUTROPHILS NFR BLD AUTO: 71.6 % — SIGNIFICANT CHANGE UP (ref 43–77)
NRBC # BLD: 0 /100 WBCS — SIGNIFICANT CHANGE UP (ref 0–0)
PLATELET # BLD AUTO: 185 K/UL — SIGNIFICANT CHANGE UP (ref 150–400)
POTASSIUM SERPL-MCNC: 3.8 MMOL/L — SIGNIFICANT CHANGE UP (ref 3.5–5.3)
POTASSIUM SERPL-SCNC: 3.8 MMOL/L — SIGNIFICANT CHANGE UP (ref 3.5–5.3)
RBC # BLD: 4.87 M/UL — SIGNIFICANT CHANGE UP (ref 4.2–5.8)
RBC # FLD: 13.9 % — SIGNIFICANT CHANGE UP (ref 10.3–14.5)
SODIUM SERPL-SCNC: 146 MMOL/L — HIGH (ref 135–145)
WBC # BLD: 8.5 K/UL — SIGNIFICANT CHANGE UP (ref 3.8–10.5)
WBC # FLD AUTO: 8.5 K/UL — SIGNIFICANT CHANGE UP (ref 3.8–10.5)

## 2024-04-02 PROCEDURE — 93306 TTE W/DOPPLER COMPLETE: CPT | Mod: 26

## 2024-04-02 PROCEDURE — 99233 SBSQ HOSP IP/OBS HIGH 50: CPT

## 2024-04-02 PROCEDURE — 99232 SBSQ HOSP IP/OBS MODERATE 35: CPT

## 2024-04-02 RX ORDER — MULTIVIT WITH MIN/MFOLATE/K2 340-15/3 G
296 POWDER (GRAM) ORAL ONCE
Refills: 0 | Status: COMPLETED | OUTPATIENT
Start: 2024-04-02 | End: 2024-04-02

## 2024-04-02 RX ADMIN — SODIUM CHLORIDE 30 MILLILITER(S): 9 INJECTION, SOLUTION INTRAVENOUS at 12:02

## 2024-04-02 RX ADMIN — HEPARIN SODIUM 5000 UNIT(S): 5000 INJECTION INTRAVENOUS; SUBCUTANEOUS at 06:19

## 2024-04-02 RX ADMIN — POLYETHYLENE GLYCOL 3350 17 GRAM(S): 17 POWDER, FOR SOLUTION ORAL at 12:00

## 2024-04-02 RX ADMIN — SENNA PLUS 2 TABLET(S): 8.6 TABLET ORAL at 22:09

## 2024-04-02 RX ADMIN — Medication 5 MILLIGRAM(S): at 22:09

## 2024-04-02 RX ADMIN — BUDESONIDE AND FORMOTEROL FUMARATE DIHYDRATE 2 PUFF(S): 160; 4.5 AEROSOL RESPIRATORY (INHALATION) at 06:18

## 2024-04-02 RX ADMIN — LATANOPROST 1 DROP(S): 0.05 SOLUTION/ DROPS OPHTHALMIC; TOPICAL at 22:10

## 2024-04-02 RX ADMIN — Medication 81 MILLIGRAM(S): at 11:59

## 2024-04-02 RX ADMIN — Medication 3 MILLILITER(S): at 19:59

## 2024-04-02 RX ADMIN — BUDESONIDE AND FORMOTEROL FUMARATE DIHYDRATE 2 PUFF(S): 160; 4.5 AEROSOL RESPIRATORY (INHALATION) at 17:56

## 2024-04-02 RX ADMIN — CEFTRIAXONE 100 MILLIGRAM(S): 500 INJECTION, POWDER, FOR SOLUTION INTRAMUSCULAR; INTRAVENOUS at 06:54

## 2024-04-02 RX ADMIN — Medication 3 MILLILITER(S): at 01:35

## 2024-04-02 RX ADMIN — FINASTERIDE 5 MILLIGRAM(S): 5 TABLET, FILM COATED ORAL at 12:00

## 2024-04-02 RX ADMIN — SODIUM CHLORIDE 4 MILLILITER(S): 9 INJECTION INTRAMUSCULAR; INTRAVENOUS; SUBCUTANEOUS at 07:53

## 2024-04-02 RX ADMIN — MONTELUKAST 10 MILLIGRAM(S): 4 TABLET, CHEWABLE ORAL at 12:00

## 2024-04-02 RX ADMIN — Medication 10 MILLIGRAM(S): at 22:09

## 2024-04-02 RX ADMIN — Medication 3 MILLILITER(S): at 07:53

## 2024-04-02 RX ADMIN — Medication 3 MILLILITER(S): at 14:00

## 2024-04-02 RX ADMIN — TIOTROPIUM BROMIDE 2 PUFF(S): 18 CAPSULE ORAL; RESPIRATORY (INHALATION) at 08:11

## 2024-04-02 RX ADMIN — CLOPIDOGREL BISULFATE 75 MILLIGRAM(S): 75 TABLET, FILM COATED ORAL at 11:59

## 2024-04-02 RX ADMIN — Medication 296 MILLILITER(S): at 12:02

## 2024-04-02 RX ADMIN — MIRABEGRON 25 MILLIGRAM(S): 50 TABLET, EXTENDED RELEASE ORAL at 22:10

## 2024-04-02 RX ADMIN — HEPARIN SODIUM 5000 UNIT(S): 5000 INJECTION INTRAVENOUS; SUBCUTANEOUS at 17:51

## 2024-04-02 RX ADMIN — SODIUM CHLORIDE 4 MILLILITER(S): 9 INJECTION INTRAMUSCULAR; INTRAVENOUS; SUBCUTANEOUS at 19:59

## 2024-04-02 RX ADMIN — RISPERIDONE 1 MILLIGRAM(S): 4 TABLET ORAL at 22:10

## 2024-04-02 RX ADMIN — Medication 75 MILLIGRAM(S): at 17:55

## 2024-04-02 RX ADMIN — Medication 40 MILLIGRAM(S): at 06:19

## 2024-04-02 RX ADMIN — Medication 75 MILLIGRAM(S): at 06:18

## 2024-04-02 RX ADMIN — PANTOPRAZOLE SODIUM 40 MILLIGRAM(S): 20 TABLET, DELAYED RELEASE ORAL at 11:59

## 2024-04-02 NOTE — PROGRESS NOTE ADULT - PROBLEM SELECTOR PLAN 9
- also with overactive bladder  - c/w home meds- do not have mybetriq here- daughter informed and told to bring from home when she comes in  - with urinary retention: PVR >500ml at current , will place taylor for I/O moitoring as well
- chronic  hold off statin due to elevated CK level

## 2024-04-02 NOTE — DISCHARGE NOTE PROVIDER - NSDCFUSCHEDAPPT_GEN_ALL_CORE_FT
Analy Coreas  [facility] Physician [facility]  CARDIOLOGY [address]  Scheduled Appointment: 04/18/2024

## 2024-04-02 NOTE — PROGRESS NOTE ADULT - SUBJECTIVE AND OBJECTIVE BOX
Patient is a 84y old  Male who presents with a chief complaint of RF, FLU (01 Apr 2024 11:15)      Subjective:  INTERVAL HPI/OVERNIGHT EVENTS: Patient seen and examined at bedside.  Patient has no complaints at this time.   Failed voiding trial   MEDICATIONS  (STANDING):  albuterol/ipratropium for Nebulization 3 milliLiter(s) Nebulizer every 6 hours  aspirin enteric coated 81 milliGRAM(s) Oral daily  bisacodyl 5 milliGRAM(s) Oral at bedtime  budesonide  80 MICROgram(s)/formoterol 4.5 MICROgram(s) Inhaler 2 Puff(s) Inhalation two times a day  cefTRIAXone   IVPB 1000 milliGRAM(s) IV Intermittent every 24 hours  clopidogrel Tablet 75 milliGRAM(s) Oral daily  dextrose 5%. 1000 milliLiter(s) (30 mL/Hr) IV Continuous <Continuous>  finasteride 5 milliGRAM(s) Oral daily  heparin   Injectable 5000 Unit(s) SubCutaneous every 12 hours  latanoprost 0.005% Ophthalmic Solution 1 Drop(s) Both EYES at bedtime  melatonin 10 milliGRAM(s) Oral at bedtime  methylPREDNISolone sodium succinate Injectable 40 milliGRAM(s) IV Push daily  mirabegron ER 25 milliGRAM(s) Oral at bedtime  montelukast 10 milliGRAM(s) Oral daily  oseltamivir 75 milliGRAM(s) Oral once  pantoprazole  Injectable 40 milliGRAM(s) IV Push daily  polyethylene glycol 3350 17 Gram(s) Oral daily  risperiDONE   Tablet 1 milliGRAM(s) Oral at bedtime  senna 2 Tablet(s) Oral at bedtime  sodium chloride 3%  Inhalation 4 milliLiter(s) Inhalation every 12 hours  tiotropium 2.5 MICROgram(s) Inhaler 2 Puff(s) Inhalation daily    MEDICATIONS  (PRN):  acetaminophen     Tablet .. 650 milliGRAM(s) Oral every 6 hours PRN Temp greater or equal to 38C (100.4F), Mild Pain (1 - 3)  albuterol    90 MICROgram(s) HFA Inhaler 2 Puff(s) Inhalation every 6 hours PRN Shortness of Breath and/or Wheezing  aluminum hydroxide/magnesium hydroxide/simethicone Suspension 30 milliLiter(s) Oral every 4 hours PRN Dyspepsia  ondansetron Injectable 4 milliGRAM(s) IV Push every 8 hours PRN Nausea and/or Vomiting      Allergies    No Known Allergies    Intolerances        REVIEW OF SYSTEMS:  not able to obtain due to dementia       Objective:  Vital Signs Last 24 Hrs  T(C): 36.6 (02 Apr 2024 13:09), Max: 36.9 (02 Apr 2024 05:28)  T(F): 97.9 (02 Apr 2024 13:09), Max: 98.4 (02 Apr 2024 05:28)  HR: 82 (02 Apr 2024 13:09) (75 - 104)  BP: 105/65 (02 Apr 2024 13:09) (104/65 - 162/87)  BP(mean): --  RR: 18 (02 Apr 2024 13:09) (17 - 18)  SpO2: 98% (02 Apr 2024 13:09) (90% - 98%)    Parameters below as of 02 Apr 2024 13:09  Patient On (Oxygen Delivery Method): nasal cannula, high flow  O2 Flow (L/min): 40      GENERAL: NAD, lying in bed comfortably  HEAD:  Normocephalic  EYES:  conjunctiva and sclera clear  ENT: Moist mucous membranes  NECK: Supple  CHEST/LUNG: left side rhonchi; no wheezing. Unlabored respirations  HEART: Regular rate and rhythm; S1S2+  ABDOMEN: Bowel sounds present; Soft, Nontender, Nondistended.   EXTREMITIES:  + distal Peripheral Pulses;  No cyanosis, or edema  NERVOUS SYSTEM:  Alert & Oriented X0;  No gross focal deficits   MSK: moves all extremities  SKIN: No rashes     LABS:                        13.7   8.50  )-----------( 185      ( 02 Apr 2024 05:54 )             42.0     02 Apr 2024 05:54    146    |  108    |  29     ----------------------------<  138    3.8     |  32     |  0.79     Ca    9.3        02 Apr 2024 05:54        Urinalysis Basic - ( 02 Apr 2024 05:54 )    Color: x / Appearance: x / SG: x / pH: x  Gluc: 138 mg/dL / Ketone: x  / Bili: x / Urobili: x   Blood: x / Protein: x / Nitrite: x   Leuk Esterase: x / RBC: x / WBC x   Sq Epi: x / Non Sq Epi: x / Bacteria: x      CAPILLARY BLOOD GLUCOSE            Culture - Urine (collected 03-29-24 @ 06:30)  Source: Clean Catch Clean Catch (Midstream)  Final Report (03-30-24 @ 17:39):    <10,000 CFU/mL Normal Urogenital Carolyn    Culture - Blood (collected 03-28-24 @ 16:00)  Source: .Blood Blood-Peripheral  Preliminary Report (04-02-24 @ 01:01):    No growth at 4 days    Culture - Blood (collected 03-28-24 @ 15:50)  Source: .Blood Blood-Peripheral  Preliminary Report (04-02-24 @ 01:01):    No growth at 4 days        RADIOLOGY & ADDITIONAL TESTS:    Personally reviewed.     Consultant(s) Notes Reviewed:  [x] YES  [ ] NO    Plan of care discussed family Daughter on the phon and wife at beside; all questions answered

## 2024-04-02 NOTE — CHART NOTE - NSCHARTNOTEFT_GEN_A_CORE
Patients' family update, spoke to daughter Georgia Ruiz . Updated on patient's status review along with plan of care. Georgia made aware of anticipated d/c to [facility], she is in agreement. SW to follow with Georgia for choices of [facility]. Prefers  at 1 pm. All questions answered. Patients' family update, spoke to daughter Georgia Ruiz . Updated on patient's status review along with plan of care. Georgia made aware of anticipated d/c to [facility], she is in agreement. SW to follow with Georgia for choices of [facility]. All questions answered.

## 2024-04-02 NOTE — PROGRESS NOTE ADULT - PROBLEM SELECTOR PLAN 2
Hypoxic to 60s , initial ABG respiratory acidosis with Co2 retention, repeat ABG improved   likely due to flu and COPD exacerbation / pneumonia   HFNC for now,  Wean as tolerated 70% at current   GOC discussed with palliative team  and family- DNR/DNI: patient with multiple significant acute and chronic medical conditions, risk of sudden decompensation. prognosis poor. candidate for palliative hospice service  CTA negative for PE , Heparin Drip stopped. + multi focal pneumonia   repeat CXR done: more opacities on left, possibly due to mucus plug.

## 2024-04-02 NOTE — DISCHARGE NOTE PROVIDER - CATHETER HOME CARE ORDERS:
Atrial fibrillation May change or replace catheter if leaking or dislodged/May irrigate with 30-60 cc NS prn for clogging or sediment

## 2024-04-02 NOTE — PROGRESS NOTE ADULT - PROBLEM SELECTOR PLAN 10
- also with overactive bladder  - c/w home meds- do not have mybetriq here- daughter informed and told to bring from home when she comes in  - with urinary retention: PVR >500ml at current , taylor  placed for I/O moitoring as well
- s/p PCI to LAD in 2019  - f/u trops, EKG  - low suspicion for ACS  - c/w Plavix  - remote tele  - follows with Dr. Coreas
- also with overactive bladder  - c/w home meds- do not have mybetriq here- daughter informed and told to bring from home when she comes in  - with urinary retention: PVR >500ml at current , taylor  placed for I/O moitoring as well

## 2024-04-02 NOTE — PROGRESS NOTE ADULT - PROBLEM SELECTOR PROBLEM 9
BPH (benign prostatic hyperplasia)
HLD (hyperlipidemia)

## 2024-04-02 NOTE — PROGRESS NOTE ADULT - ASSESSMENT
83 yo M with PMH aortic stenosis s/p TAVR 2019, CAD s/p LELAND to LAD 2019, dementia, HLD, asthma, COPD, overactive bladder, H pylori, BPH admitted with Influenza A s/p multiple falls.     CAD, respiratory failure, elev trop   - Troponins elevated, peaked 2300, now downtrending, no need to trend further   - likely elevated on the basis of demand ischemia in setting of influenza, RAY, rhabdomyolysis, agitation.  - With CPK out of proportion to Troponin, likely rhabdomyolysis s/p fall, now downtrending   - History of LELAND to LAD in 2019  - continue ASA and Plavix.     - H/o EKG with IVCD vs LBBB in the past.    - EKG's reveal elevated QTc ~500. Avoid QT prolonging medications.   - f/u repeat (ordered)   - Telemetry: SR/ST 70 up to 110's, would continue tele monitoring for now,  he is at risk of Afib in the setting of resp failure and structural heart disease   - BP labile 100-160's systolics , likely reactive, pt confused and agitated at times   - can give hydralazine 5 mg IVP PRN for SBP >160 mmhg   - Monitor and replete Lytes. Keep K > 4 and Mg > 2  - continue to monitor routine hemodynamics    - No meaningful evidence of volume overload.  - Previous TTE (6/23/2022): Normal ventricular systolic function, no segmental wall motion abnormalities, mild diastolic dysfunction.  - f/u TTE.    - + Flu, multifocal PNA, management per primary   - on HFNC,   - at risk of abrupt decompensation     - Will continue to follow.    Ally Gonsalez, [facility]  Nurse Practitioner - Cardiology   call TEAMS

## 2024-04-02 NOTE — DISCHARGE NOTE PROVIDER - NSDCMRMEDTOKEN_GEN_ALL_CORE_FT
amLODIPine 5 mg oral tablet: 1 tab(s) orally once a day  clopidogrel 75 mg oral tablet: 1 tab(s) orally once a day  donepezil 10 mg oral tablet: 1 tab(s) orally once a day (in the morning)  donepezil 5 mg oral tablet: 1 tab(s) orally once a day (at bedtime)  famotidine 40 mg oral tablet: 1 tab(s) orally once a day  finasteride 5 mg oral tablet: 1 tab(s) orally once a day  memantine 10 mg oral tablet: 1 tab(s) orally 2 times a day  Myrbetriq 25 mg oral tablet, extended release: 1 tab(s) orally once a day (at bedtime)  QUEtiapine 100 mg oral tablet: 1 tab(s) orally once a day (at bedtime)  QUEtiapine 25 mg oral tablet: 1 tab(s) orally once a day (at bedtime)  simvastatin 40 mg oral tablet: 1 tab(s) orally once a day (at bedtime)  Singulair 10 mg oral tablet: 1 tab(s) orally once a day  Trelegy Ellipta 200 mcg-62.5 mcg-25 mcg/inh inhalation powder: 1 puff(s) inhaled once a day  Ventolin HFA 90 mcg/inh inhalation aerosol: 2 puff(s) inhaled 4 times a day, As Needed  Xalatan 0.005% ophthalmic solution: 1 drop(s) to each affected eye once a day (in the evening)   aspirin 81 mg oral delayed release tablet: 1 tab(s) orally once a day  budesonide-formoterol 80 mcg-4.5 mcg/inh inhalation aerosol: inhaled 2 times a day  clopidogrel 75 mg oral tablet: 1 tab(s) orally once a day  donepezil 10 mg oral tablet: 1 tab(s) orally once a day (in the morning)  finasteride 5 mg oral tablet: 1 tab(s) orally once a day  memantine 10 mg oral tablet: 1 tab(s) orally 2 times a day  Myrbetriq 25 mg oral tablet, extended release: 1 tab(s) orally once a day (at bedtime)  simvastatin 40 mg oral tablet: 1 tab(s) orally once a day (at bedtime)  Singulair 10 mg oral tablet: 1 tab(s) orally once a day  Trelegy Ellipta 200 mcg-62.5 mcg-25 mcg/inh inhalation powder: 1 puff(s) inhaled once a day  Ventolin HFA 90 mcg/inh inhalation aerosol: 2 puff(s) inhaled 4 times a day, As Needed  Xalatan 0.005% ophthalmic solution: 1 drop(s) to each affected eye once a day (in the evening)   aspirin 81 mg oral delayed release tablet: 1 tab(s) orally once a day  budesonide-formoterol 80 mcg-4.5 mcg/inh inhalation aerosol: inhaled 2 times a day  clopidogrel 75 mg oral tablet: 1 tab(s) orally once a day  donepezil 10 mg oral tablet: 1 tab(s) orally once a day (in the morning)  famotidine 20 mg oral tablet: 1 tab(s) orally once a day  finasteride 5 mg oral tablet: 1 tab(s) orally once a day  latanoprost 0.005% ophthalmic solution: 1 drop(s) to each affected eye once a day (at bedtime)  montelukast 10 mg oral tablet: 1 tab(s) orally once a day  senna leaf extract oral tablet: 2 tab(s) orally once a day (at bedtime)  simvastatin 40 mg oral tablet: 1 tab(s) orally once a day (at bedtime)  tamsulosin 0.4 mg oral capsule: 1 cap(s) orally once a day (at bedtime)  Trelegy Ellipta 200 mcg-62.5 mcg-25 mcg/inh inhalation powder: 1 puff(s) inhaled once a day  Ventolin HFA 90 mcg/inh inhalation aerosol: 2 puff(s) inhaled 4 times a day, As Needed   aspirin 81 mg oral delayed release tablet: 1 tab(s) orally once a day  donepezil 10 mg oral tablet: 1 tab(s) orally once a day (in the morning)  finasteride 5 mg oral tablet: 1 tab(s) orally once a day  ipratropium-albuterol 0.5 mg-2.5 mg/3 mL inhalation solution: 3 milliliter(s) inhaled every 6 hours as needed for wheezing  latanoprost 0.005% ophthalmic solution: 1 drop(s) to each affected eye once a day (at bedtime)  memantine 10 mg oral tablet: 1 tab(s) orally 2 times a day  montelukast 10 mg oral tablet: 1 tab(s) orally once a day  pantoprazole 40 mg oral delayed release tablet: 1 tab(s) orally once a day  polyethylene glycol 3350 oral powder for reconstitution: 17 gram(s) orally once a day as needed for  constipation  QUEtiapine 25 mg oral tablet: 1 tab(s) orally once a day (at bedtime)  senna leaf extract oral tablet: 2 tab(s) orally once a day (at bedtime)  simvastatin 40 mg oral tablet: 1 tab(s) orally once a day (at bedtime)  tamsulosin 0.4 mg oral capsule: 1 cap(s) orally once a day (at bedtime)  Trelegy Ellipta 200 mcg-62.5 mcg-25 mcg/inh inhalation powder: 1 puff(s) inhaled once a day  Ventolin HFA 90 mcg/inh inhalation aerosol: 2 puff(s) inhaled 4 times a day, As Needed

## 2024-04-02 NOTE — PROGRESS NOTE ADULT - SUBJECTIVE AND OBJECTIVE BOX
Patient is a 84y old  Male who presents with a chief complaint of RF, fLU (02 Apr 2024 13:56)      INTERVAL HPI/OVERNIGHT EVENTS:    lethargic but responsive; currently on high flow oxygen @50% FI02    MEDICATIONS  (STANDING):  albuterol/ipratropium for Nebulization 3 milliLiter(s) Nebulizer every 6 hours  aspirin enteric coated 81 milliGRAM(s) Oral daily  bisacodyl 5 milliGRAM(s) Oral at bedtime  budesonide  80 MICROgram(s)/formoterol 4.5 MICROgram(s) Inhaler 2 Puff(s) Inhalation two times a day  cefTRIAXone   IVPB 1000 milliGRAM(s) IV Intermittent every 24 hours  clopidogrel Tablet 75 milliGRAM(s) Oral daily  dextrose 5%. 1000 milliLiter(s) (30 mL/Hr) IV Continuous <Continuous>  finasteride 5 milliGRAM(s) Oral daily  heparin   Injectable 5000 Unit(s) SubCutaneous every 12 hours  latanoprost 0.005% Ophthalmic Solution 1 Drop(s) Both EYES at bedtime  melatonin 10 milliGRAM(s) Oral at bedtime  methylPREDNISolone sodium succinate Injectable 40 milliGRAM(s) IV Push daily  mirabegron ER 25 milliGRAM(s) Oral at bedtime  montelukast 10 milliGRAM(s) Oral daily  pantoprazole  Injectable 40 milliGRAM(s) IV Push daily  polyethylene glycol 3350 17 Gram(s) Oral daily  risperiDONE   Tablet 1 milliGRAM(s) Oral at bedtime  senna 2 Tablet(s) Oral at bedtime  sodium chloride 3%  Inhalation 4 milliLiter(s) Inhalation every 12 hours  tiotropium 2.5 MICROgram(s) Inhaler 2 Puff(s) Inhalation daily      MEDICATIONS  (PRN):  acetaminophen     Tablet .. 650 milliGRAM(s) Oral every 6 hours PRN Temp greater or equal to 38C (100.4F), Mild Pain (1 - 3)  albuterol    90 MICROgram(s) HFA Inhaler 2 Puff(s) Inhalation every 6 hours PRN Shortness of Breath and/or Wheezing  aluminum hydroxide/magnesium hydroxide/simethicone Suspension 30 milliLiter(s) Oral every 4 hours PRN Dyspepsia  ondansetron Injectable 4 milliGRAM(s) IV Push every 8 hours PRN Nausea and/or Vomiting      Allergies    No Known Allergies    Intolerances        PAST MEDICAL & SURGICAL HISTORY:  Aortic stenosis      BPH (benign prostatic hyperplasia)      Dementia  mild- on Memantine      ACE (dyspnea on exertion)      Asthma  controlled on inhalers      HLD (hyperlipidemia)      CAD (coronary artery disease)  s/p stent 10/2019      OH (ocular hypertension)  on eye drops      Overactive bladder      Anxiety      History of tonsillectomy  childhood      H/O coronary angiogram  10/25/19, s/p PCI to LAD          Vital Signs Last 24 Hrs  T(C): 36.4 (02 Apr 2024 21:18), Max: 36.9 (02 Apr 2024 05:28)  T(F): 97.6 (02 Apr 2024 21:18), Max: 98.4 (02 Apr 2024 05:28)  HR: 85 (02 Apr 2024 21:18) (75 - 91)  BP: 128/66 (02 Apr 2024 21:18) (104/65 - 128/66)  BP(mean): --  RR: 18 (02 Apr 2024 21:18) (18 - 18)  SpO2: 98% (02 Apr 2024 21:18) (96% - 99%)    Parameters below as of 02 Apr 2024 21:18  Patient On (Oxygen Delivery Method): nasal cannula, high flow        PHYSICAL EXAMINATION:    GENERAL: The patient is lethargic but responsive and in no apparent distress.     HEENT: Head is normocephalic and atraumatic.     NECK: no JVD    LUNGS: scattered bilateral rhonchi    HEART: Regular rate and rhythm without murmur.    ABDOMEN: Soft, nontender, and nondistended.      EXTREMITIES: Without any cyanosis, clubbing, rash, lesions or edema.    NEUROLOGIC: no focal findings    SKIN: No ulceration or induration present.      LABS:                        13.7   8.50  )-----------( 185      ( 02 Apr 2024 05:54 )             42.0     04-02    146<H>  |  108  |  29<H>  ----------------------------<  138<H>  3.8   |  32<H>  |  0.79    Ca    9.3      02 Apr 2024 05:54    TPro  6.3  /  Alb  3.0<L>  /  TBili  0.6  /  DBili  x   /  AST  71<H>  /  ALT  61  /  AlkPhos  64  04-01      Urinalysis Basic - ( 02 Apr 2024 05:54 )    Color: x / Appearance: x / SG: x / pH: x  Gluc: 138 mg/dL / Ketone: x  / Bili: x / Urobili: x   Blood: x / Protein: x / Nitrite: x   Leuk Esterase: x / RBC: x / WBC x   Sq Epi: x / Non Sq Epi: x / Bacteria: x                  Procalcitonin, Serum: 0.38 ng/mL (03-31-24 @ 08:13)        Assessment:    Influenza A bronchitis with superimposed bacterial pneumonia  COPD with exacerbation  Acute on Chronic Hypoxic respiratory failure  Cognitive Decline    Plan:    Taper FIO2 as tolerated  Complete course of IV Rocephin  Monitor pulse oximetry  Completed course of Tamiflu  Solumedrol 40 mg daily  Duoneb + Hypertonic saline via nebulizer

## 2024-04-02 NOTE — PROGRESS NOTE ADULT - TIME BILLING
direct patient care including but not limited to reviewing chart, medications ,laboratory data, imaging reports, discussion of plan of care with consultants on the case, coordination of care with multidisciplinary team involved in the case and discussion of plan with family.  family agreeable to plan of care and verbalized understanding the anticipated hospital course and treatment plan.

## 2024-04-02 NOTE — PROGRESS NOTE ADULT - SUBJECTIVE AND OBJECTIVE BOX
[facility]  Yunier Dorman PA-C  [address] [place] [address]  [phone]      INTERVAL HPI/OVERNIGHT EVENTS:  Pt s/e  Wife at bedside translates for pt  No known BM overnight or today so far    MEDICATIONS  (STANDING):  albuterol/ipratropium for Nebulization 3 milliLiter(s) Nebulizer every 6 hours  aspirin enteric coated 81 milliGRAM(s) Oral daily  bisacodyl 5 milliGRAM(s) Oral at bedtime  budesonide  80 MICROgram(s)/formoterol 4.5 MICROgram(s) Inhaler 2 Puff(s) Inhalation two times a day  cefTRIAXone   IVPB 1000 milliGRAM(s) IV Intermittent every 24 hours  clopidogrel Tablet 75 milliGRAM(s) Oral daily  dextrose 5%. 1000 milliLiter(s) (30 mL/Hr) IV Continuous <Continuous>  finasteride 5 milliGRAM(s) Oral daily  heparin   Injectable 5000 Unit(s) SubCutaneous every 12 hours  latanoprost 0.005% Ophthalmic Solution 1 Drop(s) Both EYES at bedtime  magnesium citrate Oral Solution 296 milliLiter(s) Oral once  melatonin 10 milliGRAM(s) Oral at bedtime  methylPREDNISolone sodium succinate Injectable 40 milliGRAM(s) IV Push daily  mirabegron ER 25 milliGRAM(s) Oral at bedtime  montelukast 10 milliGRAM(s) Oral daily  oseltamivir 75 milliGRAM(s) Oral once  pantoprazole  Injectable 40 milliGRAM(s) IV Push daily  polyethylene glycol 3350 17 Gram(s) Oral daily  risperiDONE   Tablet 1 milliGRAM(s) Oral at bedtime  senna 2 Tablet(s) Oral at bedtime  sodium chloride 3%  Inhalation 4 milliLiter(s) Inhalation every 12 hours  tiotropium 2.5 MICROgram(s) Inhaler 2 Puff(s) Inhalation daily    MEDICATIONS  (PRN):  acetaminophen     Tablet .. 650 milliGRAM(s) Oral every 6 hours PRN Temp greater or equal to 38C (100.4F), Mild Pain (1 - 3)  albuterol    90 MICROgram(s) HFA Inhaler 2 Puff(s) Inhalation every 6 hours PRN Shortness of Breath and/or Wheezing  aluminum hydroxide/magnesium hydroxide/simethicone Suspension 30 milliLiter(s) Oral every 4 hours PRN Dyspepsia  ondansetron Injectable 4 milliGRAM(s) IV Push every 8 hours PRN Nausea and/or Vomiting      Allergies    No Known Allergies      PHYSICAL EXAM:   Vital Signs:  Vital Signs Last 24 Hrs  T(C): 36.9 (2024 05:28), Max: 37.7 (2024 13:37)  T(F): 98.4 (2024 05:28), Max: 99.8 (2024 13:37)  HR: 75 (2024 08:04) (75 - 104)  BP: 104/65 (2024 05:28) (104/65 - 162/87)  BP(mean): --  RR: 18 (2024 05:28) (17 - 18)  SpO2: 98% (2024 08:04) (90% - 98%)    Parameters below as of 2024 09:15  Patient On (Oxygen Delivery Method): nasal cannula, high flow      Daily     Daily Weight in k (2024 05:28)    GENERAL:  Appears stated age  HEENT:  NC/AT  CHEST:  Full & symmetric excursion  HEART:  Regular rhythm  ABDOMEN:  Soft, non-tender, non-distended  EXTEREMITIES:  no cyanosis  SKIN:  No rash  NEURO:  Alert      LABS:                        13.7   8.50  )-----------( 185      ( 2024 05:54 )             42.0     04-02    146<H>  |  108  |  29<H>  ----------------------------<  138<H>  3.8   |  32<H>  |  0.79    Ca    9.3      2024 05:54    TPro  6.3  /  Alb  3.0<L>  /  TBili  0.6  /  DBili  x   /  AST  71<H>  /  ALT  61  /  AlkPhos  64  04-01      Urinalysis Basic - ( 2024 05:54 )    Color: x / Appearance: x / SG: x / pH: x  Gluc: 138 mg/dL / Ketone: x  / Bili: x / Urobili: x   Blood: x / Protein: x / Nitrite: x   Leuk Esterase: x / RBC: x / WBC x   Sq Epi: x / Non Sq Epi: x / Bacteria: x

## 2024-04-02 NOTE — DISCHARGE NOTE PROVIDER - HOSPITAL COURSE
ADMISSION DATE:  03-28-24    ---  FROM ADMISSION H+P:   HPI:  83 yo M with PMH aortic stenosis s/p TAVR 2019, dementia, HLD, asthm, COPD, overactive bladder ,H pylori, BPH presents to the ED with lethargy/weakness. Interview was conducted on phone with patients daughter Katharina as pt has severe dementia and cannot answer questions, tried to communicate with Irish  but patient could not participate. Per daughter, pt had a fall yesterday when trying to get out of bed in the morning (lives at home with daughter, son in law and wife- does not use walking device- per daughter "wouldn't know  how").  Last night, patient was found to be much weaker and more lethargic than usual and sustained another fall this AM. This fall was unwitnessed but patient was found face forward on the floor on the carpet. Of note, daughter was just diagnosed with flu at urgent care. Daughter denied pt had any new sx of cp,palpitations, sob, cough, n/v/d/constipation/urinary changes however daughter acknowledges that difficult to know for sure 2/2 mental status.     Of note, per [facility], patient was seen by pulmonologist Dr. Zuñiga in June 2023 for asthma exacerbation tx with Trelegy, ventolin inhaler- no steroids given.      ED Course:   Vitals: BP: 129/73 , HR: 96, Temp: 98-->101.1 , RR: , SpO2: 96% on RA   Labs:  WBC: 10.58, Lactate WNL, Influenza A: Detected  UA Pending  CXR:   1. Mildly elevated right hemidiaphragm with subsegmental atelectasis near   the cardiophrenic angle on the right.  2. Status post TAVR. No CHF.  3. No evidence of infiltrate, pleural effusion or pulmonary edema.  CT Head:  CT HEAD:  1.  No evidence of acute intracranial hemorrhage or midline shift.  2.  Chronic small vessel disease.    CT CERVICAL SPINE:  1.  No evidence of acute osseous fracture or rosario dislocation.  2.  Multilevel degenerative change of the cervical spine.  3.  Slightly mottled appearance to the vertebral bodies of the cervical   and upper thoracic spine. This could be osteopenic in nature. A   underlying myeloma/lymphoma or other metastatic process is not entirely   excluded. Recommend clinical and laboratory correlation.    CT Chest:  Linear atelectasis at the right lung base. Minimal pericardial effusion No evidence of acute traumatic injury. Status post TAVR  EKG: Poor quality, will repeat   Received in the ED: Ofirmev x 1, Cefepime x 1, Zyprexa x1, LR bolus x1.    (28 Mar 2024 18:55)      ---  HOSPITAL COURSE/PERTINENT LABS/PROCEDURES PERFORMED/PENDING TESTS:   Pt was admitted for Influenza A:  Meets sepsis criteria, HR: 96, Temp: 98-->101.1, WC elevated, lactate WNL , source influenza A: completed 5 days tamiflu.   - CT chest: Linear atelectasis at the right lung base. but repeat CT : no PE but multifocal pneumonia, IV rocephin added for secondary bacterial  pneumonia.    Acute hypoxic respiratory failure due to flu/pneumonia and COPD exacerbation: HFNC started on 100% with NRB , weaned as tolerated.   found to have QT prolongation: aricept/memantin/seroquel stopped, psy eval recommended risperdol.   Advanced dementia: supportive care , assist for all ADLs.   RAY due to pre renal azotemia, resolved after IV hydration, nephrology consulted   COPD exacerbation : started on IV steroid, neb treatment , Pulmonary consulted       Patient is stable for discharge as per primary medical team and consultants.    PT consulted, recommends discharge ______    Patient showed improvement throughout hospitalization. Patient was seen and examined on day of discharge. Patient was medically optimized for discharge with close outpatient follow up.    ---  PATIENT CONDITION:  - stable    --  VITALS:   T(C): 36.6 (04-02-24 @ 13:09), Max: 36.9 (04-02-24 @ 05:28)  HR: 77 (04-02-24 @ 14:12) (75 - 104)  BP: 105/65 (04-02-24 @ 13:09) (104/65 - 162/87)  RR: 18 (04-02-24 @ 13:09) (17 - 18)  SpO2: 96% (04-02-24 @ 14:12) (94% - 98%)    PHYSICAL EXAM ON DAY OF DISCHARGE:    ---  CONSULTANTS:   -    ---  ADVANCED CARE PLANNING:  - Code status:      [facility]      [  ] NO     [  ] YES    ---  TIME SPENT:  I, the attending physician, was physically present for the key portions of the evaluation and management (E/M) service provided. The total amount of time spent reviewing the hospital notes, laboratory values, imaging findings, assessing/counseling the patient, discussing with consultant physicians, social work, nursing staff was -- minutes         Pt was admitted for shortness of breath. Was in acute hypoxic respiratory failure requiring 100% NRB. He was acutely ill. GOC held: DNR/DNI with trial of NIV. Patient improved clinically and was weaned to RA.    He did meet sepsis criteria on arrival. Normal LA however temp of 101.1.   He was treated with 5 day course of tamiflu for influenza A. His CT chest initially showed linear atelectasis at the right lung base. but repeat CT : no PE but multifocal pneumonia, IV rocephin added for secondary bacterial pneumonia.     He was found to have QT prolongation: aricept/memantin/seroquel stopped, psy eval recommended risperdol.   Advanced dementia: supportive care , assist for all ADLs.   RAY due to pre renal azotemia, resolved after IV hydration, nephrology was following. hypernatremia and RAY resolved on discharge.   He was seen by pulmonary for COPD exacerbation and was started on intravenous steroids and nebulizers.     He did have urinary retention during hospitalization. He failed TOV on 4/7 and will be discharged with taylor (replaced on 4/8).     Patient is stable for discharge as per primary medical team and consultants.    PT consulted, recommends discharge to [facility].     Patient showed improvement throughout hospitalization. Patient was seen and examined on day of discharge. Patient was medically optimized for discharge with close outpatient follow up.    ---  PATIENT CONDITION:  - stable    --  consults:   cardio: Dr Hanson   GI: Dr Dc   Nephro: Dr Shepard  ID: Dr Desai   psych: Dr Menchaca     ---  TIME SPENT:  I, the attending physician, was physically present for the key portions of the evaluation and management (E/M) service provided. The total amount of time spent reviewing the hospital notes, laboratory values, imaging findings, assessing/counseling the patient, discussing with consultant physicians, social work, nursing staff was 50 minutes       HPI as documented in H&P:  83 yo M with PMH aortic stenosis s/p TAVR 2019, dementia, HLD, asthm, COPD, overactive bladder ,H pylori, BPH presents to the ED with lethargy/weakness. Interview was conducted on phone with patients daughter Katharina as pt has severe dementia and cannot answer questions, tried to communicate with Tajik  but patient could not participate. Per daughter, pt had a fall yesterday when trying to get out of bed in the morning (lives at home with daughter, son in law and wife- does not use walking device- per daughter "wouldn't know  how").  Last night, patient was found to be much weaker and more lethargic than usual and sustained another fall this AM. This fall was unwitnessed but patient was found face forward on the floor on the carpet. Of note, daughter was just diagnosed with flu at urgent care. Daughter denied pt had any new sx of cp,palpitations, sob, cough, n/v/d/constipation/urinary changes however daughter acknowledges that difficult to know for sure 2/2 mental status.     Of note, per [facility], patient was seen by pulmonologist Dr. Zuñiga in June 2023 for asthma exacerbation tx with Trelegy, ventolin inhaler- no steroids given.      ED Course:   Vitals: BP: 129/73 , HR: 96, Temp: 98-->101.1 , RR: , SpO2: 96% on RA   Labs:  WBC: 10.58, Lactate WNL, Influenza A: Detected  UA Pending  CXR:   1. Mildly elevated right hemidiaphragm with subsegmental atelectasis near   the cardiophrenic angle on the right.  2. Status post TAVR. No CHF.  3. No evidence of infiltrate, pleural effusion or pulmonary edema.  CT Head:  CT HEAD:  1.  No evidence of acute intracranial hemorrhage or midline shift.  2.  Chronic small vessel disease.    CT CERVICAL SPINE:  1.  No evidence of acute osseous fracture or rosario dislocation.  2.  Multilevel degenerative change of the cervical spine.  3.  Slightly mottled appearance to the vertebral bodies of the cervical   and upper thoracic spine. This could be osteopenic in nature. A   underlying myeloma/lymphoma or other metastatic process is not entirely   excluded. Recommend clinical and laboratory correlation.    CT Chest:  Linear atelectasis at the right lung base. Minimal pericardial effusion No evidence of acute traumatic injury. Status post TAVR  EKG: Poor quality, will repeat   Received in the ED: Ofirmev x 1, Cefepime x 1, Zyprexa x1, LR bolus x1.       Hospital Course:  Pt was admitted with acute hypoxic respiratory failure and evolving sepsis due to influenza infection with likely overlying bacterial PNA. Pt had required 100% NRB and was subsequently titrated down to NC. GOC held and decision made for: DNR/DNI with trial of NIV. He was seen by pulmonary and treated for suspected COPD exacerbation with systemic steroids and nebulizers.     Pt's CT chest initially showed linear atelectasis at the right lung base, but CTA Chest showed: no PE but multifocal pneumonia, IV rocephin added for secondary bacterial pneumonia. Pt was treated with 5 day course of tamiflu for influenza A and ceftriaxone/azithro for PNA.    He was found to have QT prolongation and pt was more somnolent and aricept/memantine/seroquel stopped.  Advanced dementia: supportive care , assist for all ADLs.   RAY due to pre renal azotemia, resolved after IV hydration, nephrology was following. Hypernatremia and RAY resolved by discharge.     He did have urinary retention during hospitalization. He failed TOV on 4/7 and will be discharged with taylor catheter (replaced on 4/8 after patient had accidentally removed his catheter). Pt had gross hematuria after removing the catheter which was significantly improved by discharge.   Cardio recommended to d/c plavix, give statin, and c/w ASA. No further need for DAPT at this time.     PT consulted, recommends discharge to [facility].       On day of discharge:   Pt seen and examined and is stable for d/c to [facility].    Vital Signs Last 24 Hrs  T(C): 36.4 (10 Apr 2024 12:09), Max: 36.9 (09 Apr 2024 20:29)  T(F): 97.5 (10 Apr 2024 12:09), Max: 98.5 (09 Apr 2024 20:29)  HR: 87 (10 Apr 2024 12:09) (87 - 106)  BP: 133/82 (10 Apr 2024 12:09) (104/67 - 156/95)  BP(mean): --  RR: 18 (10 Apr 2024 12:09) (18 - 18)  SpO2: 92% (10 Apr 2024 12:09) (92% - 97%)    Parameters below as of 10 Apr 2024 12:09  Patient On (Oxygen Delivery Method): room air    Physical Exam:  GENERAL: NAD at rest  HEENT:  anicteric, moist mucous membranes  CHEST/LUNG:  decreased breath sounds; no rales, wheezes, or rhonchi appreciated  HEART:  RRR, S1, S2  ABDOMEN:  BS+, soft, nontender, nondistended, +taylor with darkish urine. small clots noted in the bag   EXTREMITIES: no edema or calf tenderness  NERVOUS SYSTEM: awake, confused    --  consults:   cardio: Dr Hanson   GI: Dr Dc   Nephro: Dr Shepard  ID: Dr Desai   psych: Dr Menchaca     ---  TIME SPENT: 50 minutes

## 2024-04-02 NOTE — PROGRESS NOTE ADULT - SUBJECTIVE AND OBJECTIVE BOX
[facility] -- Halima Hayes Pannella, Patel, Savella, Goodger, Cohen  Office # [phone]    Follow Up:  +FLU,  multifocal PNA on HFNC     Subjective/Observations:     REVIEW OF SYSTEMS: All other review of systems is negative unless indicated above  PAST MEDICAL & SURGICAL HISTORY:  Aortic stenosis      BPH (benign prostatic hyperplasia)      Dementia  mild- on Memantine      ACE (dyspnea on exertion)      Asthma  controlled on inhalers      HLD (hyperlipidemia)      CAD (coronary artery disease)  s/p stent 10/2019      OH (ocular hypertension)  on eye drops      Overactive bladder      Anxiety      History of tonsillectomy  childhood      H/O coronary angiogram  10/25/19, s/p PCI to LAD        MEDICATIONS  (STANDING):  albuterol/ipratropium for Nebulization 3 milliLiter(s) Nebulizer every 6 hours  aspirin enteric coated 81 milliGRAM(s) Oral daily  bisacodyl 5 milliGRAM(s) Oral at bedtime  budesonide  80 MICROgram(s)/formoterol 4.5 MICROgram(s) Inhaler 2 Puff(s) Inhalation two times a day  cefTRIAXone   IVPB 1000 milliGRAM(s) IV Intermittent every 24 hours  clopidogrel Tablet 75 milliGRAM(s) Oral daily  dextrose 5%. 1000 milliLiter(s) (30 mL/Hr) IV Continuous <Continuous>  finasteride 5 milliGRAM(s) Oral daily  heparin   Injectable 5000 Unit(s) SubCutaneous every 12 hours  latanoprost 0.005% Ophthalmic Solution 1 Drop(s) Both EYES at bedtime  melatonin 10 milliGRAM(s) Oral at bedtime  methylPREDNISolone sodium succinate Injectable 40 milliGRAM(s) IV Push daily  mirabegron ER 25 milliGRAM(s) Oral at bedtime  montelukast 10 milliGRAM(s) Oral daily  oseltamivir 75 milliGRAM(s) Oral once  pantoprazole  Injectable 40 milliGRAM(s) IV Push daily  polyethylene glycol 3350 17 Gram(s) Oral daily  risperiDONE   Tablet 1 milliGRAM(s) Oral at bedtime  senna 2 Tablet(s) Oral at bedtime  sodium chloride 3%  Inhalation 4 milliLiter(s) Inhalation every 12 hours  tiotropium 2.5 MICROgram(s) Inhaler 2 Puff(s) Inhalation daily    MEDICATIONS  (PRN):  acetaminophen     Tablet .. 650 milliGRAM(s) Oral every 6 hours PRN Temp greater or equal to 38C (100.4F), Mild Pain (1 - 3)  albuterol    90 MICROgram(s) HFA Inhaler 2 Puff(s) Inhalation every 6 hours PRN Shortness of Breath and/or Wheezing  aluminum hydroxide/magnesium hydroxide/simethicone Suspension 30 milliLiter(s) Oral every 4 hours PRN Dyspepsia  ondansetron Injectable 4 milliGRAM(s) IV Push every 8 hours PRN Nausea and/or Vomiting    Allergies    No Known Allergies    Intolerances      Vital Signs Last 24 Hrs  T(C): 36.9 (02 Apr 2024 05:28), Max: 37.7 (01 Apr 2024 13:37)  T(F): 98.4 (02 Apr 2024 05:28), Max: 99.8 (01 Apr 2024 13:37)  HR: 75 (02 Apr 2024 08:04) (75 - 104)  BP: 104/65 (02 Apr 2024 05:28) (104/65 - 162/87)  BP(mean): --  RR: 18 (02 Apr 2024 05:28) (17 - 18)  SpO2: 98% (02 Apr 2024 08:04) (90% - 98%)    Parameters below as of 02 Apr 2024 08:04  Patient On (Oxygen Delivery Method): nasal cannula, high flow, 60% @ 40LPM      I&O's Summary    01 Apr 2024 07:01  -  02 Apr 2024 07:00  --------------------------------------------------------  IN: 360 mL / OUT: 0 mL / NET: 360 mL        TELE: SR /ST 70 up to 110's   PHYSICAL EXAM:  Constitutional: NAD, awake and alert  HEENT: Moist Mucous Membranes, Anicteric  Pulmonary: Non-labored, breath sounds + rhonchi, wheeze   Cardiovascular: Regular, S1 and S2, No murmurs, rubs, gallops or clicks  Gastrointestinal: Bowel Sounds present, soft, nontender.   Lymph: No peripheral edema. No lymphadenopathy.  Skin: No visible rashes or ulcers.  Psych: unable to assess  LABS: All Labs Reviewed:                        14.1   13.18 )-----------( 173      ( 01 Apr 2024 06:47 )             41.2                         12.7   11.92 )-----------( 158      ( 31 Mar 2024 08:13 )             37.7                         13.3   13.10 )-----------( 147      ( 30 Mar 2024 10:39 )             39.2     01 Apr 2024 06:47    147    |  110    |  26     ----------------------------<  124    3.5     |  31     |  0.80   31 Mar 2024 08:13    145    |  107    |  21     ----------------------------<  140    3.9     |  31     |  0.70   30 Mar 2024 10:39    143    |  109    |  24     ----------------------------<  195    3.9     |  27     |  0.94     Ca    9.2        01 Apr 2024 06:47  Ca    8.3        31 Mar 2024 08:13  Ca    8.0        30 Mar 2024 10:39  Mg     2.1       31 Mar 2024 08:13    TPro  6.3    /  Alb  3.0    /  TBili  0.6    /  DBili  x      /  AST  71     /  ALT  61     /  AlkPhos  64     01 Apr 2024 06:47  TPro  5.7    /  Alb  2.8    /  TBili  0.4    /  DBili  x      /  AST  70     /  ALT  45     /  AlkPhos  55     31 Mar 2024 08:13  TPro  5.6    /  Alb  2.7    /  TBili  0.2    /  DBili  x      /  AST  82     /  ALT  42     /  AlkPhos  53     30 Mar 2024 10:39          12 Lead ECG:   Ventricular Rate 93 BPM    Atrial Rate 93 BPM    P-R Interval 194 ms    QRS Duration 130 ms    Q-T Interval 356 ms    QTC Calculation(Bazett) 442 ms    P Axis 74 degrees    R Axis -14 degrees    T Axis 154 degrees    Diagnosis Line Normal sinus rhythm  LVH with qrs widening and repol changes   Abnormal ECG  When compared with ECG of 01-APR-2024 04:38, (Unconfirmed)  No significant change was found  Confirmed by LANNY RONDON ([id number]) on 4/1/2024 7:01:23 PM (04-01-24 @ 09:13)      Patient name: MARLON KRUEGER  YOB: 1940   Age: 82 (M)   MR#: [id number]  Study Date: 6/23/2022  Location: O/PSonographer: Neha [facility]  Study quality: Technically fair  Referring Physician: Analy Coreas MD  Blood Pressure: 156/82 mmHg  Height: 170 cm  Weight: 85 kg  BSA: 2 m2  Heart Rate: 77 mmHg  ------------------------------------------------------------------------  PROCEDURE: Transthoracic echocardiogram with 2-D, M-Mode  and complete spectral and color flow Doppler.  INDICATION: Atherosclerotic heart disease of native  coronary artery without angina pectoris (I25.10)  ------------------------------------------------------------------------  Dimensions:    Normal Values:  LA:     3.4    2.0 - 4.0 cm  Ao: 2.7    2.0 - 3.8 cm  SEPTUM: 0.9    0.6 - 1.2 cm  PWT:    0.9    0.6 - 1.1 cm  LVIDd:  4.4    3.0 - 5.6 cm  LVIDs:  3.0    1.8 - 4.0 cm  Derived variables:  LVMI: 65 g/m2  RWT: 0.40  Fractional short: 32 %  EF (Rosado Rule): 58 %Doppler Peak Velocity (m/sec):  AoV=1.7  ------------------------------------------------------------------------  Observations:  Mitral Valve: Mitral annular calcification, otherwise  normal mitral valve. Minimal mitral regurgitation.  Aortic Valve/Aorta: Transcatheteraortic valve replacement.  The vlave is well seated. The prosthetic valve leaflets are  not well visualized.  Peak transaortic valve gradient  equals 12 mm Hg, mean transaortic valve gradient equals 5  mm Hg, the DI is 0.58 which is probably normal in the  presence of a transcatheter aortic valve replacement. No  aortic transvalvular or paravalvular regurgitation seen.  Peak left ventricular outflow tract gradient equals 3 mm  Hg, mean gradient is equal to 1 mm Hg, LVOT velocity time  integral equals 14 cm.  Aortic Root: 2.7 cm.  Left Atrium: Normal left atrium.  LA volume index = 32  cc/m2.  Left Ventricle: Normal left ventricular systolic function.  No segmental wall motion abnormalities. Normal left  ventricular internal dimensions and wall thicknesses. Mild  diastolic dysfunction (Stage I).  Right Heart: Normal right atrium. Normal right ventricular  size and function. Normal tricuspid valve. Minimal  tricuspid regurgitation. Normal pulmonic valve.  Pericardium/Pleura: Normal pericardium with no pericardial  effusion.  Hemodynamic: Estimated right ventricular systolic pressure  equals 22 mm Hg, assuming right atrial pressure equals 3 mm  Hg, consistent with normal pulmonary pressures. Color  Doppler demonstrates no evidence of a patent foramen ovale.  ------------------------------------------------------------------------  Conclusions:  1. Mitral annular calcification, otherwise normal mitral  valve. Minimal mitral regurgitation.  2. Transcatheter aortic valve replacement. The vlave is  well seated. The prosthetic valve leaflets are not well  visualized.  Peak transaortic valve gradient equals 12 mm  Hg, mean transaortic valve gradient equals 5 mm Hg, the DI  is 0.58 which is probably normal in the presence of a  transcatheter aortic valve replacement. No aortic  transvalvular or paravalvular regurgitation seen.  3. Normal left ventricular systolic function. No segmental  wall motion abnormalities.  4. Mild diastolic dysfunction (Stage I).  5. Normal right ventricularsize and function.  *** Compared with echocardiogram of 12/19/2019, no  significant changes noted.  ------------------------------------------------------------------------  Confirmed on  6/24/2022 - 08:29:28 by Karl Ramsey M.D.  ------------------------------------------------------------------------

## 2024-04-02 NOTE — DISCHARGE NOTE PROVIDER - CARE PROVIDERS DIRECT ADDRESSES
,DirectAddress_Unknown,DirectAddress_Unknown,[email or web address] ,DirectAddress_Unknown,[email or web address]

## 2024-04-02 NOTE — PROGRESS NOTE ADULT - PROBLEM SELECTOR PLAN 4
likely pre renal due to decreased oral intake and hypotension.   improved   will monitor   continue mild IVF   taylor inserted for urinary retention, voiding trial

## 2024-04-02 NOTE — DISCHARGE NOTE PROVIDER - NSDCCPCAREPLAN_GEN_ALL_CORE_FT
PRINCIPAL DISCHARGE DIAGNOSIS  Diagnosis: Influenza A  Assessment and Plan of Treatment: completed tamiflu      SECONDARY DISCHARGE DIAGNOSES  Diagnosis: Acute encephalopathy  Assessment and Plan of Treatment: due to sepsis and metabolic encephalopathy, improved    Diagnosis: Dementia  Assessment and Plan of Treatment: you home medication stopped and risperdol started due to QTC prolongation on EKG    Diagnosis: Multifocal pneumonia  Assessment and Plan of Treatment: completed course of antibotic    Diagnosis: Acute hypoxic respiratory failure  Assessment and Plan of Treatment: due to sepsis , flu/pneumonia/copd exacerbation, treated underlying contion , initially on HFNC     PRINCIPAL DISCHARGE DIAGNOSIS  Diagnosis: Influenza A  Assessment and Plan of Treatment: You completed 5 day course of tamiflu      SECONDARY DISCHARGE DIAGNOSES  Diagnosis: Acute encephalopathy  Assessment and Plan of Treatment: due to sepsis and metabolic encephalopathy, improved    Diagnosis: Multifocal pneumonia  Assessment and Plan of Treatment: completed course of antibotic    Diagnosis: Acute hypoxic respiratory failure  Assessment and Plan of Treatment: Secondary to pneumonia and influenza A.   You were weaned to room air.   Please follow up with your pulmonologist.    Diagnosis: Dementia  Assessment and Plan of Treatment: your home medication (namenda and seroquel) were stopped due to prolonged QT interval on your EKG. You were started on risperdol.     PRINCIPAL DISCHARGE DIAGNOSIS  Diagnosis: Influenza A  Assessment and Plan of Treatment: You completed 5 day course of tamiflu and have improved. Continue your home Trelegy inhaler and can use duonebs if needed for wheezing. Follow up with your PCP and/or pulmonologist after discharge from rehab.      SECONDARY DISCHARGE DIAGNOSES  Diagnosis: Multifocal pneumonia  Assessment and Plan of Treatment: You completed course of antibotics and your sepsis has resolved. Continue your home Trelegy inhaler and can use duonebs if needed for wheezing. Follow up with your PCP and/or pulmonologist after discharge from rehab.    Diagnosis: Acute hypoxic respiratory failure  Assessment and Plan of Treatment: You had significantly low oxygen levels due to bacterial pneumonia, influenza A infection and COPD exacerbation. You completed treatment with steroids, antibiotics, and Tamiflu and have greatly improved your breathing.  You were weaned to room air.   Continue your home Trelegy inhaler and can use duonebs if needed for wheezing. Follow up with your PCP and/or pulmonologist after discharge from rehab.    Diagnosis: Dementia  Assessment and Plan of Treatment: Your home medication were changed due to prolonged QT interval on your EKG and changes in your mentation. Please decrease seroquel to 25mg at night. Please continue memantine. Please stop donepezil.    Diagnosis: HTN (hypertension)  Assessment and Plan of Treatment: Please stop amlodipine. Monitor your blood pressure at rehab. If becomes high, can start low dose amlodipine and titrate as needed.

## 2024-04-02 NOTE — CHART NOTE - NSCHARTNOTEFT_GEN_A_CORE
Provider contacted for urine retention >700cc. Meneses removed 4/1 in the morning, failed TOV. Meneses ordered to be replaced.

## 2024-04-02 NOTE — PROGRESS NOTE ADULT - PROBLEM SELECTOR PLAN 8
severe dementia: need assist for all ADLs, limited functional status, as per family only able to ambulate from bed to bathroom with walker and falls frequently. not able to make needs known   - d/c aricept due to QT prolongation 501  -decrease Memantine  - supportive care   - d/c Seroquel due to QT   -psy skyleral noted   - patient hard to arouse this morning, will hold off risperdol day time to avoid daytime sleepiness, avoid benzo.  QT normalized

## 2024-04-02 NOTE — PROGRESS NOTE ADULT - ASSESSMENT
anemia  constipation  abd pain    bowel regimen  monitor for BM  simethicone  x1 mg citrate bottle ordered  continue to monitor cbc  d/w pt and wife at bedside    I reviewed the overnight course of events on the unit, re-confirming the patient history. I discussed the care with the patient  Differential diagnosis and plan of care discussed with patient after the evaluation  35 minutes spent on total encounter of which more than fifty percent of the encounter was spent counseling and/or coordinating care by the attending physician.

## 2024-04-02 NOTE — DISCHARGE NOTE PROVIDER - PROVIDER TOKENS
PROVIDER:[TOKEN:[[id number]]],PROVIDER:[TOKEN:[[id number]]],PROVIDER:[TOKEN:[[id number]]] PROVIDER:[TOKEN:[[id number]]],PROVIDER:[TOKEN:[[id number]]],PROVIDER:[TOKEN:[[id number]],FOLLOWUP:[2 weeks]]

## 2024-04-02 NOTE — PROGRESS NOTE ADULT - PROBLEM SELECTOR PROBLEM 10
BPH (benign prostatic hyperplasia)
CAD (coronary artery disease)

## 2024-04-02 NOTE — PROGRESS NOTE ADULT - SUBJECTIVE AND OBJECTIVE BOX
[facility]  INFECTIOUS DISEASES   [address] [place] [address]  =======================================================  Tel: [phone]     Fax: [phone]  MD Molly Landaverde MD Shah, Kaushal, MD Sunjit, Jaspal, MD  ========================================================      MARLON KRUEGER [id number]    Follow up: Respiratory failure    On HFNC 40L/60%, awake and alert. No fever.     Allergies:  No Known Allergies    MEDICATIONS  (STANDING):  albuterol/ipratropium for Nebulization 3 milliLiter(s) Nebulizer every 6 hours  aspirin enteric coated 81 milliGRAM(s) Oral daily  bisacodyl 5 milliGRAM(s) Oral at bedtime  budesonide  80 MICROgram(s)/formoterol 4.5 MICROgram(s) Inhaler 2 Puff(s) Inhalation two times a day  cefTRIAXone   IVPB 1000 milliGRAM(s) IV Intermittent every 24 hours  clopidogrel Tablet 75 milliGRAM(s) Oral daily  dextrose 5%. 1000 milliLiter(s) (30 mL/Hr) IV Continuous <Continuous>  finasteride 5 milliGRAM(s) Oral daily  heparin   Injectable 5000 Unit(s) SubCutaneous every 12 hours  latanoprost 0.005% Ophthalmic Solution 1 Drop(s) Both EYES at bedtime  magnesium hydroxide Suspension 30 milliLiter(s) Oral once  melatonin 10 milliGRAM(s) Oral at bedtime  methylPREDNISolone sodium succinate Injectable 40 milliGRAM(s) IV Push daily  mirabegron ER 25 milliGRAM(s) Oral at bedtime  montelukast 10 milliGRAM(s) Oral daily  oseltamivir 75 milliGRAM(s) Oral two times a day  pantoprazole  Injectable 40 milliGRAM(s) IV Push daily  polyethylene glycol 3350 17 Gram(s) Oral daily  risperiDONE   Tablet 1 milliGRAM(s) Oral at bedtime  senna 2 Tablet(s) Oral at bedtime  sodium chloride 3%  Inhalation 4 milliLiter(s) Inhalation every 12 hours  tiotropium 2.5 MICROgram(s) Inhaler 2 Puff(s) Inhalation daily    REVIEW OF SYSTEMS:  unable to obtain due to medical condition    Physical Exam:  Vital Signs Last 24 Hrs  T(C): 36.6 (02 Apr 2024 13:09), Max: 37.7 (01 Apr 2024 13:37)  T(F): 97.9 (02 Apr 2024 13:09), Max: 99.8 (01 Apr 2024 13:37)  HR: 82 (02 Apr 2024 13:09) (75 - 104)  BP: 105/65 (02 Apr 2024 13:09) (104/65 - 162/87)  BP(mean): --  RR: 18 (02 Apr 2024 13:09) (17 - 18)  SpO2: 98% (02 Apr 2024 13:09) (90% - 98%)  Parameters below as of 02 Apr 2024 13:09  Patient On (Oxygen Delivery Method): nasal cannula, high flow  O2 Flow (L/min): 40  GEN: On HFNC  HEENT: normocephalic and atraumatic. Anicteric    NECK: Supple.   LUNGS: Course BS B/L   HEART: RRR  ABDOMEN: Soft, NT, ND.  +BS.    EXTREMITIES: trace edema.  MSK: No joint swelling  NEUROLOGIC: Confused   SKIN: No rash      Labs:                        13.7   8.50  )-----------( 185      ( 02 Apr 2024 05:54 )             42.0     04-02    146<H>  |  108  |  29<H>  ----------------------------<  138<H>  3.8   |  32<H>  |  0.79    Ca    9.3      02 Apr 2024 05:54    TPro  6.3  /  Alb  3.0<L>  /  TBili  0.6  /  DBili  x   /  AST  71<H>  /  ALT  61  /  AlkPhos  64  04-01      Culture - Urine (collected 03-29-24 @ 06:30)  Source: Clean Catch Clean Catch (Midstream)  Final Report (03-30-24 @ 17:39):    <10,000 CFU/mL Normal Urogenital Carolyn    Culture - Blood (collected 03-28-24 @ 16:00)  Source: .Blood Blood-Peripheral  Preliminary Report (04-02-24 @ 01:01):    No growth at 4 days    Culture - Blood (collected 03-28-24 @ 15:50)  Source: .Blood Blood-Peripheral  Preliminary Report (04-02-24 @ 01:01):    No growth at 4 days    WBC Count: 8.50 K/uL (04-02-24 @ 05:54)  WBC Count: 13.18 K/uL (04-01-24 @ 06:47)  WBC Count: 11.92 K/uL (03-31-24 @ 08:13)  WBC Count: 13.10 K/uL (03-30-24 @ 10:39)  WBC Count: 15.84 K/uL (03-29-24 @ 14:10)  WBC Count: 14.56 K/uL (03-29-24 @ 03:30)  WBC Count: 10.58 K/uL (03-28-24 @ 16:00)    Creatinine: 0.79 mg/dL (04-02-24 @ 05:54)  Creatinine: 0.80 mg/dL (04-01-24 @ 06:47)  Creatinine: 0.70 mg/dL (03-31-24 @ 08:13)  Creatinine: 0.94 mg/dL (03-30-24 @ 10:39)  Creatinine: 0.99 mg/dL (03-29-24 @ 16:05)  Creatinine: 1.50 mg/dL (03-29-24 @ 03:30)  Creatinine: 0.98 mg/dL (03-28-24 @ 16:00)    Procalcitonin, Serum: 0.38 ng/mL (03-31-24 @ 08:13)     SARS-CoV-2 Result: NotDetec (03-28-24 @ 16:00)    < from: Xray Chest 1 View- PORTABLE-Routine (Xray Chest 1 View- PORTABLE-Routine in AM.) (04.01.24 @ 09:34) >  ACC: [id number] EXAM:  XR CHEST PORTABLE ROUTINE 1V   ORDERED BY: RODRÍGUEZ CHRIS   PROCEDURE DATE:  04/01/2024    INTERPRETATION:  TECHNIQUE: Single portable view of the chest.  COMPARISON:  3/28/2024. Chest CT dated 3/29/2024  CLINICAL HISTORY: pneumonia  FINDINGS:  Single frontal view of the chest demonstrates diffuse left lung   infiltrates. Transcatheter aortic valve replacement. The   cardiomediastinal silhouette is normal. No acute osseous abnormalities.   Overlying EKG leads and wires are noted  IMPRESSION: Diffuse left lung infiltrates.    Assessment and plan:   83 yo M with PMH aortic stenosis s/p TAVR 2019, dementia, HLD, asthm, COPD, overactive bladder ,H pylori, BPH presents to the ED with lethargy/weakness.  In ED was found to have Influenza. Last night had RRT for hypoxia in 60s and labs showed high Troponin.  Leukocytosis and hypoxia most likely related to cardiac event. Since no sign of opacities in lungs, can hold antibiotics. High fever due to influenza.     # Influenza  # Acute respiratory failure   # MI    - Blood cultures 3/28 no growth   - UA and UC negative    - Pulmonary and cardiology follow up   - Creat back to normal   - Tamiflu for total of 5days was completed today   - Continue Ceftriaxone started on 3/29, to complete total 5-7days  - Procalcitonin level 0.39 not high   - Leukocytosis normal now ( was on steroids)  - Legionella U ag negative  - MRSA Pending     Will follow PRN.     Deniz Desai MD  Division of Infectious Diseases   Please call ID service at [phone] with any question.      50 minutes spent on total encounter assessing patient, examination, chart review, counseling and coordinating care by the attending physician/nurse/care manager.

## 2024-04-02 NOTE — PHYSICAL THERAPY INITIAL EVALUATION ADULT - PERTINENT HX OF CURRENT PROBLEM, REHAB EVAL
85 yo M with PMH aortic stenosis s/p TAVR 2019, dementia, HLD, asthm, COPD, overactive bladder ,H pylori, BPH presents to the ED with lethargy/weakness. Interview was conducted on phone with patients daughter Katharina as pt has severe dementia and cannot answer questions, tried to communicate with Bel[place]ian  but patient could not participate. Per daughter, pt had a fall yesterday when trying to get out of bed in the morning (lives at home with daughter, son in law and wife- does not use walking device- per daughter "wouldn't know  how").  Last night, patient was found to be much weaker and more lethargic than usual and sustained another fall this AM. This fall was unwitnessed but patient was found face forward on the floor on the carpet. Of note, daughter was just diagnosed with flu at urgent care. Daughter denied pt had any new sx of cp,palpitations, sob, cough, n/v/d/constipation/urinary changes however daughter acknowledges that difficult to know for sure 2/2 mental status

## 2024-04-02 NOTE — CASE MANAGEMENT PROGRESS NOTE - NSCMPROGRESSNOTE_GEN_ALL_CORE
Case Management consult for Placement. Patient remains acute on high flow. Case management does not do placement. Discharge needs unclear at present.  will continue to follow.

## 2024-04-02 NOTE — DISCHARGE NOTE PROVIDER - CARE PROVIDER_API CALL
Elver Dc  Gastroenterology  [address] [place] [address]  Phone: [phone]  Fax: [phone]  Follow Up Time:     Young Shepard  Nephrology  [address], [address]  [place] [address]  Phone: [phone]  Fax: [phone]  Follow Up Time:     Darvin Butcher  Pulmonary Disease  [address] [place] [address]  Phone: [phone]  Fax: [phone]  Follow Up Time:    Young Shepard  Nephrology  [address] [facility], [address]  [place] [address]  Phone: [phone]  Fax: [phone]  Follow Up Time:     Darvin Butcher  Pulmonary Disease  [address] [place] [address]  Phone: [phone]  Fax: [phone]  Follow Up Time:     Alfredo Hirsch  Urology  [address] [facility], [address]  [place] [address]  Phone: [phone]  Fax: [phone]  Follow Up Time: 2 weeks

## 2024-04-03 LAB
CULTURE RESULTS: SIGNIFICANT CHANGE UP
CULTURE RESULTS: SIGNIFICANT CHANGE UP
HCT VFR BLD CALC: 40.8 % — SIGNIFICANT CHANGE UP (ref 39–50)
HGB BLD-MCNC: 13.5 G/DL — SIGNIFICANT CHANGE UP (ref 13–17)
MCHC RBC-ENTMCNC: 28.4 PG — SIGNIFICANT CHANGE UP (ref 27–34)
MCHC RBC-ENTMCNC: 33.1 GM/DL — SIGNIFICANT CHANGE UP (ref 32–36)
MCV RBC AUTO: 85.9 FL — SIGNIFICANT CHANGE UP (ref 80–100)
MRSA PCR RESULT.: SIGNIFICANT CHANGE UP
NRBC # BLD: 0 /100 WBCS — SIGNIFICANT CHANGE UP (ref 0–0)
PLATELET # BLD AUTO: 182 K/UL — SIGNIFICANT CHANGE UP (ref 150–400)
RBC # BLD: 4.75 M/UL — SIGNIFICANT CHANGE UP (ref 4.2–5.8)
RBC # FLD: 13.6 % — SIGNIFICANT CHANGE UP (ref 10.3–14.5)
S AUREUS DNA NOSE QL NAA+PROBE: SIGNIFICANT CHANGE UP
SPECIMEN SOURCE: SIGNIFICANT CHANGE UP
SPECIMEN SOURCE: SIGNIFICANT CHANGE UP
WBC # BLD: 10.89 K/UL — HIGH (ref 3.8–10.5)
WBC # FLD AUTO: 10.89 K/UL — HIGH (ref 3.8–10.5)

## 2024-04-03 PROCEDURE — 99233 SBSQ HOSP IP/OBS HIGH 50: CPT

## 2024-04-03 PROCEDURE — 93306 TTE W/DOPPLER COMPLETE: CPT | Mod: 26

## 2024-04-03 PROCEDURE — 99232 SBSQ HOSP IP/OBS MODERATE 35: CPT

## 2024-04-03 PROCEDURE — 99291 CRITICAL CARE FIRST HOUR: CPT

## 2024-04-03 RX ADMIN — HEPARIN SODIUM 5000 UNIT(S): 5000 INJECTION INTRAVENOUS; SUBCUTANEOUS at 17:06

## 2024-04-03 RX ADMIN — Medication 3 MILLILITER(S): at 00:46

## 2024-04-03 RX ADMIN — BUDESONIDE AND FORMOTEROL FUMARATE DIHYDRATE 2 PUFF(S): 160; 4.5 AEROSOL RESPIRATORY (INHALATION) at 06:47

## 2024-04-03 RX ADMIN — TIOTROPIUM BROMIDE 2 PUFF(S): 18 CAPSULE ORAL; RESPIRATORY (INHALATION) at 06:47

## 2024-04-03 RX ADMIN — MONTELUKAST 10 MILLIGRAM(S): 4 TABLET, CHEWABLE ORAL at 14:06

## 2024-04-03 RX ADMIN — BUDESONIDE AND FORMOTEROL FUMARATE DIHYDRATE 2 PUFF(S): 160; 4.5 AEROSOL RESPIRATORY (INHALATION) at 22:11

## 2024-04-03 RX ADMIN — SODIUM CHLORIDE 4 MILLILITER(S): 9 INJECTION INTRAMUSCULAR; INTRAVENOUS; SUBCUTANEOUS at 20:33

## 2024-04-03 RX ADMIN — PANTOPRAZOLE SODIUM 40 MILLIGRAM(S): 20 TABLET, DELAYED RELEASE ORAL at 14:07

## 2024-04-03 RX ADMIN — Medication 81 MILLIGRAM(S): at 14:06

## 2024-04-03 RX ADMIN — SODIUM CHLORIDE 4 MILLILITER(S): 9 INJECTION INTRAMUSCULAR; INTRAVENOUS; SUBCUTANEOUS at 07:40

## 2024-04-03 RX ADMIN — CEFTRIAXONE 100 MILLIGRAM(S): 500 INJECTION, POWDER, FOR SOLUTION INTRAMUSCULAR; INTRAVENOUS at 04:47

## 2024-04-03 RX ADMIN — LATANOPROST 1 DROP(S): 0.05 SOLUTION/ DROPS OPHTHALMIC; TOPICAL at 22:11

## 2024-04-03 RX ADMIN — Medication 3 MILLILITER(S): at 20:33

## 2024-04-03 RX ADMIN — Medication 5 MILLIGRAM(S): at 22:11

## 2024-04-03 RX ADMIN — Medication 3 MILLILITER(S): at 13:32

## 2024-04-03 RX ADMIN — MIRABEGRON 25 MILLIGRAM(S): 50 TABLET, EXTENDED RELEASE ORAL at 22:12

## 2024-04-03 RX ADMIN — Medication 40 MILLIGRAM(S): at 04:47

## 2024-04-03 RX ADMIN — SODIUM CHLORIDE 30 MILLILITER(S): 9 INJECTION, SOLUTION INTRAVENOUS at 17:07

## 2024-04-03 RX ADMIN — Medication 10 MILLIGRAM(S): at 22:11

## 2024-04-03 RX ADMIN — CLOPIDOGREL BISULFATE 75 MILLIGRAM(S): 75 TABLET, FILM COATED ORAL at 14:06

## 2024-04-03 RX ADMIN — HEPARIN SODIUM 5000 UNIT(S): 5000 INJECTION INTRAVENOUS; SUBCUTANEOUS at 04:48

## 2024-04-03 RX ADMIN — Medication 3 MILLILITER(S): at 07:40

## 2024-04-03 RX ADMIN — SENNA PLUS 2 TABLET(S): 8.6 TABLET ORAL at 22:11

## 2024-04-03 RX ADMIN — POLYETHYLENE GLYCOL 3350 17 GRAM(S): 17 POWDER, FOR SOLUTION ORAL at 14:07

## 2024-04-03 RX ADMIN — FINASTERIDE 5 MILLIGRAM(S): 5 TABLET, FILM COATED ORAL at 14:06

## 2024-04-03 NOTE — PROGRESS NOTE ADULT - PROBLEM SELECTOR PLAN 7
- CT spine showing:   Slightly mottled appearance to the vertebral bodies of the cervical   and upper thoracic spine. This could be osteopenic in nature. A   underlying myeloma/lymphoma or other metastatic process is not entirely   excluded. Recommend clinical and laboratory correlation.  Further followup as outpatient.

## 2024-04-03 NOTE — PROGRESS NOTE ADULT - PROBLEM SELECTOR PLAN 2
Hypoxic to 60s , initial ABG respiratory acidosis with Co2 retention, repeat ABG improved   likely due to flu and COPD exacerbation / pneumonia   HFNC for now,  Wean as tolerated 70% at current   GOC discussed with palliative team  and family- DNR/DNI: patient with multiple significant acute and chronic medical conditions, risk of sudden decompensation. prognosis poor. candidate for palliative hospice service  CTA negative for PE , Heparin Drip stopped. + multi focal pneumonia    Wean O2 as tolerated.

## 2024-04-03 NOTE — CASE MANAGEMENT PROGRESS NOTE - NSCMPROGRESSNOTE_GEN_ALL_CORE
Referral for Rolling Walker initiated and sent to community surgical. Rolling Walker delivered to bedside 4/2/2024 by community surgical representative. Patient's daughter Katharina informed. Meneses catheter inserted for retention.

## 2024-04-03 NOTE — PROGRESS NOTE ADULT - ASSESSMENT
anemia  constipation  abd pain    bowel regimen  monitor for BM  simethicone  s/p mg citrate bottle   continue to monitor cbc  d/w pt and wife at bedside    I reviewed the overnight course of events on the unit, re-confirming the patient history. I discussed the care with the patient  Differential diagnosis and plan of care discussed with patient after the evaluation  35 minutes spent on total encounter of which more than fifty percent of the encounter was spent counseling and/or coordinating care by the attending physician.

## 2024-04-03 NOTE — PROGRESS NOTE ADULT - ASSESSMENT
RAY: Prerenal azotemia, Rhabdomyolysis  Hypotension, h/o Hypertension  Dyspnea: Influenza A  MI  Dementia  s/p Fall, Rhabdomyolysis    Stable renal indices. Recheck am labs. Continue IV hydration. Encourage PO fluids as tolerated. Poor PO intake.  To continue current meds. BP stable. Cardiology follow up. Will follow electrolytes and renal function trend.

## 2024-04-03 NOTE — PROGRESS NOTE ADULT - SUBJECTIVE AND OBJECTIVE BOX
[facility]  Yunier Dorman PA-C  [address] [place] [address]  [phone]      INTERVAL HPI/OVERNIGHT EVENTS:  Pt s/e  Wife at bedside translates for pt  +BM    MEDICATIONS  (STANDING):  albuterol/ipratropium for Nebulization 3 milliLiter(s) Nebulizer every 6 hours  aspirin enteric coated 81 milliGRAM(s) Oral daily  bisacodyl 5 milliGRAM(s) Oral at bedtime  budesonide  80 MICROgram(s)/formoterol 4.5 MICROgram(s) Inhaler 2 Puff(s) Inhalation two times a day  clopidogrel Tablet 75 milliGRAM(s) Oral daily  dextrose 5%. 1000 milliLiter(s) (30 mL/Hr) IV Continuous <Continuous>  finasteride 5 milliGRAM(s) Oral daily  heparin   Injectable 5000 Unit(s) SubCutaneous every 12 hours  latanoprost 0.005% Ophthalmic Solution 1 Drop(s) Both EYES at bedtime  melatonin 10 milliGRAM(s) Oral at bedtime  mirabegron ER 25 milliGRAM(s) Oral at bedtime  montelukast 10 milliGRAM(s) Oral daily  pantoprazole  Injectable 40 milliGRAM(s) IV Push daily  polyethylene glycol 3350 17 Gram(s) Oral daily  senna 2 Tablet(s) Oral at bedtime  sodium chloride 3%  Inhalation 4 milliLiter(s) Inhalation every 12 hours  tiotropium 2.5 MICROgram(s) Inhaler 2 Puff(s) Inhalation daily    MEDICATIONS  (PRN):  acetaminophen     Tablet .. 650 milliGRAM(s) Oral every 6 hours PRN Temp greater or equal to 38C (100.4F), Mild Pain (1 - 3)  albuterol    90 MICROgram(s) HFA Inhaler 2 Puff(s) Inhalation every 6 hours PRN Shortness of Breath and/or Wheezing  aluminum hydroxide/magnesium hydroxide/simethicone Suspension 30 milliLiter(s) Oral every 4 hours PRN Dyspepsia  ondansetron Injectable 4 milliGRAM(s) IV Push every 8 hours PRN Nausea and/or Vomiting      Allergies    No Known Allergies        PHYSICAL EXAM:   Vital Signs:  Vital Signs Last 24 Hrs  T(C): 36.8 (2024 05:10), Max: 36.8 (2024 05:10)  T(F): 98.3 (2024 05:10), Max: 98.3 (2024 05:10)  HR: 82 (2024 08:17) (77 - 87)  BP: 140/68 (2024 05:10) (105/65 - 140/68)  BP(mean): --  RR: 19 (2024 05:10) (18 - 19)  SpO2: 97% (2024 08:17) (95% - 99%)    Parameters below as of 2024 08:17  Patient On (Oxygen Delivery Method): nasal cannula, high flow      Daily     Daily Weight in k.2 (2024 05:10)    GENERAL:  Appears stated age  HEENT:  NC/AT  CHEST:  Full & symmetric excursion  HEART:  Regular rhythm  ABDOMEN:  Soft, non-tender, non-distended  EXTEREMITIES:  no cyanosis  SKIN:  No rash  NEURO:  Alert      LABS:                        13.5   10.89 )-----------( 182      ( 2024 07:00 )             40.8     04-02    146<H>  |  108  |  29<H>  ----------------------------<  138<H>  3.8   |  32<H>  |  0.79    Ca    9.3      2024 05:54        Urinalysis Basic - ( 2024 05:54 )    Color: x / Appearance: x / SG: x / pH: x  Gluc: 138 mg/dL / Ketone: x  / Bili: x / Urobili: x   Blood: x / Protein: x / Nitrite: x   Leuk Esterase: x / RBC: x / WBC x   Sq Epi: x / Non Sq Epi: x / Bacteria: x

## 2024-04-03 NOTE — PROGRESS NOTE ADULT - SUBJECTIVE AND OBJECTIVE BOX
[facility] -- Halima Hayes Pannella, Patel, Savella, Goodger, Cohen  Office # [phone]    Follow Up:  +FLU,  multifocal PNA on HFNC     Subjective/Observations: seen and examined, resting comfortably in bed, NAD, unable to provide meaningful information at this time. on HFNC 40%, no events overnight     REVIEW OF SYSTEMS: All other review of systems is negative unless indicated above  PAST MEDICAL & SURGICAL HISTORY:  Aortic stenosis      BPH (benign prostatic hyperplasia)      Dementia  mild- on Memantine      ACE (dyspnea on exertion)      Asthma  controlled on inhalers      HLD (hyperlipidemia)      CAD (coronary artery disease)  s/p stent 10/2019      OH (ocular hypertension)  on eye drops      Overactive bladder      Anxiety      History of tonsillectomy  childhood      H/O coronary angiogram  10/25/19, s/p PCI to LAD        MEDICATIONS  (STANDING):  albuterol/ipratropium for Nebulization 3 milliLiter(s) Nebulizer every 6 hours  aspirin enteric coated 81 milliGRAM(s) Oral daily  bisacodyl 5 milliGRAM(s) Oral at bedtime  budesonide  80 MICROgram(s)/formoterol 4.5 MICROgram(s) Inhaler 2 Puff(s) Inhalation two times a day  clopidogrel Tablet 75 milliGRAM(s) Oral daily  dextrose 5%. 1000 milliLiter(s) (30 mL/Hr) IV Continuous <Continuous>  finasteride 5 milliGRAM(s) Oral daily  heparin   Injectable 5000 Unit(s) SubCutaneous every 12 hours  latanoprost 0.005% Ophthalmic Solution 1 Drop(s) Both EYES at bedtime  melatonin 10 milliGRAM(s) Oral at bedtime  mirabegron ER 25 milliGRAM(s) Oral at bedtime  montelukast 10 milliGRAM(s) Oral daily  pantoprazole  Injectable 40 milliGRAM(s) IV Push daily  polyethylene glycol 3350 17 Gram(s) Oral daily  risperiDONE   Tablet 1 milliGRAM(s) Oral at bedtime  senna 2 Tablet(s) Oral at bedtime  sodium chloride 3%  Inhalation 4 milliLiter(s) Inhalation every 12 hours  tiotropium 2.5 MICROgram(s) Inhaler 2 Puff(s) Inhalation daily    MEDICATIONS  (PRN):  acetaminophen     Tablet .. 650 milliGRAM(s) Oral every 6 hours PRN Temp greater or equal to 38C (100.4F), Mild Pain (1 - 3)  albuterol    90 MICROgram(s) HFA Inhaler 2 Puff(s) Inhalation every 6 hours PRN Shortness of Breath and/or Wheezing  aluminum hydroxide/magnesium hydroxide/simethicone Suspension 30 milliLiter(s) Oral every 4 hours PRN Dyspepsia  ondansetron Injectable 4 milliGRAM(s) IV Push every 8 hours PRN Nausea and/or Vomiting    Allergies    No Known Allergies    Intolerances      Vital Signs Last 24 Hrs  T(C): 36.8 (03 Apr 2024 05:10), Max: 36.8 (03 Apr 2024 05:10)  T(F): 98.3 (03 Apr 2024 05:10), Max: 98.3 (03 Apr 2024 05:10)  HR: 82 (03 Apr 2024 08:17) (77 - 87)  BP: 140/68 (03 Apr 2024 05:10) (105/65 - 140/68)  BP(mean): --  RR: 19 (03 Apr 2024 05:10) (18 - 19)  SpO2: 97% (03 Apr 2024 08:17) (95% - 99%)    Parameters below as of 03 Apr 2024 08:17  Patient On (Oxygen Delivery Method): nasal cannula, high flow      I&O's Summary    02 Apr 2024 07:01  -  03 Apr 2024 07:00  --------------------------------------------------------  IN: 0 mL / OUT: 1100 mL / NET: -1100 mL          TELE:  70-80's   PHYSICAL EXAM:  Constitutional: NAD, awake and alert  HEENT: Moist Mucous Membranes, Anicteric  Pulmonary: Non-labored, + rhonchi, wheeze   Cardiovascular: Regular, S1 and S2, No murmurs, rubs, gallops or clicks  Gastrointestinal: Bowel Sounds present, soft, nontender.   Lymph: No peripheral edema. No lymphadenopathy.  Skin: No visible rashes or ulcers.  Psych: unable to assess  LABS: All Labs Reviewed:                        13.5   10.89 )-----------( 182      ( 03 Apr 2024 07:00 )             40.8                         13.7   8.50  )-----------( 185      ( 02 Apr 2024 05:54 )             42.0                         14.1   13.18 )-----------( 173      ( 01 Apr 2024 06:47 )             41.2     02 Apr 2024 05:54    146    |  108    |  29     ----------------------------<  138    3.8     |  32     |  0.79   01 Apr 2024 06:47    147    |  110    |  26     ----------------------------<  124    3.5     |  31     |  0.80     Ca    9.3        02 Apr 2024 05:54  Ca    9.2        01 Apr 2024 06:47    TPro  6.3    /  Alb  3.0    /  TBili  0.6    /  DBili  x      /  AST  71     /  ALT  61     /  AlkPhos  64     01 Apr 2024 06:47          12 Lead ECG:   Ventricular Rate 93 BPM    Atrial Rate 93 BPM    P-R Interval 194 ms    QRS Duration 130 ms    Q-T Interval 356 ms    QTC Calculation(Bazett) 442 ms    P Axis 74 degrees    R Axis -14 degrees    T Axis 154 degrees    Diagnosis Line Normal sinus rhythm  LVH with qrs widening and repol changes   Abnormal ECG  When compared with ECG of 01-APR-2024 04:38, (Unconfirmed)  No significant change was found  Confirmed by LANNY RONDON ([id number]) on 4/1/2024 7:01:23 PM (04-01-24 @ 09:13)      Patient name: MARLON KRUEGER  YOB: 1940   Age: 82 (M)   MR#: [id number]  Study Date: 6/23/2022  Location: O/PSonographer: Neha Birmingham RDCS  Study quality: Technically fair  Referring Physician: Analy Coreas MD  Blood Pressure: 156/82 mmHg  Height: 170 cm  Weight: 85 kg  BSA: 2 m2  Heart Rate: 77 mmHg  ------------------------------------------------------------------------  PROCEDURE: Transthoracic echocardiogram with 2-D, M-Mode  and complete spectral and color flow Doppler.  INDICATION: Atherosclerotic heart disease of native  coronary artery without angina pectoris (I25.10)  ------------------------------------------------------------------------  Dimensions:    Normal Values:  LA:     3.4    2.0 - 4.0 cm  Ao: 2.7    2.0 - 3.8 cm  SEPTUM: 0.9    0.6 - 1.2 cm  PWT:    0.9    0.6 - 1.1 cm  LVIDd:  4.4    3.0 - 5.6 cm  LVIDs:  3.0    1.8 - 4.0 cm  Derived variables:  LVMI: 65 g/m2  RWT: 0.40  Fractional short: 32 %  EF (Rosado Rule): 58 %Doppler Peak Velocity (m/sec):  AoV=1.7  ------------------------------------------------------------------------  Observations:  Mitral Valve: Mitral annular calcification, otherwise  normal mitral valve. Minimal mitral regurgitation.  Aortic Valve/Aorta: Transcatheteraortic valve replacement.  The vlave is well seated. The prosthetic valve leaflets are  not well visualized.  Peak transaortic valve gradient  equals 12 mm Hg, mean transaortic valve gradient equals 5  mm Hg, the DI is 0.58 which is probably normal in the  presence of a transcatheter aortic valve replacement. No  aortic transvalvular or paravalvular regurgitation seen.  Peak left ventricular outflow tract gradient equals 3 mm  Hg, mean gradient is equal to 1 mm Hg, LVOT velocity time  integral equals 14 cm.  Aortic Root: 2.7 cm.  Left Atrium: Normal left atrium.  LA volume index = 32  cc/m2.  Left Ventricle: Normal left ventricular systolic function.  No segmental wall motion abnormalities. Normal left  ventricular internal dimensions and wall thicknesses. Mild  diastolic dysfunction (Stage I).  Right Heart: Normal right atrium. Normal right ventricular  size and function. Normal tricuspid valve. Minimal  tricuspid regurgitation. Normal pulmonic valve.  Pericardium/Pleura: Normal pericardium with no pericardial  effusion.  Hemodynamic: Estimated right ventricular systolic pressure  equals 22 mm Hg, assuming right atrial pressure equals 3 mm  Hg, consistent with normal pulmonary pressures. Color  Doppler demonstrates no evidence of a patent foramen ovale.  ------------------------------------------------------------------------  Conclusions:  1. Mitral annular calcification, otherwise normal mitral  valve. Minimal mitral regurgitation.  2. Transcatheter aortic valve replacement. The vlave is  well seated. The prosthetic valve leaflets are not well  visualized.  Peak transaortic valve gradient equals 12 mm  Hg, mean transaortic valve gradient equals 5 mm Hg, the DI  is 0.58 which is probably normal in the presence of a  transcatheter aortic valve replacement. No aortic  transvalvular or paravalvular regurgitation seen.  3. Normal left ventricular systolic function. No segmental  wall motion abnormalities.  4. Mild diastolic dysfunction (Stage I).  5. Normal right ventricularsize and function.  *** Compared with echocardiogram of 12/19/2019, no  significant changes noted.  ------------------------------------------------------------------------  Confirmed on  6/24/2022 - 08:29:28 by Karl Ramsey M.D.  ------------------------------------------------------------------------

## 2024-04-03 NOTE — PROGRESS NOTE ADULT - SUBJECTIVE AND OBJECTIVE BOX
Patient: MARLON KRUEGER [id number] 84y Male                            Hospitalist Attending Note    No complaints.  Wife at bedside. Remains on high flow at 40%.      ____________________PHYSICAL EXAM:  GENERAL:  NAD, awake, confused.    HEENT: NCAT  CARDIOVASCULAR:  S1, S2  LUNGS: coarse BS b/l  ABDOMEN:  soft, (-) tenderness, (-) distension, (+) bowel sounds, (-) guarding, (-) rebound (-) rigidity  EXTREMITIES:  no cyanosis / clubbing / edema.   ____________________     VITALS:  Vital Signs Last 24 Hrs  T(C): 36.8 (2024 05:10), Max: 36.8 (2024 05:10)  T(F): 98.3 (2024 05:10), Max: 98.3 (2024 05:10)  HR: 82 (2024 08:17) (77 - 87)  BP: 140/68 (2024 05:10) (105/65 - 140/68)  BP(mean): --  RR: 19 (2024 05:10) (18 - 19)  SpO2: 97% (2024 08:17) (95% - 99%)    Parameters below as of 2024 08:17  Patient On (Oxygen Delivery Method): nasal cannula, high flow     Daily     Daily Weight in k.2 (2024 05:10)  CAPILLARY BLOOD GLUCOSE        I&O's Summary    2024 07:01  -  2024 07:00  --------------------------------------------------------  IN: 0 mL / OUT: 1100 mL / NET: -1100 mL        HISTORY:  PAST MEDICAL & SURGICAL HISTORY:  Aortic stenosis      BPH (benign prostatic hyperplasia)      Dementia  mild- on Memantine      ACE (dyspnea on exertion)      Asthma  controlled on inhalers      HLD (hyperlipidemia)      CAD (coronary artery disease)  s/p stent 10/2019      OH (ocular hypertension)  on eye drops      Overactive bladder      Anxiety      History of tonsillectomy  childhood      H/O coronary angiogram  10/25/19, s/p PCI to LAD      Allergies    No Known Allergies    Intolerances       LABS:                        13.5   10.89 )-----------( 182      ( 2024 07:00 )             40.8     04-02    146<H>  |  108  |  29<H>  ----------------------------<  138<H>  3.8   |  32<H>  |  0.79    Ca    9.3      2024 05:54          Urinalysis Basic - ( 2024 05:54 )    Color: x / Appearance: x / SG: x / pH: x  Gluc: 138 mg/dL / Ketone: x  / Bili: x / Urobili: x   Blood: x / Protein: x / Nitrite: x   Leuk Esterase: x / RBC: x / WBC x   Sq Epi: x / Non Sq Epi: x / Bacteria: x              MEDICATIONS:  MEDICATIONS  (STANDING):  albuterol/ipratropium for Nebulization 3 milliLiter(s) Nebulizer every 6 hours  aspirin enteric coated 81 milliGRAM(s) Oral daily  bisacodyl 5 milliGRAM(s) Oral at bedtime  budesonide  80 MICROgram(s)/formoterol 4.5 MICROgram(s) Inhaler 2 Puff(s) Inhalation two times a day  clopidogrel Tablet 75 milliGRAM(s) Oral daily  dextrose 5%. 1000 milliLiter(s) (30 mL/Hr) IV Continuous <Continuous>  finasteride 5 milliGRAM(s) Oral daily  heparin   Injectable 5000 Unit(s) SubCutaneous every 12 hours  latanoprost 0.005% Ophthalmic Solution 1 Drop(s) Both EYES at bedtime  melatonin 10 milliGRAM(s) Oral at bedtime  mirabegron ER 25 milliGRAM(s) Oral at bedtime  montelukast 10 milliGRAM(s) Oral daily  pantoprazole  Injectable 40 milliGRAM(s) IV Push daily  polyethylene glycol 3350 17 Gram(s) Oral daily  senna 2 Tablet(s) Oral at bedtime  sodium chloride 3%  Inhalation 4 milliLiter(s) Inhalation every 12 hours  tiotropium 2.5 MICROgram(s) Inhaler 2 Puff(s) Inhalation daily    MEDICATIONS  (PRN):  acetaminophen     Tablet .. 650 milliGRAM(s) Oral every 6 hours PRN Temp greater or equal to 38C (100.4F), Mild Pain (1 - 3)  albuterol    90 MICROgram(s) HFA Inhaler 2 Puff(s) Inhalation every 6 hours PRN Shortness of Breath and/or Wheezing  aluminum hydroxide/magnesium hydroxide/simethicone Suspension 30 milliLiter(s) Oral every 4 hours PRN Dyspepsia  ondansetron Injectable 4 milliGRAM(s) IV Push every 8 hours PRN Nausea and/or Vomiting

## 2024-04-03 NOTE — PROGRESS NOTE ADULT - CRITICAL CARE ATTENDING COMMENT
85 yo M with PMH aortic stenosis s/p TAVR 2019, CAD s/p LELAND to LAD 2019, dementia, HLD, asthma, COPD, overactive bladder, H pylori, BPH admitted with Influenza A s/p multiple falls.     known cad s/p remote pci  demand ischemia in setting of influenza with extensive pna, RAY, rhabdomyolysis, agitation.  CPK out of proportion to Troponin, likely rhabdomyolysis s/p fall, now downtrending   continue ASA and Plavix.   BP stable 100-140's systolics   can give hydralazine 5 mg IVP PRN  No meaningful evidence of volume overload.  remains with severe resp failure on hfnc, unable to wean at this time   at risk of abrupt decompensation    Upon my evaluation, this patient is at high risk for imminent or life threatening deterioration due to resp failure, hypotension,  and other active medical issues which require my direct attention, intervention, and personal management.  I have personally spent >35 minutes  of critical care time exclusive of time spent on separate billing procedures. This includes review of laboratory data, radiology results, discussion with primary team\patient, and monitoring for potential decompensation Interventions were performed as documented above.
Upon my evaluation, this patient is at high risk for imminent or life threatening deterioration due to resp failure  and other active medical issues which require my direct attention, intervention, and personal management.  I have personally spent >30 minutes  of critical care time exclusive of time spent on separate billing procedures. This includes review of laboratory data, radiology results, discussion with primary team\patient, and monitoring for potential decompensation. Interventions were performed as documented above.

## 2024-04-03 NOTE — CASE MANAGEMENT PROGRESS NOTE - NSCMPROGRESSNOTE_GEN_ALL_CORE
spoke to patient's daughter Katharina. She stated that patient was walking around the house without assistive devices and he was alert.

## 2024-04-03 NOTE — PROGRESS NOTE ADULT - ASSESSMENT
85 yo M with PMH aortic stenosis s/p TAVR 2011, dementia, HLD, BPH presents to the ED with fall and flu like symptoms admitted for Respiratory Failure / Flu.

## 2024-04-03 NOTE — PROGRESS NOTE ADULT - SUBJECTIVE AND OBJECTIVE BOX
[facility]  INFECTIOUS DISEASES   [address] [place] [address]  =======================================================  Tel: [phone]     Fax: [phone]  MD Molly Landaverde MD Shah, Kaushal, MD Sunjit, Jaspal, MD  ========================================================      MARLON KRUEGER [id number]    Follow up: Respiratory failure    On HFNC 30L/40%, going down. No fever.     Allergies:  No Known Allergies    MEDICATIONS  (STANDING):  albuterol/ipratropium for Nebulization 3 milliLiter(s) Nebulizer every 6 hours  aspirin enteric coated 81 milliGRAM(s) Oral daily  bisacodyl 5 milliGRAM(s) Oral at bedtime  budesonide  80 MICROgram(s)/formoterol 4.5 MICROgram(s) Inhaler 2 Puff(s) Inhalation two times a day  cefTRIAXone   IVPB 1000 milliGRAM(s) IV Intermittent every 24 hours  clopidogrel Tablet 75 milliGRAM(s) Oral daily  dextrose 5%. 1000 milliLiter(s) (30 mL/Hr) IV Continuous <Continuous>  finasteride 5 milliGRAM(s) Oral daily  heparin   Injectable 5000 Unit(s) SubCutaneous every 12 hours  latanoprost 0.005% Ophthalmic Solution 1 Drop(s) Both EYES at bedtime  magnesium hydroxide Suspension 30 milliLiter(s) Oral once  melatonin 10 milliGRAM(s) Oral at bedtime  methylPREDNISolone sodium succinate Injectable 40 milliGRAM(s) IV Push daily  mirabegron ER 25 milliGRAM(s) Oral at bedtime  montelukast 10 milliGRAM(s) Oral daily  oseltamivir 75 milliGRAM(s) Oral two times a day  pantoprazole  Injectable 40 milliGRAM(s) IV Push daily  polyethylene glycol 3350 17 Gram(s) Oral daily  risperiDONE   Tablet 1 milliGRAM(s) Oral at bedtime  senna 2 Tablet(s) Oral at bedtime  sodium chloride 3%  Inhalation 4 milliLiter(s) Inhalation every 12 hours  tiotropium 2.5 MICROgram(s) Inhaler 2 Puff(s) Inhalation daily    REVIEW OF SYSTEMS:  unable to obtain due to medical condition    Physical Exam:  Vital Signs Last 24 Hrs  T(C): 36.8 (03 Apr 2024 05:10), Max: 36.8 (03 Apr 2024 05:10)  T(F): 98.3 (03 Apr 2024 05:10), Max: 98.3 (03 Apr 2024 05:10)  HR: 82 (03 Apr 2024 08:17) (77 - 87)  BP: 140/68 (03 Apr 2024 05:10) (105/65 - 140/68)  BP(mean): --  RR: 19 (03 Apr 2024 05:10) (18 - 19)  SpO2: 97% (03 Apr 2024 08:17) (95% - 99%)  Parameters below as of 03 Apr 2024 08:17  Patient On (Oxygen Delivery Method): nasal cannula, high flow  GEN: On HFNC  HEENT: normocephalic and atraumatic. Anicteric    NECK: Supple.   LUNGS: Course BS B/L   HEART: RRR  ABDOMEN: Soft, NT, ND.  +BS.    EXTREMITIES: trace edema.  MSK: No joint swelling  NEUROLOGIC: Confused   SKIN: No rash      Labs:                        13.5   10.89 )-----------( 182      ( 03 Apr 2024 07:00 )             40.8     04-02    146<H>  |  108  |  29<H>  ----------------------------<  138<H>  3.8   |  32<H>  |  0.79    Ca    9.3      02 Apr 2024 05:54    Culture - Urine (collected 03-29-24 @ 06:30)  Source: Clean Catch Clean Catch (Midstream)  Final Report (03-30-24 @ 17:39):    <10,000 CFU/mL Normal Urogenital Carolyn    Culture - Blood (collected 03-28-24 @ 16:00)  Source: .Blood Blood-Peripheral  Final Report (04-03-24 @ 01:00):    No growth at 5 days    Culture - Blood (collected 03-28-24 @ 15:50)  Source: .Blood Blood-Peripheral  Final Report (04-03-24 @ 01:00):    No growth at 5 days    WBC Count: 10.89 K/uL (04-03-24 @ 07:00)  WBC Count: 8.50 K/uL (04-02-24 @ 05:54)  WBC Count: 13.18 K/uL (04-01-24 @ 06:47)  WBC Count: 11.92 K/uL (03-31-24 @ 08:13)  WBC Count: 13.10 K/uL (03-30-24 @ 10:39)  WBC Count: 15.84 K/uL (03-29-24 @ 14:10)    Creatinine: 0.79 mg/dL (04-02-24 @ 05:54)  Creatinine: 0.80 mg/dL (04-01-24 @ 06:47)  Creatinine: 0.70 mg/dL (03-31-24 @ 08:13)  Creatinine: 0.94 mg/dL (03-30-24 @ 10:39)  Creatinine: 0.99 mg/dL (03-29-24 @ 16:05)    Procalcitonin, Serum: 0.38 ng/mL (03-31-24 @ 08:13)     SARS-CoV-2 Result: NotDetec (03-28-24 @ 16:00)      < from: Xray Chest 1 View- PORTABLE-Routine (Xray Chest 1 View- PORTABLE-Routine in AM.) (04.01.24 @ 09:34) >  ACC: [id number] EXAM:  XR CHEST PORTABLE ROUTINE 1V   ORDERED BY: RODRÍGUEZ CHRIS   PROCEDURE DATE:  04/01/2024    INTERPRETATION:  TECHNIQUE: Single portable view of the chest.  COMPARISON:  3/28/2024. Chest CT dated 3/29/2024  CLINICAL HISTORY: pneumonia  FINDINGS:  Single frontal view of the chest demonstrates diffuse left lung   infiltrates. Transcatheter aortic valve replacement. The   cardiomediastinal silhouette is normal. No acute osseous abnormalities.   Overlying EKG leads and wires are noted  IMPRESSION: Diffuse left lung infiltrates.    Assessment and plan:   85 yo M with PMH aortic stenosis s/p TAVR 2019, dementia, HLD, asthm, COPD, overactive bladder ,H pylori, BPH presents to the ED with lethargy/weakness.  In ED was found to have Influenza. Last night had RRT for hypoxia in 60s and labs showed high Troponin.  Leukocytosis and hypoxia most likely related to cardiac event. Since no sign of opacities in lungs, can hold antibiotics. High fever due to influenza.     # Influenza  # Acute respiratory failure   # MI    - Blood cultures 3/28 no growth   - UA and UC negative    - Pulmonary and cardiology follow up   - Creat back to normal   - Tamiflu for total of 5days was completed on 4/2  - Completed Ceftriaxone for 5days, 3/29-4/2  - Procalcitonin level 0.39 not high   - Leukocytosis normalizing ( was on steroids)  - Legionella U ag negative  - MRSA Pending   - Awaiting oxygenation to improve    Will sign off please call with any question.     Deniz Desai MD  Division of Infectious Diseases   Please call ID service at [phone] with any question.      50 minutes spent on total encounter assessing patient, examination, chart review, counseling and coordinating care by the attending physician/nurse/care manager.

## 2024-04-03 NOTE — PROGRESS NOTE ADULT - SUBJECTIVE AND OBJECTIVE BOX
Patient is a 84y old  Male who presents with a chief complaint of Influenza with other respiratory manifestations, other influenza virus identified   (30 Mar 2024 11:54)    Patient seen in follow up for RAY.        PAST MEDICAL HISTORY:  Aortic stenosis    BPH (benign prostatic hyperplasia)    Dementia    ACE (dyspnea on exertion)    Asthma    HLD (hyperlipidemia)    CAD (coronary artery disease)    OH (ocular hypertension)    Overactive bladder    Anxiety      MEDICATIONS  (STANDING):  albuterol/ipratropium for Nebulization 3 milliLiter(s) Nebulizer every 6 hours  aspirin enteric coated 81 milliGRAM(s) Oral daily  bisacodyl 5 milliGRAM(s) Oral at bedtime  budesonide  80 MICROgram(s)/formoterol 4.5 MICROgram(s) Inhaler 2 Puff(s) Inhalation two times a day  clopidogrel Tablet 75 milliGRAM(s) Oral daily  dextrose 5%. 1000 milliLiter(s) (30 mL/Hr) IV Continuous <Continuous>  finasteride 5 milliGRAM(s) Oral daily  heparin   Injectable 5000 Unit(s) SubCutaneous every 12 hours  latanoprost 0.005% Ophthalmic Solution 1 Drop(s) Both EYES at bedtime  melatonin 10 milliGRAM(s) Oral at bedtime  mirabegron ER 25 milliGRAM(s) Oral at bedtime  montelukast 10 milliGRAM(s) Oral daily  pantoprazole  Injectable 40 milliGRAM(s) IV Push daily  polyethylene glycol 3350 17 Gram(s) Oral daily  senna 2 Tablet(s) Oral at bedtime  sodium chloride 3%  Inhalation 4 milliLiter(s) Inhalation every 12 hours  tiotropium 2.5 MICROgram(s) Inhaler 2 Puff(s) Inhalation daily    MEDICATIONS  (PRN):  acetaminophen     Tablet .. 650 milliGRAM(s) Oral every 6 hours PRN Temp greater or equal to 38C (100.4F), Mild Pain (1 - 3)  albuterol    90 MICROgram(s) HFA Inhaler 2 Puff(s) Inhalation every 6 hours PRN Shortness of Breath and/or Wheezing  aluminum hydroxide/magnesium hydroxide/simethicone Suspension 30 milliLiter(s) Oral every 4 hours PRN Dyspepsia  ondansetron Injectable 4 milliGRAM(s) IV Push every 8 hours PRN Nausea and/or Vomiting    T(C): 37.2 (04-03-24 @ 13:30), Max: 37.2 (04-03-24 @ 13:30)  HR: 84 (04-03-24 @ 14:00) (75 - 104)  BP: 126/66 (04-03-24 @ 13:30) (104/65 - 162/87)  RR: 19 (04-03-24 @ 13:30)  SpO2: 96% (04-03-24 @ 14:00)  Wt(kg): --  I&O's Detail    02 Apr 2024 07:01  -  03 Apr 2024 07:00  --------------------------------------------------------  IN:  Total IN: 0 mL    OUT:    Indwelling Catheter - Urethral (mL): 1100 mL  Total OUT: 1100 mL    Total NET: -1100 mL                PHYSICAL EXAM:  General: No distress  Respiratory: b/l air entry  Cardiovascular: S1 S2  Gastrointestinal: soft  Extremities:  no edema                           LABORATORY:                        13.5   10.89 )-----------( 182      ( 03 Apr 2024 07:00 )             40.8     04-02    146<H>  |  108  |  29<H>  ----------------------------<  138<H>  3.8   |  32<H>  |  0.79    Ca    9.3      02 Apr 2024 05:54      Sodium: 146 mmol/L (04-02 @ 05:54)    Potassium: 3.8 mmol/L (04-02 @ 05:54)    Hemoglobin: 13.5 g/dL (04-03 @ 07:00)  Hemoglobin: 13.7 g/dL (04-02 @ 05:54)  Hemoglobin: 14.1 g/dL (04-01 @ 06:47)    Creatinine, Serum 0.79 (04-02 @ 05:54)  Creatinine, Serum 0.80 (04-01 @ 06:47)          Urinalysis Basic - ( 02 Apr 2024 05:54 )    Color: x / Appearance: x / SG: x / pH: x  Gluc: 138 mg/dL / Ketone: x  / Bili: x / Urobili: x   Blood: x / Protein: x / Nitrite: x   Leuk Esterase: x / RBC: x / WBC x   Sq Epi: x / Non Sq Epi: x / Bacteria: x

## 2024-04-03 NOTE — CASE MANAGEMENT PROGRESS NOTE - NSCMPROGRESSNOTE_GEN_ALL_CORE
Patient remains on high flow. Patient has a new Meneses for retention. charge planning unclear at present.  will continue to follow.

## 2024-04-03 NOTE — PROGRESS NOTE ADULT - ASSESSMENT
85 yo M with PMH aortic stenosis s/p TAVR 2019, CAD s/p LELAND to LAD 2019, dementia, HLD, asthma, COPD, overactive bladder, H pylori, BPH admitted with Influenza A s/p multiple falls.     CAD, respiratory failure, elev trop   - Troponins elevated, peaked 2300, now downtrending, no need to trend further   - likely elevated on the basis of demand ischemia in setting of influenza, RAY, rhabdomyolysis, agitation.  - With CPK out of proportion to Troponin, likely rhabdomyolysis s/p fall, now downtrending   - History of LELAND to LAD in 2019  - continue ASA and Plavix.     - H/o EKG with IVCD vs LBBB in the past.    - EKG's reveal elevated QTc ~500. repeat EKG with QTc: 446 Avoid QT prolonging medications.   - Telemetry: TELE: SR 70-80's, no tele events, would continue tele monitoring for now,  he is at risk of Afib in the setting of resp failure and structural heart disease   - BP stable 100-140's systolics   - can give hydralazine 5 mg IVP PRN for SBP >160 mmhg   - Monitor and replete Lytes. Keep K > 4 and Mg > 2  - continue to monitor routine hemodynamics    - No meaningful evidence of volume overload.  - TTE (6/23/2022): Normal ventricular systolic function, no segmental wall motion abnormalities, mild diastolic dysfunction.  - f/u TTE     - + Flu, multifocal PNA, management per primary   - on HFNC 40%   - at risk of abrupt decompensation     - Will continue to follow.    Ally Gonsalez [facility]  Nurse Practitioner - Cardiology   call TEAMS

## 2024-04-03 NOTE — CHART NOTE - NSCHARTNOTEFT_GEN_A_CORE
Per my conversation with patient's daughter Georgia, outpatient urologist Dr. Alfredo Hirsch as noted below. Patient failed trial of void, Meneses reinserted 4/2. Daughter aware that if not able to succeed with TOV, patient likely to be d/c with Meneses and outpatient follow up with his urologist.       Alfredo Hirsch MD, FACS  Urologist in [place], [place]  Address: [address] [place] [address] [address], [place] [address]  Phone: [phone]

## 2024-04-04 LAB
ANION GAP SERPL CALC-SCNC: 10 MMOL/L — SIGNIFICANT CHANGE UP (ref 5–17)
BUN SERPL-MCNC: 24 MG/DL — HIGH (ref 7–23)
CALCIUM SERPL-MCNC: 8.7 MG/DL — SIGNIFICANT CHANGE UP (ref 8.5–10.1)
CHLORIDE SERPL-SCNC: 103 MMOL/L — SIGNIFICANT CHANGE UP (ref 96–108)
CO2 SERPL-SCNC: 30 MMOL/L — SIGNIFICANT CHANGE UP (ref 22–31)
CREAT SERPL-MCNC: 0.79 MG/DL — SIGNIFICANT CHANGE UP (ref 0.5–1.3)
EGFR: 87 ML/MIN/1.73M2 — SIGNIFICANT CHANGE UP
GLUCOSE SERPL-MCNC: 141 MG/DL — HIGH (ref 70–99)
HCT VFR BLD CALC: 43.7 % — SIGNIFICANT CHANGE UP (ref 39–50)
HGB BLD-MCNC: 14.9 G/DL — SIGNIFICANT CHANGE UP (ref 13–17)
MCHC RBC-ENTMCNC: 28.4 PG — SIGNIFICANT CHANGE UP (ref 27–34)
MCHC RBC-ENTMCNC: 34.1 GM/DL — SIGNIFICANT CHANGE UP (ref 32–36)
MCV RBC AUTO: 83.2 FL — SIGNIFICANT CHANGE UP (ref 80–100)
MRSA PCR RESULT.: SIGNIFICANT CHANGE UP
NRBC # BLD: 0 /100 WBCS — SIGNIFICANT CHANGE UP (ref 0–0)
PLATELET # BLD AUTO: 245 K/UL — SIGNIFICANT CHANGE UP (ref 150–400)
POTASSIUM SERPL-MCNC: 3.6 MMOL/L — SIGNIFICANT CHANGE UP (ref 3.5–5.3)
POTASSIUM SERPL-SCNC: 3.6 MMOL/L — SIGNIFICANT CHANGE UP (ref 3.5–5.3)
RBC # BLD: 5.25 M/UL — SIGNIFICANT CHANGE UP (ref 4.2–5.8)
RBC # FLD: 13.3 % — SIGNIFICANT CHANGE UP (ref 10.3–14.5)
S AUREUS DNA NOSE QL NAA+PROBE: SIGNIFICANT CHANGE UP
SODIUM SERPL-SCNC: 143 MMOL/L — SIGNIFICANT CHANGE UP (ref 135–145)
WBC # BLD: 12.51 K/UL — HIGH (ref 3.8–10.5)
WBC # FLD AUTO: 12.51 K/UL — HIGH (ref 3.8–10.5)

## 2024-04-04 PROCEDURE — 99233 SBSQ HOSP IP/OBS HIGH 50: CPT

## 2024-04-04 RX ADMIN — Medication 5 MILLIGRAM(S): at 22:28

## 2024-04-04 RX ADMIN — Medication 3 MILLILITER(S): at 07:23

## 2024-04-04 RX ADMIN — TIOTROPIUM BROMIDE 2 PUFF(S): 18 CAPSULE ORAL; RESPIRATORY (INHALATION) at 05:09

## 2024-04-04 RX ADMIN — HEPARIN SODIUM 5000 UNIT(S): 5000 INJECTION INTRAVENOUS; SUBCUTANEOUS at 18:31

## 2024-04-04 RX ADMIN — Medication 10 MILLIGRAM(S): at 22:29

## 2024-04-04 RX ADMIN — CLOPIDOGREL BISULFATE 75 MILLIGRAM(S): 75 TABLET, FILM COATED ORAL at 13:05

## 2024-04-04 RX ADMIN — PANTOPRAZOLE SODIUM 40 MILLIGRAM(S): 20 TABLET, DELAYED RELEASE ORAL at 13:04

## 2024-04-04 RX ADMIN — HEPARIN SODIUM 5000 UNIT(S): 5000 INJECTION INTRAVENOUS; SUBCUTANEOUS at 05:09

## 2024-04-04 RX ADMIN — LATANOPROST 1 DROP(S): 0.05 SOLUTION/ DROPS OPHTHALMIC; TOPICAL at 22:30

## 2024-04-04 RX ADMIN — POLYETHYLENE GLYCOL 3350 17 GRAM(S): 17 POWDER, FOR SOLUTION ORAL at 13:04

## 2024-04-04 RX ADMIN — Medication 3 MILLILITER(S): at 01:33

## 2024-04-04 RX ADMIN — BUDESONIDE AND FORMOTEROL FUMARATE DIHYDRATE 2 PUFF(S): 160; 4.5 AEROSOL RESPIRATORY (INHALATION) at 05:09

## 2024-04-04 RX ADMIN — Medication 81 MILLIGRAM(S): at 13:05

## 2024-04-04 RX ADMIN — Medication 3 MILLILITER(S): at 13:33

## 2024-04-04 RX ADMIN — SENNA PLUS 2 TABLET(S): 8.6 TABLET ORAL at 22:29

## 2024-04-04 RX ADMIN — MIRABEGRON 25 MILLIGRAM(S): 50 TABLET, EXTENDED RELEASE ORAL at 22:31

## 2024-04-04 RX ADMIN — BUDESONIDE AND FORMOTEROL FUMARATE DIHYDRATE 2 PUFF(S): 160; 4.5 AEROSOL RESPIRATORY (INHALATION) at 22:32

## 2024-04-04 RX ADMIN — Medication 650 MILLIGRAM(S): at 23:28

## 2024-04-04 RX ADMIN — SODIUM CHLORIDE 4 MILLILITER(S): 9 INJECTION INTRAMUSCULAR; INTRAVENOUS; SUBCUTANEOUS at 07:21

## 2024-04-04 RX ADMIN — Medication 650 MILLIGRAM(S): at 22:28

## 2024-04-04 RX ADMIN — MONTELUKAST 10 MILLIGRAM(S): 4 TABLET, CHEWABLE ORAL at 13:05

## 2024-04-04 RX ADMIN — FINASTERIDE 5 MILLIGRAM(S): 5 TABLET, FILM COATED ORAL at 13:04

## 2024-04-04 NOTE — PROGRESS NOTE ADULT - SUBJECTIVE AND OBJECTIVE BOX
Patient is a 84y old  Male who presents with a chief complaint of RF, fLU (04 Apr 2024 14:07)      INTERVAL HPI/OVERNIGHT EVENTS:    more alert. no respiratory distress    MEDICATIONS  (STANDING):  albuterol/ipratropium for Nebulization 3 milliLiter(s) Nebulizer every 6 hours  aspirin enteric coated 81 milliGRAM(s) Oral daily  bisacodyl 5 milliGRAM(s) Oral at bedtime  budesonide  80 MICROgram(s)/formoterol 4.5 MICROgram(s) Inhaler 2 Puff(s) Inhalation two times a day  clopidogrel Tablet 75 milliGRAM(s) Oral daily  finasteride 5 milliGRAM(s) Oral daily  heparin   Injectable 5000 Unit(s) SubCutaneous every 12 hours  latanoprost 0.005% Ophthalmic Solution 1 Drop(s) Both EYES at bedtime  melatonin 10 milliGRAM(s) Oral at bedtime  mirabegron ER 25 milliGRAM(s) Oral at bedtime  montelukast 10 milliGRAM(s) Oral daily  pantoprazole  Injectable 40 milliGRAM(s) IV Push daily  polyethylene glycol 3350 17 Gram(s) Oral daily  senna 2 Tablet(s) Oral at bedtime  sodium chloride 3%  Inhalation 4 milliLiter(s) Inhalation every 12 hours  tiotropium 2.5 MICROgram(s) Inhaler 2 Puff(s) Inhalation daily      MEDICATIONS  (PRN):  acetaminophen     Tablet .. 650 milliGRAM(s) Oral every 6 hours PRN Temp greater or equal to 38C (100.4F), Mild Pain (1 - 3)  albuterol    90 MICROgram(s) HFA Inhaler 2 Puff(s) Inhalation every 6 hours PRN Shortness of Breath and/or Wheezing  aluminum hydroxide/magnesium hydroxide/simethicone Suspension 30 milliLiter(s) Oral every 4 hours PRN Dyspepsia  ondansetron Injectable 4 milliGRAM(s) IV Push every 8 hours PRN Nausea and/or Vomiting      Allergies    No Known Allergies    Intolerances        PAST MEDICAL & SURGICAL HISTORY:  Aortic stenosis      BPH (benign prostatic hyperplasia)      Dementia  mild- on Memantine      ACE (dyspnea on exertion)      Asthma  controlled on inhalers      HLD (hyperlipidemia)      CAD (coronary artery disease)  s/p stent 10/2019      OH (ocular hypertension)  on eye drops      Overactive bladder      Anxiety      History of tonsillectomy  childhood      H/O coronary angiogram  10/25/19, s/p PCI to LAD          Vital Signs Last 24 Hrs  T(C): 37.2 (04 Apr 2024 12:36), Max: 37.2 (04 Apr 2024 05:10)  T(F): 98.9 (04 Apr 2024 12:36), Max: 98.9 (04 Apr 2024 05:10)  HR: 93 (04 Apr 2024 14:14) (85 - 98)  BP: 126/79 (04 Apr 2024 12:36) (115/58 - 148/77)  BP(mean): --  RR: 19 (04 Apr 2024 12:36) (19 - 19)  SpO2: 92% (04 Apr 2024 14:14) (92% - 96%)    Parameters below as of 04 Apr 2024 14:14  Patient On (Oxygen Delivery Method): nasal cannula, 4L        PHYSICAL EXAMINATION:    GENERAL: The patient is awake and alert in no apparent distress.     HEENT: Head is normocephalic and atraumatic    NECK: no JVD    LUNGS: bilateral rhonchi    HEART: Regular rate and rhythm without murmur.    ABDOMEN: Soft, nontender, and nondistended.      EXTREMITIES: Without any cyanosis, clubbing, rash, lesions or edema.    NEUROLOGIC: Grossly intact.    SKIN: No ulceration or induration present.      LABS:                        14.9   12.51 )-----------( 245      ( 04 Apr 2024 07:30 )             43.7     04-04    143  |  103  |  24<H>  ----------------------------<  141<H>  3.6   |  30  |  0.79    Ca    8.7      04 Apr 2024 07:30        Urinalysis Basic - ( 04 Apr 2024 07:30 )    Color: x / Appearance: x / SG: x / pH: x  Gluc: 141 mg/dL / Ketone: x  / Bili: x / Urobili: x   Blood: x / Protein: x / Nitrite: x   Leuk Esterase: x / RBC: x / WBC x   Sq Epi: x / Non Sq Epi: x / Bacteria: x                Assessment:    Influenza Bronchitis  Superimposed bacterial pneumonia  COPD with exacerbation  Acute on Chronic Hypoxic respiratory failure    Plan:    Nebulizer treatments with Duoneb + hypertonic saline  Supplemental oxygen  Continue Solumedrol  Symbicort + Spiriva inhalers  Duoneb QID  Completed course of antibiotics

## 2024-04-04 NOTE — PROVIDER CONTACT NOTE (OTHER) - SITUATION
Pt coughing up blood ringed sputum.
family at bedside, pt eating lunch. No C/O chest pain
Pt's pad is dry, bedside bladder scan shows >726ml

## 2024-04-04 NOTE — PROVIDER CONTACT NOTE (OTHER) - ACTION/TREATMENT ORDERED:
Straight cath
Provider made aware. Provider stated, "expected from upper respiratory infection." No new orders at this time. Will continue to monitor.
NP aware, strip printed and placed in chart

## 2024-04-04 NOTE — PROGRESS NOTE ADULT - ASSESSMENT
83 yo M with PMH aortic stenosis s/p TAVR 2019, CAD s/p LELAND to LAD 2019, dementia, HLD, asthma, COPD, overactive bladder, H pylori, BPH admitted with Influenza A s/p multiple falls.     CAD, respiratory failure, elev trop   - Troponins elevated, peaked 2300, now downtrending, no need to trend further   - likely elevated on the basis of demand ischemia in setting of influenza, RAY, rhabdomyolysis, agitation.  - With CPK out of proportion to Troponin, likely rhabdomyolysis s/p fall, now downtrending   - History of LELAND to LAD in 2019  - continue ASA and Plavix.     - H/o EKG with IVCD vs LBBB in the past.    - EKG's reveal elevated QTc ~500. repeat EKG with QTc: 446 Avoid QT prolonging medications.   - Telemetry:     - No meaningful evidence of volume overload.  - TTE (6/23/2022): Normal ventricular systolic function, no segmental wall motion abnormalities, mild diastolic dysfunction.  - f/u TTE     - + Flu, multifocal PNA, management per primary      - Will continue to follow. 85 yo M with PMH aortic stenosis s/p TAVR 2019, CAD s/p LELAND to LAD 2019, dementia, HLD, asthma, COPD, overactive bladder, H pylori, BPH admitted with Influenza A s/p multiple falls.     CAD, respiratory failure, elev trop   - Troponins elevated, peaked 2300, now downtrending, no need to trend further   - likely elevated on the basis of demand ischemia in setting of influenza, RAY, rhabdomyolysis, agitation.  - With CPK out of proportion to Troponin, likely rhabdomyolysis s/p fall, now downtrending   - History of LELAND to LAD in 2019  - continue ASA and Plavix.     - H/o EKG with IVCD vs LBBB in the past.    - EKG's reveal elevated QTc ~500. repeat EKG with QTc: 446 Avoid QT prolonging medications.   - Telemetry: SR no events overnight     - No meaningful evidence of volume overload.  - TTE 4/2/24 as above diff stuy ef 50-55% full reports as above     - + Flu, multifocal PNA, management per primary  / ID     -followed by renal ray now resolved     - Will continue to follow. 85 yo M with PMH aortic stenosis s/p TAVR 2019, CAD s/p LELAND to LAD 2019, dementia, HLD, asthma, COPD, overactive bladder, H pylori, BPH admitted with Influenza A s/p multiple falls.     CAD, respiratory failure, elev trop   - Troponins elevated, peaked 2300, now downtrending, no need to trend further   - likely elevated on the basis of demand ischemia in setting of influenza, RAY, rhabdomyolysis, agitation.  - With CPK out of proportion to Troponin, likely rhabdomyolysis s/p fall, now downtrending   - History of LELAND to LAD in 2019  - Would continue either ASA or Plavix. Unclear indication for DAPT.     - H/o EKG with IVCD vs LBBB in the past.    - EKG's reveal elevated QTc ~500. repeat EKG with QTc: 446 Avoid QT prolonging medications.   - Telemetry: SR no events overnight     - No meaningful evidence of volume overload.  - TTE 4/2/24 as above diff stuy ef 50-55% full reports as above     - + Flu, multifocal PNA, management per primary  / ID     -followed by renal ray now resolved     - Will continue to follow.

## 2024-04-04 NOTE — CASE MANAGEMENT PROGRESS NOTE - NSCMPROGRESSNOTE_GEN_ALL_CORE
Patient is no longer on  Hi-Flow, he is on O2 4L via nasal cannula. Ramya Marques RN / [phone] will continue to follow. Patient is no longer on  Hi-Flow, he is on O2 4L via nasal cannula.  will continue to follow.

## 2024-04-04 NOTE — PROGRESS NOTE ADULT - SUBJECTIVE AND OBJECTIVE BOX
[facility] -- Halima Hayes Pannella, Patel, Savella Goodger, Cohen  Office # [phone]      Follow Up:    flu, pna, elevated troponin   Subjective/Observations:       REVIEW OF SYSTEMS: All other review of systems is negative unless indicated above    PAST MEDICAL & SURGICAL HISTORY:  Aortic stenosis      BPH (benign prostatic hyperplasia)      Dementia  mild- on Memantine      ACE (dyspnea on exertion)      Asthma  controlled on inhalers      HLD (hyperlipidemia)      CAD (coronary artery disease)  s/p stent 10/2019      OH (ocular hypertension)  on eye drops      Overactive bladder      Anxiety      History of tonsillectomy  childhood      H/O coronary angiogram  10/25/19, s/p PCI to LAD          MEDICATIONS  (STANDING):  albuterol/ipratropium for Nebulization 3 milliLiter(s) Nebulizer every 6 hours  aspirin enteric coated 81 milliGRAM(s) Oral daily  bisacodyl 5 milliGRAM(s) Oral at bedtime  budesonide  80 MICROgram(s)/formoterol 4.5 MICROgram(s) Inhaler 2 Puff(s) Inhalation two times a day  clopidogrel Tablet 75 milliGRAM(s) Oral daily  dextrose 5%. 1000 milliLiter(s) (30 mL/Hr) IV Continuous <Continuous>  finasteride 5 milliGRAM(s) Oral daily  heparin   Injectable 5000 Unit(s) SubCutaneous every 12 hours  latanoprost 0.005% Ophthalmic Solution 1 Drop(s) Both EYES at bedtime  melatonin 10 milliGRAM(s) Oral at bedtime  mirabegron ER 25 milliGRAM(s) Oral at bedtime  montelukast 10 milliGRAM(s) Oral daily  pantoprazole  Injectable 40 milliGRAM(s) IV Push daily  polyethylene glycol 3350 17 Gram(s) Oral daily  senna 2 Tablet(s) Oral at bedtime  sodium chloride 3%  Inhalation 4 milliLiter(s) Inhalation every 12 hours  tiotropium 2.5 MICROgram(s) Inhaler 2 Puff(s) Inhalation daily    MEDICATIONS  (PRN):  acetaminophen     Tablet .. 650 milliGRAM(s) Oral every 6 hours PRN Temp greater or equal to 38C (100.4F), Mild Pain (1 - 3)  albuterol    90 MICROgram(s) HFA Inhaler 2 Puff(s) Inhalation every 6 hours PRN Shortness of Breath and/or Wheezing  aluminum hydroxide/magnesium hydroxide/simethicone Suspension 30 milliLiter(s) Oral every 4 hours PRN Dyspepsia  ondansetron Injectable 4 milliGRAM(s) IV Push every 8 hours PRN Nausea and/or Vomiting      Allergies    No Known Allergies    Intolerances        Vital Signs Last 24 Hrs  T(C): 37.2 (2024 05:10), Max: 37.2 (2024 13:30)  T(F): 98.9 (2024 05:10), Max: 98.9 (2024 13:30)  HR: 90 (2024 05:10) (82 - 93)  BP: 148/77 (2024 05:10) (115/58 - 148/77)  BP(mean): --  RR: 19 (2024 05:10) (19 - 19)  SpO2: 92% (2024 05:10) (92% - 97%)    Parameters below as of 2024 05:10  Patient On (Oxygen Delivery Method): nasal cannula        I&O's Summary    2024 07:01  -  2024 07:00  --------------------------------------------------------  IN: 360 mL / OUT: 600 mL / NET: -240 mL          PHYSICAL EXAM:  TELE:   Constitutional: NAD, awake and alert, well-developed  HEENT: Moist Mucous Membranes, Anicteric  Pulmonary: Non-labored, breath sounds are scattered rhonchi   Cardiovascular: Regular, S1 and S2 nl, No murmurs, rubs, gallops or clicks  Gastrointestinal: Bowel Sounds present, soft, nontender.   Lymph: No lymphadenopathy. No peripheral edema.  Skin: No visible rashes or ulcers.  Psych:  Mood & affect appropriate    LABS: All Labs Reviewed:                        13.5   10.89 )-----------( 182      ( 2024 07:00 )             40.8                         13.7   8.50  )-----------( 185      ( 2024 05:54 )             42.0     2024 05:54    146    |  108    |  29     ----------------------------<  138    3.8     |  32     |  0.79     Ca    9.3        2024 05:54               EC Lead ECG:   Ventricular Rate 93 BPM    Atrial Rate 93 BPM    P-R Interval 194 ms    QRS Duration 130 ms    Q-T Interval 356 ms    QTC Calculation(Bazett) 442 ms    P Axis 74 degrees    R Axis -14 degrees    T Axis 154 degrees    Diagnosis Line Normal sinus rhythm  LVH with qrs widening and repol changes   Abnormal ECG  When compared with ECG of 2024 04:38, (Unconfirmed)  No significant change was found  Confirmed by CINDY FIGUEROA ([id number]) on 2024 7:01:23 PM (24 @ 09:13)      TRANSTHORACIC ECHOCARDIOGRAM REPORT  ________________________________________________________________________________                                      _______       Pt. Name:       MARLON KRUEGER Study Date:    2024  MRN:            [id number]          YOB: 1940  Accession #:    [id number]         Age:           84 years  Account#:       [id number]        Gender:        M  Heart Rate:                       Height:        68.11 in (173.00 cm)  Rhythm:    Weight:        185.19 lb (84.00 kg)  Blood Pressure: 105/65 mmHg       BSA/BMI:       1.98 m² / 28.07 kg/m²  ________________________________________________________________________________________  Referring Physician:    [id number] Rosendo Hawkins  Interpreting Physician: Cindy Figueroa  Primary Sonographer:    Gaudencio Payton    CPT:               ECHO TTE WO CON COMP W DOPP - 28980.m  Indication(s):     Abnormal electrocardiogram ECG/EKG - R94.31  Procedure:         Transthoracic echocardiogram with 2-D, M-mode and complete                     spectral and color flow Doppler.  Ordering Location: [facility]  Admission Status:  Inpatient  Study Information: Image quality for this study is technically difficult.    _______________________________________________________________________________________     CONCLUSIONS:      1. Technically difficult image quality.   2. Left ventricular endocardium is not well visualized; however, the left ventricular systolic function appears grossly normal.  3. Abnormal (paradoxical) septal motion consistent with conduction delay. Left ventricular systolic function is normal with an ejection fraction visually estimated at 50 to 55 %.   4. A prominent papillary muscule is noted.   5. There is normal LV mass and concentric remodeling.   6. The right ventricle is not well visualized. normal systolic function.   7. The left atrium is normal.   8. Mitral valve leaflets have focal calcification.   9. Trace mitral regurgitation.  10. Aortic valve was not well visualized.  11. Trace pericardial effusion noted adjacent to the anterior right ventricle.    ________________________________________________________________________________________  FINDINGS:     Left Ventricle:  Abnormal (paradoxical) septal motion consistent with conduction delay. Left ventricular systolic function is normal with an ejection fraction visually estimated at 50 to 55%. There is normal LV mass and concentric remodeling. Left ventricular endocardium is not well visualized; however, the left ventricular systolic function appears grossly normal. A prominent papillary muscule is noted.     Right Ventricle:  The right ventricle is not well visualized. Normal systolic function. Tricuspid annular plane systolic excursion (TAPSE) is 1.8 cm (normal >=1.7 cm).     Left Atrium:  The left atrium is normal with an indexed volume of 18.78 ml/m².     Right Atrium:  The right atrium is normal in size.     Aortic Valve:  A a transcatheter deployed (TAVR) is present in the aortic position. The aortic valve was not well visualized. The peak transaortic velocity is 1.38 m/s, peak transaortic gradient is 7.6 mmHg and mean transaortic gradient is 3.0 mmHg with an LVOT/aortic valve VTI ratio of 0.59. The aortic valve area is estimated at1.66 cm² by the continuity equation.     Mitral Valve:  There is calcification of the mitral valve annulus. Mitral valve leaflets have focal calcification. There is trace mitral regurgitation.     Tricuspid Valve:  Structurally normal tricuspid valvewith normal leaflet excursion. The tricuspid valve was not well visualized. There is trace tricuspid regurgitation. There is insufficient tricuspid regurgitation detected to calculate pulmonary artery systolic pressure.     Pulmonic Valve:  The pulmonic valve was not well visualized.     Aorta:  The aortic root at the sinuses of Valsalva is normal in size, measuring 2.30 cm (indexed 1.16 cm/m²).     Pericardium:  There is a trace pericardial effusion noted adjacent to the anterior right ventricle.     Systemic Veins:  The inferior vena cava is normal in size measuring 1.65 cm in diameter, (normal <2.1cm) with normal inspiratory collapse (normal >50%) consistent with normal right atrial pressure (~3, range 0-5mmHg).  ____________________________________________________________________  QUANTITATIVE DATA:  Left Ventricle Measurements: (Indexed to BSA)     IVSd (2D):   1.4 cm  LVPWd (2D):  1.4 cm  LVIDd (2D):  4.0 cm  LVIDs (2D):  3.0 cm  LV Mass:     203 g  102.6 g/m²  Visualized LV EF%: 50to 55%     MV E Vmax:    0.76 m/s  MV A Vmax:    0.98 m/s  MV E/A:       0.77  e' lateral:   5.22 cm/s  e' medial:    3.05 cm/s  E/e' lateral: 14.46  E/e' medial:  24.75  E/e' Average: 18.26  MV DT:        255 msec    Aorta Measurements: (Normal range) (Indexed to BSA)     Sinuses of Valsalva: 2.30 cm (3.1 - 3.7 cm)       Left Atrium Measurements: (Indexed to BSA)  LA Diam 2D: 3.50 cm    Right Ventricle Measurements:     TAPSE: 1.8 cm       LVOT / RVOT/ Qp/Qs Data: (Indexed to BSA)  LVOT Diameter: 1.90 cm  LVOT Vmax:     0.82 m/s  LVOT VTI:      12.30 cm  LVOT SV:       34.9 ml  17.61 ml/m²    Aortic Valve Measurements:  AV Vmax:                1.4 m/s  AV Peak Gradient:       7.6 mmHg  AV Mean Gradient:       3.0 mmHg  AV VTI: 21.0 cm  AV VTI Ratio:           0.59  AoV EOA, Contin:        1.66 cm²  AoV EOA, Contin i:      0.84 cm²/m²  AoV Dimensionless Index 0.59    Mitral Valve Measurements:     MV E Vmax: 0.8 m/s  MV A Vmax: 1.0 m/s  MV E/A:    0.8       Tricuspid ValveMeasurements:     RA Pressure: 3 mmHg    ________________________________________________________________________________________  Electronically signed on 4/3/2024 at 9:33:13 AM by Cindy Figueroa         *** Final ***      Radiology:         [facility] -- Halima Hayes Pannella, Patel, Savella Goodger, Cohen  Office # [phone]      Follow Up:    flu, pna, elevated troponin   Subjective/Observations:   Seen  bedside, remains on NC dementia unable to give any meaningful information     REVIEW OF SYSTEMS: All other review of systems is negative unless indicated above    PAST MEDICAL & SURGICAL HISTORY:  Aortic stenosis      BPH (benign prostatic hyperplasia)      Dementia  mild- on Memantine      ACE (dyspnea on exertion)      Asthma  controlled on inhalers      HLD (hyperlipidemia)      CAD (coronary artery disease)  s/p stent 10/2019      OH (ocular hypertension)  on eye drops      Overactive bladder      Anxiety      History of tonsillectomy  childhood      H/O coronary angiogram  10/25/19, s/p PCI to LAD          MEDICATIONS  (STANDING):  albuterol/ipratropium for Nebulization 3 milliLiter(s) Nebulizer every 6 hours  aspirin enteric coated 81 milliGRAM(s) Oral daily  bisacodyl 5 milliGRAM(s) Oral at bedtime  budesonide  80 MICROgram(s)/formoterol 4.5 MICROgram(s) Inhaler 2 Puff(s) Inhalation two times a day  clopidogrel Tablet 75 milliGRAM(s) Oral daily  dextrose 5%. 1000 milliLiter(s) (30 mL/Hr) IV Continuous <Continuous>  finasteride 5 milliGRAM(s) Oral daily  heparin   Injectable 5000 Unit(s) SubCutaneous every 12 hours  latanoprost 0.005% Ophthalmic Solution 1 Drop(s) Both EYES at bedtime  melatonin 10 milliGRAM(s) Oral at bedtime  mirabegron ER 25 milliGRAM(s) Oral at bedtime  montelukast 10 milliGRAM(s) Oral daily  pantoprazole  Injectable 40 milliGRAM(s) IV Push daily  polyethylene glycol 3350 17 Gram(s) Oral daily  senna 2 Tablet(s) Oral at bedtime  sodium chloride 3%  Inhalation 4 milliLiter(s) Inhalation every 12 hours  tiotropium 2.5 MICROgram(s) Inhaler 2 Puff(s) Inhalation daily    MEDICATIONS  (PRN):  acetaminophen     Tablet .. 650 milliGRAM(s) Oral every 6 hours PRN Temp greater or equal to 38C (100.4F), Mild Pain (1 - 3)  albuterol    90 MICROgram(s) HFA Inhaler 2 Puff(s) Inhalation every 6 hours PRN Shortness of Breath and/or Wheezing  aluminum hydroxide/magnesium hydroxide/simethicone Suspension 30 milliLiter(s) Oral every 4 hours PRN Dyspepsia  ondansetron Injectable 4 milliGRAM(s) IV Push every 8 hours PRN Nausea and/or Vomiting      Allergies    No Known Allergies    Intolerances        Vital Signs Last 24 Hrs  T(C): 37.2 (2024 05:10), Max: 37.2 (2024 13:30)  T(F): 98.9 (2024 05:10), Max: 98.9 (2024 13:30)  HR: 90 (2024 05:10) (82 - 93)  BP: 148/77 (2024 05:10) (115/58 - 148/77)  BP(mean): --  RR: 19 (2024 05:10) (19 - 19)  SpO2: 92% (2024 05:10) (92% - 97%)    Parameters below as of 2024 05:10  Patient On (Oxygen Delivery Method): nasal cannula        I&O's Summary    2024 07:01  -  2024 07:00  --------------------------------------------------------  IN: 360 mL / OUT: 600 mL / NET: -240 mL          PHYSICAL EXAM:  TELE:    Constitutional: NAD, awake and alert, well-developed  HEENT: Moist Mucous Membranes, Anicteric  Pulmonary: Non-labored, breath sounds are scattered rhonchi   Cardiovascular: Regular, S1 and S2 nl, No murmurs, rubs, gallops or clicks  Gastrointestinal: Bowel Sounds present, soft, nontender.   Lymph: No lymphadenopathy. No peripheral edema.  Skin: No visible rashes or ulcers.  Psych confused     LABS: All Labs Reviewed:                        13.5   10.89 )-----------( 182      ( 2024 07:00 )             40.8                         13.7   8.50  )-----------( 185      ( 2024 05:54 )             42.0     2024 05:54    146    |  108    |  29     ----------------------------<  138    3.8     |  32     |  0.79     Ca    9.3        2024 05:54               EC Lead ECG:   Ventricular Rate 93 BPM    Atrial Rate 93 BPM    P-R Interval 194 ms    QRS Duration 130 ms    Q-T Interval 356 ms    QTC Calculation(Bazett) 442 ms    P Axis 74 degrees    R Axis -14 degrees    T Axis 154 degrees    Diagnosis Line Normal sinus rhythm  LVH with qrs widening and repol changes   Abnormal ECG  When compared with ECG of 2024 04:38, (Unconfirmed)  No significant change was found  Confirmed by CINDY FIGUREOA ([id number]) on 2024 7:01:23 PM (24 @ 09:13)      TRANSTHORACIC ECHOCARDIOGRAM REPORT  ________________________________________________________________________________                                      _______       Pt. Name:       MARLON KRUEGER Study Date:    2024  MRN:            [id number]          YOB: 1940  Accession #:    [id number]         Age:           84 years  Account#:       [id number]        Gender:        M  Heart Rate:                       Height:        68.11 in (173.00 cm)  Rhythm:    Weight:        185.19 lb (84.00 kg)  Blood Pressure: 105/65 mmHg       BSA/BMI:       1.98 m² / 28.07 kg/m²  ________________________________________________________________________________________  Referring Physician:    [id number] Rosendo Hawkins  Interpreting Physician: Cindy Figueroa  Primary Sonographer:    Gaudencio Payton    CPT:               ECHO TTE WO CON COMP W DOPP - 23277.m  Indication(s):     Abnormal electrocardiogram ECG/EKG - R94.31  Procedure:         Transthoracic echocardiogram with 2-D, M-mode and complete                     spectral and color flow Doppler.  Ordering Location: [facility]  Admission Status:  Inpatient  Study Information: Image quality for this study is technically difficult.    _______________________________________________________________________________________     CONCLUSIONS:      1. Technically difficult image quality.   2. Left ventricular endocardium is not well visualized; however, the left ventricular systolic function appears grossly normal.  3. Abnormal (paradoxical) septal motion consistent with conduction delay. Left ventricular systolic function is normal with an ejection fraction visually estimated at 50 to 55 %.   4. A prominent papillary muscule is noted.   5. There is normal LV mass and concentric remodeling.   6. The right ventricle is not well visualized. normal systolic function.   7. The left atrium is normal.   8. Mitral valve leaflets have focal calcification.   9. Trace mitral regurgitation.  10. Aortic valve was not well visualized.  11. Trace pericardial effusion noted adjacent to the anterior right ventricle.    ________________________________________________________________________________________  FINDINGS:     Left Ventricle:  Abnormal (paradoxical) septal motion consistent with conduction delay. Left ventricular systolic function is normal with an ejection fraction visually estimated at 50 to 55%. There is normal LV mass and concentric remodeling. Left ventricular endocardium is not well visualized; however, the left ventricular systolic function appears grossly normal. A prominent papillary muscule is noted.     Right Ventricle:  The right ventricle is not well visualized. Normal systolic function. Tricuspid annular plane systolic excursion (TAPSE) is 1.8 cm (normal >=1.7 cm).     Left Atrium:  The left atrium is normal with an indexed volume of 18.78 ml/m².     Right Atrium:  The right atrium is normal in size.     Aortic Valve:  A a transcatheter deployed (TAVR) is present in the aortic position. The aortic valve was not well visualized. The peak transaortic velocity is 1.38 m/s, peak transaortic gradient is 7.6 mmHg and mean transaortic gradient is 3.0 mmHg with an LVOT/aortic valve VTI ratio of 0.59. The aortic valve area is estimated at1.66 cm² by the continuity equation.     Mitral Valve:  There is calcification of the mitral valve annulus. Mitral valve leaflets have focal calcification. There is trace mitral regurgitation.     Tricuspid Valve:  Structurally normal tricuspid valvewith normal leaflet excursion. The tricuspid valve was not well visualized. There is trace tricuspid regurgitation. There is insufficient tricuspid regurgitation detected to calculate pulmonary artery systolic pressure.     Pulmonic Valve:  The pulmonic valve was not well visualized.     Aorta:  The aortic root at the sinuses of Valsalva is normal in size, measuring 2.30 cm (indexed 1.16 cm/m²).     Pericardium:  There is a trace pericardial effusion noted adjacent to the anterior right ventricle.     Systemic Veins:  The inferior vena cava is normal in size measuring 1.65 cm in diameter, (normal <2.1cm) with normal inspiratory collapse (normal >50%) consistent with normal right atrial pressure (~3, range 0-5mmHg).  ____________________________________________________________________  QUANTITATIVE DATA:  Left Ventricle Measurements: (Indexed to BSA)     IVSd (2D):   1.4 cm  LVPWd (2D):  1.4 cm  LVIDd (2D):  4.0 cm  LVIDs (2D):  3.0 cm  LV Mass:     203 g  102.6 g/m²  Visualized LV EF%: 50to 55%     MV E Vmax:    0.76 m/s  MV A Vmax:    0.98 m/s  MV E/A:       0.77  e' lateral:   5.22 cm/s  e' medial:    3.05 cm/s  E/e' lateral: 14.46  E/e' medial:  24.75  E/e' Average: 18.26  MV DT:        255 msec    Aorta Measurements: (Normal range) (Indexed to BSA)     Sinuses of Valsalva: 2.30 cm (3.1 - 3.7 cm)       Left Atrium Measurements: (Indexed to BSA)  LA Diam 2D: 3.50 cm    Right Ventricle Measurements:     TAPSE: 1.8 cm       LVOT / RVOT/ Qp/Qs Data: (Indexed to BSA)  LVOT Diameter: 1.90 cm  LVOT Vmax:     0.82 m/s  LVOT VTI:      12.30 cm  LVOT SV:       34.9 ml  17.61 ml/m²    Aortic Valve Measurements:  AV Vmax:                1.4 m/s  AV Peak Gradient:       7.6 mmHg  AV Mean Gradient:       3.0 mmHg  AV VTI: 21.0 cm  AV VTI Ratio:           0.59  AoV EOA, Contin:        1.66 cm²  AoV EOA, Contin i:      0.84 cm²/m²  AoV Dimensionless Index 0.59    Mitral Valve Measurements:     MV E Vmax: 0.8 m/s  MV A Vmax: 1.0 m/s  MV E/A:    0.8       Tricuspid ValveMeasurements:     RA Pressure: 3 mmHg    ________________________________________________________________________________________  Electronically signed on 4/3/2024 at 9:33:13 AM by Cindy Figueroa         *** Final ***      Radiology:         [facility] -- Halima Hayes Pannella, Patel, Savella Goodger, Cohen  Office # [phone]      Follow Up:    flu, pna, elevated troponin   Subjective/Observations:   Seen  bedside, remains on NC dementia unable to give any meaningful information     REVIEW OF SYSTEMS: All other review of systems is negative unless indicated above    PAST MEDICAL & SURGICAL HISTORY:  Aortic stenosis      BPH (benign prostatic hyperplasia)      Dementia  mild- on Memantine      ACE (dyspnea on exertion)      Asthma  controlled on inhalers      HLD (hyperlipidemia)      CAD (coronary artery disease)  s/p stent 10/2019      OH (ocular hypertension)  on eye drops      Overactive bladder      Anxiety      History of tonsillectomy  childhood      H/O coronary angiogram  10/25/19, s/p PCI to LAD          MEDICATIONS  (STANDING):  albuterol/ipratropium for Nebulization 3 milliLiter(s) Nebulizer every 6 hours  aspirin enteric coated 81 milliGRAM(s) Oral daily  bisacodyl 5 milliGRAM(s) Oral at bedtime  budesonide  80 MICROgram(s)/formoterol 4.5 MICROgram(s) Inhaler 2 Puff(s) Inhalation two times a day  clopidogrel Tablet 75 milliGRAM(s) Oral daily  dextrose 5%. 1000 milliLiter(s) (30 mL/Hr) IV Continuous <Continuous>  finasteride 5 milliGRAM(s) Oral daily  heparin   Injectable 5000 Unit(s) SubCutaneous every 12 hours  latanoprost 0.005% Ophthalmic Solution 1 Drop(s) Both EYES at bedtime  melatonin 10 milliGRAM(s) Oral at bedtime  mirabegron ER 25 milliGRAM(s) Oral at bedtime  montelukast 10 milliGRAM(s) Oral daily  pantoprazole  Injectable 40 milliGRAM(s) IV Push daily  polyethylene glycol 3350 17 Gram(s) Oral daily  senna 2 Tablet(s) Oral at bedtime  sodium chloride 3%  Inhalation 4 milliLiter(s) Inhalation every 12 hours  tiotropium 2.5 MICROgram(s) Inhaler 2 Puff(s) Inhalation daily    MEDICATIONS  (PRN):  acetaminophen     Tablet .. 650 milliGRAM(s) Oral every 6 hours PRN Temp greater or equal to 38C (100.4F), Mild Pain (1 - 3)  albuterol    90 MICROgram(s) HFA Inhaler 2 Puff(s) Inhalation every 6 hours PRN Shortness of Breath and/or Wheezing  aluminum hydroxide/magnesium hydroxide/simethicone Suspension 30 milliLiter(s) Oral every 4 hours PRN Dyspepsia  ondansetron Injectable 4 milliGRAM(s) IV Push every 8 hours PRN Nausea and/or Vomiting      Allergies    No Known Allergies    Intolerances        Vital Signs Last 24 Hrs  T(C): 37.2 (2024 05:10), Max: 37.2 (2024 13:30)  T(F): 98.9 (2024 05:10), Max: 98.9 (2024 13:30)  HR: 90 (2024 05:10) (82 - 93)  BP: 148/77 (2024 05:10) (115/58 - 148/77)  BP(mean): --  RR: 19 (2024 05:10) (19 - 19)  SpO2: 92% (2024 05:10) (92% - 97%)    Parameters below as of 2024 05:10  Patient On (Oxygen Delivery Method): nasal cannula        I&O's Summary    2024 07:01  -  2024 07:00  --------------------------------------------------------  IN: 360 mL / OUT: 600 mL / NET: -240 mL          PHYSICAL EXAM:  TELE:    Constitutional: NAD, awake and alert, well-developed  HEENT: Moist Mucous Membranes, Anicteric  Pulmonary: Non-labored, breath sounds are scattered rhonchi   Cardiovascular: Regular, S1 and S2 nl, No murmurs, rubs, gallops or clicks  Gastrointestinal: Bowel Sounds present, soft, nontender.   Lymph: No lymphadenopathy. No peripheral edema.  Skin: No visible rashes or ulcers.  Psych confused     LABS: All Labs Reviewed:                        13.5   10.89 )-----------( 182      ( 2024 07:00 )             40.8                         13.7   8.50  )-----------( 185      ( 2024 05:54 )             42.0     2024 05:54    146    |  108    |  29     ----------------------------<  138    3.8     |  32     |  0.79     Ca    9.3        2024 05:54               EC Lead ECG:   Ventricular Rate 93 BPM    Atrial Rate 93 BPM    P-R Interval 194 ms    QRS Duration 130 ms    Q-T Interval 356 ms    QTC Calculation(Bazett) 442 ms    P Axis 74 degrees    R Axis -14 degrees    T Axis 154 degrees    Diagnosis Line Normal sinus rhythm  LVH with qrs widening and repol changes   Abnormal ECG  When compared with ECG of 2024 04:38, (Unconfirmed)  No significant change was found  Confirmed by CINDY FIGUEROA ([id number]) on 2024 7:01:23 PM (24 @ 09:13)      TRANSTHORACIC ECHOCARDIOGRAM REPORT  ________________________________________________________________________________                                      _______       Pt. Name:       MARLON KRUEGER Study Date:    2024  MRN:            [id number]          YOB: 1940  Accession #:    [id number]         Age:           84 years  Account#:       [id number]        Gender:        M  Heart Rate:                       Height:        68.11 in (173.00 cm)  Rhythm:    Weight:        185.19 lb (84.00 kg)  Blood Pressure: 105/65 mmHg       BSA/BMI:       1.98 m² / 28.07 kg/m²  ________________________________________________________________________________________  Referring Physician:    [id number] Rosendo Hawkins  Interpreting Physician: Cindy Figueroa  Primary Sonographer:    Gaudencio Payton    CPT:               ECHO TTE WO CON COMP W DOPP - 31256.m  Indication(s):     Abnormal electrocardiogram ECG/EKG - R94.31  Procedure:         Transthoracic echocardiogram with 2-D, M-mode and complete                     spectral and color flow Doppler.  Ordering Location: [facility]  Admission Status:  Inpatient  Study Information: Image quality for this study is technically difficult.    _______________________________________________________________________________________     CONCLUSIONS:      1. Technically difficult image quality.   2. Left ventricular endocardium is not well visualized; however, the left ventricular systolic function appears grossly normal.  3. Abnormal (paradoxical) septal motion consistent with conduction delay. Left ventricular systolic function is normal with an ejection fraction visually estimated at 50 to 55 %.   4. A prominent papillary muscule is noted.   5. There is normal LV mass and concentric remodeling.   6. The right ventricle is not well visualized. normal systolic function.   7. The left atrium is normal.   8. Mitral valve leaflets have focal calcification.   9. Trace mitral regurgitation.  10. Aortic valve was not well visualized.  11. Trace pericardial effusion noted adjacent to the anterior right ventricle.    ________________________________________________________________________________________  FINDINGS:     Left Ventricle:  Abnormal (paradoxical) septal motion consistent with conduction delay. Left ventricular systolic function is normal with an ejection fraction visually estimated at 50 to 55%. There is normal LV mass and concentric remodeling. Left ventricular endocardium is not well visualized; however, the left ventricular systolic function appears grossly normal. A prominent papillary muscule is noted.     Right Ventricle:  The right ventricle is not well visualized. Normal systolic function. Tricuspid annular plane systolic excursion (TAPSE) is 1.8 cm (normal >=1.7 cm).     Left Atrium:  The left atrium is normal with an indexed volume of 18.78 ml/m².     Right Atrium:  The right atrium is normal in size.     Aortic Valve:  A a transcatheter deployed (TAVR) is present in the aortic position. The aortic valve was not well visualized. The peak transaortic velocity is 1.38 m/s, peak transaortic gradient is 7.6 mmHg and mean transaortic gradient is 3.0 mmHg with an LVOT/aortic valve VTI ratio of 0.59. The aortic valve area is estimated at1.66 cm² by the continuity equation.     Mitral Valve:  There is calcification of the mitral valve annulus. Mitral valve leaflets have focal calcification. There is trace mitral regurgitation.     Tricuspid Valve:  Structurally normal tricuspid valvewith normal leaflet excursion. The tricuspid valve was not well visualized. There is trace tricuspid regurgitation. There is insufficient tricuspid regurgitation detected to calculate pulmonary artery systolic pressure.     Pulmonic Valve:  The pulmonic valve was not well visualized.     Aorta:  The aortic root at the sinuses of Valsalva is normal in size, measuring 2.30 cm (indexed 1.16 cm/m²).     Pericardium:  There is a trace pericardial effusion noted adjacent to the anterior right ventricle.     Systemic Veins:  The inferior vena cava is normal in size measuring 1.65 cm in diameter, (normal <2.1cm) with normal inspiratory collapse (normal >50%) consistent with normal right atrial pressure (~3, range 0-5mmHg).  ____________________________________________________________________  QUANTITATIVE DATA:  Left Ventricle Measurements: (Indexed to BSA)     IVSd (2D):   1.4 cm  LVPWd (2D):  1.4 cm  LVIDd (2D):  4.0 cm  LVIDs (2D):  3.0 cm  LV Mass:     203 g  102.6 g/m²  Visualized LV EF%: 50to 55%     MV E Vmax:    0.76 m/s  MV A Vmax:    0.98 m/s  MV E/A:       0.77  e' lateral:   5.22 cm/s  e' medial:    3.05 cm/s  E/e' lateral: 14.46  E/e' medial:  24.75  E/e' Average: 18.26  MV DT:        255 msec    Aorta Measurements: (Normal range) (Indexed to BSA)     Sinuses of Valsalva: 2.30 cm (3.1 - 3.7 cm)       Left Atrium Measurements: (Indexed to BSA)  LA Diam 2D: 3.50 cm    Right Ventricle Measurements:     TAPSE: 1.8 cm       LVOT / RVOT/ Qp/Qs Data: (Indexed to BSA)  LVOT Diameter: 1.90 cm  LVOT Vmax:     0.82 m/s  LVOT VTI:      12.30 cm  LVOT SV:       34.9 ml  17.61 ml/m²    Aortic Valve Measurements:  AV Vmax:                1.4 m/s  AV Peak Gradient:       7.6 mmHg  AV Mean Gradient:       3.0 mmHg  AV VTI: 21.0 cm  AV VTI Ratio:           0.59  AoV EOA, Contin:        1.66 cm²  AoV EOA, Contin i:      0.84 cm²/m²  AoV Dimensionless Index 0.59    Mitral Valve Measurements:     MV E Vmax: 0.8 m/s  MV A Vmax: 1.0 m/s  MV E/A:    0.8       Tricuspid ValveMeasurements:     RA Pressure: 3 mmHg    ________________________________________________________________________________________  Electronically signed on 4/3/2024 at 9:33:13 AM by Cindy Figueroa         *** Final ***      Radiology:

## 2024-04-04 NOTE — PROGRESS NOTE ADULT - SUBJECTIVE AND OBJECTIVE BOX
[facility]  Yunier Dorman PA-C  [address] [place] [address]  [phone]      INTERVAL HPI/OVERNIGHT EVENTS:  Pt s/e  +documented BM yesterday  No GI events    MEDICATIONS  (STANDING):  albuterol/ipratropium for Nebulization 3 milliLiter(s) Nebulizer every 6 hours  aspirin enteric coated 81 milliGRAM(s) Oral daily  bisacodyl 5 milliGRAM(s) Oral at bedtime  budesonide  80 MICROgram(s)/formoterol 4.5 MICROgram(s) Inhaler 2 Puff(s) Inhalation two times a day  clopidogrel Tablet 75 milliGRAM(s) Oral daily  dextrose 5%. 1000 milliLiter(s) (30 mL/Hr) IV Continuous <Continuous>  finasteride 5 milliGRAM(s) Oral daily  heparin   Injectable 5000 Unit(s) SubCutaneous every 12 hours  latanoprost 0.005% Ophthalmic Solution 1 Drop(s) Both EYES at bedtime  melatonin 10 milliGRAM(s) Oral at bedtime  mirabegron ER 25 milliGRAM(s) Oral at bedtime  montelukast 10 milliGRAM(s) Oral daily  pantoprazole  Injectable 40 milliGRAM(s) IV Push daily  polyethylene glycol 3350 17 Gram(s) Oral daily  senna 2 Tablet(s) Oral at bedtime  sodium chloride 3%  Inhalation 4 milliLiter(s) Inhalation every 12 hours  tiotropium 2.5 MICROgram(s) Inhaler 2 Puff(s) Inhalation daily    MEDICATIONS  (PRN):  acetaminophen     Tablet .. 650 milliGRAM(s) Oral every 6 hours PRN Temp greater or equal to 38C (100.4F), Mild Pain (1 - 3)  albuterol    90 MICROgram(s) HFA Inhaler 2 Puff(s) Inhalation every 6 hours PRN Shortness of Breath and/or Wheezing  aluminum hydroxide/magnesium hydroxide/simethicone Suspension 30 milliLiter(s) Oral every 4 hours PRN Dyspepsia  ondansetron Injectable 4 milliGRAM(s) IV Push every 8 hours PRN Nausea and/or Vomiting      Allergies    No Known Allergies      PHYSICAL EXAM:   Vital Signs:  Vital Signs Last 24 Hrs  T(C): 37.2 (2024 05:10), Max: 37.2 (2024 13:30)  T(F): 98.9 (2024 05:10), Max: 98.9 (2024 13:30)  HR: 90 (2024 09:05) (84 - 93)  BP: 148/77 (2024 05:10) (115/58 - 148/77)  BP(mean): --  RR: 19 (2024 05:10) (19 - 19)  SpO2: 92% (2024 09:05) (92% - 96%)    Parameters below as of 2024 09:05  Patient On (Oxygen Delivery Method): nasal cannula, 4L      Daily     Daily Weight in k.8 (2024 05:10)    GENERAL:  Appears stated age  HEENT:  NC/AT  CHEST:  Full & symmetric excursion  HEART:  Regular rhythm  ABDOMEN:  Soft, non-tender, non-distended  EXTEREMITIES:  no cyanosis  SKIN:  No rash  NEURO:  Alert      LABS:                        14.9   12.51 )-----------( 245      ( 2024 07:30 )             43.7     04-04    143  |  103  |  24<H>  ----------------------------<  141<H>  3.6   |  30  |  0.79    Ca    8.7      2024 07:30        Urinalysis Basic - ( 2024 07:30 )    Color: x / Appearance: x / SG: x / pH: x  Gluc: 141 mg/dL / Ketone: x  / Bili: x / Urobili: x   Blood: x / Protein: x / Nitrite: x   Leuk Esterase: x / RBC: x / WBC x   Sq Epi: x / Non Sq Epi: x / Bacteria: x

## 2024-04-04 NOTE — PROGRESS NOTE ADULT - PROBLEM SELECTOR PLAN 2
Hypoxic to 60s , initial ABG respiratory acidosis with Co2 retention, repeat ABG improved   likely due to flu and COPD exacerbation / pneumonia   HFNC for now,  Wean as tolerated 70% at current    DNR/DNI: patient with multiple significant acute and chronic medical conditions, risk of sudden decompensation. prognosis poor. candidate for palliative hospice service  CTA negative for PE , Heparin Drip stopped. + multi focal pneumonia    Wean O2 as tolerated.

## 2024-04-04 NOTE — PROGRESS NOTE ADULT - ASSESSMENT
anemia  constipation  abd pain    bowel regimen  monitor for BM  simethicone  s/p mg citrate bottle   continue to monitor cbc  d/w pt    I reviewed the overnight course of events on the unit, re-confirming the patient history. I discussed the care with the patient  Differential diagnosis and plan of care discussed with patient after the evaluation  35 minutes spent on total encounter of which more than fifty percent of the encounter was spent counseling and/or coordinating care by the attending physician.

## 2024-04-04 NOTE — SOCIAL WORK PROGRESS NOTE - NSSWPROGRESSNOTE_GEN_ALL_CORE
SW consult for placement marked complete, CM following for discharge planning home. SW remains available as needed.

## 2024-04-05 LAB
ANION GAP SERPL CALC-SCNC: 7 MMOL/L — SIGNIFICANT CHANGE UP (ref 5–17)
BUN SERPL-MCNC: 27 MG/DL — HIGH (ref 7–23)
CALCIUM SERPL-MCNC: 9.4 MG/DL — SIGNIFICANT CHANGE UP (ref 8.5–10.1)
CHLORIDE SERPL-SCNC: 108 MMOL/L — SIGNIFICANT CHANGE UP (ref 96–108)
CO2 SERPL-SCNC: 31 MMOL/L — SIGNIFICANT CHANGE UP (ref 22–31)
CREAT SERPL-MCNC: 0.73 MG/DL — SIGNIFICANT CHANGE UP (ref 0.5–1.3)
EGFR: 89 ML/MIN/1.73M2 — SIGNIFICANT CHANGE UP
GLUCOSE SERPL-MCNC: 140 MG/DL — HIGH (ref 70–99)
HCT VFR BLD CALC: 46.5 % — SIGNIFICANT CHANGE UP (ref 39–50)
HGB BLD-MCNC: 15.6 G/DL — SIGNIFICANT CHANGE UP (ref 13–17)
MCHC RBC-ENTMCNC: 28.3 PG — SIGNIFICANT CHANGE UP (ref 27–34)
MCHC RBC-ENTMCNC: 33.5 GM/DL — SIGNIFICANT CHANGE UP (ref 32–36)
MCV RBC AUTO: 84.2 FL — SIGNIFICANT CHANGE UP (ref 80–100)
NRBC # BLD: 0 /100 WBCS — SIGNIFICANT CHANGE UP (ref 0–0)
PLATELET # BLD AUTO: 262 K/UL — SIGNIFICANT CHANGE UP (ref 150–400)
POTASSIUM SERPL-MCNC: 4.1 MMOL/L — SIGNIFICANT CHANGE UP (ref 3.5–5.3)
POTASSIUM SERPL-SCNC: 4.1 MMOL/L — SIGNIFICANT CHANGE UP (ref 3.5–5.3)
RBC # BLD: 5.52 M/UL — SIGNIFICANT CHANGE UP (ref 4.2–5.8)
RBC # FLD: 13.7 % — SIGNIFICANT CHANGE UP (ref 10.3–14.5)
SODIUM SERPL-SCNC: 146 MMOL/L — HIGH (ref 135–145)
WBC # BLD: 9.72 K/UL — SIGNIFICANT CHANGE UP (ref 3.8–10.5)
WBC # FLD AUTO: 9.72 K/UL — SIGNIFICANT CHANGE UP (ref 3.8–10.5)

## 2024-04-05 PROCEDURE — 99232 SBSQ HOSP IP/OBS MODERATE 35: CPT

## 2024-04-05 PROCEDURE — 99233 SBSQ HOSP IP/OBS HIGH 50: CPT

## 2024-04-05 RX ORDER — ASPIRIN/CALCIUM CARB/MAGNESIUM 324 MG
1 TABLET ORAL
Qty: 0 | Refills: 0 | DISCHARGE
Start: 2024-04-05

## 2024-04-05 RX ORDER — BUDESONIDE, MICRONIZED 100 %
0.5 POWDER (GRAM) MISCELLANEOUS
Refills: 0 | Status: DISCONTINUED | OUTPATIENT
Start: 2024-04-05 | End: 2024-04-10

## 2024-04-05 RX ORDER — AMLODIPINE BESYLATE 2.5 MG/1
1 TABLET ORAL
Refills: 0 | DISCHARGE

## 2024-04-05 RX ORDER — DONEPEZIL HYDROCHLORIDE 10 MG/1
1 TABLET, FILM COATED ORAL
Refills: 0 | DISCHARGE

## 2024-04-05 RX ORDER — QUETIAPINE FUMARATE 200 MG/1
1 TABLET, FILM COATED ORAL
Refills: 0 | DISCHARGE

## 2024-04-05 RX ORDER — BUDESONIDE AND FORMOTEROL FUMARATE DIHYDRATE 160; 4.5 UG/1; UG/1
2 AEROSOL RESPIRATORY (INHALATION)
Qty: 0 | Refills: 0 | DISCHARGE
Start: 2024-04-05

## 2024-04-05 RX ORDER — FAMOTIDINE 10 MG/ML
1 INJECTION INTRAVENOUS
Refills: 0 | DISCHARGE

## 2024-04-05 RX ADMIN — SENNA PLUS 2 TABLET(S): 8.6 TABLET ORAL at 21:35

## 2024-04-05 RX ADMIN — Medication 3 MILLILITER(S): at 13:05

## 2024-04-05 RX ADMIN — Medication 5 MILLIGRAM(S): at 21:35

## 2024-04-05 RX ADMIN — TIOTROPIUM BROMIDE 2 PUFF(S): 18 CAPSULE ORAL; RESPIRATORY (INHALATION) at 06:31

## 2024-04-05 RX ADMIN — BUDESONIDE AND FORMOTEROL FUMARATE DIHYDRATE 2 PUFF(S): 160; 4.5 AEROSOL RESPIRATORY (INHALATION) at 18:51

## 2024-04-05 RX ADMIN — Medication 3 MILLILITER(S): at 20:28

## 2024-04-05 RX ADMIN — MIRABEGRON 25 MILLIGRAM(S): 50 TABLET, EXTENDED RELEASE ORAL at 21:36

## 2024-04-05 RX ADMIN — HEPARIN SODIUM 5000 UNIT(S): 5000 INJECTION INTRAVENOUS; SUBCUTANEOUS at 05:21

## 2024-04-05 RX ADMIN — Medication 81 MILLIGRAM(S): at 11:36

## 2024-04-05 RX ADMIN — Medication 10 MILLIGRAM(S): at 21:35

## 2024-04-05 RX ADMIN — BUDESONIDE AND FORMOTEROL FUMARATE DIHYDRATE 2 PUFF(S): 160; 4.5 AEROSOL RESPIRATORY (INHALATION) at 06:31

## 2024-04-05 RX ADMIN — MONTELUKAST 10 MILLIGRAM(S): 4 TABLET, CHEWABLE ORAL at 11:37

## 2024-04-05 RX ADMIN — FINASTERIDE 5 MILLIGRAM(S): 5 TABLET, FILM COATED ORAL at 11:36

## 2024-04-05 RX ADMIN — SODIUM CHLORIDE 4 MILLILITER(S): 9 INJECTION INTRAMUSCULAR; INTRAVENOUS; SUBCUTANEOUS at 07:38

## 2024-04-05 RX ADMIN — HEPARIN SODIUM 5000 UNIT(S): 5000 INJECTION INTRAVENOUS; SUBCUTANEOUS at 17:52

## 2024-04-05 RX ADMIN — CLOPIDOGREL BISULFATE 75 MILLIGRAM(S): 75 TABLET, FILM COATED ORAL at 11:36

## 2024-04-05 RX ADMIN — PANTOPRAZOLE SODIUM 40 MILLIGRAM(S): 20 TABLET, DELAYED RELEASE ORAL at 11:37

## 2024-04-05 RX ADMIN — LATANOPROST 1 DROP(S): 0.05 SOLUTION/ DROPS OPHTHALMIC; TOPICAL at 21:35

## 2024-04-05 RX ADMIN — SODIUM CHLORIDE 4 MILLILITER(S): 9 INJECTION INTRAMUSCULAR; INTRAVENOUS; SUBCUTANEOUS at 20:28

## 2024-04-05 RX ADMIN — POLYETHYLENE GLYCOL 3350 17 GRAM(S): 17 POWDER, FOR SOLUTION ORAL at 11:37

## 2024-04-05 RX ADMIN — Medication 3 MILLILITER(S): at 07:39

## 2024-04-05 NOTE — PROGRESS NOTE ADULT - SUBJECTIVE AND OBJECTIVE BOX
Patient: MARLON KUREGER [id number] 84y Male                            Hospitalist Attending Note    No complaints.  Now off O2.  Wife at bedside, reports pt clinically improving.     ____________________PHYSICAL EXAM:  GENERAL:  NAD, awake, confused.    HEENT: NCAT  CARDIOVASCULAR:  S1, S2  LUNGS: coarse BS b/l  ABDOMEN:  soft, (-) tenderness, (-) distension, (+) bowel sounds, (-) guarding, (-) rebound (-) rigidity  EXTREMITIES:  no cyanosis / clubbing / edema.   ____________________      VITALS:  Vital Signs Last 24 Hrs  T(C): 36.2 (2024 05:37), Max: 36.9 (2024 22:03)  T(F): 97.2 (2024 05:37), Max: 98.4 (2024 22:03)  HR: 95 (2024 13:32) (95 - 100)  BP: 130/76 (2024 05:37) (107/69 - 130/76)  BP(mean): --  RR: 18 (2024 05:37) (18 - 18)  SpO2: 96% (2024 13:32) (92% - 98%)    Parameters below as of 2024 13:32  Patient On (Oxygen Delivery Method): room air     Daily     Daily Weight in k.3 (2024 07:04)  CAPILLARY BLOOD GLUCOSE        I&O's Summary    2024 07:01  -  2024 07:00  --------------------------------------------------------  IN: 240 mL / OUT: 1700 mL / NET: -1460 mL    2024 07:01  -  2024 16:26  --------------------------------------------------------  IN: 200 mL / OUT: 0 mL / NET: 200 mL        LABS:                        15.6   9.72  )-----------( 262      ( 2024 06:15 )             46.5     04-05    146<H>  |  108  |  27<H>  ----------------------------<  140<H>  4.1   |  31  |  0.73    Ca    9.4      2024 06:15          Urinalysis Basic - ( 2024 06:15 )    Color: x / Appearance: x / SG: x / pH: x  Gluc: 140 mg/dL / Ketone: x  / Bili: x / Urobili: x   Blood: x / Protein: x / Nitrite: x   Leuk Esterase: x / RBC: x / WBC x   Sq Epi: x / Non Sq Epi: x / Bacteria: x              MEDICATIONS:  acetaminophen     Tablet .. 650 milliGRAM(s) Oral every 6 hours PRN  albuterol    90 MICROgram(s) HFA Inhaler 2 Puff(s) Inhalation every 6 hours PRN  albuterol/ipratropium for Nebulization 3 milliLiter(s) Nebulizer every 6 hours  aluminum hydroxide/magnesium hydroxide/simethicone Suspension 30 milliLiter(s) Oral every 4 hours PRN  aspirin enteric coated 81 milliGRAM(s) Oral daily  bisacodyl 5 milliGRAM(s) Oral at bedtime  budesonide  80 MICROgram(s)/formoterol 4.5 MICROgram(s) Inhaler 2 Puff(s) Inhalation two times a day  clopidogrel Tablet 75 milliGRAM(s) Oral daily  finasteride 5 milliGRAM(s) Oral daily  heparin   Injectable 5000 Unit(s) SubCutaneous every 12 hours  latanoprost 0.005% Ophthalmic Solution 1 Drop(s) Both EYES at bedtime  melatonin 10 milliGRAM(s) Oral at bedtime  mirabegron ER 25 milliGRAM(s) Oral at bedtime  montelukast 10 milliGRAM(s) Oral daily  ondansetron Injectable 4 milliGRAM(s) IV Push every 8 hours PRN  pantoprazole  Injectable 40 milliGRAM(s) IV Push daily  polyethylene glycol 3350 17 Gram(s) Oral daily  senna 2 Tablet(s) Oral at bedtime  sodium chloride 3%  Inhalation 4 milliLiter(s) Inhalation every 12 hours  tiotropium 2.5 MICROgram(s) Inhaler 2 Puff(s) Inhalation daily

## 2024-04-05 NOTE — PROGRESS NOTE ADULT - PROBLEM SELECTOR PLAN 11
- s/p PCI to LAD in 2019  - low suspicion for acute  ACS  - DAPT as per cardio eval   - remote tele  - follows with Dr. Coreas  - statin held due to elevated CK
- chronic, s/p TAVR in 2019  - c/w plavix
- s/p PCI to LAD in 2019  - low suspicion for acute  ACS  - DAPT as per cardio eval   - remote tele  - follows with Dr. Coreas  - statin held due to elevated CK
- s/p PCI to LAD in 2019  - low suspicion for acute  ACS  - DAPT as per cardio eval   - remote tele  - follows with Dr. Coreas  - statin held due to elevated CK

## 2024-04-05 NOTE — PROGRESS NOTE ADULT - PROBLEM SELECTOR PLAN 12
- pt has been complaining of abdominal pain recently per daughter  - daughter states tht patient had endoscopy in January and found to be positive for H. Pylori   - has medication regiment at home, has not started management yet  will address acute respiratory and cardiac condition for now and may resume treatment as advised as out patient.
- chronic, s/p TAVR in 2019  - c/w plavix

## 2024-04-05 NOTE — PROGRESS NOTE ADULT - ASSESSMENT
85 yo M with PMH aortic stenosis s/p TAVR 2019, CAD s/p LELAND to LAD 2019, dementia, HLD, asthma, COPD, overactive bladder, H pylori, BPH admitted with Influenza A s/p multiple falls.     CAD, respiratory failure, elev trop   - Troponins elevated, peaked 2300, now downtrending, no need to trend further   - likely elevated on the basis of demand ischemia in setting of influenza, RAY, rhabdomyolysis, agitation.  - With CPK out of proportion to Troponin, likely rhabdomyolysis s/p fall, now downtrending   - History of LELAND to LAD in 2019  - Would continue either ASA or Plavix. Unclear indication for DAPT.     - H/o EKG with IVCD vs LBBB in the past.    - EKG's reveal elevated QTc ~500. repeat EKG with QTc: 446 Avoid QT prolonging medications.     - No meaningful evidence of volume overload.  - TTE 4/2/24 as above diff stuy ef 50-55% full reports as above     - + Flu, multifocal PNA, management per primary  / ID     -followed by renal ray now resolved     - Will continue to follow.   85 yo M with PMH aortic stenosis s/p TAVR 2019, CAD s/p LELAND to LAD 2019, dementia, HLD, asthma, COPD, overactive bladder, H pylori, BPH admitted with Influenza A s/p multiple falls.     CAD, respiratory failure, elev trop   - Troponins elevated, peaked 2300, now downtrending, no need to trend further   - likely elevated on the basis of demand ischemia in setting of influenza, RAY, rhabdomyolysis, agitation.  - With CPK out of proportion to Troponin, likely rhabdomyolysis s/p fall, now downtrending   - History of LELAND to LAD in 2019  - Would continue either ASA or Plavix. Unclear indication for DAPT.   -tele st , yesterday with 5 beats vt   Monitor and replete electrolytes. Keep K>4.0 and Mg>2.0.      - H/o EKG with IVCD vs LBBB in the past.    - EKG's reveal elevated QTc ~500. repeat EKG with QTc: 446 Avoid QT prolonging medications.     - No meaningful evidence of volume overload.  - TTE 4/2/24 as above diff stuy ef 50-55% full reports as above     - + Flu, multifocal PNA, management per primary  / ID     -followed by renal ray now resolved     - Will continue to follow.

## 2024-04-05 NOTE — PROGRESS NOTE ADULT - PROBLEM SELECTOR PROBLEM 7
Imaging abnormality
Dementia
Imaging abnormality
Imaging abnormality

## 2024-04-05 NOTE — PROGRESS NOTE ADULT - PROBLEM SELECTOR PROBLEM 5
COPD exacerbation
Fall

## 2024-04-05 NOTE — PROGRESS NOTE ADULT - SUBJECTIVE AND OBJECTIVE BOX
[facility] -- Halima Hayes Pannella, Patel, Savella Goodger, Cohen  Office # [phone]      Follow Up:    FLU pna elevated troponin     Subjective/Observations:     Seen bedside, remains on nc, given dementia unable to provide meaningful information   REVIEW OF SYSTEMS: All other review of systems is negative unless indicated above    PAST MEDICAL & SURGICAL HISTORY:  Aortic stenosis      BPH (benign prostatic hyperplasia)      Dementia  mild- on Memantine      ACE (dyspnea on exertion)      Asthma  controlled on inhalers      HLD (hyperlipidemia)      CAD (coronary artery disease)  s/p stent 10/2019      OH (ocular hypertension)  on eye drops      Overactive bladder      Anxiety      History of tonsillectomy  childhood      H/O coronary angiogram  10/25/19, s/p PCI to LAD          MEDICATIONS  (STANDING):  albuterol/ipratropium for Nebulization 3 milliLiter(s) Nebulizer every 6 hours  aspirin enteric coated 81 milliGRAM(s) Oral daily  bisacodyl 5 milliGRAM(s) Oral at bedtime  budesonide  80 MICROgram(s)/formoterol 4.5 MICROgram(s) Inhaler 2 Puff(s) Inhalation two times a day  clopidogrel Tablet 75 milliGRAM(s) Oral daily  finasteride 5 milliGRAM(s) Oral daily  heparin   Injectable 5000 Unit(s) SubCutaneous every 12 hours  latanoprost 0.005% Ophthalmic Solution 1 Drop(s) Both EYES at bedtime  melatonin 10 milliGRAM(s) Oral at bedtime  mirabegron ER 25 milliGRAM(s) Oral at bedtime  montelukast 10 milliGRAM(s) Oral daily  pantoprazole  Injectable 40 milliGRAM(s) IV Push daily  polyethylene glycol 3350 17 Gram(s) Oral daily  senna 2 Tablet(s) Oral at bedtime  sodium chloride 3%  Inhalation 4 milliLiter(s) Inhalation every 12 hours  tiotropium 2.5 MICROgram(s) Inhaler 2 Puff(s) Inhalation daily    MEDICATIONS  (PRN):  acetaminophen     Tablet .. 650 milliGRAM(s) Oral every 6 hours PRN Temp greater or equal to 38C (100.4F), Mild Pain (1 - 3)  albuterol    90 MICROgram(s) HFA Inhaler 2 Puff(s) Inhalation every 6 hours PRN Shortness of Breath and/or Wheezing  aluminum hydroxide/magnesium hydroxide/simethicone Suspension 30 milliLiter(s) Oral every 4 hours PRN Dyspepsia  ondansetron Injectable 4 milliGRAM(s) IV Push every 8 hours PRN Nausea and/or Vomiting      Allergies    No Known Allergies    Intolerances        Vital Signs Last 24 Hrs  T(C): 36.2 (2024 05:37), Max: 37.2 (2024 12:36)  T(F): 97.2 (2024 05:37), Max: 98.9 (2024 12:36)  HR: 100 (2024 07:53) (93 - 100)  BP: 130/76 (2024 05:37) (107/69 - 130/76)  BP(mean): --  RR: 18 (2024 05:37) (18 - 19)  SpO2: 97% (:53) (92% - 98%)    Parameters below as of 2024 07:53  Patient On (Oxygen Delivery Method): nasal cannula, 4L        I&O's Summary    2024 07:01  -  2024 07:00  --------------------------------------------------------  IN: 240 mL / OUT: 1700 mL / NET: -1460 mL          PHYSICAL EXAM:  TELE:   Constitutional: NAD, awake and alert, well-developed  HEENT: Moist Mucous Membranes, Anicteric  Pulmonary: Non-labored, breath sounds are scattered rhonchi   Cardiovascular: Regular, S1 and S2 nl, No murmurs, rubs, gallops or clicks  Gastrointestinal: Bowel Sounds present, soft, nontender.   Lymph: No lymphadenopathy. No peripheral edema.  Skin: No visible rashes or ulcers.  Psych:  confused     LABS: All Labs Reviewed:                        15.6   9.72  )-----------( 262      ( 2024 06:15 )             46.5                         14.9   12.51 )-----------( 245      ( 2024 07:30 )             43.7                         13.5   10.89 )-----------( 182      ( 2024 07:00 )             40.8     2024 06:15    146    |  108    |  27     ----------------------------<  140    4.1     |  31     |  0.73   2024 07:30    143    |  103    |  24     ----------------------------<  141    3.6     |  30     |  0.79     Ca    9.4        2024 06:15  Ca    8.7        2024 07:30               EC Lead ECG:   Ventricular Rate 93 BPM    Atrial Rate 93 BPM    P-R Interval 194 ms    QRS Duration 130 ms    Q-T Interval 356 ms    QTC Calculation(Bazett) 442 ms    P Axis 74 degrees    R Axis -14 degrees    T Axis 154 degrees    Diagnosis Line Normal sinus rhythm  LVH with qrs widening and repol changes   Abnormal ECG  When compared with ECG of 2024 04:38, (Unconfirmed)  No significant change was found  Confirmed by CINDY FIGUEROA ([id number]) on 2024 7:01:23 PM (24 @ 09:13)      TRANSTHORACIC ECHOCARDIOGRAM REPORT  ________________________________________________________________________________                                      _______       Pt. Name:       MARLON KRUEGER Study Date:    2024  MRN:            [id number]          YOB: 1940  Accession #:    [id number]         Age:           84 years  Account#:       [id number]        Gender:        M  Heart Rate:                       Height:        68.11 in (173.00 cm)  Rhythm:    Weight:        185.19 lb (84.00 kg)  Blood Pressure: 105/65 mmHg       BSA/BMI:       1.98 m² / 28.07 kg/m²  ________________________________________________________________________________________  Referring Physician:    [id number] Rosendo Hawkins  Interpreting Physician: Cindy Figueroa  Primary Sonographer:    Gaudencio Payton    CPT:               ECHO TTE WO CON COMP W DOPP - 83441.m  Indication(s):     Abnormal electrocardiogram ECG/EKG - R94.31  Procedure:         Transthoracic echocardiogram with 2-D, M-mode and complete                     spectral and color flow Doppler.  Ordering Location: [facility]  Admission Status:  Inpatient  Study Information: Image quality for this study is technically difficult.    _______________________________________________________________________________________     CONCLUSIONS:      1. Technically difficult image quality.   2. Left ventricular endocardium is not well visualized; however, the left ventricular systolic function appears grossly normal.  3. Abnormal (paradoxical) septal motion consistent with conduction delay. Left ventricular systolic function is normal with an ejection fraction visually estimated at 50 to 55 %.   4. A prominent papillary muscule is noted.   5. There is normal LV mass and concentric remodeling.   6. The right ventricle is not well visualized. normal systolic function.   7. The left atrium is normal.   8. Mitral valve leaflets have focal calcification.   9. Trace mitral regurgitation.  10. Aortic valve was not well visualized.  11. Trace pericardial effusion noted adjacent to the anterior right ventricle.    ________________________________________________________________________________________  FINDINGS:     Left Ventricle:  Abnormal (paradoxical) septal motion consistent with conduction delay. Left ventricular systolic function is normal with an ejection fraction visually estimated at 50 to 55%. There is normal LV mass and concentric remodeling. Left ventricular endocardium is not well visualized; however, the left ventricular systolic function appears grossly normal. A prominent papillary muscule is noted.     Right Ventricle:  The right ventricle is not well visualized. Normal systolic function. Tricuspid annular plane systolic excursion (TAPSE) is 1.8 cm (normal >=1.7 cm).     Left Atrium:  The left atrium is normal with an indexed volume of 18.78 ml/m².     Right Atrium:  The right atrium is normal in size.     Aortic Valve:  A a transcatheter deployed (TAVR) is present in the aortic position. The aortic valve was not well visualized. The peak transaortic velocity is 1.38 m/s, peak transaortic gradient is 7.6 mmHg and mean transaortic gradient is 3.0 mmHg with an LVOT/aortic valve VTI ratio of 0.59. The aortic valve area is estimated at1.66 cm² by the continuity equation.     Mitral Valve:  There is calcification of the mitral valve annulus. Mitral valve leaflets have focal calcification. There is trace mitral regurgitation.     Tricuspid Valve:  Structurally normal tricuspid valvewith normal leaflet excursion. The tricuspid valve was not well visualized. There is trace tricuspid regurgitation. There is insufficient tricuspid regurgitation detected to calculate pulmonary artery systolic pressure.     Pulmonic Valve:  The pulmonic valve was not well visualized.     Aorta:  The aortic root at the sinuses of Valsalva is normal in size, measuring 2.30 cm (indexed 1.16 cm/m²).     Pericardium:  There is a trace pericardial effusion noted adjacent to the anterior right ventricle.     Systemic Veins:  The inferior vena cava is normal in size measuring 1.65 cm in diameter, (normal <2.1cm) with normal inspiratory collapse (normal >50%) consistent with normal right atrial pressure (~3, range 0-5mmHg).  ____________________________________________________________________  QUANTITATIVE DATA:  Left Ventricle Measurements: (Indexed to BSA)     IVSd (2D):   1.4 cm  LVPWd (2D):  1.4 cm  LVIDd (2D):  4.0 cm  LVIDs (2D):  3.0 cm  LV Mass:     203 g  102.6 g/m²  Visualized LV EF%: 50to 55%     MV E Vmax:    0.76 m/s  MV A Vmax:    0.98 m/s  MV E/A:       0.77  e' lateral:   5.22 cm/s  e' medial:    3.05 cm/s  E/e' lateral: 14.46  E/e' medial:  24.75  E/e' Average: 18.26  MV DT:        255 msec    Aorta Measurements: (Normal range) (Indexed to BSA)     Sinuses of Valsalva: 2.30 cm (3.1 - 3.7 cm)       Left Atrium Measurements: (Indexed to BSA)  LA Diam 2D: 3.50 cm    Right Ventricle Measurements:     TAPSE: 1.8 cm       LVOT / RVOT/ Qp/Qs Data: (Indexed to BSA)  LVOT Diameter: 1.90 cm  LVOT Vmax:     0.82 m/s  LVOT VTI:      12.30 cm  LVOT SV:       34.9 ml  17.61 ml/m²    Aortic Valve Measurements:  AV Vmax:                1.4 m/s  AV Peak Gradient:       7.6 mmHg  AV Mean Gradient:       3.0 mmHg  AV VTI: 21.0 cm  AV VTI Ratio:           0.59  AoV EOA, Contin:        1.66 cm²  AoV EOA, Contin i:      0.84 cm²/m²  AoV Dimensionless Index 0.59    Mitral Valve Measurements:     MV E Vmax: 0.8 m/s  MV A Vmax: 1.0 m/s  MV E/A:    0.8       Tricuspid ValveMeasurements:     RA Pressure: 3 mmHg    ________________________________________________________________________________________  Electronically signed on 4/3/2024 at 9:33:13 AM by Cindy Figueroa         *** Final ***      Radiology:

## 2024-04-05 NOTE — PROGRESS NOTE ADULT - PROBLEM SELECTOR PLAN 1
- Meets sepsis criteria, HR: 96, Temp: 98-->101.1, WC elevated, lactate WNL , source influenza A  - CT chest: Linear atelectasis at the right lung base.  - s/p Ofirmev x 1, Cefepime x 1, Zyprexa x1, LR bolus x1 in ED   - ucx, blood cx x 2 NTD  - ID consult noted   - Pulm Dr. Hadley hartley noted  - Complete Tamiflu 5 days
- Meets sepsis criteria, HR: 96, Temp: 98-->101.1, WC elevated, lactate WNL , source influenza A  - CT chest: Linear atelectasis at the right lung base.  - s/p Ofirmev x 1, Cefepime x 1, Zyprexa x1, LR bolus x1 in ED   - ucx, blood cx x 2 NTD  - ID consult noted   - Pulm Dr. Hadley hartley noted  - c/w Tamiflu 5 days , renally dosed
- Meets sepsis criteria, HR: 96, Temp: 98-->101.1, WC elevated, lactate WNL , source influenza A  - CT chest: Linear atelectasis at the right lung base.  - s/p Ofirmev x 1, Cefepime x 1, Zyprexa x1, LR bolus x1  - c/w Tamiflu 75mg BID x 5 days   - f/u ucx, blood cx x 2  - IVF- per pts daughter, pt has poor appetite and does not drink many fluids   - NPO till S/S eval  - Tylenol for fever, follow fever curve and daily cbc  - ID Dr. Desai consult noted   - Pulm Dr. Hadley hartley noted
- Meets sepsis criteria, HR: 96, Temp: 98-->101.1, WC elevated, lactate WNL , source influenza A  - CT chest: Linear atelectasis at the right lung base.  - s/p Ofirmev x 1, Cefepime x 1, Zyprexa x1, LR bolus x1 in ED   - ucx, blood cx x 2 NTD  - ID consult noted   - Pulm Dr. Hadley hartley noted  - Completed Tamiflu 5 days
- Meets sepsis criteria, HR: 96, Temp: 98-->101.1, WC elevated, lactate WNL , source influenza A  - CT chest: Linear atelectasis at the right lung base.  - s/p Ofirmev x 1, Cefepime x 1, Zyprexa x1, LR bolus x1 in ED   - ucx, blood cx x 2 NTD  - ID consult noted   - Pulm Dr. Hadley hartley noted  - c/w Tamiflu 5 days , renally dosed
- Meets sepsis criteria, HR: 96, Temp: 98-->101.1, WC elevated, lactate WNL , source influenza A  - CT chest: Linear atelectasis at the right lung base.  - s/p Ofirmev x 1, Cefepime x 1, Zyprexa x1, LR bolus x1 in ED   - ucx, blood cx x 2 NTD  - ID consult noted   - Pulm Dr. Hadley hartley noted  - c/w Tamiflu 5 days , renally dosed
- Meets sepsis criteria, HR: 96, Temp: 98-->101.1, WC elevated, lactate WNL , source influenza A  - CT chest: Linear atelectasis at the right lung base.  - s/p Ofirmev x 1, Cefepime x 1, Zyprexa x1, LR bolus x1  - f/u ucx, blood cx x 2  - IVF- per pts daughter, pt has poor appetite and does not drink many fluids   - NPO till S/S eval  - Tylenol for fever, follow fever curve and daily cbc  - ID Dr. Desai consult noted   - Pulm Dr. Hadley hartley noted  - c/w Tamiflu 5 days , renally dosed
- Meets sepsis criteria, HR: 96, Temp: 98-->101.1, WC elevated, lactate WNL , source influenza A  - CT chest: Linear atelectasis at the right lung base.  - s/p Ofirmev x 1, Cefepime x 1, Zyprexa x1, LR bolus x1 in ED   - ucx, blood cx x 2 NTD  - ID consult noted   - Pulm Dr. Butcher following.    - Completed Tamiflu 5 days

## 2024-04-05 NOTE — PROGRESS NOTE ADULT - SUBJECTIVE AND OBJECTIVE BOX
Patient is a 84y old  Male who presents with a chief complaint of RF, fLU (05 Apr 2024 16:25)      INTERVAL HPI/OVERNIGHT EVENTS:    no respiratory distress. Off oxygen    MEDICATIONS  (STANDING):  albuterol/ipratropium for Nebulization 3 milliLiter(s) Nebulizer every 6 hours  aspirin enteric coated 81 milliGRAM(s) Oral daily  bisacodyl 5 milliGRAM(s) Oral at bedtime  budesonide  80 MICROgram(s)/formoterol 4.5 MICROgram(s) Inhaler 2 Puff(s) Inhalation two times a day  clopidogrel Tablet 75 milliGRAM(s) Oral daily  finasteride 5 milliGRAM(s) Oral daily  heparin   Injectable 5000 Unit(s) SubCutaneous every 12 hours  latanoprost 0.005% Ophthalmic Solution 1 Drop(s) Both EYES at bedtime  melatonin 10 milliGRAM(s) Oral at bedtime  mirabegron ER 25 milliGRAM(s) Oral at bedtime  montelukast 10 milliGRAM(s) Oral daily  pantoprazole  Injectable 40 milliGRAM(s) IV Push daily  polyethylene glycol 3350 17 Gram(s) Oral daily  senna 2 Tablet(s) Oral at bedtime  sodium chloride 3%  Inhalation 4 milliLiter(s) Inhalation every 12 hours  tiotropium 2.5 MICROgram(s) Inhaler 2 Puff(s) Inhalation daily      MEDICATIONS  (PRN):  acetaminophen     Tablet .. 650 milliGRAM(s) Oral every 6 hours PRN Temp greater or equal to 38C (100.4F), Mild Pain (1 - 3)  albuterol    90 MICROgram(s) HFA Inhaler 2 Puff(s) Inhalation every 6 hours PRN Shortness of Breath and/or Wheezing  aluminum hydroxide/magnesium hydroxide/simethicone Suspension 30 milliLiter(s) Oral every 4 hours PRN Dyspepsia  ondansetron Injectable 4 milliGRAM(s) IV Push every 8 hours PRN Nausea and/or Vomiting      Allergies    No Known Allergies    Intolerances        PAST MEDICAL & SURGICAL HISTORY:  Aortic stenosis      BPH (benign prostatic hyperplasia)      Dementia  mild- on Memantine      ACE (dyspnea on exertion)      Asthma  controlled on inhalers      HLD (hyperlipidemia)      CAD (coronary artery disease)  s/p stent 10/2019      OH (ocular hypertension)  on eye drops      Overactive bladder      Anxiety      History of tonsillectomy  childhood      H/O coronary angiogram  10/25/19, s/p PCI to LAD          Vital Signs Last 24 Hrs  T(C): 36.9 (05 Apr 2024 21:20), Max: 36.9 (05 Apr 2024 21:20)  T(F): 98.4 (05 Apr 2024 21:20), Max: 98.4 (05 Apr 2024 21:20)  HR: 90 (05 Apr 2024 21:20) (90 - 109)  BP: 90/63 (05 Apr 2024 21:20) (90/63 - 130/76)  BP(mean): --  RR: 18 (05 Apr 2024 21:20) (18 - 18)  SpO2: 97% (05 Apr 2024 21:20) (91% - 98%)    Parameters below as of 05 Apr 2024 20:28  Patient On (Oxygen Delivery Method): room air        PHYSICAL EXAMINATION:    GENERAL: The patient is awake and alert in no apparent distress.     HEENT: Head is normocephalic and atraumatic.    NECK: no JVD    LUNGS: Clear to auscultation without wheezing, rales or rhonchi; respirations unlabored    HEART: Regular rate and rhythm without murmur.    ABDOMEN: Soft, nontender, and nondistended.      EXTREMITIES: Without any cyanosis, clubbing, rash, lesions or edema.    NEUROLOGIC: Grossly intact.    SKIN: No ulceration or induration present.      LABS:                        15.6   9.72  )-----------( 262      ( 05 Apr 2024 06:15 )             46.5     04-05    146<H>  |  108  |  27<H>  ----------------------------<  140<H>  4.1   |  31  |  0.73    Ca    9.4      05 Apr 2024 06:15        Urinalysis Basic - ( 05 Apr 2024 06:15 )    Color: x / Appearance: x / SG: x / pH: x  Gluc: 140 mg/dL / Ketone: x  / Bili: x / Urobili: x   Blood: x / Protein: x / Nitrite: x   Leuk Esterase: x / RBC: x / WBC x   Sq Epi: x / Non Sq Epi: x / Bacteria: x            Assessment:    Acute Hypoxic respiratory failure - resolved  Influenza A Bronchitis  Multilobar Pneumonia - significant clinical improvement  COPD with exacerbation    Plan:    Duoneb + Budesonide + Hypertonic saline by nebulizer  Monitor pulse oximetry  Repeat chest x ray  SQ heparin for DVT prophylaxis

## 2024-04-05 NOTE — PROGRESS NOTE ADULT - PROBLEM SELECTOR PROBLEM 3
Multifocal pneumonia
NSTEMI (non-ST elevation myocardial infarction)
Multifocal pneumonia

## 2024-04-05 NOTE — PROGRESS NOTE ADULT - PROBLEM SELECTOR PLAN 2
Hypoxic to 60s , initial ABG respiratory acidosis with Co2 retention, repeat ABG improved   likely due to flu and COPD exacerbation / pneumonia   HFNC for now,  Wean as tolerated 70% at current    DNR/DNI: patient with multiple significant acute and chronic medical conditions, risk of sudden decompensation. prognosis poor    + multi focal pneumonia    Wean O2 as tolerated.  Presently off O2.

## 2024-04-05 NOTE — PROGRESS NOTE ADULT - PROBLEM SELECTOR PROBLEM 4
ARY (acute kidney injury)
COPD exacerbation
RAY (acute kidney injury)

## 2024-04-05 NOTE — PROGRESS NOTE ADULT - PROBLEM SELECTOR PLAN 6
- unclear h/o multiple falls  - today patient with unwitnessed fall on face?  - CT HEAD:  1.  No evidence of acute intracranial hemorrhage or midline shift.  2.  Chronic small vessel disease.  - CT CERVICAL SPINE:  1.  No evidence of acute osseous fracture or rosario dislocation.  2.  Multilevel degenerative change of the cervical spine.  3.  Slightly mottled appearance to the vertebral bodies of the cervical   and upper thoracic spine. This could be osteopenic in nature. A   underlying myeloma/lymphoma or other metastatic process is not entirely   excluded. Recommend clinical and laboratory correlation.  - fall risk  - defer PT until medical condition improves   - SW/Case management   - Nutrition consult  -  RLE xray, negative for fracture
- CT spine showing:   Slightly mottled appearance to the vertebral bodies of the cervical   and upper thoracic spine. This could be osteopenic in nature. A   underlying myeloma/lymphoma or other metastatic process is not entirely   excluded. Recommend clinical and laboratory correlation.  will obtain further work up when current condition improves
- unclear h/o multiple falls  - today patient with unwitnessed fall on face?  - CT HEAD:  1.  No evidence of acute intracranial hemorrhage or midline shift.  2.  Chronic small vessel disease.  - CT CERVICAL SPINE:  1.  No evidence of acute osseous fracture or rosario dislocation.  2.  Multilevel degenerative change of the cervical spine.  3.  Slightly mottled appearance to the vertebral bodies of the cervical   and upper thoracic spine. This could be osteopenic in nature. A   underlying myeloma/lymphoma or other metastatic process is not entirely   excluded. Recommend clinical and laboratory correlation.  - fall risk  - defer PT until medical condition improves   - SW/Case management   - Nutrition consult  -  RLE xray, negative for fracture
- unclear h/o multiple falls  - today patient with unwitnessed fall on face?  - CT HEAD:  1.  No evidence of acute intracranial hemorrhage or midline shift.  2.  Chronic small vessel disease.  - CT CERVICAL SPINE:  1.  No evidence of acute osseous fracture or rosario dislocation.  2.  Multilevel degenerative change of the cervical spine.  3.  Slightly mottled appearance to the vertebral bodies of the cervical   and upper thoracic spine. This could be osteopenic in nature. A   underlying myeloma/lymphoma or other metastatic process is not entirely   excluded. Recommend clinical and laboratory correlation.  - fall risk  - defer PT until medical condition improves   - SW/Case management   - Nutrition consult  -  RLE xray, negative for fracture

## 2024-04-05 NOTE — PROGRESS NOTE ADULT - PROBLEM SELECTOR PLAN 3
h/o CAD with PCI   heparin drip started , on plavix, statin   trend cardiac enzyme   cardio consult   check TTE
viral pneumonia with secondary bacterial pneumonia  neb treatment   aspiration precaution  chest PT   suction as needed  Continue Abx
viral pneumonia with secondary bacterial pneumonia  rocephin started  neb treatment   aspiration precaution  chest PT   suction as needed
viral pneumonia with secondary bacterial pneumonia  neb treatment   aspiration precaution  chest PT   suction as needed  Completed Rocephin
viral pneumonia with secondary bacterial pneumonia  rocephin started  neb treatment   aspiration precaution  chest PT   suction as needed
viral pneumonia with secondary bacterial pneumonia  neb treatment   aspiration precaution  chest PT   suction as needed  Completed Rocephin
viral pneumonia with secondary bacterial pneumonia  rocephin started  neb treatment   aspiration precaution  chest PT   suction as needed
viral pneumonia with secondary bacterial pneumonia  rocephin started  neb treatment   aspiration precaution  chest PT   suction as needed

## 2024-04-05 NOTE — PROGRESS NOTE ADULT - PROBLEM SELECTOR PLAN 4
likely pre renal due to decreased oral intake and hypotension.   improved   will monitor   continue mild IVF

## 2024-04-05 NOTE — PROGRESS NOTE ADULT - PROBLEM SELECTOR PROBLEM 2
Acute hypoxic respiratory failure

## 2024-04-05 NOTE — PROGRESS NOTE ADULT - PROBLEM SELECTOR PROBLEM 12
Aortic stenosis
Positive H. pylori test
Aortic stenosis
Aortic stenosis

## 2024-04-05 NOTE — PROGRESS NOTE ADULT - PROBLEM SELECTOR PLAN 5
c/w inhalers  IV steroid   duoneb ATC   oxygen supply HFNC as current   pulmonary eval noted
c/w inhalers  IV steroid   duoneb ATC   oxygen supply  Titrate off high flow, d/w RT.    pulmonary Following
c/w inhalers  IV steroid   duoneb ATC   oxygen supply  Titrate off high flow, d/w RT.    pulmonary Following
c/w inhalers  IV steroid   duoneb ATC   oxygen supply HFNC as current   pulmonary eval noted
c/w inhalers  IV steroid   duoneb ATC   oxygen supply HFNC as current   pulmonary eval noted
- unclear h/o multiple falls  - today patient with unwitnessed fall on face?  - CT HEAD:  1.  No evidence of acute intracranial hemorrhage or midline shift.  2.  Chronic small vessel disease.  - CT CERVICAL SPINE:  1.  No evidence of acute osseous fracture or rosario dislocation.  2.  Multilevel degenerative change of the cervical spine.  3.  Slightly mottled appearance to the vertebral bodies of the cervical   and upper thoracic spine. This could be osteopenic in nature. A   underlying myeloma/lymphoma or other metastatic process is not entirely   excluded. Recommend clinical and laboratory correlation.  - fall risk  - defer PT until medical condition improves   - SW/Case management   - Nutrition consult  - will have RLE xray, erythema area shin and knee area.

## 2024-04-05 NOTE — PROGRESS NOTE ADULT - PROBLEM SELECTOR PROBLEM 11
CAD (coronary artery disease)
CAD (coronary artery disease)
Aortic stenosis
CAD (coronary artery disease)

## 2024-04-05 NOTE — CHART NOTE - NSCHARTNOTEFT_GEN_A_CORE
Assessment: patient seen for follow up. hospital course noted   85 yo M with PMH aortic stenosis s/p TAVR 2011, dementia, HLD, BPH presents to the ED with fall and flu like symptoms admitted for Respiratory Failure / Flu.    patient seen sleeping in bed. per RN patient eating fairly well  4/3 fecal incontinence noted  patient sleeping unable to complete nutrition focused physical exam with visual temple severe muscle loss  patient is DNR/DNI        Factors impacting intake: [ ] none [ ] nausea  [ ] vomiting [ ] diarrhea [ ] constipation  [ ]chewing problems [x ] swallowing issues  [ ] other: puree mild thick per speech     Diet Prescription: Diet, Pureed:   Mildly Thick Liquids (MILDTHICKLIQS)  Supplement Feeding Modality:  Oral  Glucerna Shake Cans or Servings Per Day:  1       Frequency:  Two Times a day (03-30-24 @ 12:11)    Intake: up to 100% per flow sheet most intake 50-75%     Current Weight: 4/5 150.5# 4/4 153.8#       Pertinent Medications: MEDICATIONS  (STANDING):  albuterol/ipratropium for Nebulization 3 milliLiter(s) Nebulizer every 6 hours  aspirin enteric coated 81 milliGRAM(s) Oral daily  bisacodyl 5 milliGRAM(s) Oral at bedtime  budesonide  80 MICROgram(s)/formoterol 4.5 MICROgram(s) Inhaler 2 Puff(s) Inhalation two times a day  clopidogrel Tablet 75 milliGRAM(s) Oral daily  finasteride 5 milliGRAM(s) Oral daily  heparin   Injectable 5000 Unit(s) SubCutaneous every 12 hours  latanoprost 0.005% Ophthalmic Solution 1 Drop(s) Both EYES at bedtime  melatonin 10 milliGRAM(s) Oral at bedtime  mirabegron ER 25 milliGRAM(s) Oral at bedtime  montelukast 10 milliGRAM(s) Oral daily  pantoprazole  Injectable 40 milliGRAM(s) IV Push daily  polyethylene glycol 3350 17 Gram(s) Oral daily  senna 2 Tablet(s) Oral at bedtime  sodium chloride 3%  Inhalation 4 milliLiter(s) Inhalation every 12 hours  tiotropium 2.5 MICROgram(s) Inhaler 2 Puff(s) Inhalation daily    MEDICATIONS  (PRN):  acetaminophen     Tablet .. 650 milliGRAM(s) Oral every 6 hours PRN Temp greater or equal to 38C (100.4F), Mild Pain (1 - 3)  albuterol    90 MICROgram(s) HFA Inhaler 2 Puff(s) Inhalation every 6 hours PRN Shortness of Breath and/or Wheezing  aluminum hydroxide/magnesium hydroxide/simethicone Suspension 30 milliLiter(s) Oral every 4 hours PRN Dyspepsia  ondansetron Injectable 4 milliGRAM(s) IV Push every 8 hours PRN Nausea and/or Vomiting    Pertinent Labs: A1c 5.8% glucose 141, 138   Skin: ecchymosis    Estimated Needs:   [x ] no change since previous assessment based on # 63.5kg 25-30kcals/kg 1587-1905kcals and 1-1.2gms protein/kg 63-76gms protein  [ ] recalculated:     Previous Nutrition Diagnosis:   [ x] Inadequate Energy Intake     Nutrition Diagnosis is [ ] ongoing  [ x] resolved now with improved PO intake taking > 75% of meals  [ ] not applicable     New Nutrition Diagnosis: [ x] swallow difficulty related to motor causes as evidenced by dysphagia on swallow study        Interventions:   Recommend  [ ] Change Diet To:  [ ] Nutrition Supplement  [ ] Nutrition Support  [x ] Other: continue glucerna supplement and magic cup JAVIER ice cream, trend weights     Monitoring and Evaluation:   [x ] PO intake [ x ] Tolerance to diet prescription [ x ] weights [ x ] labs[ x ] follow up per protocol  [ ] other:

## 2024-04-05 NOTE — PROGRESS NOTE ADULT - SUBJECTIVE AND OBJECTIVE BOX
[facility]  Yunier Dorman PA-C  [address] [place] [address]  [phone]      INTERVAL HPI/OVERNIGHT EVENTS:  Pt s/e  No new GI events    MEDICATIONS  (STANDING):  albuterol/ipratropium for Nebulization 3 milliLiter(s) Nebulizer every 6 hours  aspirin enteric coated 81 milliGRAM(s) Oral daily  bisacodyl 5 milliGRAM(s) Oral at bedtime  budesonide  80 MICROgram(s)/formoterol 4.5 MICROgram(s) Inhaler 2 Puff(s) Inhalation two times a day  clopidogrel Tablet 75 milliGRAM(s) Oral daily  finasteride 5 milliGRAM(s) Oral daily  heparin   Injectable 5000 Unit(s) SubCutaneous every 12 hours  latanoprost 0.005% Ophthalmic Solution 1 Drop(s) Both EYES at bedtime  melatonin 10 milliGRAM(s) Oral at bedtime  mirabegron ER 25 milliGRAM(s) Oral at bedtime  montelukast 10 milliGRAM(s) Oral daily  pantoprazole  Injectable 40 milliGRAM(s) IV Push daily  polyethylene glycol 3350 17 Gram(s) Oral daily  senna 2 Tablet(s) Oral at bedtime  sodium chloride 3%  Inhalation 4 milliLiter(s) Inhalation every 12 hours  tiotropium 2.5 MICROgram(s) Inhaler 2 Puff(s) Inhalation daily    MEDICATIONS  (PRN):  acetaminophen     Tablet .. 650 milliGRAM(s) Oral every 6 hours PRN Temp greater or equal to 38C (100.4F), Mild Pain (1 - 3)  albuterol    90 MICROgram(s) HFA Inhaler 2 Puff(s) Inhalation every 6 hours PRN Shortness of Breath and/or Wheezing  aluminum hydroxide/magnesium hydroxide/simethicone Suspension 30 milliLiter(s) Oral every 4 hours PRN Dyspepsia  ondansetron Injectable 4 milliGRAM(s) IV Push every 8 hours PRN Nausea and/or Vomiting      Allergies    No Known Allergies      PHYSICAL EXAM:   Vital Signs:  Vital Signs Last 24 Hrs  T(C): 36.2 (2024 05:37), Max: 37.2 (2024 12:36)  T(F): 97.2 (2024 05:37), Max: 98.9 (2024 12:36)  HR: 100 (2024 07:53) (93 - 100)  BP: 130/76 (2024 05:37) (107/69 - 130/76)  BP(mean): --  RR: 18 (2024 05:37) (18 - 19)  SpO2: 93% (2024 11:21) (92% - 98%)    Parameters below as of 2024 11:21  Patient On (Oxygen Delivery Method): room air      Daily     Daily Weight in k.3 (2024 07:04)    GENERAL:  Appears stated age  HEENT:  NC/AT  CHEST:  Full & symmetric excursion  HEART:  Regular rhythm  ABDOMEN:  Soft, non-tender, non-distended  EXTEREMITIES:  no cyanosis  SKIN:  No rash  NEURO:  Alert      LABS:                        15.6   9.72  )-----------( 262      ( 2024 06:15 )             46.5     04-05    146<H>  |  108  |  27<H>  ----------------------------<  140<H>  4.1   |  31  |  0.73    Ca    9.4      2024 06:15        Urinalysis Basic - ( 2024 06:15 )    Color: x / Appearance: x / SG: x / pH: x  Gluc: 140 mg/dL / Ketone: x  / Bili: x / Urobili: x   Blood: x / Protein: x / Nitrite: x   Leuk Esterase: x / RBC: x / WBC x   Sq Epi: x / Non Sq Epi: x / Bacteria: x

## 2024-04-05 NOTE — SOCIAL WORK PROGRESS NOTE - NSSWPROGRESSNOTE_GEN_ALL_CORE
kermit spoke to dtr whom is in agreement w christiana. sw to email list for dtr to review to [email or web address]. kermit to forward halina and supporting documentation to selected facilities. no auth needed for dc.

## 2024-04-06 LAB
ANION GAP SERPL CALC-SCNC: 9 MMOL/L — SIGNIFICANT CHANGE UP (ref 5–17)
BUN SERPL-MCNC: 29 MG/DL — HIGH (ref 7–23)
CALCIUM SERPL-MCNC: 9.3 MG/DL — SIGNIFICANT CHANGE UP (ref 8.5–10.1)
CHLORIDE SERPL-SCNC: 112 MMOL/L — HIGH (ref 96–108)
CO2 SERPL-SCNC: 25 MMOL/L — SIGNIFICANT CHANGE UP (ref 22–31)
CREAT SERPL-MCNC: 0.81 MG/DL — SIGNIFICANT CHANGE UP (ref 0.5–1.3)
EGFR: 86 ML/MIN/1.73M2 — SIGNIFICANT CHANGE UP
GLUCOSE SERPL-MCNC: 143 MG/DL — HIGH (ref 70–99)
HCT VFR BLD CALC: 45.5 % — SIGNIFICANT CHANGE UP (ref 39–50)
HGB BLD-MCNC: 15.3 G/DL — SIGNIFICANT CHANGE UP (ref 13–17)
MCHC RBC-ENTMCNC: 28.6 PG — SIGNIFICANT CHANGE UP (ref 27–34)
MCHC RBC-ENTMCNC: 33.6 GM/DL — SIGNIFICANT CHANGE UP (ref 32–36)
MCV RBC AUTO: 85 FL — SIGNIFICANT CHANGE UP (ref 80–100)
NRBC # BLD: 0 /100 WBCS — SIGNIFICANT CHANGE UP (ref 0–0)
PLATELET # BLD AUTO: 281 K/UL — SIGNIFICANT CHANGE UP (ref 150–400)
POTASSIUM SERPL-MCNC: 4.4 MMOL/L — SIGNIFICANT CHANGE UP (ref 3.5–5.3)
POTASSIUM SERPL-SCNC: 4.4 MMOL/L — SIGNIFICANT CHANGE UP (ref 3.5–5.3)
RBC # BLD: 5.35 M/UL — SIGNIFICANT CHANGE UP (ref 4.2–5.8)
RBC # FLD: 14 % — SIGNIFICANT CHANGE UP (ref 10.3–14.5)
SODIUM SERPL-SCNC: 146 MMOL/L — HIGH (ref 135–145)
WBC # BLD: 15.45 K/UL — HIGH (ref 3.8–10.5)
WBC # FLD AUTO: 15.45 K/UL — HIGH (ref 3.8–10.5)

## 2024-04-06 PROCEDURE — 99233 SBSQ HOSP IP/OBS HIGH 50: CPT

## 2024-04-06 PROCEDURE — 99232 SBSQ HOSP IP/OBS MODERATE 35: CPT

## 2024-04-06 PROCEDURE — 71045 X-RAY EXAM CHEST 1 VIEW: CPT | Mod: 26

## 2024-04-06 RX ORDER — HYDROCORTISONE 1 %
1 OINTMENT (GRAM) TOPICAL
Refills: 0 | Status: DISCONTINUED | OUTPATIENT
Start: 2024-04-06 | End: 2024-04-10

## 2024-04-06 RX ADMIN — Medication 650 MILLIGRAM(S): at 23:05

## 2024-04-06 RX ADMIN — SODIUM CHLORIDE 4 MILLILITER(S): 9 INJECTION INTRAMUSCULAR; INTRAVENOUS; SUBCUTANEOUS at 20:12

## 2024-04-06 RX ADMIN — Medication 5 MILLIGRAM(S): at 23:05

## 2024-04-06 RX ADMIN — POLYETHYLENE GLYCOL 3350 17 GRAM(S): 17 POWDER, FOR SOLUTION ORAL at 12:02

## 2024-04-06 RX ADMIN — Medication 650 MILLIGRAM(S): at 13:04

## 2024-04-06 RX ADMIN — Medication 81 MILLIGRAM(S): at 12:02

## 2024-04-06 RX ADMIN — LATANOPROST 1 DROP(S): 0.05 SOLUTION/ DROPS OPHTHALMIC; TOPICAL at 23:07

## 2024-04-06 RX ADMIN — SODIUM CHLORIDE 4 MILLILITER(S): 9 INJECTION INTRAMUSCULAR; INTRAVENOUS; SUBCUTANEOUS at 07:45

## 2024-04-06 RX ADMIN — HEPARIN SODIUM 5000 UNIT(S): 5000 INJECTION INTRAVENOUS; SUBCUTANEOUS at 17:57

## 2024-04-06 RX ADMIN — HEPARIN SODIUM 5000 UNIT(S): 5000 INJECTION INTRAVENOUS; SUBCUTANEOUS at 05:05

## 2024-04-06 RX ADMIN — Medication 0.5 MILLIGRAM(S): at 20:12

## 2024-04-06 RX ADMIN — FINASTERIDE 5 MILLIGRAM(S): 5 TABLET, FILM COATED ORAL at 12:02

## 2024-04-06 RX ADMIN — MONTELUKAST 10 MILLIGRAM(S): 4 TABLET, CHEWABLE ORAL at 12:02

## 2024-04-06 RX ADMIN — Medication 0.5 MILLIGRAM(S): at 07:45

## 2024-04-06 RX ADMIN — CLOPIDOGREL BISULFATE 75 MILLIGRAM(S): 75 TABLET, FILM COATED ORAL at 12:02

## 2024-04-06 RX ADMIN — Medication 1 SUPPOSITORY(S): at 17:57

## 2024-04-06 RX ADMIN — PANTOPRAZOLE SODIUM 40 MILLIGRAM(S): 20 TABLET, DELAYED RELEASE ORAL at 12:02

## 2024-04-06 RX ADMIN — Medication 10 MILLIGRAM(S): at 16:49

## 2024-04-06 RX ADMIN — Medication 3 MILLILITER(S): at 07:45

## 2024-04-06 RX ADMIN — SENNA PLUS 2 TABLET(S): 8.6 TABLET ORAL at 23:05

## 2024-04-06 RX ADMIN — Medication 10 MILLIGRAM(S): at 23:05

## 2024-04-06 RX ADMIN — Medication 1 ENEMA: at 23:05

## 2024-04-06 RX ADMIN — Medication 3 MILLILITER(S): at 20:12

## 2024-04-06 RX ADMIN — Medication 3 MILLILITER(S): at 12:50

## 2024-04-06 RX ADMIN — Medication 650 MILLIGRAM(S): at 12:04

## 2024-04-06 NOTE — PROGRESS NOTE ADULT - ASSESSMENT
85 yo M with PMH aortic stenosis s/p TAVR 2019, CAD s/p LELAND to LAD 2019, dementia, HLD, asthma, COPD, overactive bladder, H pylori, BPH admitted with Influenza A s/p multiple falls.     CAD, respiratory failure, elev trop   - Troponins elevated, peaked 2300, now downtrending, no need to trend further   - likely elevated on the basis of demand ischemia in setting of influenza, RAY, rhabdomyolysis, agitation.  - With CPK out of proportion to Troponin, likely rhabdomyolysis s/p fall, now downtrending   - History of LELAND to LAD in 2019  - Would continue either ASA or Plavix. Unclear indication for DAPT.   - tele st , yesterday with 5 beats vt   - Monitor and replete electrolytes. Keep K>4.0 and Mg>2.0.    - H/o EKG with IVCD vs LBBB in the past.    - EKG's reveal elevated QTc ~500. repeat EKG with QTc: 446 Avoid QT prolonging medications.     - No meaningful evidence of volume overload.  - TTE 4/2/24 as above diff stuy ef 50-55% full reports as above     - + Flu, multifocal PNA, management per primary/ ID     -followed by renal ray now resolved     - Will continue to follow.

## 2024-04-06 NOTE — PROGRESS NOTE ADULT - SUBJECTIVE AND OBJECTIVE BOX
Patient is a 84y old  Male who presents with a chief complaint of RF, fLU (05 Apr 2024 16:25)    INTERVAL HPI/OVERNIGHT EVENTS: Patient was seen and examined. on RA. confused. wife at bedside. Discussed with RN, constipated.    I&O's Summary    05 Apr 2024 07:01  -  06 Apr 2024 07:00  --------------------------------------------------------  IN: 200 mL / OUT: 900 mL / NET: -700 mL    06 Apr 2024 07:01  -  06 Apr 2024 16:36  --------------------------------------------------------  IN: 0 mL / OUT: 200 mL / NET: -200 mL        LABS:                        15.3   15.45 )-----------( 281      ( 06 Apr 2024 06:15 )             45.5     04-06    146<H>  |  112<H>  |  29<H>  ----------------------------<  143<H>  4.4   |  25  |  0.81    Ca    9.3      06 Apr 2024 06:15        Urinalysis Basic - ( 06 Apr 2024 06:15 )    Color: x / Appearance: x / SG: x / pH: x  Gluc: 143 mg/dL / Ketone: x  / Bili: x / Urobili: x   Blood: x / Protein: x / Nitrite: x   Leuk Esterase: x / RBC: x / WBC x   Sq Epi: x / Non Sq Epi: x / Bacteria: x      CAPILLARY BLOOD GLUCOSE    Urinalysis Basic - ( 06 Apr 2024 06:15 )    Color: x / Appearance: x / SG: x / pH: x  Gluc: 143 mg/dL / Ketone: x  / Bili: x / Urobili: x   Blood: x / Protein: x / Nitrite: x   Leuk Esterase: x / RBC: x / WBC x   Sq Epi: x / Non Sq Epi: x / Bacteria: x        MEDICATIONS  (STANDING):  albuterol/ipratropium for Nebulization 3 milliLiter(s) Nebulizer every 6 hours  aspirin enteric coated 81 milliGRAM(s) Oral daily  bisacodyl 5 milliGRAM(s) Oral at bedtime  buDESOnide    Inhalation Suspension 0.5 milliGRAM(s) Inhalation two times a day  clopidogrel Tablet 75 milliGRAM(s) Oral daily  finasteride 5 milliGRAM(s) Oral daily  heparin   Injectable 5000 Unit(s) SubCutaneous every 12 hours  latanoprost 0.005% Ophthalmic Solution 1 Drop(s) Both EYES at bedtime  melatonin 10 milliGRAM(s) Oral at bedtime  mirabegron ER 25 milliGRAM(s) Oral at bedtime  montelukast 10 milliGRAM(s) Oral daily  pantoprazole  Injectable 40 milliGRAM(s) IV Push daily  polyethylene glycol 3350 17 Gram(s) Oral daily  senna 2 Tablet(s) Oral at bedtime  sodium chloride 3%  Inhalation 4 milliLiter(s) Inhalation every 12 hours    MEDICATIONS  (PRN):  acetaminophen     Tablet .. 650 milliGRAM(s) Oral every 6 hours PRN Temp greater or equal to 38C (100.4F), Mild Pain (1 - 3)  albuterol    90 MICROgram(s) HFA Inhaler 2 Puff(s) Inhalation every 6 hours PRN Shortness of Breath and/or Wheezing  aluminum hydroxide/magnesium hydroxide/simethicone Suspension 30 milliLiter(s) Oral every 4 hours PRN Dyspepsia  bisacodyl Suppository 10 milliGRAM(s) Rectal daily PRN Constipation  ondansetron Injectable 4 milliGRAM(s) IV Push every 8 hours PRN Nausea and/or Vomiting      REVIEW OF SYSTEMS:  CONSTITUTIONAL: No fever or chills  HEENT:  No headache, no sore throat  RESPIRATORY: No cough, wheezing, or shortness of breath  CARDIOVASCULAR: No chest pain, palpitations  GASTROINTESTINAL: No abdominal pain, nausea, vomiting, or diarrhea  GENITOURINARY: No dysuria, frequency, or hematuria  NEUROLOGICAL: no focal weakness or dizziness  MUSCULOSKELETAL: no myalgias  PSYCH: no recent changes in mood    RADIOLOGY & ADDITIONAL TESTS:    Imaging Personally Reviewed:  [x] YES  [ ] NO    Consultant(s) Notes Reviewed:  [x] YES  [ ] NO    PHYSICAL EXAM:  T(C): 37.1 (04-06-24 @ 12:41), Max: 37.2 (04-06-24 @ 04:58)  HR: 88 (04-06-24 @ 13:08) (84 - 109)  BP: 145/82 (04-06-24 @ 12:41) (90/63 - 145/82)  RR: 18 (04-06-24 @ 12:41) (17 - 18)  SpO2: 93% (04-06-24 @ 12:41) (91% - 97%)    GENERAL: NAD, well-developed, well-groomed  HEENT:  anicteric, moist mucous membranes  CHEST/LUNG:  CTA b/l, no rales, wheezes, or rhonchi  HEART:  RRR, S1, S2  ABDOMEN:  BS+, soft, nontender, nondistended  EXTREMITIES: no edema  NERVOUS SYSTEM: answers questions and follows commands appropriately  PSYCH: normal affect    Care Discussed with Consultants/Other Providers [x] YES  [ ] NO

## 2024-04-06 NOTE — PROGRESS NOTE ADULT - SUBJECTIVE AND OBJECTIVE BOX
[facility] - Halima Hayes Pannella, Patel, Savella, Goodger  Office Number: [phone]    Follow Up:    FLU pna elevated troponin     Subjective/Observations:     Seen bedside, remains on nc, given dementia unable to provide meaningful information     PAST MEDICAL & SURGICAL HISTORY:  Aortic stenosis      BPH (benign prostatic hyperplasia)      Dementia  mild- on Memantine      ACE (dyspnea on exertion)      Asthma  controlled on inhalers      HLD (hyperlipidemia)      CAD (coronary artery disease)  s/p stent 10/2019      OH (ocular hypertension)  on eye drops      Overactive bladder      Anxiety      History of tonsillectomy  childhood      H/O coronary angiogram  10/25/19, s/p PCI to LAD          SOCIAL HISTORY:  No tobacco, ethanol, or drug abuse.    FAMILY HISTORY:    No family history of acute MI or sudden cardiac death.    MEDICATIONS  (STANDING):  albuterol/ipratropium for Nebulization 3 milliLiter(s) Nebulizer every 6 hours  aspirin enteric coated 81 milliGRAM(s) Oral daily  bisacodyl 5 milliGRAM(s) Oral at bedtime  buDESOnide    Inhalation Suspension 0.5 milliGRAM(s) Inhalation two times a day  clopidogrel Tablet 75 milliGRAM(s) Oral daily  finasteride 5 milliGRAM(s) Oral daily  heparin   Injectable 5000 Unit(s) SubCutaneous every 12 hours  latanoprost 0.005% Ophthalmic Solution 1 Drop(s) Both EYES at bedtime  melatonin 10 milliGRAM(s) Oral at bedtime  mirabegron ER 25 milliGRAM(s) Oral at bedtime  montelukast 10 milliGRAM(s) Oral daily  pantoprazole  Injectable 40 milliGRAM(s) IV Push daily  polyethylene glycol 3350 17 Gram(s) Oral daily  senna 2 Tablet(s) Oral at bedtime  sodium chloride 3%  Inhalation 4 milliLiter(s) Inhalation every 12 hours    MEDICATIONS  (PRN):  acetaminophen     Tablet .. 650 milliGRAM(s) Oral every 6 hours PRN Temp greater or equal to 38C (100.4F), Mild Pain (1 - 3)  albuterol    90 MICROgram(s) HFA Inhaler 2 Puff(s) Inhalation every 6 hours PRN Shortness of Breath and/or Wheezing  aluminum hydroxide/magnesium hydroxide/simethicone Suspension 30 milliLiter(s) Oral every 4 hours PRN Dyspepsia  ondansetron Injectable 4 milliGRAM(s) IV Push every 8 hours PRN Nausea and/or Vomiting      Allergies    No Known Allergies    Intolerances        VITAL SIGNS:   Vital Signs Last 24 Hrs  T(C): 37.2 (06 Apr 2024 04:58), Max: 37.2 (06 Apr 2024 04:58)  T(F): 98.9 (06 Apr 2024 04:58), Max: 98.9 (06 Apr 2024 04:58)  HR: 84 (06 Apr 2024 08:20) (84 - 109)  BP: 102/61 (06 Apr 2024 04:58) (90/63 - 102/61)  BP(mean): --  RR: 17 (06 Apr 2024 04:58) (17 - 18)  SpO2: 93% (06 Apr 2024 04:58) (91% - 97%)    Parameters below as of 06 Apr 2024 04:58  Patient On (Oxygen Delivery Method): room air        I&O's Summary    05 Apr 2024 07:01  -  06 Apr 2024 07:00  --------------------------------------------------------  IN: 200 mL / OUT: 900 mL / NET: -700 mL        On Exam:  Constitutional: NAD, awake and alert, well-developed  HEENT: Moist Mucous Membranes, Anicteric  Pulmonary: Non-labored, breath sounds are clear bilaterally, No wheezing, rales or rhonchi  Cardiovascular: Regular, S1 and S2, No murmurs, rubs, gallops or clicks  Gastrointestinal: Bowel Sounds present, soft, nontender.   Lymph: No peripheral edema. No lymphadenopathy.  Skin: No visible rashes or ulcers.  Psych:  Mood & affect appropriate    LABS: All Labs Reviewed:                        15.3   15.45 )-----------( 281      ( 06 Apr 2024 06:15 )             45.5                         15.6   9.72  )-----------( 262      ( 05 Apr 2024 06:15 )             46.5                         14.9   12.51 )-----------( 245      ( 04 Apr 2024 07:30 )             43.7     06 Apr 2024 06:15    146    |  112    |  29     ----------------------------<  143    4.4     |  25     |  0.81   05 Apr 2024 06:15    146    |  108    |  27     ----------------------------<  140    4.1     |  31     |  0.73   04 Apr 2024 07:30    143    |  103    |  24     ----------------------------<  141    3.6     |  30     |  0.79     Ca    9.3        06 Apr 2024 06:15  Ca    9.4        05 Apr 2024 06:15  Ca    8.7        04 Apr 2024 07:30            Blood Culture:         RADIOLOGY:    ECG:

## 2024-04-06 NOTE — PROGRESS NOTE ADULT - SUBJECTIVE AND OBJECTIVE BOX
Patient is a 84y old  Male who presents with a chief complaint of RF, fLU (05 Apr 2024 16:25)      INTERVAL HPI/OVERNIGHT EVENTS:    no respiratory distress    MEDICATIONS  (STANDING):  albuterol/ipratropium for Nebulization 3 milliLiter(s) Nebulizer every 6 hours  aspirin enteric coated 81 milliGRAM(s) Oral daily  bisacodyl 5 milliGRAM(s) Oral at bedtime  buDESOnide    Inhalation Suspension 0.5 milliGRAM(s) Inhalation two times a day  clopidogrel Tablet 75 milliGRAM(s) Oral daily  finasteride 5 milliGRAM(s) Oral daily  heparin   Injectable 5000 Unit(s) SubCutaneous every 12 hours  hydrocortisone hemorrhoidal Suppository 1 Suppository(s) Rectal two times a day  latanoprost 0.005% Ophthalmic Solution 1 Drop(s) Both EYES at bedtime  melatonin 10 milliGRAM(s) Oral at bedtime  mirabegron ER 25 milliGRAM(s) Oral at bedtime  montelukast 10 milliGRAM(s) Oral daily  pantoprazole  Injectable 40 milliGRAM(s) IV Push daily  polyethylene glycol 3350 17 Gram(s) Oral daily  saline laxative (FLEET) Rectal Enema 1 Enema Rectal once  senna 2 Tablet(s) Oral at bedtime  sodium chloride 3%  Inhalation 4 milliLiter(s) Inhalation every 12 hours      MEDICATIONS  (PRN):  acetaminophen     Tablet .. 650 milliGRAM(s) Oral every 6 hours PRN Temp greater or equal to 38C (100.4F), Mild Pain (1 - 3)  albuterol    90 MICROgram(s) HFA Inhaler 2 Puff(s) Inhalation every 6 hours PRN Shortness of Breath and/or Wheezing  aluminum hydroxide/magnesium hydroxide/simethicone Suspension 30 milliLiter(s) Oral every 4 hours PRN Dyspepsia  bisacodyl Suppository 10 milliGRAM(s) Rectal daily PRN Constipation  ondansetron Injectable 4 milliGRAM(s) IV Push every 8 hours PRN Nausea and/or Vomiting      Allergies    No Known Allergies    Intolerances        PAST MEDICAL & SURGICAL HISTORY:  Aortic stenosis      BPH (benign prostatic hyperplasia)      Dementia  mild- on Memantine      ACE (dyspnea on exertion)      Asthma  controlled on inhalers      HLD (hyperlipidemia)      CAD (coronary artery disease)  s/p stent 10/2019      OH (ocular hypertension)  on eye drops      Overactive bladder      Anxiety      History of tonsillectomy  childhood      H/O coronary angiogram  10/25/19, s/p PCI to LAD          Vital Signs Last 24 Hrs  T(C): 37.1 (06 Apr 2024 12:41), Max: 37.2 (06 Apr 2024 04:58)  T(F): 98.8 (06 Apr 2024 12:41), Max: 98.9 (06 Apr 2024 04:58)  HR: 88 (06 Apr 2024 13:08) (84 - 109)  BP: 145/82 (06 Apr 2024 12:41) (90/63 - 145/82)  BP(mean): --  RR: 18 (06 Apr 2024 12:41) (17 - 18)  SpO2: 93% (06 Apr 2024 12:41) (91% - 97%)    Parameters below as of 06 Apr 2024 12:41  Patient On (Oxygen Delivery Method): room air        PHYSICAL EXAMINATION:    GENERAL: The patient is awake and alert in no apparent distress.     HEENT: Head is normocephalic and atraumatic    NECK: no JVD    LUNGS: Clear to auscultation without wheezing, rales or rhonchi; respirations unlabored    HEART: Regular rate and rhythm without murmur.    ABDOMEN: Soft, nontender, and nondistended.      EXTREMITIES: Without any cyanosis, clubbing, rash, lesions or edema.    NEUROLOGIC: Grossly intact.    SKIN: No ulceration or induration present.      LABS:                        15.3   15.45 )-----------( 281      ( 06 Apr 2024 06:15 )             45.5     04-06    146<H>  |  112<H>  |  29<H>  ----------------------------<  143<H>  4.4   |  25  |  0.81    Ca    9.3      06 Apr 2024 06:15        Urinalysis Basic - ( 06 Apr 2024 06:15 )    Color: x / Appearance: x / SG: x / pH: x  Gluc: 143 mg/dL / Ketone: x  / Bili: x / Urobili: x   Blood: x / Protein: x / Nitrite: x   Leuk Esterase: x / RBC: x / WBC x   Sq Epi: x / Non Sq Epi: x / Bacteria: x              Assessment:    Resolved Hypoxic respiratory failure  Influenza A bronchitis - markedly improved  COPD  Pneumonia with significant clinical improvement  Altered mentation  Coronary artery disease - S/P stent    Plan:    Duoneb QID  Monitor pulse oximetry  Budesonide via nebulizer  DVT prophylaxis

## 2024-04-06 NOTE — PROGRESS NOTE ADULT - SUBJECTIVE AND OBJECTIVE BOX
[facility]  Yunier Dorman PA-C  [address] [place] [address]  [phone]      INTERVAL HPI/OVERNIGHT EVENTS:  Pt s/e  No acute events overnight as per nursing staff   Tolerating diet   No new GI events      MEDICATIONS  (STANDING):  albuterol/ipratropium for Nebulization 3 milliLiter(s) Nebulizer every 6 hours  aspirin enteric coated 81 milliGRAM(s) Oral daily  bisacodyl 5 milliGRAM(s) Oral at bedtime  buDESOnide    Inhalation Suspension 0.5 milliGRAM(s) Inhalation two times a day  clopidogrel Tablet 75 milliGRAM(s) Oral daily  finasteride 5 milliGRAM(s) Oral daily  heparin   Injectable 5000 Unit(s) SubCutaneous every 12 hours  latanoprost 0.005% Ophthalmic Solution 1 Drop(s) Both EYES at bedtime  melatonin 10 milliGRAM(s) Oral at bedtime  mirabegron ER 25 milliGRAM(s) Oral at bedtime  montelukast 10 milliGRAM(s) Oral daily  pantoprazole  Injectable 40 milliGRAM(s) IV Push daily  polyethylene glycol 3350 17 Gram(s) Oral daily  senna 2 Tablet(s) Oral at bedtime  sodium chloride 3%  Inhalation 4 milliLiter(s) Inhalation every 12 hours    MEDICATIONS  (PRN):  acetaminophen     Tablet .. 650 milliGRAM(s) Oral every 6 hours PRN Temp greater or equal to 38C (100.4F), Mild Pain (1 - 3)  albuterol    90 MICROgram(s) HFA Inhaler 2 Puff(s) Inhalation every 6 hours PRN Shortness of Breath and/or Wheezing  aluminum hydroxide/magnesium hydroxide/simethicone Suspension 30 milliLiter(s) Oral every 4 hours PRN Dyspepsia  ondansetron Injectable 4 milliGRAM(s) IV Push every 8 hours PRN Nausea and/or Vomiting      Allergies  No Known Allergies    Intolerances    ROS: Unable to obtain, pt confused     Vital Signs Last 24 Hrs  T(C): 37.2 (06 Apr 2024 04:58), Max: 37.2 (06 Apr 2024 04:58)  T(F): 98.9 (06 Apr 2024 04:58), Max: 98.9 (06 Apr 2024 04:58)  HR: 84 (06 Apr 2024 08:20) (84 - 109)  BP: 102/61 (06 Apr 2024 04:58) (90/63 - 130/63)  BP(mean): --  RR: 17 (06 Apr 2024 04:58) (17 - 18)  SpO2: 93% (06 Apr 2024 04:58) (91% - 97%)    Parameters below as of 06 Apr 2024 04:58  Patient On (Oxygen Delivery Method): room air      GENERAL:  Appears stated age  HEENT:  NC/AT  CHEST:  Full & symmetric excursion  HEART:  Regular rhythm  ABDOMEN:  Soft, non-tender, non-distended  EXTEREMITIES:  no cyanosis  SKIN:  No rash  NEURO:  Alert    LABS:                        15.3   15.45 )-----------( 281      ( 06 Apr 2024 06:15 )             45.5     04-06    146<H>  |  112<H>  |  29<H>  ----------------------------<  143<H>  4.4   |  25  |  0.81    Ca    9.3      06 Apr 2024 06:15

## 2024-04-06 NOTE — PROGRESS NOTE ADULT - ASSESSMENT
85 yo M with PMH aortic stenosis s/p TAVR 2011, dementia, HLD, BPH presents to the ED with fall and flu like symptoms admitted for Respiratory Failure / Flu.     Influenza   acute hypoxic respiratory failure   bacterial pneumonia   COPD exacerbation     - pulm consulted: Dr Butcher     - completed tamiflu and rocephin     - on RA    - duonebs ATC and singulair       dementia     - aricept and seroquel discontinued secondary to QT prolongation    - seen by psych     - limiting benzo due to drowsiness     CAD s/p PCI to LAD 2019   aortic stenosis s/p TAVR     - statin held secondary to CK     - cont asa and plavix     constipation    - dulcolax ordered    - added hydrocortisone cream BID    DVT proph: heparin 5000 units TID

## 2024-04-07 LAB
ALBUMIN SERPL ELPH-MCNC: 3.2 G/DL — LOW (ref 3.3–5)
ALP SERPL-CCNC: 71 U/L — SIGNIFICANT CHANGE UP (ref 40–120)
ALT FLD-CCNC: 75 U/L — SIGNIFICANT CHANGE UP (ref 12–78)
ANION GAP SERPL CALC-SCNC: 8 MMOL/L — SIGNIFICANT CHANGE UP (ref 5–17)
AST SERPL-CCNC: 35 U/L — SIGNIFICANT CHANGE UP (ref 15–37)
BILIRUB SERPL-MCNC: 1 MG/DL — SIGNIFICANT CHANGE UP (ref 0.2–1.2)
BUN SERPL-MCNC: 32 MG/DL — HIGH (ref 7–23)
CALCIUM SERPL-MCNC: 8.8 MG/DL — SIGNIFICANT CHANGE UP (ref 8.5–10.1)
CHLORIDE SERPL-SCNC: 112 MMOL/L — HIGH (ref 96–108)
CO2 SERPL-SCNC: 28 MMOL/L — SIGNIFICANT CHANGE UP (ref 22–31)
CREAT SERPL-MCNC: 0.89 MG/DL — SIGNIFICANT CHANGE UP (ref 0.5–1.3)
EGFR: 83 ML/MIN/1.73M2 — SIGNIFICANT CHANGE UP
GLUCOSE SERPL-MCNC: 145 MG/DL — HIGH (ref 70–99)
HCT VFR BLD CALC: 45.9 % — SIGNIFICANT CHANGE UP (ref 39–50)
HGB BLD-MCNC: 15.5 G/DL — SIGNIFICANT CHANGE UP (ref 13–17)
MAGNESIUM SERPL-MCNC: 2.4 MG/DL — SIGNIFICANT CHANGE UP (ref 1.6–2.6)
MCHC RBC-ENTMCNC: 28.5 PG — SIGNIFICANT CHANGE UP (ref 27–34)
MCHC RBC-ENTMCNC: 33.8 GM/DL — SIGNIFICANT CHANGE UP (ref 32–36)
MCV RBC AUTO: 84.4 FL — SIGNIFICANT CHANGE UP (ref 80–100)
NRBC # BLD: 0 /100 WBCS — SIGNIFICANT CHANGE UP (ref 0–0)
PLATELET # BLD AUTO: 292 K/UL — SIGNIFICANT CHANGE UP (ref 150–400)
POTASSIUM SERPL-MCNC: 3.9 MMOL/L — SIGNIFICANT CHANGE UP (ref 3.5–5.3)
POTASSIUM SERPL-SCNC: 3.9 MMOL/L — SIGNIFICANT CHANGE UP (ref 3.5–5.3)
PROT SERPL-MCNC: 6.7 G/DL — SIGNIFICANT CHANGE UP (ref 6–8.3)
RBC # BLD: 5.44 M/UL — SIGNIFICANT CHANGE UP (ref 4.2–5.8)
RBC # FLD: 13.9 % — SIGNIFICANT CHANGE UP (ref 10.3–14.5)
SODIUM SERPL-SCNC: 148 MMOL/L — HIGH (ref 135–145)
WBC # BLD: 14.22 K/UL — HIGH (ref 3.8–10.5)
WBC # FLD AUTO: 14.22 K/UL — HIGH (ref 3.8–10.5)

## 2024-04-07 PROCEDURE — 99232 SBSQ HOSP IP/OBS MODERATE 35: CPT

## 2024-04-07 PROCEDURE — 99233 SBSQ HOSP IP/OBS HIGH 50: CPT

## 2024-04-07 RX ORDER — SODIUM CHLORIDE 9 MG/ML
1000 INJECTION, SOLUTION INTRAVENOUS
Refills: 0 | Status: DISCONTINUED | OUTPATIENT
Start: 2024-04-07 | End: 2024-04-07

## 2024-04-07 RX ORDER — SODIUM CHLORIDE 9 MG/ML
1000 INJECTION, SOLUTION INTRAVENOUS
Refills: 0 | Status: DISCONTINUED | OUTPATIENT
Start: 2024-04-07 | End: 2024-04-10

## 2024-04-07 RX ADMIN — Medication 3 MILLILITER(S): at 20:16

## 2024-04-07 RX ADMIN — Medication 650 MILLIGRAM(S): at 23:27

## 2024-04-07 RX ADMIN — CLOPIDOGREL BISULFATE 75 MILLIGRAM(S): 75 TABLET, FILM COATED ORAL at 11:09

## 2024-04-07 RX ADMIN — Medication 3 MILLILITER(S): at 13:50

## 2024-04-07 RX ADMIN — Medication 0.5 MILLIGRAM(S): at 20:21

## 2024-04-07 RX ADMIN — Medication 650 MILLIGRAM(S): at 00:05

## 2024-04-07 RX ADMIN — Medication 650 MILLIGRAM(S): at 22:40

## 2024-04-07 RX ADMIN — MONTELUKAST 10 MILLIGRAM(S): 4 TABLET, CHEWABLE ORAL at 11:09

## 2024-04-07 RX ADMIN — Medication 0.5 MILLIGRAM(S): at 07:56

## 2024-04-07 RX ADMIN — SODIUM CHLORIDE 75 MILLILITER(S): 9 INJECTION, SOLUTION INTRAVENOUS at 11:07

## 2024-04-07 RX ADMIN — HEPARIN SODIUM 5000 UNIT(S): 5000 INJECTION INTRAVENOUS; SUBCUTANEOUS at 17:58

## 2024-04-07 RX ADMIN — Medication 3 MILLILITER(S): at 07:56

## 2024-04-07 RX ADMIN — Medication 10 MILLIGRAM(S): at 22:40

## 2024-04-07 RX ADMIN — Medication 81 MILLIGRAM(S): at 11:09

## 2024-04-07 RX ADMIN — FINASTERIDE 5 MILLIGRAM(S): 5 TABLET, FILM COATED ORAL at 11:09

## 2024-04-07 RX ADMIN — Medication 650 MILLIGRAM(S): at 12:12

## 2024-04-07 RX ADMIN — SODIUM CHLORIDE 4 MILLILITER(S): 9 INJECTION INTRAMUSCULAR; INTRAVENOUS; SUBCUTANEOUS at 20:20

## 2024-04-07 RX ADMIN — Medication 650 MILLIGRAM(S): at 11:11

## 2024-04-07 RX ADMIN — POLYETHYLENE GLYCOL 3350 17 GRAM(S): 17 POWDER, FOR SOLUTION ORAL at 11:10

## 2024-04-07 RX ADMIN — PANTOPRAZOLE SODIUM 40 MILLIGRAM(S): 20 TABLET, DELAYED RELEASE ORAL at 11:10

## 2024-04-07 RX ADMIN — LATANOPROST 1 DROP(S): 0.05 SOLUTION/ DROPS OPHTHALMIC; TOPICAL at 22:41

## 2024-04-07 RX ADMIN — HEPARIN SODIUM 5000 UNIT(S): 5000 INJECTION INTRAVENOUS; SUBCUTANEOUS at 05:16

## 2024-04-07 RX ADMIN — SODIUM CHLORIDE 4 MILLILITER(S): 9 INJECTION INTRAMUSCULAR; INTRAVENOUS; SUBCUTANEOUS at 08:10

## 2024-04-07 RX ADMIN — Medication 1 SUPPOSITORY(S): at 17:58

## 2024-04-07 NOTE — PROGRESS NOTE ADULT - ASSESSMENT
85 yo M with PMH aortic stenosis s/p TAVR 2019, CAD s/p LELAND to LAD 2019, dementia, HLD, asthma, COPD, overactive bladder, H pylori, BPH admitted with Influenza A s/p multiple falls.     CAD, respiratory failure, elev trop   - Troponins elevated, peaked 2300 but downtrended  - Likely elevated on the basis of demand ischemia in setting of influenza, RAY, rhabdomyolysis, agitation.  - With CPK out of proportion to Troponin, likely rhabdomyolysis s/p fall; downtrended    - History of CAD with LELAND to LAD   - Would continue either ASA or Plavix. Unclear indication for DAPT.   - NSR on tele, no further arrhythmias.  Can D/C  - Monitor and replete electrolytes. Keep K>4.0 and Mg>2.0.    - H/o EKG with IVCD vs LBBB in the past.    - EKG's reveal elevated QTc ~500. Repeat EKG with QTc: 446.  Avoid QT prolonging medications.     - No meaningful evidence of volume overload.  - TTE 4/2/24 as above diff study: EF 50-55%     - + Flu s/p Tamiflu, multifocal PNA s/p Abx   - On RA  - Will continue to follow.    Kailee Levi DNP, NP-C, AGACNP-C  Cardiology   Call TEAMS

## 2024-04-07 NOTE — PROGRESS NOTE ADULT - SUBJECTIVE AND OBJECTIVE BOX
[facility] -- Halima Hayes, Henry Gaona Savella, , Yimi Hanson  Office # [phone]    Follow Up:  Elevated Troponins    Subjective/Observations: Awake and alert but very confused and restless.  Uncooperative.  Not in any form of respiratory or cardiac discomfort.  No tele events    REVIEW OF SYSTEMS: All other review of systems is negative unless indicated above  PAST MEDICAL & SURGICAL HISTORY:  Aortic stenosis  BPH (benign prostatic hyperplasia)  Dementia  mild- on Memantine  ACE (dyspnea on exertion)  Asthma  controlled on inhalers  HLD (hyperlipidemia)  CAD (coronary artery disease)  s/p stent 10/2019  OH (ocular hypertension)  on eye drops  Overactive bladder  Anxiety  History of tonsillectomy  childhood  H/O coronary angiogram  10/25/19, s/p PCI to LAD    MEDICATIONS  (STANDING):  albuterol/ipratropium for Nebulization 3 milliLiter(s) Nebulizer every 6 hours  aspirin enteric coated 81 milliGRAM(s) Oral daily  bisacodyl 5 milliGRAM(s) Oral at bedtime  buDESOnide    Inhalation Suspension 0.5 milliGRAM(s) Inhalation two times a day  clopidogrel Tablet 75 milliGRAM(s) Oral daily  dextrose 5%. 1000 milliLiter(s) (50 mL/Hr) IV Continuous <Continuous>  finasteride 5 milliGRAM(s) Oral daily  heparin   Injectable 5000 Unit(s) SubCutaneous every 12 hours  hydrocortisone hemorrhoidal Suppository 1 Suppository(s) Rectal two times a day  latanoprost 0.005% Ophthalmic Solution 1 Drop(s) Both EYES at bedtime  melatonin 10 milliGRAM(s) Oral at bedtime  mirabegron ER 25 milliGRAM(s) Oral at bedtime  montelukast 10 milliGRAM(s) Oral daily  pantoprazole  Injectable 40 milliGRAM(s) IV Push daily  polyethylene glycol 3350 17 Gram(s) Oral daily  saline laxative (FLEET) Rectal Enema 1 Enema Rectal once  senna 2 Tablet(s) Oral at bedtime  sodium chloride 3%  Inhalation 4 milliLiter(s) Inhalation every 12 hours    MEDICATIONS  (PRN):  acetaminophen     Tablet .. 650 milliGRAM(s) Oral every 6 hours PRN Temp greater or equal to 38C (100.4F), Mild Pain (1 - 3)  albuterol    90 MICROgram(s) HFA Inhaler 2 Puff(s) Inhalation every 6 hours PRN Shortness of Breath and/or Wheezing  aluminum hydroxide/magnesium hydroxide/simethicone Suspension 30 milliLiter(s) Oral every 4 hours PRN Dyspepsia  bisacodyl Suppository 10 milliGRAM(s) Rectal daily PRN Constipation  ondansetron Injectable 4 milliGRAM(s) IV Push every 8 hours PRN Nausea and/or Vomiting    Allergies    No Known Allergies    Intolerances      Vital Signs Last 24 Hrs  T(C): 36.7 (07 Apr 2024 06:41), Max: 37.1 (06 Apr 2024 12:41)  T(F): 98.1 (07 Apr 2024 06:41), Max: 98.8 (06 Apr 2024 12:41)  HR: 102 (07 Apr 2024 08:11) (72 - 110)  BP: 129/81 (07 Apr 2024 06:41) (119/78 - 145/82)  BP(mean): --  RR: 18 (07 Apr 2024 06:41) (18 - 18)  SpO2: 95% (07 Apr 2024 08:11) (93% - 95%)    Parameters below as of 07 Apr 2024 08:11  Patient On (Oxygen Delivery Method): room air    I&O's Summary    06 Apr 2024 07:01  -  07 Apr 2024 07:00  --------------------------------------------------------  IN: 0 mL / OUT: 550 mL / NET: -550 mL    PHYSICAL EXAM:  TELE: NSR  Constitutional: NAD, awake and alert  HEENT: Moist Mucous Membranes, Anicteric  Pulmonary: Non-labored, breath sounds are clear but diminished bilaterally, No wheezing, rales or rhonchi  Cardiovascular: Regular, S1 and S2, No murmurs, rubs, gallops or clicks  Gastrointestinal: Bowel Sounds present, soft, nontender.   Lymph: No peripheral edema. No lymphadenopathy.  Skin: No visible rashes or ulcers.  Psych:  Mood & affect: Restless, confused/disoriented  LABS: All Labs Reviewed:                        15.5   14.22 )-----------( 292      ( 07 Apr 2024 05:30 )             45.9                         15.3   15.45 )-----------( 281      ( 06 Apr 2024 06:15 )             45.5                         15.6   9.72  )-----------( 262      ( 05 Apr 2024 06:15 )             46.5     07 Apr 2024 05:30    148    |  112    |  32     ----------------------------<  145    3.9     |  28     |  0.89   06 Apr 2024 06:15    146    |  112    |  29     ----------------------------<  143    4.4     |  25     |  0.81   05 Apr 2024 06:15    146    |  108    |  27     ----------------------------<  140    4.1     |  31     |  0.73     Ca    8.8        07 Apr 2024 05:30  Ca    9.3        06 Apr 2024 06:15  Ca    9.4        05 Apr 2024 06:15  Mg     2.4       07 Apr 2024 05:30    TPro  6.7    /  Alb  3.2    /  TBili  1.0    /  DBili  x      /  AST  35     /  ALT  75     /  AlkPhos  71     07 Apr 2024 05:30    12 Lead ECG:   Ventricular Rate 93 BPM    Atrial Rate 93 BPM    P-R Interval 194 ms    QRS Duration 130 ms    Q-T Interval 356 ms    QTC Calculation(Bazett) 442 ms    P Axis 74 degrees    R Axis -14 degrees    T Axis 154 degrees    Diagnosis Line Normal sinus rhythm  LVH with qrs widening and repol changes   Abnormal ECG  When compared with ECG of 01-APR-2024 04:38, (Unconfirmed)  No significant change was found  Confirmed by CINDY FIGUEROA ([id number]) on 4/1/2024 7:01:23 PM (04-01-24 @ 09:13)      TRANSTHORACIC ECHOCARDIOGRAM REPORT  ________________________________________________________________________________                                      _______       Pt. Name:       MARLON KRUEGER Study Date:    4/2/2024  MRN:            [id number]          YOB: 1940  Accession #:    [id number]         Age:           84 years  Account#:       [id number]        Gender:        M  Heart Rate:                       Height:        68.11 in (173.00 cm)  Rhythm:    Weight:        185.19 lb (84.00 kg)  Blood Pressure: 105/65 mmHg       BSA/BMI:       1.98 m² / 28.07 kg/m²  ________________________________________________________________________________________  Referring Physician:    [id number] Rosendo Hawkins  Interpreting Physician: Cindy Figueroa  Primary Sonographer:    Gaudencio Payton    CPT:               ECHO TTE WO CON COMP W DOPP - 83672.m  Indication(s):     Abnormal electrocardiogram ECG/EKG - R94.31  Procedure:         Transthoracic echocardiogram with 2-D, M-mode and complete                     spectral and color flow Doppler.  Ordering Location: [facility]  Admission Status:  Inpatient  Study Information: Image quality for this study is technically difficult.    _______________________________________________________________________________________     CONCLUSIONS:      1. Technically difficult image quality.   2. Left ventricular endocardium is not well visualized; however, the left ventricular systolic function appears grossly normal.  3. Abnormal (paradoxical) septal motion consistent with conduction delay. Left ventricular systolic function is normal with an ejection fraction visually estimated at 50 to 55 %.   4. A prominent papillary muscule is noted.   5. There is normal LV mass and concentric remodeling.   6. The right ventricle is not well visualized. normal systolic function.   7. The left atrium is normal.   8. Mitral valve leaflets have focal calcification.   9. Trace mitral regurgitation.  10. Aortic valve was not well visualized.  11. Trace pericardial effusion noted adjacent to the anterior right ventricle.    ________________________________________________________________________________________  FINDINGS:     Left Ventricle:  Abnormal (paradoxical) septal motion consistent with conduction delay. Left ventricular systolic function is normal with an ejection fraction visually estimated at 50 to 55%. There is normal LV mass and concentric remodeling. Left ventricular endocardium is not well visualized; however, the left ventricular systolic function appears grossly normal. A prominent papillary muscule is noted.     Right Ventricle:  The right ventricle is not well visualized. Normal systolic function. Tricuspid annular plane systolic excursion (TAPSE) is 1.8 cm (normal >=1.7 cm).     Left Atrium:  The left atrium is normal with an indexed volume of 18.78 ml/m².     Right Atrium:  The right atrium is normal in size.     Aortic Valve:  A a transcatheter deployed (TAVR) is present in the aortic position. The aortic valve was not well visualized. The peak transaortic velocity is 1.38 m/s, peak transaortic gradient is 7.6 mmHg and mean transaortic gradient is 3.0 mmHg with an LVOT/aortic valve VTI ratio of 0.59. The aortic valve area is estimated at1.66 cm² by the continuity equation.     Mitral Valve:  There is calcification of the mitral valve annulus. Mitral valve leaflets have focal calcification. There is trace mitral regurgitation.     Tricuspid Valve:  Structurally normal tricuspid valvewith normal leaflet excursion. The tricuspid valve was not well visualized. There is trace tricuspid regurgitation. There is insufficient tricuspid regurgitation detected to calculate pulmonary artery systolic pressure.     Pulmonic Valve:  The pulmonic valve was not well visualized.     Aorta:  The aortic root at the sinuses of Valsalva is normal in size, measuring 2.30 cm (indexed 1.16 cm/m²).     Pericardium:  There is a trace pericardial effusion noted adjacent to the anterior right ventricle.     Systemic Veins:  The inferior vena cava is normal in size measuring 1.65 cm in diameter, (normal <2.1cm) with normal inspiratory collapse (normal >50%) consistent with normal right atrial pressure (~3, range 0-5mmHg).  ____________________________________________________________________  QUANTITATIVE DATA:  Left Ventricle Measurements: (Indexed to BSA)     IVSd (2D):   1.4 cm  LVPWd (2D):  1.4 cm  LVIDd (2D):  4.0 cm  LVIDs (2D):  3.0 cm  LV Mass:     203 g  102.6 g/m²  Visualized LV EF%: 50to 55%     MV E Vmax:    0.76 m/s  MV A Vmax:    0.98 m/s  MV E/A:       0.77  e' lateral:   5.22 cm/s  e' medial:    3.05 cm/s  E/e' lateral: 14.46  E/e' medial:  24.75  E/e' Average: 18.26  MV DT:        255 msec    Aorta Measurements: (Normal range) (Indexed to BSA)     Sinuses of Valsalva: 2.30 cm (3.1 - 3.7 cm)     Left Atrium Measurements: (Indexed to BSA)  LA Diam 2D: 3.50 cm    Right Ventricle Measurements:     TAPSE: 1.8 cm    LVOT / RVOT/ Qp/Qs Data: (Indexed to BSA)  LVOT Diameter: 1.90 cm  LVOT Vmax:     0.82 m/s  LVOT VTI:      12.30 cm  LVOT SV:       34.9 ml  17.61 ml/m²    Aortic Valve Measurements:  AV Vmax:                1.4 m/s  AV Peak Gradient:       7.6 mmHg  AV Mean Gradient:       3.0 mmHg  AV VTI: 21.0 cm  AV VTI Ratio:           0.59  AoV EOA, Contin:        1.66 cm²  AoV EOA, Contin i:      0.84 cm²/m²  AoV Dimensionless Index 0.59    Mitral Valve Measurements:     MV E Vmax: 0.8 m/s  MV A Vmax: 1.0 m/s  MV E/A:    0.8    Tricuspid ValveMeasurements:     RA Pressure: 3 mmHg    ________________________________________________________________________________________  Electronically signed on 4/3/2024 at 9:33:13 AM by Cindy Figueroa         *** Final ***

## 2024-04-07 NOTE — SOCIAL WORK PROGRESS NOTE - NSSWPROGRESSNOTE_GEN_ALL_CORE
secured the following sub-acute rehab facility choices from patient's daughter Georgia: [facility] in [place], [facility] in [place], [facility] in [place], and [facility] in [place]. Referral sent accordingly, will await responses regarding admission decisions.

## 2024-04-07 NOTE — PROGRESS NOTE ADULT - SUBJECTIVE AND OBJECTIVE BOX
Subjective: moved to the hallway for close obs due to confusion, agitation.       MEDICATIONS  (STANDING):  albuterol/ipratropium for Nebulization 3 milliLiter(s) Nebulizer every 6 hours  aspirin enteric coated 81 milliGRAM(s) Oral daily  bisacodyl 5 milliGRAM(s) Oral at bedtime  buDESOnide    Inhalation Suspension 0.5 milliGRAM(s) Inhalation two times a day  clopidogrel Tablet 75 milliGRAM(s) Oral daily  dextrose 5%. 1000 milliLiter(s) (50 mL/Hr) IV Continuous <Continuous>  finasteride 5 milliGRAM(s) Oral daily  heparin   Injectable 5000 Unit(s) SubCutaneous every 12 hours  hydrocortisone hemorrhoidal Suppository 1 Suppository(s) Rectal two times a day  latanoprost 0.005% Ophthalmic Solution 1 Drop(s) Both EYES at bedtime  melatonin 10 milliGRAM(s) Oral at bedtime  mirabegron ER 25 milliGRAM(s) Oral at bedtime  montelukast 10 milliGRAM(s) Oral daily  pantoprazole  Injectable 40 milliGRAM(s) IV Push daily  polyethylene glycol 3350 17 Gram(s) Oral daily  saline laxative (FLEET) Rectal Enema 1 Enema Rectal once  senna 2 Tablet(s) Oral at bedtime  sodium chloride 3%  Inhalation 4 milliLiter(s) Inhalation every 12 hours    MEDICATIONS  (PRN):  acetaminophen     Tablet .. 650 milliGRAM(s) Oral every 6 hours PRN Temp greater or equal to 38C (100.4F), Mild Pain (1 - 3)  albuterol    90 MICROgram(s) HFA Inhaler 2 Puff(s) Inhalation every 6 hours PRN Shortness of Breath and/or Wheezing  aluminum hydroxide/magnesium hydroxide/simethicone Suspension 30 milliLiter(s) Oral every 4 hours PRN Dyspepsia  bisacodyl Suppository 10 milliGRAM(s) Rectal daily PRN Constipation  ondansetron Injectable 4 milliGRAM(s) IV Push every 8 hours PRN Nausea and/or Vomiting          T(C): 36.7 (04-07-24 @ 06:41), Max: 37.1 (04-06-24 @ 12:41)  HR: 102 (04-07-24 @ 08:11) (72 - 110)  BP: 129/81 (04-07-24 @ 06:41) (119/78 - 145/82)  RR: 18 (04-07-24 @ 06:41) (18 - 18)  SpO2: 95% (04-07-24 @ 08:11) (93% - 95%)  Wt(kg): --        I&O's Detail    06 Apr 2024 07:01  -  07 Apr 2024 07:00  --------------------------------------------------------  IN:  Total IN: 0 mL    OUT:    Indwelling Catheter - Urethral (mL): 550 mL  Total OUT: 550 mL    Total NET: -550 mL               PHYSICAL EXAM:    GENERAL: NAD  NECK: Supple, no inc in JVP  CHEST/LUNG: Clear  HEART: S1S2  ABDOMEN: Soft, Nontender, Nondistended; Bowel sounds present  EXTREMITIES:  no edema.       LABS:  CBC Full  -  ( 07 Apr 2024 05:30 )  WBC Count : 14.22 K/uL  RBC Count : 5.44 M/uL  Hemoglobin : 15.5 g/dL  Hematocrit : 45.9 %  Platelet Count - Automated : 292 K/uL  Mean Cell Volume : 84.4 fl  Mean Cell Hemoglobin : 28.5 pg  Mean Cell Hemoglobin Concentration : 33.8 gm/dL  Auto Neutrophil # : x  Auto Lymphocyte # : x  Auto Monocyte # : x  Auto Eosinophil # : x  Auto Basophil # : x  Auto Neutrophil % : x  Auto Lymphocyte % : x  Auto Monocyte % : x  Auto Eosinophil % : x  Auto Basophil % : x    04-07    148<H>  |  112<H>  |  32<H>  ----------------------------<  145<H>  3.9   |  28  |  0.89    Ca    8.8      07 Apr 2024 05:30  Mg     2.4     04-07    TPro  6.7  /  Alb  3.2<L>  /  TBili  1.0  /  DBili  x   /  AST  35  /  ALT  75  /  AlkPhos  71  04-07      Impression:  1.RAY: Prerenal azotemia, Rhabdomyolysis  2.Hypotension, h/o Hypertension  3.Dyspnea: Influenza A  4.MI  5. HyperNa  6.s/p Fall, Rhabdomyolysis    Recommendations:   Incr D5W to 75cc/h  Repeat BMP tomorrow.

## 2024-04-07 NOTE — PROGRESS NOTE ADULT - SUBJECTIVE AND OBJECTIVE BOX
Patient is a 84y old  Male who presents with a chief complaint of RF, fLU (05 Apr 2024 16:25)    INTERVAL HPI/OVERNIGHT EVENTS: Patient was seen and examined. intermittent agitated but calms down with reorientation. Seen in chair. Was able to ambulate with RW and 1 person to bathroom   on RA. more awake than yesterday. still with only small BM yesterday after fleet enema.     I&O's Summary    06 Apr 2024 07:01  -  07 Apr 2024 07:00  --------------------------------------------------------  IN: 0 mL / OUT: 550 mL / NET: -550 mL        LABS:                        15.5   14.22 )-----------( 292      ( 07 Apr 2024 05:30 )             45.9     04-07    148<H>  |  112<H>  |  32<H>  ----------------------------<  145<H>  3.9   |  28  |  0.89    Ca    8.8      07 Apr 2024 05:30  Mg     2.4     04-07    TPro  6.7  /  Alb  3.2<L>  /  TBili  1.0  /  DBili  x   /  AST  35  /  ALT  75  /  AlkPhos  71  04-07      Urinalysis Basic - ( 07 Apr 2024 05:30 )    Color: x / Appearance: x / SG: x / pH: x  Gluc: 145 mg/dL / Ketone: x  / Bili: x / Urobili: x   Blood: x / Protein: x / Nitrite: x   Leuk Esterase: x / RBC: x / WBC x   Sq Epi: x / Non Sq Epi: x / Bacteria: x      CAPILLARY BLOOD GLUCOSE      Urinalysis Basic - ( 07 Apr 2024 05:30 )    Color: x / Appearance: x / SG: x / pH: x  Gluc: 145 mg/dL / Ketone: x  / Bili: x / Urobili: x   Blood: x / Protein: x / Nitrite: x   Leuk Esterase: x / RBC: x / WBC x   Sq Epi: x / Non Sq Epi: x / Bacteria: x        MEDICATIONS  (STANDING):  albuterol/ipratropium for Nebulization 3 milliLiter(s) Nebulizer every 6 hours  aspirin enteric coated 81 milliGRAM(s) Oral daily  bisacodyl 5 milliGRAM(s) Oral at bedtime  buDESOnide    Inhalation Suspension 0.5 milliGRAM(s) Inhalation two times a day  clopidogrel Tablet 75 milliGRAM(s) Oral daily  dextrose 5%. 1000 milliLiter(s) (50 mL/Hr) IV Continuous <Continuous>  finasteride 5 milliGRAM(s) Oral daily  heparin   Injectable 5000 Unit(s) SubCutaneous every 12 hours  hydrocortisone hemorrhoidal Suppository 1 Suppository(s) Rectal two times a day  latanoprost 0.005% Ophthalmic Solution 1 Drop(s) Both EYES at bedtime  melatonin 10 milliGRAM(s) Oral at bedtime  mirabegron ER 25 milliGRAM(s) Oral at bedtime  montelukast 10 milliGRAM(s) Oral daily  pantoprazole  Injectable 40 milliGRAM(s) IV Push daily  polyethylene glycol 3350 17 Gram(s) Oral daily  saline laxative (FLEET) Rectal Enema 1 Enema Rectal once  senna 2 Tablet(s) Oral at bedtime  sodium chloride 3%  Inhalation 4 milliLiter(s) Inhalation every 12 hours    MEDICATIONS  (PRN):  acetaminophen     Tablet .. 650 milliGRAM(s) Oral every 6 hours PRN Temp greater or equal to 38C (100.4F), Mild Pain (1 - 3)  albuterol    90 MICROgram(s) HFA Inhaler 2 Puff(s) Inhalation every 6 hours PRN Shortness of Breath and/or Wheezing  aluminum hydroxide/magnesium hydroxide/simethicone Suspension 30 milliLiter(s) Oral every 4 hours PRN Dyspepsia  bisacodyl Suppository 10 milliGRAM(s) Rectal daily PRN Constipation  ondansetron Injectable 4 milliGRAM(s) IV Push every 8 hours PRN Nausea and/or Vomiting      REVIEW OF SYSTEMS:  difficult to assess     RADIOLOGY & ADDITIONAL TESTS:    Imaging Personally Reviewed:  [x] YES  [ ] NO    Consultant(s) Notes Reviewed:  [x] YES  [ ] NO    PHYSICAL EXAM:  T(C): 36.7 (04-07-24 @ 06:41), Max: 37.1 (04-06-24 @ 12:41)  HR: 102 (04-07-24 @ 08:11) (72 - 110)  BP: 129/81 (04-07-24 @ 06:41) (119/78 - 145/82)  RR: 18 (04-07-24 @ 06:41) (18 - 18)  SpO2: 95% (04-07-24 @ 08:11) (93% - 95%)    GENERAL: NAD, well-developed, well-groomed  HEENT:  anicteric, moist mucous membranes  CHEST/LUNG:  CTA b/l, no rales, wheezes, or rhonchi  HEART:  RRR, S1, S2  ABDOMEN:  BS+, soft, nontender, nondistended  EXTREMITIES: no edema, cyanosis, or calf tenderness  NERVOUS SYSTEM:  confused     Care Discussed with Consultants/Other Providers [x] YES  [ ] NO

## 2024-04-07 NOTE — PROGRESS NOTE ADULT - SUBJECTIVE AND OBJECTIVE BOX
[facility]  Yunier Dorman PA-C  [address] [place] [address]  [phone]      INTERVAL HPI/OVERNIGHT EVENTS:  Pt s/e  No acute events overnight as per nursing staff  2 small BM yesterday , s/p enema and suppository   Tolerating diet       MEDICATIONS  (STANDING):  albuterol/ipratropium for Nebulization 3 milliLiter(s) Nebulizer every 6 hours  aspirin enteric coated 81 milliGRAM(s) Oral daily  bisacodyl 5 milliGRAM(s) Oral at bedtime  buDESOnide    Inhalation Suspension 0.5 milliGRAM(s) Inhalation two times a day  clopidogrel Tablet 75 milliGRAM(s) Oral daily  dextrose 5%. 1000 milliLiter(s) (50 mL/Hr) IV Continuous <Continuous>  finasteride 5 milliGRAM(s) Oral daily  heparin   Injectable 5000 Unit(s) SubCutaneous every 12 hours  hydrocortisone hemorrhoidal Suppository 1 Suppository(s) Rectal two times a day  latanoprost 0.005% Ophthalmic Solution 1 Drop(s) Both EYES at bedtime  melatonin 10 milliGRAM(s) Oral at bedtime  mirabegron ER 25 milliGRAM(s) Oral at bedtime  montelukast 10 milliGRAM(s) Oral daily  pantoprazole  Injectable 40 milliGRAM(s) IV Push daily  polyethylene glycol 3350 17 Gram(s) Oral daily  saline laxative (FLEET) Rectal Enema 1 Enema Rectal once  senna 2 Tablet(s) Oral at bedtime  sodium chloride 3%  Inhalation 4 milliLiter(s) Inhalation every 12 hours    MEDICATIONS  (PRN):  acetaminophen     Tablet .. 650 milliGRAM(s) Oral every 6 hours PRN Temp greater or equal to 38C (100.4F), Mild Pain (1 - 3)  albuterol    90 MICROgram(s) HFA Inhaler 2 Puff(s) Inhalation every 6 hours PRN Shortness of Breath and/or Wheezing  aluminum hydroxide/magnesium hydroxide/simethicone Suspension 30 milliLiter(s) Oral every 4 hours PRN Dyspepsia  bisacodyl Suppository 10 milliGRAM(s) Rectal daily PRN Constipation  ondansetron Injectable 4 milliGRAM(s) IV Push every 8 hours PRN Nausea and/or Vomiting      Allergies  No Known Allergies    Intolerances    ROS: Unable to obtain, Pt confused     Vital Signs Last 24 Hrs  T(C): 36.7 (07 Apr 2024 06:41), Max: 37.1 (06 Apr 2024 12:41)  T(F): 98.1 (07 Apr 2024 06:41), Max: 98.8 (06 Apr 2024 12:41)  HR: 102 (07 Apr 2024 08:11) (72 - 110)  BP: 129/81 (07 Apr 2024 06:41) (119/78 - 145/82)  BP(mean): --  RR: 18 (07 Apr 2024 06:41) (18 - 18)  SpO2: 95% (07 Apr 2024 08:11) (93% - 95%)    Parameters below as of 07 Apr 2024 08:11  Patient On (Oxygen Delivery Method): room air    GENERAL:  Appears stated age  HEENT:  NC/AT  CHEST:  Full & symmetric excursion  HEART:  Regular rhythm  ABDOMEN:  Soft, non-tender, non-distended  EXTEREMITIES:  no cyanosis  SKIN:  No rash  NEURO:  Alert, confused           LABS:                        15.5   14.22 )-----------( 292      ( 07 Apr 2024 05:30 )             45.9     04-07    148<H>  |  112<H>  |  32<H>  ----------------------------<  145<H>  3.9   |  28  |  0.89    Ca    8.8      07 Apr 2024 05:30  Mg     2.4     04-07    TPro  6.7  /  Alb  3.2<L>  /  TBili  1.0  /  DBili  x   /  AST  35  /  ALT  75  /  AlkPhos  71  04-07 04-06-24 @ 07:01  -  04-07-24 @ 07:00  --------------------------------------------------------  IN: 0 mL / OUT: 550 mL / NET: -550 mL                  RADIOLOGY

## 2024-04-07 NOTE — PROGRESS NOTE ADULT - ASSESSMENT
83 yo M with PMH aortic stenosis s/p TAVR 2011, dementia, HLD, BPH presents to the ED with fall and flu like symptoms admitted for Respiratory Failure / Flu.     Influenza   acute hypoxic respiratory failure   bacterial pneumonia   COPD exacerbation     - pulm consulted: Dr Butcher     - completed tamiflu and rocephin     - on RA    - duonebs ATC and singulair     - CXR done yesterday. pending results.       dementia     - aricept and seroquel discontinued secondary to QT prolongation    - seen by psych     - limiting benzo due to drowsiness     CAD s/p PCI to LAD 2019   aortic stenosis s/p TAVR     - statin held secondary to CPK     - cont asa and plavix     constipation    - fleet enema x 1 dose     - added hydrocortisone cream BID for hemorrhoids     hypernatremia    - start D5W     DVT proph: heparin 5000 units TID

## 2024-04-07 NOTE — PROGRESS NOTE ADULT - SUBJECTIVE AND OBJECTIVE BOX
Patient is a 84y old  Male who presents with a chief complaint of RF, fLU (05 Apr 2024 16:25)      INTERVAL HPI/OVERNIGHT EVENTS:    confused - no distress. off oxygen    MEDICATIONS  (STANDING):  albuterol/ipratropium for Nebulization 3 milliLiter(s) Nebulizer every 6 hours  aspirin enteric coated 81 milliGRAM(s) Oral daily  bisacodyl 5 milliGRAM(s) Oral at bedtime  buDESOnide    Inhalation Suspension 0.5 milliGRAM(s) Inhalation two times a day  clopidogrel Tablet 75 milliGRAM(s) Oral daily  dextrose 5%. 1000 milliLiter(s) (75 mL/Hr) IV Continuous <Continuous>  finasteride 5 milliGRAM(s) Oral daily  heparin   Injectable 5000 Unit(s) SubCutaneous every 12 hours  hydrocortisone hemorrhoidal Suppository 1 Suppository(s) Rectal two times a day  latanoprost 0.005% Ophthalmic Solution 1 Drop(s) Both EYES at bedtime  melatonin 10 milliGRAM(s) Oral at bedtime  mirabegron ER 25 milliGRAM(s) Oral at bedtime  montelukast 10 milliGRAM(s) Oral daily  pantoprazole  Injectable 40 milliGRAM(s) IV Push daily  polyethylene glycol 3350 17 Gram(s) Oral daily  senna 2 Tablet(s) Oral at bedtime  sodium chloride 3%  Inhalation 4 milliLiter(s) Inhalation every 12 hours      MEDICATIONS  (PRN):  acetaminophen     Tablet .. 650 milliGRAM(s) Oral every 6 hours PRN Temp greater or equal to 38C (100.4F), Mild Pain (1 - 3)  albuterol    90 MICROgram(s) HFA Inhaler 2 Puff(s) Inhalation every 6 hours PRN Shortness of Breath and/or Wheezing  aluminum hydroxide/magnesium hydroxide/simethicone Suspension 30 milliLiter(s) Oral every 4 hours PRN Dyspepsia  bisacodyl Suppository 10 milliGRAM(s) Rectal daily PRN Constipation  ondansetron Injectable 4 milliGRAM(s) IV Push every 8 hours PRN Nausea and/or Vomiting      Allergies    No Known Allergies    Intolerances        PAST MEDICAL & SURGICAL HISTORY:  Aortic stenosis      BPH (benign prostatic hyperplasia)      Dementia  mild- on Memantine      ACE (dyspnea on exertion)      Asthma  controlled on inhalers      HLD (hyperlipidemia)      CAD (coronary artery disease)  s/p stent 10/2019      OH (ocular hypertension)  on eye drops      Overactive bladder      Anxiety      History of tonsillectomy  childhood      H/O coronary angiogram  10/25/19, s/p PCI to LAD          Vital Signs Last 24 Hrs  T(C): 36.4 (07 Apr 2024 21:25), Max: 36.7 (07 Apr 2024 06:41)  T(F): 97.5 (07 Apr 2024 21:25), Max: 98.1 (07 Apr 2024 06:41)  HR: 99 (07 Apr 2024 21:25) (72 - 122)  BP: 123/66 (07 Apr 2024 21:25) (116/71 - 129/81)  BP(mean): --  RR: 18 (07 Apr 2024 21:25) (18 - 18)  SpO2: 94% (07 Apr 2024 21:25) (94% - 97%)    Parameters below as of 07 Apr 2024 21:25  Patient On (Oxygen Delivery Method): room air        PHYSICAL EXAMINATION:    GENERAL: The patient is awake and in no apparent distress.     HEENT: Head is normocephalic and atraumatic.     NECK: no JVD    LUNGS: diminished breath sounds bilateral    HEART: Regular rate and rhythm without murmur.    ABDOMEN: Soft, nontender, and nondistended.      EXTREMITIES: Without any cyanosis, clubbing, rash, lesions or edema.    NEUROLOGIC: Grossly intact.    SKIN: No ulceration or induration present.      LABS:                        15.5   14.22 )-----------( 292      ( 07 Apr 2024 05:30 )             45.9     04-07    148<H>  |  112<H>  |  32<H>  ----------------------------<  145<H>  3.9   |  28  |  0.89    Ca    8.8      07 Apr 2024 05:30  Mg     2.4     04-07    TPro  6.7  /  Alb  3.2<L>  /  TBili  1.0  /  DBili  x   /  AST  35  /  ALT  75  /  AlkPhos  71  04-07      Urinalysis Basic - ( 07 Apr 2024 05:30 )    Color: x / Appearance: x / SG: x / pH: x  Gluc: 145 mg/dL / Ketone: x  / Bili: x / Urobili: x   Blood: x / Protein: x / Nitrite: x   Leuk Esterase: x / RBC: x / WBC x   Sq Epi: x / Non Sq Epi: x / Bacteria: x        Assessment:    Resolved Influenza Bronchitis  Resolved multilobar pneumonia  COPD  Altered Mentation    Plan:    Duoneb + Budesonide via nebulizer  monitor pulse oximetry  SQ heparin for DVT prophylaxis  Will stop nebulizer hypertonic saline

## 2024-04-08 ENCOUNTER — TRANSCRIPTION ENCOUNTER (OUTPATIENT)
Age: 84
End: 2024-04-08

## 2024-04-08 LAB
ALBUMIN SERPL ELPH-MCNC: 3.2 G/DL — LOW (ref 3.3–5)
ALP SERPL-CCNC: 68 U/L — SIGNIFICANT CHANGE UP (ref 40–120)
ALT FLD-CCNC: 59 U/L — SIGNIFICANT CHANGE UP (ref 12–78)
ANION GAP SERPL CALC-SCNC: 7 MMOL/L — SIGNIFICANT CHANGE UP (ref 5–17)
AST SERPL-CCNC: 34 U/L — SIGNIFICANT CHANGE UP (ref 15–37)
BILIRUB SERPL-MCNC: 1.2 MG/DL — SIGNIFICANT CHANGE UP (ref 0.2–1.2)
BUN SERPL-MCNC: 31 MG/DL — HIGH (ref 7–23)
CALCIUM SERPL-MCNC: 9.4 MG/DL — SIGNIFICANT CHANGE UP (ref 8.5–10.1)
CHLORIDE SERPL-SCNC: 110 MMOL/L — HIGH (ref 96–108)
CO2 SERPL-SCNC: 28 MMOL/L — SIGNIFICANT CHANGE UP (ref 22–31)
CREAT SERPL-MCNC: 0.88 MG/DL — SIGNIFICANT CHANGE UP (ref 0.5–1.3)
EGFR: 84 ML/MIN/1.73M2 — SIGNIFICANT CHANGE UP
GLUCOSE SERPL-MCNC: 164 MG/DL — HIGH (ref 70–99)
HCT VFR BLD CALC: 43.1 % — SIGNIFICANT CHANGE UP (ref 39–50)
HGB BLD-MCNC: 14.5 G/DL — SIGNIFICANT CHANGE UP (ref 13–17)
MCHC RBC-ENTMCNC: 28.2 PG — SIGNIFICANT CHANGE UP (ref 27–34)
MCHC RBC-ENTMCNC: 33.6 GM/DL — SIGNIFICANT CHANGE UP (ref 32–36)
MCV RBC AUTO: 83.7 FL — SIGNIFICANT CHANGE UP (ref 80–100)
NRBC # BLD: 0 /100 WBCS — SIGNIFICANT CHANGE UP (ref 0–0)
PLATELET # BLD AUTO: 311 K/UL — SIGNIFICANT CHANGE UP (ref 150–400)
POTASSIUM SERPL-MCNC: 3.6 MMOL/L — SIGNIFICANT CHANGE UP (ref 3.5–5.3)
POTASSIUM SERPL-SCNC: 3.6 MMOL/L — SIGNIFICANT CHANGE UP (ref 3.5–5.3)
PROT SERPL-MCNC: 6.7 G/DL — SIGNIFICANT CHANGE UP (ref 6–8.3)
RBC # BLD: 5.15 M/UL — SIGNIFICANT CHANGE UP (ref 4.2–5.8)
RBC # FLD: 13.9 % — SIGNIFICANT CHANGE UP (ref 10.3–14.5)
SODIUM SERPL-SCNC: 145 MMOL/L — SIGNIFICANT CHANGE UP (ref 135–145)
WBC # BLD: 11.33 K/UL — HIGH (ref 3.8–10.5)
WBC # FLD AUTO: 11.33 K/UL — HIGH (ref 3.8–10.5)

## 2024-04-08 PROCEDURE — 99233 SBSQ HOSP IP/OBS HIGH 50: CPT

## 2024-04-08 PROCEDURE — 99232 SBSQ HOSP IP/OBS MODERATE 35: CPT

## 2024-04-08 RX ORDER — LATANOPROST 0.05 MG/ML
1 SOLUTION/ DROPS OPHTHALMIC; TOPICAL
Qty: 0 | Refills: 0 | DISCHARGE
Start: 2024-04-08

## 2024-04-08 RX ORDER — MIRABEGRON 50 MG/1
1 TABLET, EXTENDED RELEASE ORAL
Qty: 0 | Refills: 0 | DISCHARGE

## 2024-04-08 RX ORDER — FINASTERIDE 5 MG/1
1 TABLET, FILM COATED ORAL
Qty: 0 | Refills: 0 | DISCHARGE

## 2024-04-08 RX ORDER — MEMANTINE HYDROCHLORIDE 10 MG/1
1 TABLET ORAL
Qty: 0 | Refills: 0 | DISCHARGE

## 2024-04-08 RX ORDER — SENNA PLUS 8.6 MG/1
2 TABLET ORAL
Qty: 0 | Refills: 0 | DISCHARGE
Start: 2024-04-08

## 2024-04-08 RX ORDER — MONTELUKAST 4 MG/1
1 TABLET, CHEWABLE ORAL
Qty: 0 | Refills: 0 | DISCHARGE
Start: 2024-04-08

## 2024-04-08 RX ORDER — FINASTERIDE 5 MG/1
1 TABLET, FILM COATED ORAL
Qty: 0 | Refills: 0 | DISCHARGE
Start: 2024-04-08

## 2024-04-08 RX ORDER — MONTELUKAST 4 MG/1
1 TABLET, CHEWABLE ORAL
Qty: 0 | Refills: 0 | DISCHARGE

## 2024-04-08 RX ORDER — LATANOPROST 0.05 MG/ML
1 SOLUTION/ DROPS OPHTHALMIC; TOPICAL
Qty: 0 | Refills: 0 | DISCHARGE

## 2024-04-08 RX ORDER — TAMSULOSIN HYDROCHLORIDE 0.4 MG/1
0.4 CAPSULE ORAL AT BEDTIME
Refills: 0 | Status: DISCONTINUED | OUTPATIENT
Start: 2024-04-08 | End: 2024-04-10

## 2024-04-08 RX ORDER — FAMOTIDINE 10 MG/ML
1 INJECTION INTRAVENOUS
Qty: 30 | Refills: 0
Start: 2024-04-08

## 2024-04-08 RX ORDER — TAMSULOSIN HYDROCHLORIDE 0.4 MG/1
1 CAPSULE ORAL
Qty: 0 | Refills: 0 | DISCHARGE
Start: 2024-04-08

## 2024-04-08 RX ADMIN — Medication 0.5 MILLIGRAM(S): at 19:32

## 2024-04-08 RX ADMIN — CLOPIDOGREL BISULFATE 75 MILLIGRAM(S): 75 TABLET, FILM COATED ORAL at 12:19

## 2024-04-08 RX ADMIN — HEPARIN SODIUM 5000 UNIT(S): 5000 INJECTION INTRAVENOUS; SUBCUTANEOUS at 05:00

## 2024-04-08 RX ADMIN — POLYETHYLENE GLYCOL 3350 17 GRAM(S): 17 POWDER, FOR SOLUTION ORAL at 12:18

## 2024-04-08 RX ADMIN — Medication 3 MILLILITER(S): at 12:58

## 2024-04-08 RX ADMIN — MONTELUKAST 10 MILLIGRAM(S): 4 TABLET, CHEWABLE ORAL at 12:18

## 2024-04-08 RX ADMIN — FINASTERIDE 5 MILLIGRAM(S): 5 TABLET, FILM COATED ORAL at 12:18

## 2024-04-08 RX ADMIN — PANTOPRAZOLE SODIUM 40 MILLIGRAM(S): 20 TABLET, DELAYED RELEASE ORAL at 12:19

## 2024-04-08 RX ADMIN — Medication 0.5 MILLIGRAM(S): at 07:38

## 2024-04-08 RX ADMIN — Medication 10 MILLIGRAM(S): at 23:13

## 2024-04-08 RX ADMIN — Medication 3 MILLILITER(S): at 07:38

## 2024-04-08 RX ADMIN — TAMSULOSIN HYDROCHLORIDE 0.4 MILLIGRAM(S): 0.4 CAPSULE ORAL at 23:13

## 2024-04-08 RX ADMIN — Medication 3 MILLILITER(S): at 19:31

## 2024-04-08 RX ADMIN — Medication 1 SUPPOSITORY(S): at 17:31

## 2024-04-08 RX ADMIN — Medication 81 MILLIGRAM(S): at 12:19

## 2024-04-08 RX ADMIN — LATANOPROST 1 DROP(S): 0.05 SOLUTION/ DROPS OPHTHALMIC; TOPICAL at 23:13

## 2024-04-08 NOTE — PROGRESS NOTE ADULT - SUBJECTIVE AND OBJECTIVE BOX
Patient is a 84y old  Male who presents with a chief complaint of elevated Troponin (08 Apr 2024 10:40)    INTERVAL HPI/OVERNIGHT EVENTS: Patient was seen and examined. was ready for discharge however patient pulled out taylor catheter and now with hematuria in the taylor bag     I&O's Summary    07 Apr 2024 07:01  -  08 Apr 2024 07:00  --------------------------------------------------------  IN: 0 mL / OUT: 150 mL / NET: -150 mL    08 Apr 2024 07:01  -  08 Apr 2024 16:01  --------------------------------------------------------  IN: 525 mL / OUT: 0 mL / NET: 525 mL        LABS:                        14.5   11.33 )-----------( 311      ( 08 Apr 2024 09:12 )             43.1     04-08    145  |  110<H>  |  31<H>  ----------------------------<  164<H>  3.6   |  28  |  0.88    Ca    9.4      08 Apr 2024 09:12  Mg     2.4     04-07    TPro  6.7  /  Alb  3.2<L>  /  TBili  1.2  /  DBili  x   /  AST  34  /  ALT  59  /  AlkPhos  68  04-08      Urinalysis Basic - ( 08 Apr 2024 09:12 )    Color: x / Appearance: x / SG: x / pH: x  Gluc: 164 mg/dL / Ketone: x  / Bili: x / Urobili: x   Blood: x / Protein: x / Nitrite: x   Leuk Esterase: x / RBC: x / WBC x   Sq Epi: x / Non Sq Epi: x / Bacteria: x      CAPILLARY BLOOD GLUCOSE            Urinalysis Basic - ( 08 Apr 2024 09:12 )    Color: x / Appearance: x / SG: x / pH: x  Gluc: 164 mg/dL / Ketone: x  / Bili: x / Urobili: x   Blood: x / Protein: x / Nitrite: x   Leuk Esterase: x / RBC: x / WBC x   Sq Epi: x / Non Sq Epi: x / Bacteria: x        MEDICATIONS  (STANDING):  albuterol/ipratropium for Nebulization 3 milliLiter(s) Nebulizer every 6 hours  aspirin enteric coated 81 milliGRAM(s) Oral daily  bisacodyl 5 milliGRAM(s) Oral at bedtime  buDESOnide    Inhalation Suspension 0.5 milliGRAM(s) Inhalation two times a day  clopidogrel Tablet 75 milliGRAM(s) Oral daily  dextrose 5%. 1000 milliLiter(s) (75 mL/Hr) IV Continuous <Continuous>  finasteride 5 milliGRAM(s) Oral daily  heparin   Injectable 5000 Unit(s) SubCutaneous every 12 hours  hydrocortisone hemorrhoidal Suppository 1 Suppository(s) Rectal two times a day  latanoprost 0.005% Ophthalmic Solution 1 Drop(s) Both EYES at bedtime  melatonin 10 milliGRAM(s) Oral at bedtime  montelukast 10 milliGRAM(s) Oral daily  pantoprazole  Injectable 40 milliGRAM(s) IV Push daily  polyethylene glycol 3350 17 Gram(s) Oral daily  senna 2 Tablet(s) Oral at bedtime  tamsulosin 0.4 milliGRAM(s) Oral at bedtime    MEDICATIONS  (PRN):  acetaminophen     Tablet .. 650 milliGRAM(s) Oral every 6 hours PRN Temp greater or equal to 38C (100.4F), Mild Pain (1 - 3)  albuterol    90 MICROgram(s) HFA Inhaler 2 Puff(s) Inhalation every 6 hours PRN Shortness of Breath and/or Wheezing  aluminum hydroxide/magnesium hydroxide/simethicone Suspension 30 milliLiter(s) Oral every 4 hours PRN Dyspepsia  bisacodyl Suppository 10 milliGRAM(s) Rectal daily PRN Constipation  ondansetron Injectable 4 milliGRAM(s) IV Push every 8 hours PRN Nausea and/or Vomiting      REVIEW OF SYSTEMS:  CONSTITUTIONAL: No fever or chills  HEENT:  No headache, no sore throat  RESPIRATORY: No cough, wheezing, or shortness of breath  CARDIOVASCULAR: No chest pain, palpitations  GASTROINTESTINAL: No abdominal pain, nausea, vomiting, or diarrhea  GENITOURINARY: No dysuria, frequency, or hematuria  NEUROLOGICAL: no focal weakness or dizziness  MUSCULOSKELETAL: no myalgias  PSYCH: no recent changes in mood    RADIOLOGY & ADDITIONAL TESTS:    Imaging Personally Reviewed:  [x] YES  [ ] NO    Consultant(s) Notes Reviewed:  [x] YES  [ ] NO    PHYSICAL EXAM:  T(C): 37.4 (04-08-24 @ 14:15), Max: 37.4 (04-08-24 @ 14:15)  HR: 107 (04-08-24 @ 14:15) (96 - 122)  BP: 154/85 (04-08-24 @ 14:15) (123/66 - 154/85)  RR: 18 (04-08-24 @ 14:15) (18 - 18)  SpO2: 94% (04-08-24 @ 14:15) (94% - 96%)    GENERAL: NAD, well-developed, well-groomed  HEENT:  anicteric, moist mucous membranes  CHEST/LUNG:  CTA b/l, no rales, wheezes, or rhonchi  HEART:  RRR, S1, S2  ABDOMEN:  BS+, soft, nontender, nondistended, + taylor with hematuria   EXTREMITIES: no edema  NERVOUS SYSTEM: answers questions and follows commands appropriately  PSYCH: normal affect    Care Discussed with Consultants/Other Providers [x] YES  [ ] NO

## 2024-04-08 NOTE — CHART NOTE - NSCHARTNOTEFT_GEN_A_CORE
chart reviewed.   wbc improved, off antibiotics. likely reactive.   sodium improved. off IVF    failed TOV. taylor replaced.   added flomax 0.4mg. Daughter reports he follows with Dr Alfredo Hirsch in the office. Recommended follow up for TOV    discussed with pulm, cardio and nephro  updated daughter.   For discharge today to [facility].

## 2024-04-08 NOTE — PROGRESS NOTE ADULT - ASSESSMENT
83 yo M with PMH aortic stenosis s/p TAVR 2019, CAD s/p LELAND to LAD 2019, dementia, HLD, asthma, COPD, overactive bladder, H pylori, BPH admitted with Influenza A s/p multiple falls.     CAD, respiratory failure, elev trop   - Troponins elevated, peaked 2300 but downtrended  - Likely elevated on the basis of demand ischemia in setting of influenza, RAY, rhabdomyolysis, agitation.  - With CPK out of proportion to Troponin, likely rhabdomyolysis s/p fall; downtrended    - History of CAD with LELAND to LAD   - Would continue either ASA or Plavix. Unclear indication for DAPT.   - Monitor and replete electrolytes. Keep K>4.0 and Mg>2.0.    - H/o EKG with IVCD vs LBBB in the past.    - EKG's reveal elevated QTc ~500. Repeat EKG with QTc: 446.  Avoid QT prolonging medications.     - No meaningful evidence of volume overload.  - TTE 4/2/24 as above diff study: EF 50-55%     - + Flu s/p Tamiflu, multifocal PNA s/p Abx   - On RA  - Will continue to follow

## 2024-04-08 NOTE — PROGRESS NOTE ADULT - SUBJECTIVE AND OBJECTIVE BOX
Patient is a 84y old  Male who presents with a chief complaint of elevated Troponin (08 Apr 2024 10:40)      INTERVAL HPI/OVERNIGHT EVENTS:    offers no complaints. No respiratory distress    MEDICATIONS  (STANDING):  albuterol/ipratropium for Nebulization 3 milliLiter(s) Nebulizer every 6 hours  aspirin enteric coated 81 milliGRAM(s) Oral daily  bisacodyl 5 milliGRAM(s) Oral at bedtime  buDESOnide    Inhalation Suspension 0.5 milliGRAM(s) Inhalation two times a day  clopidogrel Tablet 75 milliGRAM(s) Oral daily  dextrose 5%. 1000 milliLiter(s) (75 mL/Hr) IV Continuous <Continuous>  finasteride 5 milliGRAM(s) Oral daily  hydrocortisone hemorrhoidal Suppository 1 Suppository(s) Rectal two times a day  latanoprost 0.005% Ophthalmic Solution 1 Drop(s) Both EYES at bedtime  melatonin 10 milliGRAM(s) Oral at bedtime  montelukast 10 milliGRAM(s) Oral daily  pantoprazole  Injectable 40 milliGRAM(s) IV Push daily  polyethylene glycol 3350 17 Gram(s) Oral daily  senna 2 Tablet(s) Oral at bedtime  tamsulosin 0.4 milliGRAM(s) Oral at bedtime      MEDICATIONS  (PRN):  acetaminophen     Tablet .. 650 milliGRAM(s) Oral every 6 hours PRN Temp greater or equal to 38C (100.4F), Mild Pain (1 - 3)  albuterol    90 MICROgram(s) HFA Inhaler 2 Puff(s) Inhalation every 6 hours PRN Shortness of Breath and/or Wheezing  aluminum hydroxide/magnesium hydroxide/simethicone Suspension 30 milliLiter(s) Oral every 4 hours PRN Dyspepsia  bisacodyl Suppository 10 milliGRAM(s) Rectal daily PRN Constipation  ondansetron Injectable 4 milliGRAM(s) IV Push every 8 hours PRN Nausea and/or Vomiting      Allergies    No Known Allergies    Intolerances        PAST MEDICAL & SURGICAL HISTORY:  Aortic stenosis      BPH (benign prostatic hyperplasia)      Dementia  mild- on Memantine      ACE (dyspnea on exertion)      Asthma  controlled on inhalers      HLD (hyperlipidemia)      CAD (coronary artery disease)  s/p stent 10/2019      OH (ocular hypertension)  on eye drops      Overactive bladder      Anxiety      History of tonsillectomy  childhood      H/O coronary angiogram  10/25/19, s/p PCI to LAD          Vital Signs Last 24 Hrs  T(C): 37.4 (08 Apr 2024 14:15), Max: 37.4 (08 Apr 2024 14:15)  T(F): 99.4 (08 Apr 2024 14:15), Max: 99.4 (08 Apr 2024 14:15)  HR: 107 (08 Apr 2024 14:15) (96 - 122)  BP: 154/85 (08 Apr 2024 14:15) (123/66 - 154/85)  BP(mean): --  RR: 18 (08 Apr 2024 14:15) (18 - 18)  SpO2: 94% (08 Apr 2024 14:15) (94% - 96%)    Parameters below as of 08 Apr 2024 14:15  Patient On (Oxygen Delivery Method): room air        PHYSICAL EXAMINATION:    GENERAL: The patient is in no apparent distress.     HEENT: Head is normocephalic and atraumatic.    NECK: no JVD    LUNGS: Clear to auscultation without wheezing, rales or rhonchi; respirations unlabored    HEART: Regular rate and rhythm without murmur.    ABDOMEN: Soft, nontender, and nondistended.      EXTREMITIES: Without any cyanosis, clubbing, rash, lesions or edema.    NEUROLOGIC: Grossly intact.    SKIN: No ulceration or induration present.      LABS:                        14.5   11.33 )-----------( 311      ( 08 Apr 2024 09:12 )             43.1     04-08    145  |  110<H>  |  31<H>  ----------------------------<  164<H>  3.6   |  28  |  0.88    Ca    9.4      08 Apr 2024 09:12  Mg     2.4     04-07    TPro  6.7  /  Alb  3.2<L>  /  TBili  1.2  /  DBili  x   /  AST  34  /  ALT  59  /  AlkPhos  68  04-08      Urinalysis Basic - ( 08 Apr 2024 09:12 )    Color: x / Appearance: x / SG: x / pH: x  Gluc: 164 mg/dL / Ketone: x  / Bili: x / Urobili: x   Blood: x / Protein: x / Nitrite: x   Leuk Esterase: x / RBC: x / WBC x   Sq Epi: x / Non Sq Epi: x / Bacteria: x              Assessment:    Resolved Hypoxic respiratory failure  S/P Influenza bronchitis with superimposed bacterial pneumonia  Altered Mentation    Plan:    For transfer to rehab

## 2024-04-08 NOTE — CHART NOTE - NSCHARTNOTEFT_GEN_A_CORE
Called by RN, pt w/ bladder scan of >300cc's. Per RN, pt has not urinated "all night". To straight cath x1 now. RN to call for any changes in pt's status. Day team to address symptoms.

## 2024-04-08 NOTE — SOCIAL WORK PROGRESS NOTE - NSSWPROGRESSNOTE_GEN_ALL_CORE
pt for dc to [facility]. dtr in agreement (sheree). [facility] ems to  pt at 5pm. all paperwork to accompany patient. no further sw services indicated at this time.

## 2024-04-08 NOTE — DISCHARGE NOTE NURSING/CASE MANAGEMENT/SOCIAL WORK - NSDCVIVACCINE_GEN_ALL_CORE_FT
influenza, injectable, quadrivalent, preservative free; 25-Oct-2019 17:31; Cat Childress (RN); [employer]; [id number] (Exp. Date: 30-Jun-2020); IntraMuscular; Deltoid Left.; 0.5 milliLiter(s); VIS (VIS Published: 15-Aug-2019, VIS Presented: 25-Oct-2019);

## 2024-04-08 NOTE — DISCHARGE NOTE NURSING/CASE MANAGEMENT/SOCIAL WORK - NSDCPEFALRISK_GEN_ALL_CORE
For information on Fall & Injury Prevention, visit: [email or web address] OR  [email or web address] OR  https://www.cdc.gov/steadi/patient.html

## 2024-04-08 NOTE — PROGRESS NOTE ADULT - ASSESSMENT
anemia  constipation  abd pain    bowel regimen  s/p enema and suppository-4/6  monitor for BM  simethicone  s/p mg citrate bottle   continue to monitor cbc  d/w pt    I reviewed the overnight course of events on the unit, re-confirming the patient history. I discussed the care with the patient  Differential diagnosis and plan of care discussed with patient after the evaluation  35 minutes spent on total encounter of which more than fifty percent of the encounter was spent counseling and/or coordinating care by the attending physician.

## 2024-04-08 NOTE — PROGRESS NOTE ADULT - SUBJECTIVE AND OBJECTIVE BOX
[facility] -- Halima Hayes Pannella, Patel, Savella Goodger, Cohen  Office # [phone]      Follow Up:    Elevated troponin     Subjective/Observations:     Seen bedside, in no acute distress   REVIEW OF SYSTEMS: All other review of systems is negative unless indicated above    PAST MEDICAL & SURGICAL HISTORY:  Aortic stenosis      BPH (benign prostatic hyperplasia)      Dementia  mild- on Memantine      ACE (dyspnea on exertion)      Asthma  controlled on inhalers      HLD (hyperlipidemia)      CAD (coronary artery disease)  s/p stent 10/2019      OH (ocular hypertension)  on eye drops      Overactive bladder      Anxiety      History of tonsillectomy  childhood      H/O coronary angiogram  10/25/19, s/p PCI to LAD          MEDICATIONS  (STANDING):  albuterol/ipratropium for Nebulization 3 milliLiter(s) Nebulizer every 6 hours  aspirin enteric coated 81 milliGRAM(s) Oral daily  bisacodyl 5 milliGRAM(s) Oral at bedtime  buDESOnide    Inhalation Suspension 0.5 milliGRAM(s) Inhalation two times a day  clopidogrel Tablet 75 milliGRAM(s) Oral daily  dextrose 5%. 1000 milliLiter(s) (75 mL/Hr) IV Continuous <Continuous>  finasteride 5 milliGRAM(s) Oral daily  heparin   Injectable 5000 Unit(s) SubCutaneous every 12 hours  hydrocortisone hemorrhoidal Suppository 1 Suppository(s) Rectal two times a day  latanoprost 0.005% Ophthalmic Solution 1 Drop(s) Both EYES at bedtime  melatonin 10 milliGRAM(s) Oral at bedtime  mirabegron ER 25 milliGRAM(s) Oral at bedtime  montelukast 10 milliGRAM(s) Oral daily  pantoprazole  Injectable 40 milliGRAM(s) IV Push daily  polyethylene glycol 3350 17 Gram(s) Oral daily  senna 2 Tablet(s) Oral at bedtime    MEDICATIONS  (PRN):  acetaminophen     Tablet .. 650 milliGRAM(s) Oral every 6 hours PRN Temp greater or equal to 38C (100.4F), Mild Pain (1 - 3)  albuterol    90 MICROgram(s) HFA Inhaler 2 Puff(s) Inhalation every 6 hours PRN Shortness of Breath and/or Wheezing  aluminum hydroxide/magnesium hydroxide/simethicone Suspension 30 milliLiter(s) Oral every 4 hours PRN Dyspepsia  bisacodyl Suppository 10 milliGRAM(s) Rectal daily PRN Constipation  ondansetron Injectable 4 milliGRAM(s) IV Push every 8 hours PRN Nausea and/or Vomiting      Allergies    No Known Allergies    Intolerances        Vital Signs Last 24 Hrs  T(C): 37 (2024 06:05), Max: 37 (2024 06:05)  T(F): 98.6 (2024 06:05), Max: 98.6 (2024 06:05)  HR: 96 (2024 07:55) (93 - 122)  BP: 148/81 (2024 06:05) (116/71 - 148/81)  BP(mean): --  RR: 18 (2024 06:05) (18 - 18)  SpO2: 96% (2024 06:05) (94% - 97%)    Parameters below as of 2024 06:05  Patient On (Oxygen Delivery Method): room air        I&O's Summary    2024 07:01  -  2024 07:00  --------------------------------------------------------  IN: 0 mL / OUT: 150 mL / NET: -150 mL          PHYSICAL EXAM:  TELE: not on tele   Constitutional: NAD, awake and alert, well-developed  HEENT: Moist Mucous Membranes, Anicteric  Pulmonary: Non-labored, breath sounds are clear bilaterally, No wheezing, crackles or rhonchi  Cardiovascular: Regular, S1 and S2 nl, No murmurs, rubs, gallops or clicks  Gastrointestinal: Bowel Sounds present, soft, nontender.   Lymph: No lymphadenopathy. No peripheral edema.  Skin: No visible rashes or ulcers.  Psych:  Mood & affect appropriate    LABS: All Labs Reviewed:                        14.5   11.33 )-----------( 311      ( 2024 09:12 )             43.1                         15.5   14.22 )-----------( 292      ( 2024 05:30 )             45.9                         15.3   15.45 )-----------( 281      ( 2024 06:15 )             45.5     2024 09:12    145    |  110    |  31     ----------------------------<  164    3.6     |  28     |  0.88   2024 05:30    148    |  112    |  32     ----------------------------<  145    3.9     |  28     |  0.89   2024 06:15    146    |  112    |  29     ----------------------------<  143    4.4     |  25     |  0.81     Ca    9.4        2024 09:12  Ca    8.8        2024 05:30  Ca    9.3        2024 06:15  Mg     2.4       2024 05:30    TPro  6.7    /  Alb  3.2    /  TBili  1.2    /  DBili  x      /  AST  34     /  ALT  59     /  AlkPhos  68     2024 09:12  TPro  6.7    /  Alb  3.2    /  TBili  1.0    /  DBili  x      /  AST  35     /  ALT  75     /  AlkPhos  71     2024 05:30             EC Lead ECG:   Ventricular Rate 93 BPM    Atrial Rate 93 BPM    P-R Interval 194 ms    QRS Duration 130 ms    Q-T Interval 356 ms    QTC Calculation(Bazett) 442 ms    P Axis 74 degrees    R Axis -14 degrees    T Axis 154 degrees    Diagnosis Line Normal sinus rhythm  LVH with qrs widening and repol changes   Abnormal ECG  When compared with ECG of 2024 04:38, (Unconfirmed)  No significant change was found  Confirmed by CINDY FIGUEROA ([id number]) on 2024 7:01:23 PM (24 @ 09:13)      TRANSTHORACIC ECHOCARDIOGRAM REPORT  ________________________________________________________________________________                                      _______       Pt. Name:       MARLON KRUEGER Study Date:    2024  MRN:            [id number]          YOB: 1940  Accession #:    [id number]         Age:           84 years  Account#:       [id number]        Gender:        M  Heart Rate:                       Height:        68.11 in (173.00 cm)  Rhythm:    Weight:        185.19 lb (84.00 kg)  Blood Pressure: 105/65 mmHg       BSA/BMI:       1.98 m² / 28.07 kg/m²  ________________________________________________________________________________________  Referring Physician:    [id number] Rosendo Hawkins  Interpreting Physician: Cindy Figueroa  Primary Sonographer:    Gaudencio Payton    CPT:               ECHO TTE WO CON COMP W DOPP - 43571.m  Indication(s):     Abnormal electrocardiogram ECG/EKG - R94.31  Procedure:         Transthoracic echocardiogram with 2-D, M-mode and complete                     spectral and color flow Doppler.  Ordering Location: [facility]  Admission Status:  Inpatient  Study Information: Image quality for this study is technically difficult.    _______________________________________________________________________________________     CONCLUSIONS:      1. Technically difficult image quality.   2. Left ventricular endocardium is not well visualized; however, the left ventricular systolic function appears grossly normal.  3. Abnormal (paradoxical) septal motion consistent with conduction delay. Left ventricular systolic function is normal with an ejection fraction visually estimated at 50 to 55 %.   4. A prominent papillary muscule is noted.   5. There is normal LV mass and concentric remodeling.   6. The right ventricle is not well visualized. normal systolic function.   7. The left atrium is normal.   8. Mitral valve leaflets have focal calcification.   9. Trace mitral regurgitation.  10. Aortic valve was not well visualized.  11. Trace pericardial effusion noted adjacent to the anterior right ventricle.    ________________________________________________________________________________________  FINDINGS:     Left Ventricle:  Abnormal (paradoxical) septal motion consistent with conduction delay. Left ventricular systolic function is normal with an ejection fraction visually estimated at 50 to 55%. There is normal LV mass and concentric remodeling. Left ventricular endocardium is not well visualized; however, the left ventricular systolic function appears grossly normal. A prominent papillary muscule is noted.     Right Ventricle:  The right ventricle is not well visualized. Normal systolic function. Tricuspid annular plane systolic excursion (TAPSE) is 1.8 cm (normal >=1.7 cm).     Left Atrium:  The left atrium is normal with an indexed volume of 18.78 ml/m².     Right Atrium:  The right atrium is normal in size.     Aortic Valve:  A a transcatheter deployed (TAVR) is present in the aortic position. The aortic valve was not well visualized. The peak transaortic velocity is 1.38 m/s, peak transaortic gradient is 7.6 mmHg and mean transaortic gradient is 3.0 mmHg with an LVOT/aortic valve VTI ratio of 0.59. The aortic valve area is estimated at1.66 cm² by the continuity equation.     Mitral Valve:  There is calcification of the mitral valve annulus. Mitral valve leaflets have focal calcification. There is trace mitral regurgitation.     Tricuspid Valve:  Structurally normal tricuspid valvewith normal leaflet excursion. The tricuspid valve was not well visualized. There is trace tricuspid regurgitation. There is insufficient tricuspid regurgitation detected to calculate pulmonary artery systolic pressure.     Pulmonic Valve:  The pulmonic valve was not well visualized.     Aorta:  The aortic root at the sinuses of Valsalva is normal in size, measuring 2.30 cm (indexed 1.16 cm/m²).     Pericardium:  There is a trace pericardial effusion noted adjacent to the anterior right ventricle.     Systemic Veins:  The inferior vena cava is normal in size measuring 1.65 cm in diameter, (normal <2.1cm) with normal inspiratory collapse (normal >50%) consistent with normal right atrial pressure (~3, range 0-5mmHg).  ____________________________________________________________________  QUANTITATIVE DATA:  Left Ventricle Measurements: (Indexed to BSA)     IVSd (2D):   1.4 cm  LVPWd (2D):  1.4 cm  LVIDd (2D):  4.0 cm  LVIDs (2D):  3.0 cm  LV Mass:     203 g  102.6 g/m²  Visualized LV EF%: 50to 55%     MV E Vmax:    0.76 m/s  MV A Vmax:    0.98 m/s  MV E/A:       0.77  e' lateral:   5.22 cm/s  e' medial:    3.05 cm/s  E/e' lateral: 14.46  E/e' medial:  24.75  E/e' Average: 18.26  MV DT:        255 msec    Aorta Measurements: (Normal range) (Indexed to BSA)     Sinuses of Valsalva: 2.30 cm (3.1 - 3.7 cm)       Left Atrium Measurements: (Indexed to BSA)  LA Diam 2D: 3.50 cm    Right Ventricle Measurements:     TAPSE: 1.8 cm       LVOT / RVOT/ Qp/Qs Data: (Indexed to BSA)  LVOT Diameter: 1.90 cm  LVOT Vmax:     0.82 m/s  LVOT VTI:      12.30 cm  LVOT SV:       34.9 ml  17.61 ml/m²    Aortic Valve Measurements:  AV Vmax:                1.4 m/s  AV Peak Gradient:       7.6 mmHg  AV Mean Gradient:       3.0 mmHg  AV VTI: 21.0 cm  AV VTI Ratio:           0.59  AoV EOA, Contin:        1.66 cm²  AoV EOA, Contin i:      0.84 cm²/m²  AoV Dimensionless Index 0.59    Mitral Valve Measurements:     MV E Vmax: 0.8 m/s  MV A Vmax: 1.0 m/s  MV E/A:    0.8       Tricuspid ValveMeasurements:     RA Pressure: 3 mmHg    ________________________________________________________________________________________  Electronically signed on 4/3/2024 at 9:33:13 AM by Cindy Figueroa         *** Final ***      Radiology:         [facility] -- Halima Hayes Pannella, Patel, Savella Goodger, Cohen  Office # [phone]      Follow Up:    Elevated troponin     Subjective/Observations:     Seen bedside, confused unable to provide any meaningful information   REVIEW OF SYSTEMS: All other review of systems is negative unless indicated above    PAST MEDICAL & SURGICAL HISTORY:  Aortic stenosis      BPH (benign prostatic hyperplasia)      Dementia  mild- on Memantine      ACE (dyspnea on exertion)      Asthma  controlled on inhalers      HLD (hyperlipidemia)      CAD (coronary artery disease)  s/p stent 10/2019      OH (ocular hypertension)  on eye drops      Overactive bladder      Anxiety      History of tonsillectomy  childhood      H/O coronary angiogram  10/25/19, s/p PCI to LAD          MEDICATIONS  (STANDING):  albuterol/ipratropium for Nebulization 3 milliLiter(s) Nebulizer every 6 hours  aspirin enteric coated 81 milliGRAM(s) Oral daily  bisacodyl 5 milliGRAM(s) Oral at bedtime  buDESOnide    Inhalation Suspension 0.5 milliGRAM(s) Inhalation two times a day  clopidogrel Tablet 75 milliGRAM(s) Oral daily  dextrose 5%. 1000 milliLiter(s) (75 mL/Hr) IV Continuous <Continuous>  finasteride 5 milliGRAM(s) Oral daily  heparin   Injectable 5000 Unit(s) SubCutaneous every 12 hours  hydrocortisone hemorrhoidal Suppository 1 Suppository(s) Rectal two times a day  latanoprost 0.005% Ophthalmic Solution 1 Drop(s) Both EYES at bedtime  melatonin 10 milliGRAM(s) Oral at bedtime  mirabegron ER 25 milliGRAM(s) Oral at bedtime  montelukast 10 milliGRAM(s) Oral daily  pantoprazole  Injectable 40 milliGRAM(s) IV Push daily  polyethylene glycol 3350 17 Gram(s) Oral daily  senna 2 Tablet(s) Oral at bedtime    MEDICATIONS  (PRN):  acetaminophen     Tablet .. 650 milliGRAM(s) Oral every 6 hours PRN Temp greater or equal to 38C (100.4F), Mild Pain (1 - 3)  albuterol    90 MICROgram(s) HFA Inhaler 2 Puff(s) Inhalation every 6 hours PRN Shortness of Breath and/or Wheezing  aluminum hydroxide/magnesium hydroxide/simethicone Suspension 30 milliLiter(s) Oral every 4 hours PRN Dyspepsia  bisacodyl Suppository 10 milliGRAM(s) Rectal daily PRN Constipation  ondansetron Injectable 4 milliGRAM(s) IV Push every 8 hours PRN Nausea and/or Vomiting      Allergies    No Known Allergies    Intolerances        Vital Signs Last 24 Hrs  T(C): 37 (2024 06:05), Max: 37 (2024 06:05)  T(F): 98.6 (2024 06:05), Max: 98.6 (2024 06:05)  HR: 96 (2024 07:55) (93 - 122)  BP: 148/81 (2024 06:05) (116/71 - 148/81)  BP(mean): --  RR: 18 (2024 06:05) (18 - 18)  SpO2: 96% (2024 06:05) (94% - 97%)    Parameters below as of 2024 06:05  Patient On (Oxygen Delivery Method): room air        I&O's Summary    2024 07:01  -  2024 07:00  --------------------------------------------------------  IN: 0 mL / OUT: 150 mL / NET: -150 mL          PHYSICAL EXAM:  TELE: not on tele   Constitutional: NAD,   HEENT: Moist Mucous Membranes, Anicteric  Pulmonary: Non-labored, breath sounds are clear bilaterally, No wheezing, crackles or rhonchi  Cardiovascular: Regular, S1 and S2 nl, No murmurs, rubs, gallops or clicks  Gastrointestinal: Bowel Sounds present, soft, nontender.   Lymph: No lymphadenopathy. No peripheral edema.  Skin: No visible rashes or ulcers.  Psych: confused     LABS: All Labs Reviewed:                        14.5   11.33 )-----------( 311      ( 2024 09:12 )             43.1                         15.5   14.22 )-----------( 292      ( 2024 05:30 )             45.9                         15.3   15.45 )-----------( 281      ( 2024 06:15 )             45.5     2024 09:12    145    |  110    |  31     ----------------------------<  164    3.6     |  28     |  0.88   2024 05:30    148    |  112    |  32     ----------------------------<  145    3.9     |  28     |  0.89   2024 06:15    146    |  112    |  29     ----------------------------<  143    4.4     |  25     |  0.81     Ca    9.4        2024 09:12  Ca    8.8        2024 05:30  Ca    9.3        2024 06:15  Mg     2.4       2024 05:30    TPro  6.7    /  Alb  3.2    /  TBili  1.2    /  DBili  x      /  AST  34     /  ALT  59     /  AlkPhos  68     2024 09:12  TPro  6.7    /  Alb  3.2    /  TBili  1.0    /  DBili  x      /  AST  35     /  ALT  75     /  AlkPhos  71     2024 05:30             EC Lead ECG:   Ventricular Rate 93 BPM    Atrial Rate 93 BPM    P-R Interval 194 ms    QRS Duration 130 ms    Q-T Interval 356 ms    QTC Calculation(Bazett) 442 ms    P Axis 74 degrees    R Axis -14 degrees    T Axis 154 degrees    Diagnosis Line Normal sinus rhythm  LVH with qrs widening and repol changes   Abnormal ECG  When compared with ECG of 2024 04:38, (Unconfirmed)  No significant change was found  Confirmed by CINDY FIGUEROA ([id number]) on 2024 7:01:23 PM (24 @ 09:13)      TRANSTHORACIC ECHOCARDIOGRAM REPORT  ________________________________________________________________________________                                      _______       Pt. Name:       MARLON KRUEGER Study Date:    2024  MRN:            [id number]          YOB: 1940  Accession #:    [id number]         Age:           84 years  Account#:       [id number]        Gender:        M  Heart Rate:                       Height:        68.11 in (173.00 cm)  Rhythm:    Weight:        185.19 lb (84.00 kg)  Blood Pressure: 105/65 mmHg       BSA/BMI:       1.98 m² / 28.07 kg/m²  ________________________________________________________________________________________  Referring Physician:    [id number] Rosendo Hawkins  Interpreting Physician: Cindy Figueroa  Primary Sonographer:    Gaudencio Payton    CPT:               ECHO TTE WO CON COMP W DOPP - 37318.m  Indication(s):     Abnormal electrocardiogram ECG/EKG - R94.31  Procedure:         Transthoracic echocardiogram with 2-D, M-mode and complete                     spectral and color flow Doppler.  Ordering Location: [facility]  Admission Status:  Inpatient  Study Information: Image quality for this study is technically difficult.    _______________________________________________________________________________________     CONCLUSIONS:      1. Technically difficult image quality.   2. Left ventricular endocardium is not well visualized; however, the left ventricular systolic function appears grossly normal.  3. Abnormal (paradoxical) septal motion consistent with conduction delay. Left ventricular systolic function is normal with an ejection fraction visually estimated at 50 to 55 %.   4. A prominent papillary muscule is noted.   5. There is normal LV mass and concentric remodeling.   6. The right ventricle is not well visualized. normal systolic function.   7. The left atrium is normal.   8. Mitral valve leaflets have focal calcification.   9. Trace mitral regurgitation.  10. Aortic valve was not well visualized.  11. Trace pericardial effusion noted adjacent to the anterior right ventricle.    ________________________________________________________________________________________  FINDINGS:     Left Ventricle:  Abnormal (paradoxical) septal motion consistent with conduction delay. Left ventricular systolic function is normal with an ejection fraction visually estimated at 50 to 55%. There is normal LV mass and concentric remodeling. Left ventricular endocardium is not well visualized; however, the left ventricular systolic function appears grossly normal. A prominent papillary muscule is noted.     Right Ventricle:  The right ventricle is not well visualized. Normal systolic function. Tricuspid annular plane systolic excursion (TAPSE) is 1.8 cm (normal >=1.7 cm).     Left Atrium:  The left atrium is normal with an indexed volume of 18.78 ml/m².     Right Atrium:  The right atrium is normal in size.     Aortic Valve:  A a transcatheter deployed (TAVR) is present in the aortic position. The aortic valve was not well visualized. The peak transaortic velocity is 1.38 m/s, peak transaortic gradient is 7.6 mmHg and mean transaortic gradient is 3.0 mmHg with an LVOT/aortic valve VTI ratio of 0.59. The aortic valve area is estimated at1.66 cm² by the continuity equation.     Mitral Valve:  There is calcification of the mitral valve annulus. Mitral valve leaflets have focal calcification. There is trace mitral regurgitation.     Tricuspid Valve:  Structurally normal tricuspid valvewith normal leaflet excursion. The tricuspid valve was not well visualized. There is trace tricuspid regurgitation. There is insufficient tricuspid regurgitation detected to calculate pulmonary artery systolic pressure.     Pulmonic Valve:  The pulmonic valve was not well visualized.     Aorta:  The aortic root at the sinuses of Valsalva is normal in size, measuring 2.30 cm (indexed 1.16 cm/m²).     Pericardium:  There is a trace pericardial effusion noted adjacent to the anterior right ventricle.     Systemic Veins:  The inferior vena cava is normal in size measuring 1.65 cm in diameter, (normal <2.1cm) with normal inspiratory collapse (normal >50%) consistent with normal right atrial pressure (~3, range 0-5mmHg).  ____________________________________________________________________  QUANTITATIVE DATA:  Left Ventricle Measurements: (Indexed to BSA)     IVSd (2D):   1.4 cm  LVPWd (2D):  1.4 cm  LVIDd (2D):  4.0 cm  LVIDs (2D):  3.0 cm  LV Mass:     203 g  102.6 g/m²  Visualized LV EF%: 50to 55%     MV E Vmax:    0.76 m/s  MV A Vmax:    0.98 m/s  MV E/A:       0.77  e' lateral:   5.22 cm/s  e' medial:    3.05 cm/s  E/e' lateral: 14.46  E/e' medial:  24.75  E/e' Average: 18.26  MV DT:        255 msec    Aorta Measurements: (Normal range) (Indexed to BSA)     Sinuses of Valsalva: 2.30 cm (3.1 - 3.7 cm)       Left Atrium Measurements: (Indexed to BSA)  LA Diam 2D: 3.50 cm    Right Ventricle Measurements:     TAPSE: 1.8 cm       LVOT / RVOT/ Qp/Qs Data: (Indexed to BSA)  LVOT Diameter: 1.90 cm  LVOT Vmax:     0.82 m/s  LVOT VTI:      12.30 cm  LVOT SV:       34.9 ml  17.61 ml/m²    Aortic Valve Measurements:  AV Vmax:                1.4 m/s  AV Peak Gradient:       7.6 mmHg  AV Mean Gradient:       3.0 mmHg  AV VTI: 21.0 cm  AV VTI Ratio:           0.59  AoV EOA, Contin:        1.66 cm²  AoV EOA, Contin i:      0.84 cm²/m²  AoV Dimensionless Index 0.59    Mitral Valve Measurements:     MV E Vmax: 0.8 m/s  MV A Vmax: 1.0 m/s  MV E/A:    0.8       Tricuspid ValveMeasurements:     RA Pressure: 3 mmHg    ________________________________________________________________________________________  Electronically signed on 4/3/2024 at 9:33:13 AM by Cindy Figueroa         *** Final ***      Radiology:         [facility] -- Halima Hayes Pannella, Patel, Savella Goodger, Cohen  Office # [phone]      Follow Up:    Elevated troponin     Subjective/Observations:     Seen bedside, confused unable to provide any meaningful information   REVIEW OF SYSTEMS: All other review of systems is negative unless indicated above    PAST MEDICAL & SURGICAL HISTORY:  Aortic stenosis      BPH (benign prostatic hyperplasia)      Dementia  mild- on Memantine      ACE (dyspnea on exertion)      Asthma  controlled on inhalers      HLD (hyperlipidemia)      CAD (coronary artery disease)  s/p stent 10/2019      OH (ocular hypertension)  on eye drops      Overactive bladder      Anxiety      History of tonsillectomy  childhood      H/O coronary angiogram  10/25/19, s/p PCI to LAD          MEDICATIONS  (STANDING):  albuterol/ipratropium for Nebulization 3 milliLiter(s) Nebulizer every 6 hours  aspirin enteric coated 81 milliGRAM(s) Oral daily  bisacodyl 5 milliGRAM(s) Oral at bedtime  buDESOnide    Inhalation Suspension 0.5 milliGRAM(s) Inhalation two times a day  clopidogrel Tablet 75 milliGRAM(s) Oral daily  dextrose 5%. 1000 milliLiter(s) (75 mL/Hr) IV Continuous <Continuous>  finasteride 5 milliGRAM(s) Oral daily  heparin   Injectable 5000 Unit(s) SubCutaneous every 12 hours  hydrocortisone hemorrhoidal Suppository 1 Suppository(s) Rectal two times a day  latanoprost 0.005% Ophthalmic Solution 1 Drop(s) Both EYES at bedtime  melatonin 10 milliGRAM(s) Oral at bedtime  mirabegron ER 25 milliGRAM(s) Oral at bedtime  montelukast 10 milliGRAM(s) Oral daily  pantoprazole  Injectable 40 milliGRAM(s) IV Push daily  polyethylene glycol 3350 17 Gram(s) Oral daily  senna 2 Tablet(s) Oral at bedtime    MEDICATIONS  (PRN):  acetaminophen     Tablet .. 650 milliGRAM(s) Oral every 6 hours PRN Temp greater or equal to 38C (100.4F), Mild Pain (1 - 3)  albuterol    90 MICROgram(s) HFA Inhaler 2 Puff(s) Inhalation every 6 hours PRN Shortness of Breath and/or Wheezing  aluminum hydroxide/magnesium hydroxide/simethicone Suspension 30 milliLiter(s) Oral every 4 hours PRN Dyspepsia  bisacodyl Suppository 10 milliGRAM(s) Rectal daily PRN Constipation  ondansetron Injectable 4 milliGRAM(s) IV Push every 8 hours PRN Nausea and/or Vomiting      Allergies    No Known Allergies    Intolerances        Vital Signs Last 24 Hrs  T(C): 37 (2024 06:05), Max: 37 (2024 06:05)  T(F): 98.6 (2024 06:05), Max: 98.6 (2024 06:05)  HR: 96 (2024 07:55) (93 - 122)  BP: 148/81 (2024 06:05) (116/71 - 148/81)  BP(mean): --  RR: 18 (2024 06:05) (18 - 18)  SpO2: 96% (2024 06:05) (94% - 97%)    Parameters below as of 2024 06:05  Patient On (Oxygen Delivery Method): room air        I&O's Summary    2024 07:01  -  2024 07:00  --------------------------------------------------------  IN: 0 mL / OUT: 150 mL / NET: -150 mL          PHYSICAL EXAM:  TELE: not on tele   Constitutional: NAD,   HEENT: Moist Mucous Membranes, Anicteric  Pulmonary: Non-labored, breath sounds are clear bilaterally, No wheezing, crackles or rhonchi  Cardiovascular: Regular, S1 and S2 nl, No murmurs, rubs, gallops or clicks  Gastrointestinal: Bowel Sounds present, soft, nontender.   Lymph: No lymphadenopathy. No peripheral edema.  Skin: No visible rashes or ulcers.  Psych: confused     LABS: All Labs Reviewed:                        14.5   11.33 )-----------( 311      ( 2024 09:12 )             43.1                         15.5   14.22 )-----------( 292      ( 2024 05:30 )             45.9                         15.3   15.45 )-----------( 281      ( 2024 06:15 )             45.5     2024 09:12    145    |  110    |  31     ----------------------------<  164    3.6     |  28     |  0.88   2024 05:30    148    |  112    |  32     ----------------------------<  145    3.9     |  28     |  0.89   2024 06:15    146    |  112    |  29     ----------------------------<  143    4.4     |  25     |  0.81     Ca    9.4        2024 09:12  Ca    8.8        2024 05:30  Ca    9.3        2024 06:15  Mg     2.4       2024 05:30    TPro  6.7    /  Alb  3.2    /  TBili  1.2    /  DBili  x      /  AST  34     /  ALT  59     /  AlkPhos  68     2024 09:12  TPro  6.7    /  Alb  3.2    /  TBili  1.0    /  DBili  x      /  AST  35     /  ALT  75     /  AlkPhos  71     2024 05:30             EC Lead ECG:   Ventricular Rate 93 BPM    Atrial Rate 93 BPM    P-R Interval 194 ms    QRS Duration 130 ms    Q-T Interval 356 ms    QTC Calculation(Bazett) 442 ms    P Axis 74 degrees    R Axis -14 degrees    T Axis 154 degrees    Diagnosis Line Normal sinus rhythm  LVH with qrs widening and repol changes   Abnormal ECG  When compared with ECG of 2024 04:38, (Unconfirmed)  No significant change was found  Confirmed by CINDY FIGUEROA ([id number]) on 2024 7:01:23 PM (24 @ 09:13)      TRANSTHORACIC ECHOCARDIOGRAM REPORT  ________________________________________________________________________________                                      _______       Pt. Name:       MARLON KRUEGER Study Date:    2024  MRN:            [id number]          YOB: 1940  Accession #:    [id number]         Age:           84 years  Account#:       [id number]        Gender:        M  Heart Rate:                       Height:        68.11 in (173.00 cm)  Rhythm:    Weight:        185.19 lb (84.00 kg)  Blood Pressure: 105/65 mmHg       BSA/BMI:       1.98 m² / 28.07 kg/m²  ________________________________________________________________________________________  Referring Physician:    [id number] Rosendo Hawkins  Interpreting Physician: Cindy Figueroa  Primary Sonographer:    Gaudencio Payton    CPT:               ECHO TTE WO CON COMP W DOPP - 67146.m  Indication(s):     Abnormal electrocardiogram ECG/EKG - R94.31  Procedure:         Transthoracic echocardiogram with 2-D, M-mode and complete                     spectral and color flow Doppler.  Ordering Location: [facility]  Admission Status:  Inpatient  Study Information: Image quality for this study is technically difficult.    _______________________________________________________________________________________     CONCLUSIONS:      1. Technically difficult image quality.   2. Left ventricular endocardium is not well visualized; however, the left ventricular systolic function appears grossly normal.  3. Abnormal (paradoxical) septal motion consistent with conduction delay. Left ventricular systolic function is normal with an ejection fraction visually estimated at 50 to 55 %.   4. A prominent papillary muscule is noted.   5. There is normal LV mass and concentric remodeling.   6. The right ventricle is not well visualized. normal systolic function.   7. The left atrium is normal.   8. Mitral valve leaflets have focal calcification.   9. Trace mitral regurgitation.  10. Aortic valve was not well visualized.  11. Trace pericardial effusion noted adjacent to the anterior right ventricle.    ________________________________________________________________________________________  FINDINGS:     Left Ventricle:  Abnormal (paradoxical) septal motion consistent with conduction delay. Left ventricular systolic function is normal with an ejection fraction visually estimated at 50 to 55%. There is normal LV mass and concentric remodeling. Left ventricular endocardium is not well visualized; however, the left ventricular systolic function appears grossly normal. A prominent papillary muscule is noted.     Right Ventricle:  The right ventricle is not well visualized. Normal systolic function. Tricuspid annular plane systolic excursion (TAPSE) is 1.8 cm (normal >=1.7 cm).     Left Atrium:  The left atrium is normal with an indexed volume of 18.78 ml/m².     Right Atrium:  The right atrium is normal in size.     Aortic Valve:  A a transcatheter deployed (TAVR) is present in the aortic position. The aortic valve was not well visualized. The peak transaortic velocity is 1.38 m/s, peak transaortic gradient is 7.6 mmHg and mean transaortic gradient is 3.0 mmHg with an LVOT/aortic valve VTI ratio of 0.59. The aortic valve area is estimated at1.66 cm² by the continuity equation.     Mitral Valve:  There is calcification of the mitral valve annulus. Mitral valve leaflets have focal calcification. There is trace mitral regurgitation.     Tricuspid Valve:  Structurally normal tricuspid valvewith normal leaflet excursion. The tricuspid valve was not well visualized. There is trace tricuspid regurgitation. There is insufficient tricuspid regurgitation detected to calculate pulmonary artery systolic pressure.     Pulmonic Valve:  The pulmonic valve was not well visualized.     Aorta:  The aortic root at the sinuses of Valsalva is normal in size, measuring 2.30 cm (indexed 1.16 cm/m²).     Pericardium:  There is a trace pericardial effusion noted adjacent to the anterior right ventricle.     Systemic Veins:  The inferior vena cava is normal in size measuring 1.65 cm in diameter, (normal <2.1cm) with normal inspiratory collapse (normal >50%) consistent with normal right atrial pressure (~3, range 0-5mmHg).  ____________________________________________________________________  QUANTITATIVE DATA:  Left Ventricle Measurements: (Indexed to BSA)     IVSd (2D):   1.4 cm  LVPWd (2D):  1.4 cm  LVIDd (2D):  4.0 cm  LVIDs (2D):  3.0 cm  LV Mass:     203 g  102.6 g/m²  Visualized LV EF%: 50to 55%     MV E Vmax:    0.76 m/s  MV A Vmax:    0.98 m/s  MV E/A:       0.77  e' lateral:   5.22 cm/s  e' medial:    3.05 cm/s  E/e' lateral: 14.46  E/e' medial:  24.75  E/e' Average: 18.26  MV DT:        255 msec    Aorta Measurements: (Normal range) (Indexed to BSA)     Sinuses of Valsalva: 2.30 cm (3.1 - 3.7 cm)       Left Atrium Measurements: (Indexed to BSA)  LA Diam 2D: 3.50 cm    Right Ventricle Measurements:     TAPSE: 1.8 cm       LVOT / RVOT/ Qp/Qs Data: (Indexed to BSA)  LVOT Diameter: 1.90 cm  LVOT Vmax:     0.82 m/s  LVOT VTI:      12.30 cm  LVOT SV:       34.9 ml  17.61 ml/m²    Aortic Valve Measurements:  AV Vmax:                1.4 m/s  AV Peak Gradient:       7.6 mmHg  AV Mean Gradient:       3.0 mmHg  AV VTI: 21.0 cm  AV VTI Ratio:           0.59  AoV EOA, Contin:        1.66 cm²  AoV EOA, Contin i:      0.84 cm²/m²  AoV Dimensionless Index 0.59    Mitral Valve Measurements:     MV E Vmax: 0.8 m/s  MV A Vmax: 1.0 m/s  MV E/A:    0.8       Tricuspid ValveMeasurements:     RA Pressure: 3 mmHg    ________________________________________________________________________________________  Electronically signed on 4/3/2024 at 9:33:13 AM by Cindy Figueroa         *** Final ***      Radiology:

## 2024-04-08 NOTE — CHART NOTE - NSCHARTNOTESELECT_GEN_ALL_CORE
Event Note
Family Update/Event Note
Nutrition Services
Palliative/Event Note
Rapid Response
outpt Urologist/Event Note
Event Note
Rapid Response

## 2024-04-08 NOTE — PROGRESS NOTE ADULT - ASSESSMENT
85 yo M with PMH aortic stenosis s/p TAVR 2011, dementia, HLD, BPH presents to the ED with fall and flu like symptoms admitted for Respiratory Failure / Flu.     Influenza   acute hypoxic respiratory failure   bacterial pneumonia   COPD exacerbation     - pulm consulted: Dr Butcher     - completed tamiflu and rocephin     - on RA    - duonebs ATC and singulair     - CXR NAD      dementia     - aricept and seroquel discontinued secondary to QT prolongation    - seen by psych     - limiting benzo due to drowsiness     CAD s/p PCI to LAD 2019   aortic stenosis s/p TAVR     - statin held secondary to CPK     - cont asa and plavix     constipation    - added hydrocortisone cream BID for hemorrhoids     hypernatremia    - cont D5W     hematuria     - cont to monitor overnight. taylor draining.     DVT proph: heparin discontinued. continue SCDs    updated daughter. monitor hematuria overnight

## 2024-04-08 NOTE — CASE MANAGEMENT PROGRESS NOTE - NSCMPROGRESSNOTE_GEN_ALL_CORE
Patient has Visiting Nurse [facility] [phone], Fax # [phone]. Care manager is Eileen [phone]. Patient's daughter Katharina is his CDPAP aide. Discharge ins ructions faxes to [facility].

## 2024-04-08 NOTE — DISCHARGE NOTE NURSING/CASE MANAGEMENT/SOCIAL WORK - PATIENT PORTAL LINK FT
You can access the FollowMyHealth Patient Portal offered by [facility] by registering at the following website: [email or web address]. By joining [facility]’s FollowMyHealth portal, you will also be able to view your health information using other applications (apps) compatible with our system.

## 2024-04-08 NOTE — PROGRESS NOTE ADULT - SUBJECTIVE AND OBJECTIVE BOX
[facility]  Yunier Dorman PA-C  [address] [place] [address]  [phone]      INTERVAL HPI/OVERNIGHT EVENTS:  Pt s/e  No new GI events    MEDICATIONS  (STANDING):  albuterol/ipratropium for Nebulization 3 milliLiter(s) Nebulizer every 6 hours  aspirin enteric coated 81 milliGRAM(s) Oral daily  bisacodyl 5 milliGRAM(s) Oral at bedtime  buDESOnide    Inhalation Suspension 0.5 milliGRAM(s) Inhalation two times a day  clopidogrel Tablet 75 milliGRAM(s) Oral daily  dextrose 5%. 1000 milliLiter(s) (75 mL/Hr) IV Continuous <Continuous>  finasteride 5 milliGRAM(s) Oral daily  heparin   Injectable 5000 Unit(s) SubCutaneous every 12 hours  hydrocortisone hemorrhoidal Suppository 1 Suppository(s) Rectal two times a day  latanoprost 0.005% Ophthalmic Solution 1 Drop(s) Both EYES at bedtime  melatonin 10 milliGRAM(s) Oral at bedtime  mirabegron ER 25 milliGRAM(s) Oral at bedtime  montelukast 10 milliGRAM(s) Oral daily  pantoprazole  Injectable 40 milliGRAM(s) IV Push daily  polyethylene glycol 3350 17 Gram(s) Oral daily  senna 2 Tablet(s) Oral at bedtime    MEDICATIONS  (PRN):  acetaminophen     Tablet .. 650 milliGRAM(s) Oral every 6 hours PRN Temp greater or equal to 38C (100.4F), Mild Pain (1 - 3)  albuterol    90 MICROgram(s) HFA Inhaler 2 Puff(s) Inhalation every 6 hours PRN Shortness of Breath and/or Wheezing  aluminum hydroxide/magnesium hydroxide/simethicone Suspension 30 milliLiter(s) Oral every 4 hours PRN Dyspepsia  bisacodyl Suppository 10 milliGRAM(s) Rectal daily PRN Constipation  ondansetron Injectable 4 milliGRAM(s) IV Push every 8 hours PRN Nausea and/or Vomiting      Allergies  No Known Allergies      PHYSICAL EXAM:   Vital Signs:  Vital Signs Last 24 Hrs  T(C): 37 (2024 06:05), Max: 37 (2024 06:05)  T(F): 98.6 (2024 06:05), Max: 98.6 (2024 06:05)  HR: 96 (2024 07:55) (93 - 122)  BP: 148/81 (2024 06:05) (116/71 - 148/81)  BP(mean): --  RR: 18 (2024 06:05) (18 - 18)  SpO2: 96% (2024 06:05) (94% - 97%)    Parameters below as of 2024 06:05  Patient On (Oxygen Delivery Method): room air      Daily     Daily Weight in k.6 (2024 06:05)    GENERAL:  Appears stated age  HEENT:  NC/AT  CHEST:  Full & symmetric excursion  HEART:  Regular rhythm  ABDOMEN:  Soft, non-tender, non-distended  EXTEREMITIES:  no cyanosis  SKIN:  No rash  NEURO:  Alert      LABS:                        14.5   11.33 )-----------( 311      ( 2024 09:12 )             43.1     04-08    145  |  110<H>  |  31<H>  ----------------------------<  164<H>  3.6   |  28  |  0.88    Ca    9.4      2024 09:12  Mg     2.4     04-07    TPro  6.7  /  Alb  3.2<L>  /  TBili  1.2  /  DBili  x   /  AST  34  /  ALT  59  /  AlkPhos  68  04-08      Urinalysis Basic - ( 2024 09:12 )    Color: x / Appearance: x / SG: x / pH: x  Gluc: 164 mg/dL / Ketone: x  / Bili: x / Urobili: x   Blood: x / Protein: x / Nitrite: x   Leuk Esterase: x / RBC: x / WBC x   Sq Epi: x / Non Sq Epi: x / Bacteria: x

## 2024-04-09 LAB
ALBUMIN SERPL ELPH-MCNC: 3.2 G/DL — LOW (ref 3.3–5)
ALP SERPL-CCNC: 72 U/L — SIGNIFICANT CHANGE UP (ref 40–120)
ALT FLD-CCNC: 58 U/L — SIGNIFICANT CHANGE UP (ref 12–78)
ANION GAP SERPL CALC-SCNC: 7 MMOL/L — SIGNIFICANT CHANGE UP (ref 5–17)
AST SERPL-CCNC: 36 U/L — SIGNIFICANT CHANGE UP (ref 15–37)
BILIRUB SERPL-MCNC: 1.3 MG/DL — HIGH (ref 0.2–1.2)
BUN SERPL-MCNC: 26 MG/DL — HIGH (ref 7–23)
CALCIUM SERPL-MCNC: 9.5 MG/DL — SIGNIFICANT CHANGE UP (ref 8.5–10.1)
CHLORIDE SERPL-SCNC: 108 MMOL/L — SIGNIFICANT CHANGE UP (ref 96–108)
CO2 SERPL-SCNC: 30 MMOL/L — SIGNIFICANT CHANGE UP (ref 22–31)
CREAT SERPL-MCNC: 0.94 MG/DL — SIGNIFICANT CHANGE UP (ref 0.5–1.3)
EGFR: 79 ML/MIN/1.73M2 — SIGNIFICANT CHANGE UP
GLUCOSE SERPL-MCNC: 141 MG/DL — HIGH (ref 70–99)
HCT VFR BLD CALC: 44.7 % — SIGNIFICANT CHANGE UP (ref 39–50)
HGB BLD-MCNC: 14.9 G/DL — SIGNIFICANT CHANGE UP (ref 13–17)
MCHC RBC-ENTMCNC: 28.1 PG — SIGNIFICANT CHANGE UP (ref 27–34)
MCHC RBC-ENTMCNC: 33.3 GM/DL — SIGNIFICANT CHANGE UP (ref 32–36)
MCV RBC AUTO: 84.2 FL — SIGNIFICANT CHANGE UP (ref 80–100)
NRBC # BLD: 0 /100 WBCS — SIGNIFICANT CHANGE UP (ref 0–0)
PLATELET # BLD AUTO: 353 K/UL — SIGNIFICANT CHANGE UP (ref 150–400)
POTASSIUM SERPL-MCNC: 3.5 MMOL/L — SIGNIFICANT CHANGE UP (ref 3.5–5.3)
POTASSIUM SERPL-SCNC: 3.5 MMOL/L — SIGNIFICANT CHANGE UP (ref 3.5–5.3)
PROT SERPL-MCNC: 6.7 G/DL — SIGNIFICANT CHANGE UP (ref 6–8.3)
RBC # BLD: 5.31 M/UL — SIGNIFICANT CHANGE UP (ref 4.2–5.8)
RBC # FLD: 13.6 % — SIGNIFICANT CHANGE UP (ref 10.3–14.5)
SODIUM SERPL-SCNC: 145 MMOL/L — SIGNIFICANT CHANGE UP (ref 135–145)
WBC # BLD: 11.61 K/UL — HIGH (ref 3.8–10.5)
WBC # FLD AUTO: 11.61 K/UL — HIGH (ref 3.8–10.5)

## 2024-04-09 PROCEDURE — 99232 SBSQ HOSP IP/OBS MODERATE 35: CPT

## 2024-04-09 PROCEDURE — 99239 HOSP IP/OBS DSCHRG MGMT >30: CPT

## 2024-04-09 RX ADMIN — TAMSULOSIN HYDROCHLORIDE 0.4 MILLIGRAM(S): 0.4 CAPSULE ORAL at 21:59

## 2024-04-09 RX ADMIN — LATANOPROST 1 DROP(S): 0.05 SOLUTION/ DROPS OPHTHALMIC; TOPICAL at 21:59

## 2024-04-09 RX ADMIN — FINASTERIDE 5 MILLIGRAM(S): 5 TABLET, FILM COATED ORAL at 14:27

## 2024-04-09 RX ADMIN — CLOPIDOGREL BISULFATE 75 MILLIGRAM(S): 75 TABLET, FILM COATED ORAL at 14:27

## 2024-04-09 RX ADMIN — Medication 10 MILLIGRAM(S): at 21:59

## 2024-04-09 RX ADMIN — Medication 1 SUPPOSITORY(S): at 17:24

## 2024-04-09 RX ADMIN — Medication 3 MILLILITER(S): at 19:19

## 2024-04-09 RX ADMIN — MONTELUKAST 10 MILLIGRAM(S): 4 TABLET, CHEWABLE ORAL at 14:28

## 2024-04-09 RX ADMIN — Medication 81 MILLIGRAM(S): at 14:27

## 2024-04-09 RX ADMIN — Medication 3 MILLILITER(S): at 13:13

## 2024-04-09 RX ADMIN — PANTOPRAZOLE SODIUM 40 MILLIGRAM(S): 20 TABLET, DELAYED RELEASE ORAL at 14:28

## 2024-04-09 RX ADMIN — Medication 3 MILLILITER(S): at 08:05

## 2024-04-09 RX ADMIN — Medication 0.5 MILLIGRAM(S): at 08:05

## 2024-04-09 RX ADMIN — Medication 0.5 MILLIGRAM(S): at 19:20

## 2024-04-09 RX ADMIN — POLYETHYLENE GLYCOL 3350 17 GRAM(S): 17 POWDER, FOR SOLUTION ORAL at 14:26

## 2024-04-09 NOTE — PROGRESS NOTE ADULT - ASSESSMENT
83 yo M with PMH aortic stenosis s/p TAVR 2019, CAD s/p LELAND to LAD 2019, dementia, HLD, asthma, COPD, overactive bladder, H pylori, BPH admitted with Influenza A s/p multiple falls.     CAD, respiratory failure, elev trop   - Troponins elevated, peaked 2300 but downtrended  - Likely elevated on the basis of demand ischemia in setting of influenza, RAY, rhabdomyolysis, agitation.  - With CPK out of proportion to Troponin, likely rhabdomyolysis s/p fall; downtrended    - History of CAD with LELAND to LAD   - Would continue either ASA or Plavix. Unclear indication for DAPT.   - Monitor and replete electrolytes. Keep K>4.0 and Mg>2.0.    - H/o EKG with IVCD vs LBBB in the past.    - EKG's reveal elevated QTc ~500. Repeat EKG with QTc: 446.  Avoid QT prolonging medications.     - No meaningful evidence of volume overload.  - TTE 4/2/24 as above diff study: EF 50-55%     - Flu s/p Tamiflu, multifocal PNA s/p Abx   - On RA  - Will continue to follow

## 2024-04-09 NOTE — PROGRESS NOTE ADULT - SUBJECTIVE AND OBJECTIVE BOX
Patient is a 84y old  Male who presents with a chief complaint of Elevated troponin (09 Apr 2024 10:15)    INTERVAL HPI/OVERNIGHT EVENTS: Patient was seen and examined. No acute events occurred overnight. noted possibly some leakage around the taylor. taylor flushed with small clot.     I&O's Summary    08 Apr 2024 07:01  -  09 Apr 2024 07:00  --------------------------------------------------------  IN: 900 mL / OUT: 0 mL / NET: 900 mL        LABS:                        14.9   11.61 )-----------( 353      ( 09 Apr 2024 07:00 )             44.7     04-09    145  |  108  |  26<H>  ----------------------------<  141<H>  3.5   |  30  |  0.94    Ca    9.5      09 Apr 2024 07:00    TPro  6.7  /  Alb  3.2<L>  /  TBili  1.3<H>  /  DBili  x   /  AST  36  /  ALT  58  /  AlkPhos  72  04-09      Urinalysis Basic - ( 09 Apr 2024 07:00 )    Color: x / Appearance: x / SG: x / pH: x  Gluc: 141 mg/dL / Ketone: x  / Bili: x / Urobili: x   Blood: x / Protein: x / Nitrite: x   Leuk Esterase: x / RBC: x / WBC x   Sq Epi: x / Non Sq Epi: x / Bacteria: x      CAPILLARY BLOOD GLUCOSE            Urinalysis Basic - ( 09 Apr 2024 07:00 )    Color: x / Appearance: x / SG: x / pH: x  Gluc: 141 mg/dL / Ketone: x  / Bili: x / Urobili: x   Blood: x / Protein: x / Nitrite: x   Leuk Esterase: x / RBC: x / WBC x   Sq Epi: x / Non Sq Epi: x / Bacteria: x        MEDICATIONS  (STANDING):  albuterol/ipratropium for Nebulization 3 milliLiter(s) Nebulizer every 6 hours  aspirin enteric coated 81 milliGRAM(s) Oral daily  bisacodyl 5 milliGRAM(s) Oral at bedtime  buDESOnide    Inhalation Suspension 0.5 milliGRAM(s) Inhalation two times a day  clopidogrel Tablet 75 milliGRAM(s) Oral daily  dextrose 5%. 1000 milliLiter(s) (75 mL/Hr) IV Continuous <Continuous>  finasteride 5 milliGRAM(s) Oral daily  hydrocortisone hemorrhoidal Suppository 1 Suppository(s) Rectal two times a day  latanoprost 0.005% Ophthalmic Solution 1 Drop(s) Both EYES at bedtime  melatonin 10 milliGRAM(s) Oral at bedtime  montelukast 10 milliGRAM(s) Oral daily  pantoprazole  Injectable 40 milliGRAM(s) IV Push daily  polyethylene glycol 3350 17 Gram(s) Oral daily  senna 2 Tablet(s) Oral at bedtime  tamsulosin 0.4 milliGRAM(s) Oral at bedtime    MEDICATIONS  (PRN):  acetaminophen     Tablet .. 650 milliGRAM(s) Oral every 6 hours PRN Temp greater or equal to 38C (100.4F), Mild Pain (1 - 3)  albuterol    90 MICROgram(s) HFA Inhaler 2 Puff(s) Inhalation every 6 hours PRN Shortness of Breath and/or Wheezing  aluminum hydroxide/magnesium hydroxide/simethicone Suspension 30 milliLiter(s) Oral every 4 hours PRN Dyspepsia  bisacodyl Suppository 10 milliGRAM(s) Rectal daily PRN Constipation  ondansetron Injectable 4 milliGRAM(s) IV Push every 8 hours PRN Nausea and/or Vomiting      REVIEW OF SYSTEMS:  CONSTITUTIONAL: No fever or chills  HEENT:  No headache, no sore throat  RESPIRATORY: No cough, wheezing, or shortness of breath  CARDIOVASCULAR: No chest pain, palpitations  GASTROINTESTINAL: No abdominal pain, nausea, vomiting, or diarrhea  GENITOURINARY: No dysuria, frequency, or hematuria  NEUROLOGICAL: no focal weakness or dizziness  MUSCULOSKELETAL: no myalgias  PSYCH: no recent changes in mood    RADIOLOGY & ADDITIONAL TESTS:    Imaging Personally Reviewed:  [x] YES  [ ] NO    Consultant(s) Notes Reviewed:  [x] YES  [ ] NO    PHYSICAL EXAM:  T(C): 36.4 (04-09-24 @ 06:05), Max: 37.4 (04-08-24 @ 14:15)  HR: 110 (04-09-24 @ 08:05) (98 - 114)  BP: 154/86 (04-09-24 @ 06:05) (154/85 - 154/90)  RR: 18 (04-09-24 @ 06:05) (18 - 18)  SpO2: 98% (04-09-24 @ 08:05) (94% - 98%)    GENERAL: NAD, well-developed, well-groomed  HEENT:  anicteric, moist mucous membranes  CHEST/LUNG:  CTA b/l, no rales, wheezes, or rhonchi  HEART:  RRR, S1, S2  ABDOMEN:  BS+, soft, nontender, nondistended, + taylor with darkish urine. small clots noted in the bag   EXTREMITIES: no edema  NERVOUS SYSTEM: confused  PSYCH: normal affect    Care Discussed with Consultants/Other Providers [x] YES  [ ] NO

## 2024-04-09 NOTE — PROGRESS NOTE ADULT - ASSESSMENT
3 yo M with PMH aortic stenosis s/p TAVR 2011, dementia, HLD, BPH presents to the ED with fall and flu like symptoms admitted for Respiratory Failure / Flu.     Influenza   acute hypoxic respiratory failure   bacterial pneumonia   COPD exacerbation     - pulm consulted: Dr Butcher     - completed tamiflu and rocephin     - on RA    - duonebs ATC and singulair     - CXR NAD      dementia     - aricept and seroquel discontinued secondary to QT prolongation    - seen by psych     - limiting benzo due to drowsiness     CAD s/p PCI to LAD 2019   aortic stenosis s/p TAVR     - statin held secondary to CPK     - cont asa and plavix     constipation    - cont hydrocortisone cream BID for hemorrhoids     hematuria     - cont to monitor. taylor draining.     DVT proph: heparin discontinued. continue SCDs    updated daughter.   taylor flushes easily. will irrigate small clots. monitor tonight. if improvement of taylor, plan for discharge to [facility] tomorrow. Daughter is in agreement with plan.

## 2024-04-09 NOTE — PROGRESS NOTE ADULT - SUBJECTIVE AND OBJECTIVE BOX
[facility] -- Halima Hayes Pannella, Patel, Savella Goodger, Cohen  Office # [phone]      Follow Up:    Elevated troponn    Subjective/Observations:   Seen bedside, confused unable to provide any meaningful information   remains on room air     REVIEW OF SYSTEMS: All other review of systems is negative unless indicated above    PAST MEDICAL & SURGICAL HISTORY:  Aortic stenosis      BPH (benign prostatic hyperplasia)      Dementia  mild- on Memantine      ACE (dyspnea on exertion)      Asthma  controlled on inhalers      HLD (hyperlipidemia)      CAD (coronary artery disease)  s/p stent 10/2019      OH (ocular hypertension)  on eye drops      Overactive bladder      Anxiety      History of tonsillectomy  childhood      H/O coronary angiogram  10/25/19, s/p PCI to LAD          MEDICATIONS  (STANDING):  albuterol/ipratropium for Nebulization 3 milliLiter(s) Nebulizer every 6 hours  aspirin enteric coated 81 milliGRAM(s) Oral daily  bisacodyl 5 milliGRAM(s) Oral at bedtime  buDESOnide    Inhalation Suspension 0.5 milliGRAM(s) Inhalation two times a day  clopidogrel Tablet 75 milliGRAM(s) Oral daily  dextrose 5%. 1000 milliLiter(s) (75 mL/Hr) IV Continuous <Continuous>  finasteride 5 milliGRAM(s) Oral daily  hydrocortisone hemorrhoidal Suppository 1 Suppository(s) Rectal two times a day  latanoprost 0.005% Ophthalmic Solution 1 Drop(s) Both EYES at bedtime  melatonin 10 milliGRAM(s) Oral at bedtime  montelukast 10 milliGRAM(s) Oral daily  pantoprazole  Injectable 40 milliGRAM(s) IV Push daily  polyethylene glycol 3350 17 Gram(s) Oral daily  senna 2 Tablet(s) Oral at bedtime  tamsulosin 0.4 milliGRAM(s) Oral at bedtime    MEDICATIONS  (PRN):  acetaminophen     Tablet .. 650 milliGRAM(s) Oral every 6 hours PRN Temp greater or equal to 38C (100.4F), Mild Pain (1 - 3)  albuterol    90 MICROgram(s) HFA Inhaler 2 Puff(s) Inhalation every 6 hours PRN Shortness of Breath and/or Wheezing  aluminum hydroxide/magnesium hydroxide/simethicone Suspension 30 milliLiter(s) Oral every 4 hours PRN Dyspepsia  bisacodyl Suppository 10 milliGRAM(s) Rectal daily PRN Constipation  ondansetron Injectable 4 milliGRAM(s) IV Push every 8 hours PRN Nausea and/or Vomiting      Allergies    No Known Allergies    Intolerances        Vital Signs Last 24 Hrs  T(C): 36.4 (2024 06:05), Max: 37.4 (2024 14:15)  T(F): 97.6 (2024 06:05), Max: 99.4 (2024 14:15)  HR: 110 (2024 08:05) (98 - 114)  BP: 154/86 (2024 06:05) (154/85 - 154/90)  BP(mean): --  RR: 18 (2024 06:05) (18 - 18)  SpO2: 98% (2024 08:05) (94% - 98%)    Parameters below as of 2024 08:05  Patient On (Oxygen Delivery Method): room air        I&O's Summary    2024 07:01  -  2024 07:00  --------------------------------------------------------  IN: 900 mL / OUT: 0 mL / NET: 900 mL          PHYSICAL EXAM:  TELE: not on tele   Constitutional: NAD, awake and alert, well-developed  HEENT: Moist Mucous Membranes, Anicteric  Pulmonary: Non-labored, breath sounds are clear bilaterally, No wheezing, crackles or rhonchi  Cardiovascular: Regular, S1 and S2 nl, No murmurs, rubs, gallops or clicks  Gastrointestinal: Bowel Sounds present, soft, nontender.   Lymph: No lymphadenopathy. No peripheral edema.  Skin: No visible rashes or ulcers.  Psych:  confused   LABS: All Labs Reviewed:                        14.9   11.61 )-----------( 353      ( 2024 07:00 )             44.7                         14.5   11.33 )-----------( 311      ( 2024 09:12 )             43.1                         15.5   14.22 )-----------( 292      ( 2024 05:30 )             45.9     2024 07:00    145    |  108    |  26     ----------------------------<  141    3.5     |  30     |  0.94   2024 09:12    145    |  110    |  31     ----------------------------<  164    3.6     |  28     |  0.88   2024 05:30    148    |  112    |  32     ----------------------------<  145    3.9     |  28     |  0.89     Ca    9.5        2024 07:00  Ca    9.4        2024 09:12  Ca    8.8        2024 05:30  Mg     2.4       2024 05:30    TPro  6.7    /  Alb  3.2    /  TBili  1.3    /  DBili  x      /  AST  36     /  ALT  58     /  AlkPhos  72     2024 07:00  TPro  6.7    /  Alb  3.2    /  TBili  1.2    /  DBili  x      /  AST  34     /  ALT  59     /  AlkPhos  68     2024 09:12  TPro  6.7    /  Alb  3.2    /  TBili  1.0    /  DBili  x      /  AST  35     /  ALT  75     /  AlkPhos  71     2024 05:30             EC Lead ECG:   Ventricular Rate 93 BPM    Atrial Rate 93 BPM    P-R Interval 194 ms    QRS Duration 130 ms    Q-T Interval 356 ms    QTC Calculation(Bazett) 442 ms    P Axis 74 degrees    R Axis -14 degrees    T Axis 154 degrees    Diagnosis Line Normal sinus rhythm  LVH with qrs widening and repol changes   Abnormal ECG  When compared with ECG of 2024 04:38, (Unconfirmed)  No significant change was found  Confirmed by CINDY FIGUEROA ([id number]) on 2024 7:01:23 PM (24 @ 09:13)      TRANSTHORACIC ECHOCARDIOGRAM REPORT  ________________________________________________________________________________                                      _______       Pt. Name:       MARLON KRUEGER Study Date:    2024  MRN:            [id number]          YOB: 1940  Accession #:    [id number]         Age:           84 years  Account#:       [id number]        Gender:        M  Heart Rate:                       Height:        68.11 in (173.00 cm)  Rhythm:    Weight:        185.19 lb (84.00 kg)  Blood Pressure: 105/65 mmHg       BSA/BMI:       1.98 m² / 28.07 kg/m²  ________________________________________________________________________________________  Referring Physician:    [id number] Rosendo Hawkins  Interpreting Physician: Cindy Figueroa  Primary Sonographer:    Gaudencio Payton    CPT:               ECHO TTE WO CON COMP W DOPP - 15521.m  Indication(s):     Abnormal electrocardiogram ECG/EKG - R94.31  Procedure:         Transthoracic echocardiogram with 2-D, M-mode and complete                     spectral and color flow Doppler.  Ordering Location: [facility]  Admission Status:  Inpatient  Study Information: Image quality for this study is technically difficult.    _______________________________________________________________________________________     CONCLUSIONS:      1. Technically difficult image quality.   2. Left ventricular endocardium is not well visualized; however, the left ventricular systolic function appears grossly normal.  3. Abnormal (paradoxical) septal motion consistent with conduction delay. Left ventricular systolic function is normal with an ejection fraction visually estimated at 50 to 55 %.   4. A prominent papillary muscule is noted.   5. There is normal LV mass and concentric remodeling.   6. The right ventricle is not well visualized. normal systolic function.   7. The left atrium is normal.   8. Mitral valve leaflets have focal calcification.   9. Trace mitral regurgitation.  10. Aortic valve was not well visualized.  11. Trace pericardial effusion noted adjacent to the anterior right ventricle.    ________________________________________________________________________________________  FINDINGS:     Left Ventricle:  Abnormal (paradoxical) septal motion consistent with conduction delay. Left ventricular systolic function is normal with an ejection fraction visually estimated at 50 to 55%. There is normal LV mass and concentric remodeling. Left ventricular endocardium is not well visualized; however, the left ventricular systolic function appears grossly normal. A prominent papillary muscule is noted.     Right Ventricle:  The right ventricle is not well visualized. Normal systolic function. Tricuspid annular plane systolic excursion (TAPSE) is 1.8 cm (normal >=1.7 cm).     Left Atrium:  The left atrium is normal with an indexed volume of 18.78 ml/m².     Right Atrium:  The right atrium is normal in size.     Aortic Valve:  A a transcatheter deployed (TAVR) is present in the aortic position. The aortic valve was not well visualized. The peak transaortic velocity is 1.38 m/s, peak transaortic gradient is 7.6 mmHg and mean transaortic gradient is 3.0 mmHg with an LVOT/aortic valve VTI ratio of 0.59. The aortic valve area is estimated at1.66 cm² by the continuity equation.     Mitral Valve:  There is calcification of the mitral valve annulus. Mitral valve leaflets have focal calcification. There is trace mitral regurgitation.     Tricuspid Valve:  Structurally normal tricuspid valvewith normal leaflet excursion. The tricuspid valve was not well visualized. There is trace tricuspid regurgitation. There is insufficient tricuspid regurgitation detected to calculate pulmonary artery systolic pressure.     Pulmonic Valve:  The pulmonic valve was not well visualized.     Aorta:  The aortic root at the sinuses of Valsalva is normal in size, measuring 2.30 cm (indexed 1.16 cm/m²).     Pericardium:  There is a trace pericardial effusion noted adjacent to the anterior right ventricle.     Systemic Veins:  The inferior vena cava is normal in size measuring 1.65 cm in diameter, (normal <2.1cm) with normal inspiratory collapse (normal >50%) consistent with normal right atrial pressure (~3, range 0-5mmHg).  ____________________________________________________________________  QUANTITATIVE DATA:  Left Ventricle Measurements: (Indexed to BSA)     IVSd (2D):   1.4 cm  LVPWd (2D):  1.4 cm  LVIDd (2D):  4.0 cm  LVIDs (2D):  3.0 cm  LV Mass:     203 g  102.6 g/m²  Visualized LV EF%: 50to 55%     MV E Vmax:    0.76 m/s  MV A Vmax:    0.98 m/s  MV E/A:       0.77  e' lateral:   5.22 cm/s  e' medial:    3.05 cm/s  E/e' lateral: 14.46  E/e' medial:  24.75  E/e' Average: 18.26  MV DT:        255 msec    Aorta Measurements: (Normal range) (Indexed to BSA)     Sinuses of Valsalva: 2.30 cm (3.1 - 3.7 cm)       Left Atrium Measurements: (Indexed to BSA)  LA Diam 2D: 3.50 cm    Right Ventricle Measurements:     TAPSE: 1.8 cm       LVOT / RVOT/ Qp/Qs Data: (Indexed to BSA)  LVOT Diameter: 1.90 cm  LVOT Vmax:     0.82 m/s  LVOT VTI:      12.30 cm  LVOT SV:       34.9 ml  17.61 ml/m²    Aortic Valve Measurements:  AV Vmax:                1.4 m/s  AV Peak Gradient:       7.6 mmHg  AV Mean Gradient:       3.0 mmHg  AV VTI: 21.0 cm  AV VTI Ratio:           0.59  AoV EOA, Contin:        1.66 cm²  AoV EOA, Contin i:      0.84 cm²/m²  AoV Dimensionless Index 0.59    Mitral Valve Measurements:     MV E Vmax: 0.8 m/s  MV A Vmax: 1.0 m/s  MV E/A:    0.8       Tricuspid ValveMeasurements:     RA Pressure: 3 mmHg    ________________________________________________________________________________________  Electronically signed on 4/3/2024 at 9:33:13 AM by Cindy Figueroa         *** Final ***      Radiology:

## 2024-04-09 NOTE — PROGRESS NOTE ADULT - SUBJECTIVE AND OBJECTIVE BOX
Patient is a 84y old  Male who presents with a chief complaint of Elevated troponin (09 Apr 2024 10:15)      INTERVAL HPI/OVERNIGHT EVENTS:    No respiratory distress    MEDICATIONS  (STANDING):  albuterol/ipratropium for Nebulization 3 milliLiter(s) Nebulizer every 6 hours  aspirin enteric coated 81 milliGRAM(s) Oral daily  bisacodyl 5 milliGRAM(s) Oral at bedtime  buDESOnide    Inhalation Suspension 0.5 milliGRAM(s) Inhalation two times a day  clopidogrel Tablet 75 milliGRAM(s) Oral daily  dextrose 5%. 1000 milliLiter(s) (75 mL/Hr) IV Continuous <Continuous>  finasteride 5 milliGRAM(s) Oral daily  hydrocortisone hemorrhoidal Suppository 1 Suppository(s) Rectal two times a day  latanoprost 0.005% Ophthalmic Solution 1 Drop(s) Both EYES at bedtime  melatonin 10 milliGRAM(s) Oral at bedtime  montelukast 10 milliGRAM(s) Oral daily  pantoprazole  Injectable 40 milliGRAM(s) IV Push daily  polyethylene glycol 3350 17 Gram(s) Oral daily  senna 2 Tablet(s) Oral at bedtime  tamsulosin 0.4 milliGRAM(s) Oral at bedtime      MEDICATIONS  (PRN):  acetaminophen     Tablet .. 650 milliGRAM(s) Oral every 6 hours PRN Temp greater or equal to 38C (100.4F), Mild Pain (1 - 3)  albuterol    90 MICROgram(s) HFA Inhaler 2 Puff(s) Inhalation every 6 hours PRN Shortness of Breath and/or Wheezing  aluminum hydroxide/magnesium hydroxide/simethicone Suspension 30 milliLiter(s) Oral every 4 hours PRN Dyspepsia  bisacodyl Suppository 10 milliGRAM(s) Rectal daily PRN Constipation  ondansetron Injectable 4 milliGRAM(s) IV Push every 8 hours PRN Nausea and/or Vomiting      Allergies    No Known Allergies    Intolerances        PAST MEDICAL & SURGICAL HISTORY:  Aortic stenosis      BPH (benign prostatic hyperplasia)      Dementia  mild- on Memantine      ACE (dyspnea on exertion)      Asthma  controlled on inhalers      HLD (hyperlipidemia)      CAD (coronary artery disease)  s/p stent 10/2019      OH (ocular hypertension)  on eye drops      Overactive bladder      Anxiety      History of tonsillectomy  childhood      H/O coronary angiogram  10/25/19, s/p PCI to LAD          Vital Signs Last 24 Hrs  T(C): 36.8 (09 Apr 2024 13:52), Max: 36.8 (08 Apr 2024 21:09)  T(F): 98.2 (09 Apr 2024 13:52), Max: 98.3 (08 Apr 2024 21:09)  HR: 104 (09 Apr 2024 13:52) (104 - 114)  BP: 156/95 (09 Apr 2024 13:52) (154/86 - 156/95)  BP(mean): --  RR: 18 (09 Apr 2024 13:52) (18 - 18)  SpO2: 96% (09 Apr 2024 13:52) (95% - 98%)    Parameters below as of 09 Apr 2024 13:52  Patient On (Oxygen Delivery Method): room air        PHYSICAL EXAMINATION:    GENERAL: The patient is awake and in no apparent distress.     HEENT: Head is normocephalic and atraumatic.    NECK: no JVD    LUNGS: Clear to auscultation without wheezing, rales or rhonchi; respirations unlabored    HEART: Regular rate and rhythm without murmur.    ABDOMEN: Soft, nontender, and nondistended.      EXTREMITIES: Without any cyanosis, clubbing, rash, lesions or edema.    NEUROLOGIC: Grossly intact.    SKIN: No ulceration or induration present.      LABS:                        14.9   11.61 )-----------( 353      ( 09 Apr 2024 07:00 )             44.7     04-09    145  |  108  |  26<H>  ----------------------------<  141<H>  3.5   |  30  |  0.94    Ca    9.5      09 Apr 2024 07:00    TPro  6.7  /  Alb  3.2<L>  /  TBili  1.3<H>  /  DBili  x   /  AST  36  /  ALT  58  /  AlkPhos  72  04-09      Urinalysis Basic - ( 09 Apr 2024 07:00 )    Color: x / Appearance: x / SG: x / pH: x  Gluc: 141 mg/dL / Ketone: x  / Bili: x / Urobili: x   Blood: x / Protein: x / Nitrite: x   Leuk Esterase: x / RBC: x / WBC x   Sq Epi: x / Non Sq Epi: x / Bacteria: x              Assessment:    Resolved Influenza Bronchitis  Hx COPD  Coronary artery disease - S/P stent  Resolved Aspiration Pneumonia  Cognitive Decline    Plan:    For transfer to subacute rehab

## 2024-04-09 NOTE — PROGRESS NOTE ADULT - SUBJECTIVE AND OBJECTIVE BOX
[facility]  Yunier Dorman PA-C  [address] [place] [address]  [phone]      INTERVAL HPI/OVERNIGHT EVENTS:  Pt s/e  No new GI events    MEDICATIONS  (STANDING):  albuterol/ipratropium for Nebulization 3 milliLiter(s) Nebulizer every 6 hours  aspirin enteric coated 81 milliGRAM(s) Oral daily  bisacodyl 5 milliGRAM(s) Oral at bedtime  buDESOnide    Inhalation Suspension 0.5 milliGRAM(s) Inhalation two times a day  clopidogrel Tablet 75 milliGRAM(s) Oral daily  dextrose 5%. 1000 milliLiter(s) (75 mL/Hr) IV Continuous <Continuous>  finasteride 5 milliGRAM(s) Oral daily  hydrocortisone hemorrhoidal Suppository 1 Suppository(s) Rectal two times a day  latanoprost 0.005% Ophthalmic Solution 1 Drop(s) Both EYES at bedtime  melatonin 10 milliGRAM(s) Oral at bedtime  montelukast 10 milliGRAM(s) Oral daily  pantoprazole  Injectable 40 milliGRAM(s) IV Push daily  polyethylene glycol 3350 17 Gram(s) Oral daily  senna 2 Tablet(s) Oral at bedtime  tamsulosin 0.4 milliGRAM(s) Oral at bedtime    MEDICATIONS  (PRN):  acetaminophen     Tablet .. 650 milliGRAM(s) Oral every 6 hours PRN Temp greater or equal to 38C (100.4F), Mild Pain (1 - 3)  albuterol    90 MICROgram(s) HFA Inhaler 2 Puff(s) Inhalation every 6 hours PRN Shortness of Breath and/or Wheezing  aluminum hydroxide/magnesium hydroxide/simethicone Suspension 30 milliLiter(s) Oral every 4 hours PRN Dyspepsia  bisacodyl Suppository 10 milliGRAM(s) Rectal daily PRN Constipation  ondansetron Injectable 4 milliGRAM(s) IV Push every 8 hours PRN Nausea and/or Vomiting      Allergies  No Known Allergies      PHYSICAL EXAM:   Vital Signs:  Vital Signs Last 24 Hrs  T(C): 36.4 (2024 06:05), Max: 37.4 (2024 14:15)  T(F): 97.6 (2024 06:05), Max: 99.4 (2024 14:15)  HR: 110 (2024 08:05) (98 - 114)  BP: 154/86 (2024 06:05) (154/85 - 154/90)  BP(mean): --  RR: 18 (2024 06:05) (18 - 18)  SpO2: 98% (2024 08:05) (94% - 98%)    Parameters below as of 2024 08:05  Patient On (Oxygen Delivery Method): room air      Daily     Daily Weight in k (2024 06:05)    GENERAL:  Appears stated age  HEENT:  NC/AT  CHEST:  Full & symmetric excursion  HEART:  Regular rhythm  ABDOMEN:  Soft, non-tender, non-distended  EXTEREMITIES:  no cyanosis  SKIN:  No rash  NEURO:  Alert      LABS:                        14.9   11.61 )-----------( 353      ( 2024 07:00 )             44.7     04-    145  |  108  |  26<H>  ----------------------------<  141<H>  3.5   |  30  |  0.94    Ca    9.5      2024 07:00    TPro  6.7  /  Alb  3.2<L>  /  TBili  1.3<H>  /  DBili  x   /  AST  36  /  ALT  58  /  AlkPhos  72  04-09      Urinalysis Basic - ( 2024 07:00 )    Color: x / Appearance: x / SG: x / pH: x  Gluc: 141 mg/dL / Ketone: x  / Bili: x / Urobili: x   Blood: x / Protein: x / Nitrite: x   Leuk Esterase: x / RBC: x / WBC x   Sq Epi: x / Non Sq Epi: x / Bacteria: x

## 2024-04-10 VITALS
TEMPERATURE: 98 F | SYSTOLIC BLOOD PRESSURE: 133 MMHG | OXYGEN SATURATION: 92 % | RESPIRATION RATE: 18 BRPM | DIASTOLIC BLOOD PRESSURE: 82 MMHG | HEART RATE: 87 BPM

## 2024-04-10 LAB
ANION GAP SERPL CALC-SCNC: 8 MMOL/L — SIGNIFICANT CHANGE UP (ref 5–17)
BUN SERPL-MCNC: 23 MG/DL — SIGNIFICANT CHANGE UP (ref 7–23)
CALCIUM SERPL-MCNC: 9.1 MG/DL — SIGNIFICANT CHANGE UP (ref 8.5–10.1)
CHLORIDE SERPL-SCNC: 107 MMOL/L — SIGNIFICANT CHANGE UP (ref 96–108)
CO2 SERPL-SCNC: 28 MMOL/L — SIGNIFICANT CHANGE UP (ref 22–31)
CREAT SERPL-MCNC: 0.88 MG/DL — SIGNIFICANT CHANGE UP (ref 0.5–1.3)
EGFR: 84 ML/MIN/1.73M2 — SIGNIFICANT CHANGE UP
GLUCOSE SERPL-MCNC: 155 MG/DL — HIGH (ref 70–99)
HCT VFR BLD CALC: 41.6 % — SIGNIFICANT CHANGE UP (ref 39–50)
HGB BLD-MCNC: 14.1 G/DL — SIGNIFICANT CHANGE UP (ref 13–17)
MAGNESIUM SERPL-MCNC: 2.3 MG/DL — SIGNIFICANT CHANGE UP (ref 1.6–2.6)
MCHC RBC-ENTMCNC: 28.4 PG — SIGNIFICANT CHANGE UP (ref 27–34)
MCHC RBC-ENTMCNC: 33.9 GM/DL — SIGNIFICANT CHANGE UP (ref 32–36)
MCV RBC AUTO: 83.9 FL — SIGNIFICANT CHANGE UP (ref 80–100)
NRBC # BLD: 0 /100 WBCS — SIGNIFICANT CHANGE UP (ref 0–0)
PHOSPHATE SERPL-MCNC: 3 MG/DL — SIGNIFICANT CHANGE UP (ref 2.5–4.5)
PLATELET # BLD AUTO: 309 K/UL — SIGNIFICANT CHANGE UP (ref 150–400)
POTASSIUM SERPL-MCNC: 3.4 MMOL/L — LOW (ref 3.5–5.3)
POTASSIUM SERPL-SCNC: 3.4 MMOL/L — LOW (ref 3.5–5.3)
RBC # BLD: 4.96 M/UL — SIGNIFICANT CHANGE UP (ref 4.2–5.8)
RBC # FLD: 14 % — SIGNIFICANT CHANGE UP (ref 10.3–14.5)
SODIUM SERPL-SCNC: 143 MMOL/L — SIGNIFICANT CHANGE UP (ref 135–145)
WBC # BLD: 10.45 K/UL — SIGNIFICANT CHANGE UP (ref 3.8–10.5)
WBC # FLD AUTO: 10.45 K/UL — SIGNIFICANT CHANGE UP (ref 3.8–10.5)

## 2024-04-10 PROCEDURE — 72125 CT NECK SPINE W/O DYE: CPT | Mod: MC

## 2024-04-10 PROCEDURE — 99239 HOSP IP/OBS DSCHRG MGMT >30: CPT

## 2024-04-10 PROCEDURE — 87449 NOS EACH ORGANISM AG IA: CPT

## 2024-04-10 PROCEDURE — 94640 AIRWAY INHALATION TREATMENT: CPT

## 2024-04-10 PROCEDURE — 84100 ASSAY OF PHOSPHORUS: CPT

## 2024-04-10 PROCEDURE — 87086 URINE CULTURE/COLONY COUNT: CPT

## 2024-04-10 PROCEDURE — 83735 ASSAY OF MAGNESIUM: CPT

## 2024-04-10 PROCEDURE — 99232 SBSQ HOSP IP/OBS MODERATE 35: CPT

## 2024-04-10 PROCEDURE — 84484 ASSAY OF TROPONIN QUANT: CPT

## 2024-04-10 PROCEDURE — 80053 COMPREHEN METABOLIC PANEL: CPT

## 2024-04-10 PROCEDURE — 84145 PROCALCITONIN (PCT): CPT

## 2024-04-10 PROCEDURE — 87040 BLOOD CULTURE FOR BACTERIA: CPT

## 2024-04-10 PROCEDURE — 87641 MR-STAPH DNA AMP PROBE: CPT

## 2024-04-10 PROCEDURE — 92610 EVALUATE SWALLOWING FUNCTION: CPT

## 2024-04-10 PROCEDURE — 80048 BASIC METABOLIC PNL TOTAL CA: CPT

## 2024-04-10 PROCEDURE — 99285 EMERGENCY DEPT VISIT HI MDM: CPT | Mod: 25

## 2024-04-10 PROCEDURE — 94760 N-INVAS EAR/PLS OXIMETRY 1: CPT

## 2024-04-10 PROCEDURE — 87640 STAPH A DNA AMP PROBE: CPT

## 2024-04-10 PROCEDURE — 71045 X-RAY EXAM CHEST 1 VIEW: CPT

## 2024-04-10 PROCEDURE — 82553 CREATINE MB FRACTION: CPT

## 2024-04-10 PROCEDURE — 80061 LIPID PANEL: CPT

## 2024-04-10 PROCEDURE — 85025 COMPLETE CBC W/AUTO DIFF WBC: CPT

## 2024-04-10 PROCEDURE — 96375 TX/PRO/DX INJ NEW DRUG ADDON: CPT

## 2024-04-10 PROCEDURE — 81001 URINALYSIS AUTO W/SCOPE: CPT

## 2024-04-10 PROCEDURE — 83036 HEMOGLOBIN GLYCOSYLATED A1C: CPT

## 2024-04-10 PROCEDURE — 96372 THER/PROPH/DIAG INJ SC/IM: CPT | Mod: XU

## 2024-04-10 PROCEDURE — 71275 CT ANGIOGRAPHY CHEST: CPT | Mod: MC

## 2024-04-10 PROCEDURE — 93005 ELECTROCARDIOGRAM TRACING: CPT

## 2024-04-10 PROCEDURE — 93306 TTE W/DOPPLER COMPLETE: CPT

## 2024-04-10 PROCEDURE — 96374 THER/PROPH/DIAG INJ IV PUSH: CPT

## 2024-04-10 PROCEDURE — 36600 WITHDRAWAL OF ARTERIAL BLOOD: CPT

## 2024-04-10 PROCEDURE — 73590 X-RAY EXAM OF LOWER LEG: CPT

## 2024-04-10 PROCEDURE — 71250 CT THORAX DX C-: CPT | Mod: MC

## 2024-04-10 PROCEDURE — 80076 HEPATIC FUNCTION PANEL: CPT

## 2024-04-10 PROCEDURE — 85610 PROTHROMBIN TIME: CPT

## 2024-04-10 PROCEDURE — 83880 ASSAY OF NATRIURETIC PEPTIDE: CPT

## 2024-04-10 PROCEDURE — 73501 X-RAY EXAM HIP UNI 1 VIEW: CPT

## 2024-04-10 PROCEDURE — 82550 ASSAY OF CK (CPK): CPT

## 2024-04-10 PROCEDURE — 83605 ASSAY OF LACTIC ACID: CPT

## 2024-04-10 PROCEDURE — 82803 BLOOD GASES ANY COMBINATION: CPT

## 2024-04-10 PROCEDURE — 85027 COMPLETE CBC AUTOMATED: CPT

## 2024-04-10 PROCEDURE — 70450 CT HEAD/BRAIN W/O DYE: CPT | Mod: MC

## 2024-04-10 PROCEDURE — 82962 GLUCOSE BLOOD TEST: CPT

## 2024-04-10 PROCEDURE — 97162 PT EVAL MOD COMPLEX 30 MIN: CPT

## 2024-04-10 PROCEDURE — 73560 X-RAY EXAM OF KNEE 1 OR 2: CPT

## 2024-04-10 PROCEDURE — 97110 THERAPEUTIC EXERCISES: CPT

## 2024-04-10 PROCEDURE — 36415 COLL VENOUS BLD VENIPUNCTURE: CPT

## 2024-04-10 PROCEDURE — 85730 THROMBOPLASTIN TIME PARTIAL: CPT

## 2024-04-10 PROCEDURE — 87637 SARSCOV2&INF A&B&RSV AMP PRB: CPT

## 2024-04-10 RX ORDER — IPRATROPIUM/ALBUTEROL SULFATE 18-103MCG
3 AEROSOL WITH ADAPTER (GRAM) INHALATION
Qty: 0 | Refills: 0 | DISCHARGE
Start: 2024-04-10

## 2024-04-10 RX ORDER — ATORVASTATIN CALCIUM 80 MG/1
10 TABLET, FILM COATED ORAL AT BEDTIME
Refills: 0 | Status: DISCONTINUED | OUTPATIENT
Start: 2024-04-10 | End: 2024-04-10

## 2024-04-10 RX ORDER — POLYETHYLENE GLYCOL 3350 17 G/17G
17 POWDER, FOR SOLUTION ORAL
Qty: 0 | Refills: 0 | DISCHARGE
Start: 2024-04-10

## 2024-04-10 RX ORDER — QUETIAPINE FUMARATE 200 MG/1
1 TABLET, FILM COATED ORAL
Qty: 0 | Refills: 0 | DISCHARGE
Start: 2024-04-10

## 2024-04-10 RX ORDER — POTASSIUM CHLORIDE 20 MEQ
40 PACKET (EA) ORAL ONCE
Refills: 0 | Status: COMPLETED | OUTPATIENT
Start: 2024-04-10 | End: 2024-04-10

## 2024-04-10 RX ORDER — MEMANTINE HYDROCHLORIDE 10 MG/1
1 TABLET ORAL
Qty: 0 | Refills: 0 | DISCHARGE
Start: 2024-04-10

## 2024-04-10 RX ORDER — PANTOPRAZOLE SODIUM 20 MG/1
1 TABLET, DELAYED RELEASE ORAL
Qty: 0 | Refills: 0 | DISCHARGE
Start: 2024-04-10

## 2024-04-10 RX ADMIN — FINASTERIDE 5 MILLIGRAM(S): 5 TABLET, FILM COATED ORAL at 13:13

## 2024-04-10 RX ADMIN — PANTOPRAZOLE SODIUM 40 MILLIGRAM(S): 20 TABLET, DELAYED RELEASE ORAL at 13:12

## 2024-04-10 RX ADMIN — MONTELUKAST 10 MILLIGRAM(S): 4 TABLET, CHEWABLE ORAL at 13:12

## 2024-04-10 RX ADMIN — Medication 3 MILLILITER(S): at 13:23

## 2024-04-10 RX ADMIN — Medication 3 MILLILITER(S): at 07:46

## 2024-04-10 RX ADMIN — POLYETHYLENE GLYCOL 3350 17 GRAM(S): 17 POWDER, FOR SOLUTION ORAL at 13:12

## 2024-04-10 RX ADMIN — Medication 40 MILLIEQUIVALENT(S): at 13:24

## 2024-04-10 RX ADMIN — Medication 81 MILLIGRAM(S): at 13:13

## 2024-04-10 RX ADMIN — Medication 0.5 MILLIGRAM(S): at 07:56

## 2024-04-10 NOTE — PROGRESS NOTE ADULT - PROVIDER SPECIALTY LIST ADULT
Cardiology
Gastroenterology
Hospitalist
Hospitalist
Infectious Disease
Nephrology
Pulmonology
Cardiology
Gastroenterology
Hospitalist
Hospitalist
Infectious Disease
Infectious Disease
Nephrology
Pulmonology
Cardiology
Gastroenterology
Infectious Disease
Infectious Disease
Nephrology
Cardiology
Hospitalist

## 2024-04-10 NOTE — SOCIAL WORK PROGRESS NOTE - NSSWPROGRESSNOTE_GEN_ALL_CORE
Per medical team, pt cleared for dc. Bed confirmed at [facility]. Jose pickup scheduled for 3pm. SW reached out to pt's daughter/Georgia in order to provide her with update.

## 2024-04-10 NOTE — PROGRESS NOTE ADULT - ASSESSMENT
RAY: Prerenal azotemia, Rhabdomyolysis  Hypotension, h/o Hypertension  Dyspnea: Influenza A  MI  Dementia  s/p Fall, Rhabdomyolysis    Stable renal indices. Sodium levels better, D/c IVF. Encourage PO fluids as tolerated. Poor PO intake.  To continue current meds. BP stable. Cardiology follow up. Will follow electrolytes and renal function trend. D/c planning.

## 2024-04-10 NOTE — PROGRESS NOTE ADULT - SUBJECTIVE AND OBJECTIVE BOX
[facility] -- Halima Hayes Pannella, Patel, Savella, Goodger: Office # [phone]    Follow Up:  Elevated troponin    Subjective/Observations: Patient alert, awake , confuse. No meaning ful conservation . taylor to bedside dark dark red sedimented urine. IV NS at 75 cc/hr infusing      REVIEW OF SYSTEMS: All other review of systems are negative unless indicated above    PAST MEDICAL & SURGICAL HISTORY:  Aortic stenosis      BPH (benign prostatic hyperplasia)      Dementia  mild- on Memantine      ACE (dyspnea on exertion)      Asthma  controlled on inhalers      HLD (hyperlipidemia)      CAD (coronary artery disease)  s/p stent 10/2019      OH (ocular hypertension)  on eye drops      Overactive bladder      Anxiety      History of tonsillectomy  childhood      H/O coronary angiogram  10/25/19, s/p PCI to LAD          MEDICATIONS  (STANDING):  albuterol/ipratropium for Nebulization 3 milliLiter(s) Nebulizer every 6 hours  aspirin enteric coated 81 milliGRAM(s) Oral daily  atorvastatin 10 milliGRAM(s) Oral at bedtime  bisacodyl 5 milliGRAM(s) Oral at bedtime  buDESOnide    Inhalation Suspension 0.5 milliGRAM(s) Inhalation two times a day  finasteride 5 milliGRAM(s) Oral daily  hydrocortisone hemorrhoidal Suppository 1 Suppository(s) Rectal two times a day  latanoprost 0.005% Ophthalmic Solution 1 Drop(s) Both EYES at bedtime  melatonin 10 milliGRAM(s) Oral at bedtime  montelukast 10 milliGRAM(s) Oral daily  pantoprazole  Injectable 40 milliGRAM(s) IV Push daily  polyethylene glycol 3350 17 Gram(s) Oral daily  potassium chloride    Tablet ER 40 milliEquivalent(s) Oral once  senna 2 Tablet(s) Oral at bedtime  tamsulosin 0.4 milliGRAM(s) Oral at bedtime    MEDICATIONS  (PRN):  acetaminophen     Tablet .. 650 milliGRAM(s) Oral every 6 hours PRN Temp greater or equal to 38C (100.4F), Mild Pain (1 - 3)  albuterol    90 MICROgram(s) HFA Inhaler 2 Puff(s) Inhalation every 6 hours PRN Shortness of Breath and/or Wheezing  aluminum hydroxide/magnesium hydroxide/simethicone Suspension 30 milliLiter(s) Oral every 4 hours PRN Dyspepsia  bisacodyl Suppository 10 milliGRAM(s) Rectal daily PRN Constipation  ondansetron Injectable 4 milliGRAM(s) IV Push every 8 hours PRN Nausea and/or Vomiting    Allergies    No Known Allergies    Intolerances      Vital Signs Last 24 Hrs  T(C): 36.9 (10 Apr 2024 05:13), Max: 36.9 (09 Apr 2024 20:29)  T(F): 98.5 (10 Apr 2024 05:13), Max: 98.5 (09 Apr 2024 20:29)  HR: 90 (10 Apr 2024 05:13) (90 - 108)  BP: 104/67 (10 Apr 2024 05:13) (104/67 - 156/95)  BP(mean): --  RR: 18 (10 Apr 2024 05:13) (18 - 18)  SpO2: 92% (10 Apr 2024 05:13) (92% - 97%)    Parameters below as of 10 Apr 2024 05:13  Patient On (Oxygen Delivery Method): room air      I&O's Summary    09 Apr 2024 07:01  -  10 Apr 2024 07:00  --------------------------------------------------------  IN: 900 mL / OUT: 350 mL / NET: 550 mL          TELE:   PHYSICAL EXAM:  Constitutional: NAD, awake and alert,   HEENT: Moist Mucous Membranes, Anicteric  Pulmonary: Non-labored, breath sounds are clear bilaterally, No wheezing, rales or rhonchi  Cardiovascular: Regular, S1 and S2, No murmurs, No rubs, gallops or clicks  Gastrointestinal:  soft, nontender, nondistended   Lymph: No peripheral edema. No lymphadenopathy.   Skin: No visible rashes or ulcers.  Psych:  Mood & affect appropriate      LABS: All Labs Reviewed:                        14.1   10.45 )-----------( 309      ( 10 Apr 2024 05:45 )             41.6                         14.9   11.61 )-----------( 353      ( 09 Apr 2024 07:00 )             44.7                         14.5   11.33 )-----------( 311      ( 08 Apr 2024 09:12 )             43.1     10 Apr 2024 05:45    143    |  107    |  23     ----------------------------<  155    3.4     |  28     |  0.88   09 Apr 2024 07:00    145    |  108    |  26     ----------------------------<  141    3.5     |  30     |  0.94   08 Apr 2024 09:12    145    |  110    |  31     ----------------------------<  164    3.6     |  28     |  0.88     Ca    9.1        10 Apr 2024 05:45  Ca    9.5        09 Apr 2024 07:00  Ca    9.4        08 Apr 2024 09:12  Phos  3.0       10 Apr 2024 05:45  Mg     2.3       10 Apr 2024 05:45    TPro  6.7    /  Alb  3.2    /  TBili  1.3    /  DBili  x      /  AST  36     /  ALT  58     /  AlkPhos  72     09 Apr 2024 07:00  TPro  6.7    /  Alb  3.2    /  TBili  1.2    /  DBili  x      /  AST  34     /  ALT  59     /  AlkPhos  68     08 Apr 2024 09:12   LIVER FUNCTIONS - ( 09 Apr 2024 07:00 )  Alb: 3.2 g/dL / Pro: 6.7 g/dL / ALK PHOS: 72 U/L / ALT: 58 U/L / AST: 36 U/L / GGT: x           Cholesterol: 128 mg/dL (03-29-24 @ 03:30)  HDL Cholesterol: 55 mg/dL (03-29-24 @ 03:30)  Triglycerides, Serum: 78 mg/dL (03-29-24 @ 03:30)    12 Lead ECG:   Ventricular Rate 93 BPM    Atrial Rate 93 BPM    P-R Interval 194 ms    QRS Duration 130 ms    Q-T Interval 356 ms    QTC Calculation(Bazett) 442 ms    P Axis 74 degrees    R Axis -14 degrees    T Axis 154 degrees    Diagnosis Line Normal sinus rhythm  LVH with qrs widening and repol changes   Abnormal ECG  When compared with ECG of 01-APR-2024 04:38, (Unconfirmed)  No significant change was found  Confirmed by CINDY FIGUEROA ([id number]) on 4/1/2024 7:01:23 PM (04-01-24 @ 09:13)      TRANSTHORACIC ECHOCARDIOGRAM REPORT  ________________________________________________________________________________                                      _______       Pt. Name:       MARLON KRUEGER Study Date:    4/2/2024  MRN:            [id number]          YOB: 1940  Accession #:    [id number]         Age:           84 years  Account#:       [id number]        Gender:        M  Heart Rate:                       Height:        68.11 in (173.00 cm)  Rhythm:    Weight:        185.19 lb (84.00 kg)  Blood Pressure: 105/65 mmHg       BSA/BMI:       1.98 m² / 28.07 kg/m²  ________________________________________________________________________________________  Referring Physician:    [id number] Rosendo Hawkins  Interpreting Physician: Cindy Figueroa  Primary Sonographer:    Gaudencio Payton    CPT:               ECHO TTE WO CON COMP W DOPP - 58444.m  Indication(s):     Abnormal electrocardiogram ECG/EKG - R94.31  Procedure:         Transthoracic echocardiogram with 2-D, M-mode and complete                     spectral and color flow Doppler.  Ordering Location: [facility]  Admission Status:  Inpatient  Study Information: Image quality for this study is technically difficult.    _______________________________________________________________________________________     CONCLUSIONS:      1. Technically difficult image quality.   2. Left ventricular endocardium is not well visualized; however, the left ventricular systolic function appears grossly normal.  3. Abnormal (paradoxical) septal motion consistent with conduction delay. Left ventricular systolic function is normal with an ejection fraction visually estimated at 50 to 55 %.   4. A prominent papillary muscule is noted.   5. There is normal LV mass and concentric remodeling.   6. The right ventricle is not well visualized. normal systolic function.   7. The left atrium is normal.   8. Mitral valve leaflets have focal calcification.   9. Trace mitral regurgitation.  10. Aortic valve was not well visualized.  11. Trace pericardial effusion noted adjacent to the anterior right ventricle.    ________________________________________________________________________________________  FINDINGS:     Left Ventricle:  Abnormal (paradoxical) septal motion consistent with conduction delay. Left ventricular systolic function is normal with an ejection fraction visually estimated at 50 to 55%. There is normal LV mass and concentric remodeling. Left ventricular endocardium is not well visualized; however, the left ventricular systolic function appears grossly normal. A prominent papillary muscule is noted.     Right Ventricle:  The right ventricle is not well visualized. Normal systolic function. Tricuspid annular plane systolic excursion (TAPSE) is 1.8 cm (normal >=1.7 cm).     Left Atrium:  The left atrium is normal with an indexed volume of 18.78 ml/m².     Right Atrium:  The right atrium is normal in size.     Aortic Valve:  A a transcatheter deployed (TAVR) is present in the aortic position. The aortic valve was not well visualized. The peak transaortic velocity is 1.38 m/s, peak transaortic gradient is 7.6 mmHg and mean transaortic gradient is 3.0 mmHg with an LVOT/aortic valve VTI ratio of 0.59. The aortic valve area is estimated at1.66 cm² by the continuity equation.     Mitral Valve:  There is calcification of the mitral valve annulus. Mitral valve leaflets have focal calcification. There is trace mitral regurgitation.     Tricuspid Valve:  Structurally normal tricuspid valvewith normal leaflet excursion. The tricuspid valve was not well visualized. There is trace tricuspid regurgitation. There is insufficient tricuspid regurgitation detected to calculate pulmonary artery systolic pressure.     Pulmonic Valve:  The pulmonic valve was not well visualized.     Aorta:  The aortic root at the sinuses of Valsalva is normal in size, measuring 2.30 cm (indexed 1.16 cm/m²).     Pericardium:  There is a trace pericardial effusion noted adjacent to the anterior right ventricle.     Systemic Veins:  The inferior vena cava is normal in size measuring 1.65 cm in diameter, (normal <2.1cm) with normal inspiratory collapse (normal >50%) consistent with normal right atrial pressure (~3, range 0-5mmHg).  ____________________________________________________________________  QUANTITATIVE DATA:  Left Ventricle Measurements: (Indexed to BSA)     IVSd (2D):   1.4 cm  LVPWd (2D):  1.4 cm  LVIDd (2D):  4.0 cm  LVIDs (2D):  3.0 cm  LV Mass:     203 g  102.6 g/m²  Visualized LV EF%: 50to 55%     MV E Vmax:    0.76 m/s  MV A Vmax:    0.98 m/s  MV E/A:       0.77  e' lateral:   5.22 cm/s  e' medial:    3.05 cm/s  E/e' lateral: 14.46  E/e' medial:  24.75  E/e' Average: 18.26  MV DT:        255 msec    Aorta Measurements: (Normal range) (Indexed to BSA)     Sinuses of Valsalva: 2.30 cm (3.1 - 3.7 cm)       Left Atrium Measurements: (Indexed to BSA)  LA Diam 2D: 3.50 cm    Right Ventricle Measurements:     TAPSE: 1.8 cm       LVOT / RVOT/ Qp/Qs Data: (Indexed to BSA)  LVOT Diameter: 1.90 cm  LVOT Vmax:     0.82 m/s  LVOT VTI:      12.30 cm  LVOT SV:       34.9 ml  17.61 ml/m²    Aortic Valve Measurements:  AV Vmax:                1.4 m/s  AV Peak Gradient:       7.6 mmHg  AV Mean Gradient:       3.0 mmHg  AV VTI: 21.0 cm  AV VTI Ratio:           0.59  AoV EOA, Contin:        1.66 cm²  AoV EOA, Contin i:      0.84 cm²/m²  AoV Dimensionless Index 0.59    Mitral Valve Measurements:     MV E Vmax: 0.8 m/s  MV A Vmax: 1.0 m/s  MV E/A:    0.8       Tricuspid ValveMeasurements:     RA Pressure: 3 mmHg    ________________________________________________________________________________________  Electronically signed on 4/3/2024 at 9:33:13 AM by Cindy Figueroa         *** Final ***

## 2024-04-10 NOTE — PROGRESS NOTE ADULT - REASON FOR ADMISSION
RF , Flu
RF, fLU
elevated Troponin
falls
falls
Hf
Elevated troponin
RF, fLU
Flu, RF
RF, fLU

## 2024-04-10 NOTE — PROGRESS NOTE ADULT - SUBJECTIVE AND OBJECTIVE BOX
Patient is a 84y old  Male who presents with a chief complaint of Influenza with other respiratory manifestations, other influenza virus identified   (30 Mar 2024 11:54)    Patient seen in follow up for RAY.        PAST MEDICAL HISTORY:  Aortic stenosis    BPH (benign prostatic hyperplasia)    Dementia    ACE (dyspnea on exertion)    Asthma    HLD (hyperlipidemia)    CAD (coronary artery disease)    OH (ocular hypertension)    Overactive bladder    Anxiety      MEDICATIONS  (STANDING):  albuterol/ipratropium for Nebulization 3 milliLiter(s) Nebulizer every 6 hours  aspirin enteric coated 81 milliGRAM(s) Oral daily  atorvastatin 10 milliGRAM(s) Oral at bedtime  bisacodyl 5 milliGRAM(s) Oral at bedtime  buDESOnide    Inhalation Suspension 0.5 milliGRAM(s) Inhalation two times a day  finasteride 5 milliGRAM(s) Oral daily  hydrocortisone hemorrhoidal Suppository 1 Suppository(s) Rectal two times a day  latanoprost 0.005% Ophthalmic Solution 1 Drop(s) Both EYES at bedtime  melatonin 10 milliGRAM(s) Oral at bedtime  montelukast 10 milliGRAM(s) Oral daily  pantoprazole  Injectable 40 milliGRAM(s) IV Push daily  polyethylene glycol 3350 17 Gram(s) Oral daily  potassium chloride    Tablet ER 40 milliEquivalent(s) Oral once  senna 2 Tablet(s) Oral at bedtime  tamsulosin 0.4 milliGRAM(s) Oral at bedtime    MEDICATIONS  (PRN):  acetaminophen     Tablet .. 650 milliGRAM(s) Oral every 6 hours PRN Temp greater or equal to 38C (100.4F), Mild Pain (1 - 3)  albuterol    90 MICROgram(s) HFA Inhaler 2 Puff(s) Inhalation every 6 hours PRN Shortness of Breath and/or Wheezing  aluminum hydroxide/magnesium hydroxide/simethicone Suspension 30 milliLiter(s) Oral every 4 hours PRN Dyspepsia  bisacodyl Suppository 10 milliGRAM(s) Rectal daily PRN Constipation  ondansetron Injectable 4 milliGRAM(s) IV Push every 8 hours PRN Nausea and/or Vomiting    T(C): 36.9 (04-10-24 @ 05:13), Max: 37.4 (04-08-24 @ 14:15)  HR: 90 (04-10-24 @ 05:13) (90 - 114)  BP: 104/67 (04-10-24 @ 05:13) (104/67 - 156/95)  RR: 18 (04-10-24 @ 05:13)  SpO2: 92% (04-10-24 @ 05:13)  Wt(kg): --  I&O's Detail    09 Apr 2024 07:01  -  10 Apr 2024 07:00  --------------------------------------------------------  IN:    dextrose 5%: 900 mL  Total IN: 900 mL    OUT:    Indwelling Catheter - Urethral (mL): 350 mL  Total OUT: 350 mL    Total NET: 550 mL                    PHYSICAL EXAM:  General: No distress  Respiratory: b/l air entry  Cardiovascular: S1 S2  Gastrointestinal: soft  Extremities:  no edema                         LABORATORY:                        14.1   10.45 )-----------( 309      ( 10 Apr 2024 05:45 )             41.6     04-10    143  |  107  |  23  ----------------------------<  155<H>  3.4<L>   |  28  |  0.88    Ca    9.1      10 Apr 2024 05:45  Phos  3.0     04-10  Mg     2.3     04-10    TPro  6.1  /  Alb  2.9<L>  /  TBili  1.1  /  DBili  0.3  /  AST  46<H>  /  ALT  64  /  AlkPhos  68  04-10    Sodium: 143 mmol/L (04-10 @ 05:45)  Sodium: 145 mmol/L (04-09 @ 07:00)    Potassium: 3.4 mmol/L (04-10 @ 05:45)  Potassium: 3.5 mmol/L (04-09 @ 07:00)    Hemoglobin: 14.1 g/dL (04-10 @ 05:45)  Hemoglobin: 14.9 g/dL (04-09 @ 07:00)  Hemoglobin: 14.5 g/dL (04-08 @ 09:12)    Creatinine, Serum 0.88 (04-10 @ 05:45)  Creatinine, Serum 0.94 (04-09 @ 07:00)  Creatinine, Serum 0.88 (04-08 @ 09:12)        LIVER FUNCTIONS - ( 10 Apr 2024 05:45 )  Alb: 2.9 g/dL / Pro: 6.1 g/dL / ALK PHOS: 68 U/L / ALT: 64 U/L / AST: 46 U/L / GGT: x           Urinalysis Basic - ( 10 Apr 2024 05:45 )    Color: x / Appearance: x / SG: x / pH: x  Gluc: 155 mg/dL / Ketone: x  / Bili: x / Urobili: x   Blood: x / Protein: x / Nitrite: x   Leuk Esterase: x / RBC: x / WBC x   Sq Epi: x / Non Sq Epi: x / Bacteria: x

## 2024-04-10 NOTE — PROGRESS NOTE ADULT - SUBJECTIVE AND OBJECTIVE BOX
[facility]  Yunier Dorman PA-C  [address] [place] [address]  [phone]      INTERVAL HPI/OVERNIGHT EVENTS:  Pt s/e  No new GI events    MEDICATIONS  (STANDING):  albuterol/ipratropium for Nebulization 3 milliLiter(s) Nebulizer every 6 hours  aspirin enteric coated 81 milliGRAM(s) Oral daily  atorvastatin 10 milliGRAM(s) Oral at bedtime  bisacodyl 5 milliGRAM(s) Oral at bedtime  buDESOnide    Inhalation Suspension 0.5 milliGRAM(s) Inhalation two times a day  finasteride 5 milliGRAM(s) Oral daily  hydrocortisone hemorrhoidal Suppository 1 Suppository(s) Rectal two times a day  latanoprost 0.005% Ophthalmic Solution 1 Drop(s) Both EYES at bedtime  melatonin 10 milliGRAM(s) Oral at bedtime  montelukast 10 milliGRAM(s) Oral daily  pantoprazole  Injectable 40 milliGRAM(s) IV Push daily  polyethylene glycol 3350 17 Gram(s) Oral daily  potassium chloride    Tablet ER 40 milliEquivalent(s) Oral once  senna 2 Tablet(s) Oral at bedtime  tamsulosin 0.4 milliGRAM(s) Oral at bedtime    MEDICATIONS  (PRN):  acetaminophen     Tablet .. 650 milliGRAM(s) Oral every 6 hours PRN Temp greater or equal to 38C (100.4F), Mild Pain (1 - 3)  albuterol    90 MICROgram(s) HFA Inhaler 2 Puff(s) Inhalation every 6 hours PRN Shortness of Breath and/or Wheezing  aluminum hydroxide/magnesium hydroxide/simethicone Suspension 30 milliLiter(s) Oral every 4 hours PRN Dyspepsia  bisacodyl Suppository 10 milliGRAM(s) Rectal daily PRN Constipation  ondansetron Injectable 4 milliGRAM(s) IV Push every 8 hours PRN Nausea and/or Vomiting      Allergies    No Known Allergies    PHYSICAL EXAM:   Vital Signs:  Vital Signs Last 24 Hrs  T(C): 36.9 (10 Apr 2024 05:13), Max: 36.9 (2024 20:29)  T(F): 98.5 (10 Apr 2024 05:13), Max: 98.5 (2024 20:29)  HR: 90 (10 Apr 2024 05:13) (90 - 108)  BP: 104/67 (10 Apr 2024 05:13) (104/67 - 156/95)  BP(mean): --  RR: 18 (10 Apr 2024 05:13) (18 - 18)  SpO2: 92% (10 Apr 2024 05:13) (92% - 97%)    Parameters below as of 10 Apr 2024 05:13  Patient On (Oxygen Delivery Method): room air      Daily     Daily Weight in k.7 (10 Apr 2024 05:13)    GENERAL:  Appears stated age  HEENT:  NC/AT  CHEST:  Full & symmetric excursion  HEART:  Regular rhythm  ABDOMEN:  Soft, non-tender, non-distended  EXTEREMITIES:  no cyanosis  SKIN:  No rash  NEURO:  Alert      LABS:                        14.1   10.45 )-----------( 309      ( 10 Apr 2024 05:45 )             41.6     04-10    143  |  107  |  23  ----------------------------<  155<H>  3.4<L>   |  28  |  0.88    Ca    9.1      10 Apr 2024 05:45  Phos  3.0     04-10  Mg     2.3     04-10    TPro  6.1  /  Alb  2.9<L>  /  TBili  1.1  /  DBili  0.3  /  AST  46<H>  /  ALT  64  /  AlkPhos  68  04-10      Urinalysis Basic - ( 10 Apr 2024 05:45 )    Color: x / Appearance: x / SG: x / pH: x  Gluc: 155 mg/dL / Ketone: x  / Bili: x / Urobili: x   Blood: x / Protein: x / Nitrite: x   Leuk Esterase: x / RBC: x / WBC x   Sq Epi: x / Non Sq Epi: x / Bacteria: x

## 2024-04-10 NOTE — PROGRESS NOTE ADULT - ASSESSMENT
85 yo M with PMH aortic stenosis s/p TAVR 2019, CAD s/p LELAND to LAD 2019, dementia, HLD, asthma, COPD, overactive bladder, H pylori, BPH admitted with Influenza A s/p multiple falls.     CAD, respiratory failure, elev trop   - Troponins elevated, peaked 2300 but downtrended  - Likely elevated on the basis of demand ischemia in setting of influenza, RAY, rhabdomyolysis, agitation.  - With CPK out of proportion to Troponin, likely rhabdomyolysis s/p fall; downtrended    - History of CAD with LELAND to LAD   - continue ASA  - Monitor and replete electrolytes. Keep K>4.0 and Mg>2.0.    - H/o EKG with IVCD vs LBBB in the past.    - EKG's reveal elevated QTc ~500. Repeat EKG with QTc: 446.  Avoid QT prolonging medications.     - No meaningful evidence of volume overload.  - TTE 4/2/24 as above diff study: EF 50-55%

## 2024-04-10 NOTE — PROGRESS NOTE ADULT - NS ATTEND AMEND GEN_ALL_CORE FT
85 yo M with PMH aortic stenosis s/p TAVR 2019, CAD s/p LELAND to LAD 2019, dementia, HLD, asthma, COPD, overactive bladder, H pylori, BPH admitted with Influenza A s/p multiple falls.     - Troponins elevated, peaked 2300, now downtrending  - likely elevated on the basis of demand ischemia in setting of influenza, RAY, rhabdomyolysis, agitation.  - History of LELAND to LAD in 2019, continue ASA and Plavix.   - With CPK out of proportion to Troponin, likely rhabdomyolysis s/p fall.  cpk downtrending     - H/o EKG with IVCD vs LBBB in the past.    - heart rate has been reasonable but is at risk of af in the setting of resp failure and structural heart disease   - EKG's reveal elevated QTc ~500. Avoid QT prolonging medications.  - Continue telemetry monitoring.     -cont HFNC for resp support.   - hypoxia likley 2.2. pna  - pulm.   - defer diuretics for now.   - Please continue to maintain strict I/Os, monitor daily weights, Cr, and K.   - Previous TTE (6/23/2022): Normal ventricular systolic function, no segmental wall motion abnormalities, mild diastolic dysfunction.  - repeat pending.     -at risk of abrupt decompensation.
85 yo M with PMH aortic stenosis s/p TAVR 2019, CAD s/p LELAND to LAD 2019, dementia, HLD, asthma, COPD, overactive bladder, H pylori, BPH admitted with Influenza A s/p multiple falls.     confused on my exam  - No signs of significant ischemia or volume overload.   - History of LELAND to LAD in 2019  - Would continue ASA stop plavix.   - start on statin
83 yo M with PMH aortic stenosis s/p TAVR 2019, CAD s/p LELAND to LAD 2019, dementia, HLD, asthma, COPD, overactive bladder, H pylori, BPH admitted with Influenza A s/p multiple falls.     - Troponins elevated, peaked 2300, now downtrending  - likely elevated on the basis of demand ischemia in setting of influenza, RAY, rhabdomyolysis, agitation.  - History of LELAND to LAD in 2019, continue ASA and Plavix.   - With CPK out of proportion to Troponin, likely rhabdomyolysis s/p fall.  cpk downtrending     - H/o EKG with IVCD vs LBBB in the past.    -heart rate has been reasonable but is at risk of af in the setting of resp failure and structural heart disease   - EKG's reveal elevated QTc ~500. Avoid QT prolonging medications.  - Continue telemetry monitoring.     -cont HFNC for resp support.   - hypoxia likley 2.2. pna  - pulm.   -  defer diuretics for now.   - Please continue to maintain strict I/Os, monitor daily weights, Cr, and K.   - Previous TTE (6/23/2022): Normal ventricular systolic function, no segmental wall motion abnormalities, mild diastolic dysfunction.  - repeat pending.     -at risk of abrupt decompensation
85 yo M with PMH aortic stenosis s/p TAVR 2019, CAD s/p LELAND to LAD 2019, dementia, HLD, asthma, COPD, overactive bladder, H pylori, BPH admitted with Influenza A s/p multiple falls.     - History of LELAND to LAD in 2019  - Would continue either ASA or Plavix. Unclear indication for DAPT.   - Monitor telemetry.  May need low dose bb
I have personally seen and examined the patient in detail.  I have spoken to the provider regarding the assessment and plan of care.  I have made changes to the note accordingly.
83 yo M with PMH aortic stenosis s/p TAVR 2019, CAD s/p LELAND to LAD 2019, dementia, HLD, asthma, COPD, overactive bladder, H pylori, BPH admitted with Influenza A s/p multiple falls.     known cad s/p remote pci  demand ischemia in setting of influenza with extensive pna, RAY, rhabdomyolysis, agitation.  CPK out of proportion to Troponin, likely rhabdomyolysis s/p fall, now downtrending   Would continue single antiplt, either ASA or Plavix, unclear indication for DAPT  No meaningful evidence of volume overload, though would DC IVF if able. Hypernatremia has resolved.   Was on HFNC, now on NC  Would place on low dose BB in the setting of known CAD and NSVT x 9 beats on tele  at risk of abrupt decompensation
85 yo M with PMH aortic stenosis s/p TAVR 2019, CAD s/p LELAND to LAD 2019, dementia, HLD, asthma, COPD, overactive bladder, H pylori, BPH admitted with Influenza A s/p multiple falls.     remains lethargic  off o2  no acute ischemia or volume overload.   known cad s/p LELAND to LAD in 2019  continue ASA off plavix.   cont statin.
85 yo M with PMH aortic stenosis s/p TAVR 2019, CAD s/p LELAND to LAD 2019, dementia, HLD, asthma, COPD, overactive bladder, H pylori, BPH admitted with Influenza A s/p multiple falls.     - History of LELAND to LAD in 2019  - Would continue either ASA or Plavix. Unclear indication for DAPT.   - Please place on high intensity statin, LFTs have normalized.

## 2024-04-18 ENCOUNTER — APPOINTMENT (OUTPATIENT)
Dept: CARDIOLOGY | Facility: CLINIC | Age: 84
End: 2024-04-18

## 2024-04-25 ENCOUNTER — TRANSCRIPTION ENCOUNTER (OUTPATIENT)
Age: 84
End: 2024-04-25

## 2024-07-23 NOTE — REVIEW OF SYSTEMS
[[email or web address]] : Preopdx:scrotal  mass Procedure: excisional biopsy   1.5cm mass of scrotum and complex closure 1.5cm RLE Anesthesia: local 1% w/epi Specimens: to path on formalin No complications  Summary: IC obtained.  Lesion demarcated with marking pen.  1%lido with epinephrine injected.  15 blade used to incise full thickness.    1.5Cm  lesion excised as an ellipse full thickness in subcutaneous plane.   Hemostasis obtained with cautery.  Skin edges widely undermined and closed for a complex closure of  1.5cm.  bacitracin and steristrips placed.    [Nocturia] : nocturia [Frequency] : frequency [Fever] : no fever [Chills] : no chills [Fatigue] : no fatigue [Hot Flashes] : no hot flashes [Night Sweats] : no night sweats [Recent Change In Weight] : ~T no recent weight change [Discharge] : no discharge [Pain] : no pain [Redness] : no redness [Dryness] : no dryness [Vision Problems] : no vision problems [Itching] : no itching [Earache] : no earache [Hearing Loss] : no hearing loss [Nosebleeds] : no nosebleeds [Postnasal Drip] : no postnasal drip [Nasal Discharge] : no nasal discharge [Sore Throat] : no sore throat [Hoarseness] : no hoarseness [Chest Pain] : no chest pain [Palpitations] : no palpitations [Claudication] : no  leg claudication [Lower Ext Edema] : no lower extremity edema [Orthopena] : no orthopnea [Shortness Of Breath] : no shortness of breath [Wheezing] : no wheezing [Cough] : no cough [Dyspnea on Exertion] : not dyspnea on exertion [Abdominal Pain] : no abdominal pain [Nausea] : no nausea [Diarrhea] : no diarrhea [Constipation] : no constipation [Vomiting] : no vomiting [Heartburn] : no heartburn [Melena] : no melena [Dysuria] : no dysuria [Incontinence] : no incontinence [Hesitancy] : no hesitancy [Hematuria] : no hematuria [Impotence] : no impotency [Poor Libido] : libido not poor [Joint Pain] : no joint pain [Joint Stiffness] : no joint stiffness [Muscle Pain] : no muscle pain [Muscle Weakness] : no muscle weakness [Back Pain] : no back pain [Joint Swelling] : no joint swelling [Itching] : no itching [Mole Changes] : no mole changes [Nail Changes] : no nail changes [Hair Changes] : no hair changes [Skin Rash] : no skin rash [Headache] : no headache [Dizziness] : no dizziness [Fainting] : no fainting [Confusion] : no confusion [Unsteady Walk] : no ataxia [Memory Loss] : no memory loss [Suicidal] : not suicidal [Insomnia] : no insomnia [Anxiety] : no anxiety [Depression] : no depression [Easy Bleeding] : no easy bleeding [Easy Bruising] : no easy bruising [Swollen Glands] : no swollen glands

## 2024-09-26 NOTE — H&P PST ADULT - VENOUS THROMBOEMBOLISM
Minocycline Pregnancy And Lactation Text: This medication is Pregnancy Category D and not consider safe during pregnancy. It is also excreted in breast milk. Topical Clindamycin Counseling: Patient counseled that this medication may cause skin irritation or allergic reactions.  In the event of skin irritation, the patient was advised to reduce the amount of the drug applied or use it less frequently.   The patient verbalized understanding of the proper use and possible adverse effects of clindamycin.  All of the patient's questions and concerns were addressed. Bactrim Counseling:  I discussed with the patient the risks of sulfa antibiotics including but not limited to GI upset, allergic reaction, drug rash, diarrhea, dizziness, photosensitivity, and yeast infections.  Rarely, more serious reactions can occur including but not limited to aplastic anemia, agranulocytosis, methemoglobinemia, blood dyscrasias, liver or kidney failure, lung infiltrates or desquamative/blistering drug rashes. High Dose Vitamin A Pregnancy And Lactation Text: High dose vitamin A therapy is contraindicated during pregnancy and breast feeding. Topical Sulfur Applications Pregnancy And Lactation Text: This medication is Pregnancy Category C and has an unknown safety profile during pregnancy. It is unknown if this topical medication is excreted in breast milk. Doxycycline Counseling:  Patient counseled regarding possible photosensitivity and increased risk for sunburn.  Patient instructed to avoid sunlight, if possible.  When exposed to sunlight, patients should wear protective clothing, sunglasses, and sunscreen.  The patient was instructed to call the office immediately if the following severe adverse effects occur:  hearing changes, easy bruising/bleeding, severe headache, or vision changes.  The patient verbalized understanding of the proper use and possible adverse effects of doxycycline.  All of the patient's questions and concerns were addressed. Bactrim Pregnancy And Lactation Text: This medication is Pregnancy Category D and is known to cause fetal risk.  It is also excreted in breast milk. Spironolactone Pregnancy And Lactation Text: This medication can cause feminization of the male fetus and should be avoided during pregnancy. The active metabolite is also found in breast milk. Use Enhanced Medication Counseling?: No Dapsone Counseling: I discussed with the patient the risks of dapsone including but not limited to hemolytic anemia, agranulocytosis, rashes, methemoglobinemia, kidney failure, peripheral neuropathy, headaches, GI upset, and liver toxicity.  Patients who start dapsone require monitoring including baseline LFTs and weekly CBCs for the first month, then every month thereafter.  The patient verbalized understanding of the proper use and possible adverse effects of dapsone.  All of the patient's questions and concerns were addressed. Benzoyl Peroxide Pregnancy And Lactation Text: This medication is Pregnancy Category C. It is unknown if benzoyl peroxide is excreted in breast milk. Erythromycin Counseling:  I discussed with the patient the risks of erythromycin including but not limited to GI upset, allergic reaction, drug rash, diarrhea, increase in liver enzymes, and yeast infections. High Dose Vitamin A Counseling: Side effects reviewed, pt to contact office should one occur. Isotretinoin Pregnancy And Lactation Text: This medication is Pregnancy Category X and is considered extremely dangerous during pregnancy. It is unknown if it is excreted in breast milk. Erythromycin Pregnancy And Lactation Text: This medication is Pregnancy Category B and is considered safe during pregnancy. It is also excreted in breast milk. Benzoyl Peroxide Counseling: Patient counseled that medicine may cause skin irritation and bleach clothing.  In the event of skin irritation, the patient was advised to reduce the amount of the drug applied or use it less frequently.   The patient verbalized understanding of the proper use and possible adverse effects of benzoyl peroxide.  All of the patient's questions and concerns were addressed. Tazorac Pregnancy And Lactation Text: This medication is not safe during pregnancy. It is unknown if this medication is excreted in breast milk. Azithromycin Counseling:  I discussed with the patient the risks of azithromycin including but not limited to GI upset, allergic reaction, drug rash, diarrhea, and yeast infections. Topical Retinoid counseling:  Patient advised to apply a pea-sized amount only at bedtime and wait 30 minutes after washing their face before applying.  If too drying, patient may add a non-comedogenic moisturizer. The patient verbalized understanding of the proper use and possible adverse effects of retinoids.  All of the patient's questions and concerns were addressed. Detail Level: Zone Minocycline Counseling: Patient advised regarding possible photosensitivity and discoloration of the teeth, skin, lips, tongue and gums.  Patient instructed to avoid sunlight, if possible.  When exposed to sunlight, patients should wear protective clothing, sunglasses, and sunscreen.  The patient was instructed to call the office immediately if the following severe adverse effects occur:  hearing changes, easy bruising/bleeding, severe headache, or vision changes.  The patient verbalized understanding of the proper use and possible adverse effects of minocycline.  All of the patient's questions and concerns were addressed. Azithromycin Pregnancy And Lactation Text: This medication is considered safe during pregnancy and is also secreted in breast milk. Topical Retinoid Pregnancy And Lactation Text: This medication is Pregnancy Category C. It is unknown if this medication is excreted in breast milk. Tazorac Counseling:  Patient advised that medication is irritating and drying.  Patient may need to apply sparingly and wash off after an hour before eventually leaving it on overnight.  The patient verbalized understanding of the proper use and possible adverse effects of tazorac.  All of the patient's questions and concerns were addressed. Isotretinoin Counseling: Patient should get monthly blood tests, not donate blood, not drive at night if vision affected, not share medication, and not undergo elective surgery for 6 months after tx completed. Side effects reviewed, pt to contact office should one occur. Spironolactone Counseling: Patient advised regarding risks of diarrhea, abdominal pain, hyperkalemia, birth defects (for female patients), liver toxicity and renal toxicity. The patient may need blood work to monitor liver and kidney function and potassium levels while on therapy. The patient verbalized understanding of the proper use and possible adverse effects of spironolactone.  All of the patient's questions and concerns were addressed. Birth Control Pills Counseling: Birth Control Pill Counseling: I discussed with the patient the potential side effects of OCPs including but not limited to increased risk of stroke, heart attack, thrombophlebitis, deep venous thrombosis, hepatic adenomas, breast changes, GI upset, headaches, and depression.  The patient verbalized understanding of the proper use and possible adverse effects of OCPs. All of the patient's questions and concerns were addressed. Topical Clindamycin Pregnancy And Lactation Text: This medication is Pregnancy Category B and is considered safe during pregnancy. It is unknown if it is excreted in breast milk. Dapsone Pregnancy And Lactation Text: This medication is Pregnancy Category C and is not considered safe during pregnancy or breast feeding. Birth Control Pills Pregnancy And Lactation Text: This medication should be avoided if pregnant and for the first 30 days post-partum. Tetracycline Counseling: Patient counseled regarding possible photosensitivity and increased risk for sunburn.  Patient instructed to avoid sunlight, if possible.  When exposed to sunlight, patients should wear protective clothing, sunglasses, and sunscreen.  The patient was instructed to call the office immediately if the following severe adverse effects occur:  hearing changes, easy bruising/bleeding, severe headache, or vision changes.  The patient verbalized understanding of the proper use and possible adverse effects of tetracycline.  All of the patient's questions and concerns were addressed. Patient understands to avoid pregnancy while on therapy due to potential birth defects. Topical Sulfur Applications Counseling: Topical Sulfur Counseling: Patient counseled that this medication may cause skin irritation or allergic reactions.  In the event of skin irritation, the patient was advised to reduce the amount of the drug applied or use it less frequently.   The patient verbalized understanding of the proper use and possible adverse effects of topical sulfur application.  All of the patient's questions and concerns were addressed. Doxycycline Pregnancy And Lactation Text: This medication is Pregnancy Category D and not consider safe during pregnancy. It is also excreted in breast milk but is considered safe for shorter treatment courses. Detail Level: Detailed no

## 2025-02-18 NOTE — PROGRESS NOTE ADULT - SUBJECTIVE AND OBJECTIVE BOX
[facility] -- Halima Hayes, Henry Gaona Savella, Goodger, Cohen  Office # [phone]    Follow Up: +FLU,  multifocal PNA on HFNC     Subjective/Observations: seen and examined, awake, unable to provide meaningful information at this time, NAD, resting in bed, wife at bedside, on HFNC,      REVIEW OF SYSTEMS: All other review of systems is negative unless indicated above  PAST MEDICAL & SURGICAL HISTORY:  Aortic stenosis      BPH (benign prostatic hyperplasia)      Dementia  mild- on Memantine      ACE (dyspnea on exertion)      Asthma  controlled on inhalers      HLD (hyperlipidemia)      CAD (coronary artery disease)  s/p stent 10/2019      OH (ocular hypertension)  on eye drops      Overactive bladder      Anxiety      History of tonsillectomy  childhood      H/O coronary angiogram  10/25/19, s/p PCI to LAD        MEDICATIONS  (STANDING):  albuterol/ipratropium for Nebulization 3 milliLiter(s) Nebulizer every 6 hours  aspirin enteric coated 81 milliGRAM(s) Oral daily  bisacodyl 5 milliGRAM(s) Oral at bedtime  budesonide  80 MICROgram(s)/formoterol 4.5 MICROgram(s) Inhaler 2 Puff(s) Inhalation two times a day  cefTRIAXone   IVPB 1000 milliGRAM(s) IV Intermittent every 24 hours  clopidogrel Tablet 75 milliGRAM(s) Oral daily  dextrose 5%. 1000 milliLiter(s) (30 mL/Hr) IV Continuous <Continuous>  finasteride 5 milliGRAM(s) Oral daily  heparin   Injectable 5000 Unit(s) SubCutaneous every 12 hours  latanoprost 0.005% Ophthalmic Solution 1 Drop(s) Both EYES at bedtime  melatonin 10 milliGRAM(s) Oral at bedtime  methylPREDNISolone sodium succinate Injectable 40 milliGRAM(s) IV Push daily  mirabegron ER 25 milliGRAM(s) Oral at bedtime  montelukast 10 milliGRAM(s) Oral daily  oseltamivir 75 milliGRAM(s) Oral two times a day  pantoprazole  Injectable 40 milliGRAM(s) IV Push daily  risperiDONE   Tablet 1 milliGRAM(s) Oral at bedtime  sodium chloride 3%  Inhalation 4 milliLiter(s) Inhalation every 12 hours  tiotropium 2.5 MICROgram(s) Inhaler 2 Puff(s) Inhalation daily    MEDICATIONS  (PRN):  acetaminophen     Tablet .. 650 milliGRAM(s) Oral every 6 hours PRN Temp greater or equal to 38C (100.4F), Mild Pain (1 - 3)  albuterol    90 MICROgram(s) HFA Inhaler 2 Puff(s) Inhalation every 6 hours PRN Shortness of Breath and/or Wheezing  aluminum hydroxide/magnesium hydroxide/simethicone Suspension 30 milliLiter(s) Oral every 4 hours PRN Dyspepsia  ondansetron Injectable 4 milliGRAM(s) IV Push every 8 hours PRN Nausea and/or Vomiting    Allergies    No Known Allergies    Intolerances      Vital Signs Last 24 Hrs  T(C): 37.1 (01 Apr 2024 05:05), Max: 37.1 (31 Mar 2024 20:21)  T(F): 98.8 (01 Apr 2024 05:05), Max: 98.8 (31 Mar 2024 20:21)  HR: 90 (01 Apr 2024 07:50) (74 - 92)  BP: 143/79 (01 Apr 2024 05:05) (113/66 - 143/79)  BP(mean): --  RR: 18 (01 Apr 2024 05:05) (18 - 18)  SpO2: 92% (01 Apr 2024 07:50) (92% - 97%)    Parameters below as of 01 Apr 2024 08:35  Patient On (Oxygen Delivery Method): nasal cannula, high flow      I&O's Summary    31 Mar 2024 07:01  -  01 Apr 2024 07:00  --------------------------------------------------------  IN: 50 mL / OUT: 1600 mL / NET: -1550 mL          TELE: SR /ST 80 up to 110's   PHYSICAL EXAM:  Constitutional: NAD, awake and alert  HEENT: Moist Mucous Membranes, Anicteric  Pulmonary: Non-labored, breath sounds + rhonchi, wheeze   Cardiovascular: Regular, S1 and S2, No murmurs, rubs, gallops or clicks  Gastrointestinal: Bowel Sounds present, soft, nontender.   Lymph: No peripheral edema. No lymphadenopathy.  Skin: No visible rashes or ulcers.  Psych: unable to assess  LABS: All Labs Reviewed:                        14.1   13.18 )-----------( 173      ( 01 Apr 2024 06:47 )             41.2                         12.7   11.92 )-----------( 158      ( 31 Mar 2024 08:13 )             37.7                         13.3   13.10 )-----------( 147      ( 30 Mar 2024 10:39 )             39.2     01 Apr 2024 06:47    147    |  110    |  26     ----------------------------<  124    3.5     |  31     |  0.80   31 Mar 2024 08:13    145    |  107    |  21     ----------------------------<  140    3.9     |  31     |  0.70   30 Mar 2024 10:39    143    |  109    |  24     ----------------------------<  195    3.9     |  27     |  0.94     Ca    9.2        01 Apr 2024 06:47  Ca    8.3        31 Mar 2024 08:13  Ca    8.0        30 Mar 2024 10:39  Mg     2.1       31 Mar 2024 08:13    TPro  6.3    /  Alb  3.0    /  TBili  0.6    /  DBili  x      /  AST  71     /  ALT  61     /  AlkPhos  64     01 Apr 2024 06:47  TPro  5.7    /  Alb  2.8    /  TBili  0.4    /  DBili  x      /  AST  70     /  ALT  45     /  AlkPhos  55     31 Mar 2024 08:13  TPro  5.6    /  Alb  2.7    /  TBili  0.2    /  DBili  x      /  AST  82     /  ALT  42     /  AlkPhos  53     30 Mar 2024 10:39    PTT - ( 30 Mar 2024 10:39 )  PTT:30.1 sec  CARDIAC MARKERS ( 31 Mar 2024 08:13 )  x     / x     / 937 U/L / x     / x          12 Lead ECG:   Ventricular Rate 89 BPM    Atrial Rate 89 BPM    P-R Interval 178 ms    QRS Duration 136 ms    Q-T Interval 444 ms    QTC Calculation(Bazett) 540 ms    P Axis 51 degrees    R Axis -13 degrees    T Axis 182 degrees    Diagnosis Line Normal sinus rhythm  Left ventricular hypertrophy with QRS widening and repolarization abnormality ( Georges product )  Abnormal ECG  When compared with ECG of 29-MAR-2024 08:32,  Left bundle branch block is no longer present  Confirmed by Gaudencio Varghese MD ([id number]) on3/31/2024 2:43:51 PM (03-30-24 @ 09:22)      Patient name: MARLON KRUEGER  YOB: 1940   Age: 82 (M)   MR#: [id number]  Study Date: 6/23/2022  Location: O/PSonographer: Neha [facility]  Study quality: Technically fair  Referring Physician: Analy Coreas MD  Blood Pressure: 156/82 mmHg  Height: 170 cm  Weight: 85 kg  BSA: 2 m2  Heart Rate: 77 mmHg  ------------------------------------------------------------------------  PROCEDURE: Transthoracic echocardiogram with 2-D, M-Mode  and complete spectral and color flow Doppler.  INDICATION: Atherosclerotic heart disease of native  coronary artery without angina pectoris (I25.10)  ------------------------------------------------------------------------  Dimensions:    Normal Values:  LA:     3.4    2.0 - 4.0 cm  Ao: 2.7    2.0 - 3.8 cm  SEPTUM: 0.9    0.6 - 1.2 cm  PWT:    0.9    0.6 - 1.1 cm  LVIDd:  4.4    3.0 - 5.6 cm  LVIDs:  3.0    1.8 - 4.0 cm  Derived variables:  LVMI: 65 g/m2  RWT: 0.40  Fractional short: 32 %  EF (Rosado Rule): 58 %Doppler Peak Velocity (m/sec):  AoV=1.7  ------------------------------------------------------------------------  Observations:  Mitral Valve: Mitral annular calcification, otherwise  normal mitral valve. Minimal mitral regurgitation.  Aortic Valve/Aorta: Transcatheteraortic valve replacement.  The vlave is well seated. The prosthetic valve leaflets are  not well visualized.  Peak transaortic valve gradient  equals 12 mm Hg, mean transaortic valve gradient equals 5  mm Hg, the DI is 0.58 which is probably normal in the  presence of a transcatheter aortic valve replacement. No  aortic transvalvular or paravalvular regurgitation seen.  Peak left ventricular outflow tract gradient equals 3 mm  Hg, mean gradient is equal to 1 mm Hg, LVOT velocity time  integral equals 14 cm.  Aortic Root: 2.7 cm.  Left Atrium: Normal left atrium.  LA volume index = 32  cc/m2.  Left Ventricle: Normal left ventricular systolic function.  No segmental wall motion abnormalities. Normal left  ventricular internal dimensions and wall thicknesses. Mild  diastolic dysfunction (Stage I).  Right Heart: Normal right atrium. Normal right ventricular  size and function. Normal tricuspid valve. Minimal  tricuspid regurgitation. Normal pulmonic valve.  Pericardium/Pleura: Normal pericardium with no pericardial  effusion.  Hemodynamic: Estimated right ventricular systolic pressure  equals 22 mm Hg, assuming right atrial pressure equals 3 mm  Hg, consistent with normal pulmonary pressures. Color  Doppler demonstrates no evidence of a patent foramen ovale.  ------------------------------------------------------------------------  Conclusions:  1. Mitral annular calcification, otherwise normal mitral  valve. Minimal mitral regurgitation.  2. Transcatheter aortic valve replacement. The vlave is  well seated. The prosthetic valve leaflets are not well  visualized.  Peak transaortic valve gradient equals 12 mm  Hg, mean transaortic valve gradient equals 5 mm Hg, the DI  is 0.58 which is probably normal in the presence of a  transcatheter aortic valve replacement. No aortic  transvalvular or paravalvular regurgitation seen.  3. Normal left ventricular systolic function. No segmental  wall motion abnormalities.  4. Mild diastolic dysfunction (Stage I).  5. Normal right ventricularsize and function.  *** Compared with echocardiogram of 12/19/2019, no  significant changes noted.  ------------------------------------------------------------------------  Confirmed on  6/24/2022 - 08:29:28 by Karl Ramsey M.D.  ------------------------------------------------------------------------      1